# Patient Record
Sex: FEMALE | Race: BLACK OR AFRICAN AMERICAN | NOT HISPANIC OR LATINO | Employment: UNEMPLOYED
[De-identification: names, ages, dates, MRNs, and addresses within clinical notes are randomized per-mention and may not be internally consistent; named-entity substitution may affect disease eponyms.]

---

## 2017-01-03 ENCOUNTER — RECORDS - HEALTHEAST (OUTPATIENT)
Dept: ADMINISTRATIVE | Facility: OTHER | Age: 27
End: 2017-01-03

## 2017-01-12 ENCOUNTER — TRANSFERRED RECORDS (OUTPATIENT)
Dept: HEALTH INFORMATION MANAGEMENT | Facility: CLINIC | Age: 27
End: 2017-01-12

## 2017-01-12 ENCOUNTER — OFFICE VISIT - HEALTHEAST (OUTPATIENT)
Dept: PODIATRY | Facility: CLINIC | Age: 27
End: 2017-01-12

## 2017-01-12 DIAGNOSIS — M72.2 PLANTAR FASCIITIS: ICD-10-CM

## 2017-01-16 ENCOUNTER — RECORDS - HEALTHEAST (OUTPATIENT)
Dept: ADMINISTRATIVE | Facility: OTHER | Age: 27
End: 2017-01-16

## 2017-02-03 ENCOUNTER — TELEPHONE (OUTPATIENT)
Dept: FAMILY MEDICINE | Facility: CLINIC | Age: 27
End: 2017-02-03

## 2017-02-03 ENCOUNTER — AMBULATORY - HEALTHEAST (OUTPATIENT)
Dept: PODIATRY | Facility: CLINIC | Age: 27
End: 2017-02-03

## 2017-02-03 NOTE — TELEPHONE ENCOUNTER
Pt c/o white discharge, odor, and irritation. Been going on for 2 wks now. Pt would like metronidazole sent to her pharmacy. Pt states she has had BV before and is having the same symptoms. Advised pt that I will send this to DR. Ochoa but she may have to come in.     Routed to Dr. Ochoa. /HOMERO Rios

## 2017-02-03 NOTE — TELEPHONE ENCOUNTER
[facility] phone call message- patient requesting a refill:    Full Medication Name: METRONIDAZOLE (FLAGYL) 500 MG tablet     *    Pharmacy confirmed as   [facility] - [place], [place] - [address]  [address]  [place] [address]  Phone: [phone] Fax: [phone]  : Yes    Additional Comments: Pt got her BV back again.  She is wondering if the doctor will call the refill in.    OK to leave a message on voice mail? Yes    Primary language: English      needed? No    Call taken on February 3, 2017 at 11:13 AM by Karel Darnell

## 2017-02-05 NOTE — TELEPHONE ENCOUNTER
She should come in. I think she needs a referral to OB/Gyn for recurrent BV but it needs to be confirmed with a wet prep.     Thank you.

## 2017-02-06 ENCOUNTER — OFFICE VISIT (OUTPATIENT)
Dept: FAMILY MEDICINE | Facility: CLINIC | Age: 27
End: 2017-02-06

## 2017-02-06 VITALS
SYSTOLIC BLOOD PRESSURE: 120 MMHG | HEIGHT: 63 IN | BODY MASS INDEX: 11.16 KG/M2 | DIASTOLIC BLOOD PRESSURE: 89 MMHG | WEIGHT: 63 LBS | HEART RATE: 89 BPM

## 2017-02-06 DIAGNOSIS — M72.2 PLANTAR FASCIITIS: ICD-10-CM

## 2017-02-06 DIAGNOSIS — B96.89 BV (BACTERIAL VAGINOSIS): ICD-10-CM

## 2017-02-06 DIAGNOSIS — R87.612 PAPANICOLAOU SMEAR OF CERVIX WITH LOW GRADE SQUAMOUS INTRAEPITHELIAL LESION (LGSIL): ICD-10-CM

## 2017-02-06 DIAGNOSIS — N89.8 VAGINAL DISCHARGE: Primary | ICD-10-CM

## 2017-02-06 DIAGNOSIS — N76.0 BV (BACTERIAL VAGINOSIS): ICD-10-CM

## 2017-02-06 DIAGNOSIS — R87.610 ATYPICAL SQUAMOUS CELLS OF UNDETERMINED SIGNIFICANCE ON CYTOLOGIC SMEAR OF CERVIX (ASC-US): ICD-10-CM

## 2017-02-06 LAB
BACTERIA: NORMAL
CLUE CELLS: NORMAL
MOTILE TRICHOMONAS: NEGATIVE
ODOR: POSITIVE
PH WET PREP: NORMAL
WBC WET PREP: NORMAL
YEAST: NORMAL

## 2017-02-06 RX ORDER — CLINDAMYCIN HCL 300 MG
300 CAPSULE ORAL 2 TIMES DAILY
Qty: 14 CAPSULE | Refills: 0 | Status: SHIPPED | OUTPATIENT
Start: 2017-02-06 | End: 2017-02-13

## 2017-02-06 NOTE — PATIENT INSTRUCTIONS
Kee was seen today for vaginal problem, back pain and musculoskeletal problem.    Diagnoses and all orders for this visit:    Vaginal discharge  -     Wet Prep (P )  -     Chlamydia/Gono Amplified ([facility])    Plantar fasciitis  -     SPORTS MEDICINE REFERRAL  - Do the stretching like we discussed. Stretch for 30-60 seconds for 5-10 repetitions each session (do this for each leg). Do this 3-4 times per day.     Drink at least 64 ounces of water per day.   Increased your fruit and vegetable intake as this has nutrients that will help your body recover.     Please return to clinic in 2-3 months to recheck, sooner as needed.     Thank you for coming to [facility].  **If you had lab testing today and your results are reassuring or normal they will be be mailed to you within 7 days.   **If the lab tests need quick action we will call you with the results.  The phone number we will call with results is # [phone] (home) . If this is not the best number please call our clinic and change the number.  If you need any refills please call your pharmacy and they will contact us.  If you have any concerns about today's visit or wish to schedule another appointment please call our office during normal business hours [phone] (8-5:00 M-F)  If you have urgent medical concerns please call [phone] at any time of the day.  If you a medical emergency please call [phone]  Again thank you for choosing [facility] and please let us know how we can best partner with you to improve you and your family's health.        Treating Plantar Fasciitis  First, your healthcare provider tries to determine the cause of your problem in order to suggest ways to relieve pain. If your pain is due to poor foot mechanics, custom-made shoe inserts (orthoses) may help.    Reduce symptoms    To relieve mild symptoms, try aspirin, ibuprofen, or other medicines as directed. Rubbing ice on the affected area may also help.    To reduce  severe pain and swelling, your healthcare provider may prescribe pills or injections or a walking cast in some instances. Physical therapy, such as ultrasound or a daily stretching program, may also be recommended. Surgery is rarely required.    To reduce symptoms caused by poor foot mechanics, your foot may be taped. This supports the arch and temporarily controls movement. Night splints may also help by stretching the fascia.  Control movement  If taping helps, your healthcare provider may prescribe orthoses. Built from plaster casts of your feet, these inserts control the way your foot moves. As a result, your symptoms should go away.  Reduce overuse  Every time your foot strikes the ground, the plantar fascia is stretched. You can reduce the strain on the plantar fascia and the possibility of overuse by following these suggestions:    Lose any excess weight.    Avoid running on hard or uneven ground.    Use orthoses at all times in your shoes and house slippers.  If surgery is needed  Your healthcare provider may consider surgery if other types of treatment don't control your pain. During surgery, the plantar fascia is partially cut to release tension. As you heal, fibrous tissue fills the space between the heel bone and the plantar fascia.     8219-4905 [employer]. [address] [place] [address]. All rights reserved. This information is not intended as a substitute for professional medical care. Always follow your healthcare professional's instructions.      ORTHOPEDIC REFERRAL  Orders and demographics faxed to:  [facility]  Ph:  [phone]  Fax:  [phone]  Patient requested that they call her to schedule.  Macey Barrow  2/6/17      Your referral information is below:     [facility] [address], [address]  [place] [address]   [phone]  Please call to reschedule your appointment with OB/GYN.     If you  have any questions or need to reschedule please call the number listed above.  Any other concerns please call our referral coordinator at [phone]    Natalie  Care Coordinator     MAILED TO PATIENT     Message sent to Dr Peng and Gabriela about numerouse attempts to schedule pt with referrals. Natalie Segura 4:02 PM 2/10/2017

## 2017-02-06 NOTE — MR AVS SNAPSHOT
After Visit Summary   2/6/2017    Kee Phipps    MRN: [id number]           Patient Information     Date Of Birth          1990        Visit Information        Provider Department      2/6/2017 10:20 AM Jong Peng MD [facility]        Today's Diagnoses     Vaginal discharge    -  1     Plantar fasciitis         Atypical squamous cells of undetermined significance on cytologic smear of cervix (ASC-US)           Care Instructions    Kee was seen today for vaginal problem, back pain and musculoskeletal problem.    Diagnoses and all orders for this visit:    Vaginal discharge  -     Wet Prep ([facility] )  -     Chlamydia/Gono Amplified ([facility])    Plantar fasciitis  -     SPORTS MEDICINE REFERRAL  - Do the stretching like we discussed. Stretch for 30-60 seconds for 5-10 repetitions each session (do this for each leg). Do this 3-4 times per day.     Drink at least 64 ounces of water per day.   Increased your fruit and vegetable intake as this has nutrients that will help your body recover.     Please return to clinic in 2-3 months to recheck, sooner as needed.     Thank you for coming to [facility].  **If you had lab testing today and your results are reassuring or normal they will be be mailed to you within 7 days.   **If the lab tests need quick action we will call you with the results.  The phone number we will call with results is # [phone] (home) . If this is not the best number please call our clinic and change the number.  If you need any refills please call your pharmacy and they will contact us.  If you have any concerns about today's visit or wish to schedule another appointment please call our office during normal business hours [phone] (8-5:00 M-F)  If you have urgent medical concerns please call [phone] at any time of the day.  If you a medical emergency please call [phone]  Again thank you for choosing [facility] and please let us know how we can best  partner with you to improve you and your family's health.        Treating Plantar Fasciitis  First, your healthcare provider tries to determine the cause of your problem in order to suggest ways to relieve pain. If your pain is due to poor foot mechanics, custom-made shoe inserts (orthoses) may help.    Reduce symptoms    To relieve mild symptoms, try aspirin, ibuprofen, or other medicines as directed. Rubbing ice on the affected area may also help.    To reduce severe pain and swelling, your healthcare provider may prescribe pills or injections or a walking cast in some instances. Physical therapy, such as ultrasound or a daily stretching program, may also be recommended. Surgery is rarely required.    To reduce symptoms caused by poor foot mechanics, your foot may be taped. This supports the arch and temporarily controls movement. Night splints may also help by stretching the fascia.  Control movement  If taping helps, your healthcare provider may prescribe orthoses. Built from plaster casts of your feet, these inserts control the way your foot moves. As a result, your symptoms should go away.  Reduce overuse  Every time your foot strikes the ground, the plantar fascia is stretched. You can reduce the strain on the plantar fascia and the possibility of overuse by following these suggestions:    Lose any excess weight.    Avoid running on hard or uneven ground.    Use orthoses at all times in your shoes and house slippers.  If surgery is needed  Your healthcare provider may consider surgery if other types of treatment don't control your pain. During surgery, the plantar fascia is partially cut to release tension. As you heal, fibrous tissue fills the space between the heel bone and the plantar fascia.     5440-7295 [employer]. [address] [place] [address], [place] [address]. All rights reserved. This information is not intended as a substitute for professional medical care. Always follow your healthcare  professional's instructions.              Follow-ups after your visit        Additional Services     SPORTS MEDICINE REFERRAL       Your provider has referred you to:  [facility]---Appointments: [phone]    Please be aware that coverage of these services is subject to the terms and limitations of your health insurance plan.  Call member services at your health plan with any benefit or coverage questions.      Please bring the following to your appointment:    >>   Any x-rays, CTs or MRIs which have been performed.  Contact the facility where they were done to arrange for  prior to your scheduled appointment.  Any new CT, MRI or other procedures ordered by your specialist must be performed at a [facility] or coordinated by your clinic's referral office.    >>   List of current medications   >>   This referral request   >>   Any documents/labs given to you for this referral                  Who to contact     Please call your clinic at [phone] to:    Ask questions about your health    Make or cancel appointments    Discuss your medicines    Learn about your test results    Speak to your doctor   If you have compliments or concerns about an experience at your clinic, or if you wish to file a complaint, please contact [facility] Patient Relations at [phone] or email us at [email or web address]         Additional Information About Your Visit        [employer] Information     [employer] gives you secure access to your electronic health record. If you see a primary care provider, you can also send messages to your care team and make appointments. If you have questions, please call your primary care clinic.  If you do not have a primary care provider, please call [phone] and they will assist you.      [employer] is an electronic gateway that provides easy, online access to your medical records. With [employer], you can request a clinic appointment, read your  "test results, renew a prescription or communicate with your care team.     To access your existing account, please contact your [facility] or call [phone] for assistance.        Care EveryWhere ID     This is your Care EveryWhere ID. This could be used by other organizations to access your [facility] medical records  [id number]        Your Vitals Were     Pulse Height BMI (Body Mass Index)             89 5' 3\" (160 cm) 11.16 kg/m2          Blood Pressure from Last 3 Encounters:   02/06/17 120/89   12/28/16 129/82   12/21/16 118/80    Weight from Last 3 Encounters:   02/06/17 63 lb (28.577 kg)   12/28/16 168 lb 12.8 oz (76.567 kg)   11/23/16 170 lb 9.6 oz (77.384 kg)              We Performed the Following     Chlamydia/Gono Amplified ([facility])     GYN Cytology ([facility])     SPORTS MEDICINE REFERRAL     Wet Prep ([facility])        Primary Care Provider Office Phone # Fax #    Dayna Ochoa [phone]       [address] [address]        Thank you!     Thank you for choosing [facility]  for your care. Our goal is always to provide you with excellent care. Hearing back from our patients is one way we can continue to improve our services. Please take a few minutes to complete the written survey that you may receive in the mail after your visit with us. Thank you!             Your Updated Medication List - Protect others around you: Learn how to safely use, store and throw away your medicines at www.disposemymeds.org.          This list is accurate as of: 2/6/17 11:23 AM.  Always use your most recent med list.                   Brand Name Dispense Instructions for use    acetaminophen 500 MG tablet    TYLENOL    100 tablet    Take 2 tablets (1,000 mg) by mouth 3 times daily       dextromethorphan 15 MG/5ML syrup      Take 10 mLs by mouth 4 times daily as needed       diphenhydrAMINE 25 MG tablet    BENADRYL    60 tablet    Take 1-2 " tablets (25-50 mg) by mouth every 6 hours as needed for allergies or other       gabapentin 300 MG capsule    NEURONTIN    180 capsule    Take 2 capsules (600 mg) by mouth 2 times daily 2 in the morning and 1 tablet at night       metroNIDAZOLE 500 MG tablet    FLAGYL    14 tablet    Take 1 tablet (500 mg) by mouth 2 times daily       norgestim-eth estrad triphasic 0.18/0.215/0.25 MG-35 MCG per tablet    TRINESSA (28)    28 tablet    Take 1 tablet by mouth daily       PREDNISONE PO      Take 10 mg by mouth       PRENATAL VITAMINS PLUS 27-1 MG Tabs     90 tablet    Take 1 tablet by mouth daily       SUMAtriptan 50 MG tablet    IMITREX    18 tablet    Take 1 tablet (50 mg) by mouth at onset of headache for migraine May repeat in 2 hours. Max 8 tablets/24 hours.

## 2017-02-06 NOTE — PROGRESS NOTES
OFFICE VISIT    ASSESSMENT/PLAN:   Kee was seen today for vaginal problem, back pain and musculoskeletal problem.    Diagnoses and all orders for this visit:    Vaginal discharge: patient update regarding wet prep. Clindamycin prescription sent as patient has been taking metronidazole only thus far.   -     Wet Prep (P )  -     Chlamydia/Gono Amplified ([facility])    Plantar fasciitis: will schedule an appointment with sports medicine for further assistance in cares.   -     SPORTS MEDICINE REFERRAL  - Do the stretching like we discussed. Stretch for 30-60 seconds for 5-10 repetitions each session (do this for each leg). Do this 3-4 times per day.     Drink at least 64 ounces of water per day.   Increased your fruit and vegetable intake as this has nutrients that will help your body recover.     Please return to clinic in 2-3 months to recheck, sooner as needed.       SUBJECTIVE: 26 year old female with history of plantar fasciitis, fybromyalgia, celiac disease, Crohn's disease presenting with concerns for bacterial infection of vagina, back pain, and foot pain.     Vaginal concern: Thinks she may have a bacterial infection again. Discharge looks milky and turns yellow/brown. No itching. Has a cold right now for the last three days. Taking nyquil and dayquil. She feels weak and tired. Getting rest. Also has stuffy/runny nose.  She wonders if the BV could be caused by intercourse. She was avoid use of soap in the vaginal area for a while and this seemed to go okay until she started to use the soap again and that was when this episode happened. She usually takes metronidazole for the BV. She does not think it is a urine issue. Denies fevers.     Back pain: the whole back is hurting. Was hurting before she got sick. 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 It is the fibromyalgia acting up again. The follows with a rheumatologist for this and getting treatment  "from them. Denies stool/urine incontinence    Foot pain: present on both feet. Thinks it is plantar fasciitis. Got cortisone shot in the feet and has custom shoe inserts from her podiatrist. She has tried stretching multiple ways and these do not seem to help. 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 She was a MFIP form filled out to state that she should not work on her feet for long periods of time, which provider thinks is reasonable. Patient does want to work and is hoping she can find a desk job, such as , as she is good at talking with people. The medical opinion form was completed, original given to patient and a copy was faxed to . The form will last for three months at which time the patient should be re-evaluated.     The 10 point ROS is negative except as stated in the HPI.    OBJECTIVE:   /89 mmHg  Pulse 89  Ht 5' 3\" (160 cm)  Wt 63 lb (28.577 kg)  BMI 11.16 kg/m2  GENERAL: No acute distress. Cooperative. Well-appearing.  HEENT: Normocephalic atraumatic. No conjunctival injection or scleral icterus. Nares patent without rhinorrhea. Oral mucosa is moist  CARDIO: Regular rate and rhythm. S1 S2 present. No murmurs, gallops, or rubs.  RESP: Clear to auscultation bilaterally. No wheezing, rales or rhonchi. Good breath sounds throughout. No use of intercostal or other accessory muscles to breathe.  ABDO: Positive bowel sounds. Non-distended. Some tenderness to palpation of the pelvic region at midline and just right of midline. No guarding. No rebound tenderness.  EXTR: Moves all limbs independently. Bilateral upper extremity strength 5/5 in adduction, abduction, flexion, and extension. Bilateral lower extremity strength 5/5 in adduction, abduction, flexion, and extension. No lower extremity edema x 2.  : external vaginal area appears normal. " There is moderate amounts of thick, white discharge. Vaginal vault and cervix appears normal and without tenderness to exam. Uterus does not appear enlarged.     Discussed with Dr. Simmons who agrees with the above assessment and plan.    Jong Peng MD

## 2017-02-07 LAB
C TRACH RRNA CVX QL NAA+PROBE: NEGATIVE
N GONORRHOEA RRNA SPEC QL NAA+PROBE: NEGATIVE

## 2017-02-07 ASSESSMENT — PATIENT HEALTH QUESTIONNAIRE - PHQ9: SUM OF ALL RESPONSES TO PHQ QUESTIONS 1-9: 24

## 2017-02-07 NOTE — PROGRESS NOTES
Preceptor attestation:  Patient seen and discussed with the resident. Assessment and plan reviewed with resident and agreed upon.  Supervising physician: Vance Simmons  [facility]

## 2017-02-09 ENCOUNTER — RECORDS - HEALTHEAST (OUTPATIENT)
Dept: ADMINISTRATIVE | Facility: OTHER | Age: 27
End: 2017-02-09

## 2017-02-09 LAB
HIGH RISK?: NO
HPV REFLEX?: NO
LAB AP ABNORMAL BLEEDING: NO
LAB AP BIRTH CONTROL/HORMONES: ABNORMAL
LAB AP CASE REPORT: ABNORMAL
LAB AP CERVICAL APPEARANCE: NORMAL
LAB AP GYN INTERPRETATION: ABNORMAL
LAB AP MALIGNANT?: ABNORMAL
LAB AP PATIENT STATUS: ABNORMAL
LAB AP PREVIOUS ABNL: 2015
LAB AP PREVIOUS NORMAL: ABNORMAL
LAST MENS PERIOD: ABNORMAL
SPECIMEN ADEQUACY:: ABNORMAL

## 2017-02-10 NOTE — PROGRESS NOTES
Quick Note:    Spoke with patient on phone 2/10 - recommended going to OB/Gyn for potential colp among other  issues (recurrent BV and pelvic pain). Will send in new referral. She understands plan.  ______

## 2017-02-13 ENCOUNTER — TRANSFERRED RECORDS (OUTPATIENT)
Dept: HEALTH INFORMATION MANAGEMENT | Facility: CLINIC | Age: 27
End: 2017-02-13

## 2017-02-15 ENCOUNTER — TELEPHONE (OUTPATIENT)
Dept: FAMILY MEDICINE | Facility: CLINIC | Age: 27
End: 2017-02-15

## 2017-02-15 DIAGNOSIS — Z30.41 ENCOUNTER FOR SURVEILLANCE OF CONTRACEPTIVE PILLS: Primary | ICD-10-CM

## 2017-02-16 ENCOUNTER — TRANSFERRED RECORDS (OUTPATIENT)
Dept: HEALTH INFORMATION MANAGEMENT | Facility: CLINIC | Age: 27
End: 2017-02-16

## 2017-02-16 NOTE — TELEPHONE ENCOUNTER
Triaged pt yesterday. Pt states she is on birth control pills and doesn't want to have periods. Discussed taking 3 wks of the pack and on the fourth wk, start a new pack to avoid having a period. Advised pt that I will ask her MD to see if this is something she should or not should not do long term. Please advise. If okay to do this, please rewrite sig on birth control rx.     Routed to Dr. Ochoa.   /HOMERO Rios

## 2017-02-19 RX ORDER — DROSPIRENONE AND ETHINYL ESTRADIOL 0.02-3(28)
1 KIT ORAL DAILY
Qty: 84 TABLET | Refills: 1 | Status: SHIPPED
Start: 2017-02-19 | End: 2017-04-17

## 2017-02-20 NOTE — TELEPHONE ENCOUNTER
Sent in prescription for joyce - 3 month at a time.   Please let patient know and advise her that we can do IUD or nexplanon which can sometimes reduce periods and protects her from getting pregnant for 3-5 years.   Dr. Ochoa

## 2017-02-20 NOTE — TELEPHONE ENCOUNTER
Gave msg to pt. Pt states she prefers to continue to take the pill.     Routed to Dr. Ochoa/HOMERO Rios

## 2017-02-21 DIAGNOSIS — G89.29 OTHER CHRONIC PAIN: ICD-10-CM

## 2017-02-21 RX ORDER — GABAPENTIN 300 MG/1
600 CAPSULE ORAL 2 TIMES DAILY
Qty: 180 CAPSULE | Refills: 1 | Status: SHIPPED
Start: 2017-02-21 | End: 2017-04-17

## 2017-02-28 ENCOUNTER — TELEPHONE (OUTPATIENT)
Dept: FAMILY MEDICINE | Facility: CLINIC | Age: 27
End: 2017-02-28

## 2017-02-28 NOTE — TELEPHONE ENCOUNTER
[facility] phone call message- patient requesting results:    Test: Lab and PAP    Date of test:     Additional Comments: The pt is at the appointment that she needed the results for     OK to leave a message on voice mail? Yes    Primary language: English      needed? No    Call taken on February 28, 2017 at 11:05 AM by Nahum Lawrence

## 2017-03-03 ENCOUNTER — TELEPHONE (OUTPATIENT)
Dept: FAMILY MEDICINE | Facility: CLINIC | Age: 27
End: 2017-03-03

## 2017-03-03 NOTE — TELEPHONE ENCOUNTER
Refill request for Methocarbamol 500 mg tablets (Not on patients medication list) please advise.

## 2017-03-09 ENCOUNTER — OFFICE VISIT - HEALTHEAST (OUTPATIENT)
Dept: PODIATRY | Facility: CLINIC | Age: 27
End: 2017-03-09

## 2017-03-09 DIAGNOSIS — M72.2 PLANTAR FASCIITIS: ICD-10-CM

## 2017-03-10 ENCOUNTER — TELEPHONE (OUTPATIENT)
Dept: FAMILY MEDICINE | Facility: CLINIC | Age: 27
End: 2017-03-10

## 2017-03-10 DIAGNOSIS — M79.7 FIBROMYALGIA: Primary | ICD-10-CM

## 2017-03-14 ENCOUNTER — OFFICE VISIT (OUTPATIENT)
Dept: FAMILY MEDICINE | Facility: CLINIC | Age: 27
End: 2017-03-14

## 2017-03-14 VITALS
HEART RATE: 94 BPM | SYSTOLIC BLOOD PRESSURE: 113 MMHG | DIASTOLIC BLOOD PRESSURE: 80 MMHG | OXYGEN SATURATION: 99 % | TEMPERATURE: 97.8 F

## 2017-03-14 DIAGNOSIS — H53.8 BLURRED VISION: ICD-10-CM

## 2017-03-14 DIAGNOSIS — M79.7 FIBROMYALGIA: Primary | ICD-10-CM

## 2017-03-14 RX ORDER — METHOCARBAMOL 750 MG/1
750 TABLET, FILM COATED ORAL 3 TIMES DAILY PRN
Qty: 180 TABLET | Refills: 0 | Status: SHIPPED
Start: 2017-03-14 | End: 2018-09-06

## 2017-03-14 NOTE — MR AVS SNAPSHOT
After Visit Summary   3/14/2017    Kee Phipps    MRN: [id number]           Patient Information     Date Of Birth          1990        Visit Information        Provider Department      3/14/2017 9:00 AM Wesley Ferris DO [facility]        Today's Diagnoses     Fibromyalgia    -  1    Blurred vision          Care Instructions    Thank you for coming to clinic today.  Please do not hesitate to call or return if you have any questions.    1)  new medication, you can increased to 50mg daily in a week or so if you are not having any benefit  2) Schedule appointment with eye doctor  3) Return in 1 month for follow-up, or sooner if you have any new symptoms or concerns    Sincerely,  Dr. Ferris          Follow-ups after your visit        Additional Services     OPHTHALMOLOGY ADULT REFERRAL       Patient to stop at the [facility]    Reason for Referral: blurry vision, hx Crohn's dz     needed: No  Language: English    May leave message on voicemail: Yes    ([place] Only) Referral should be tracked (Yes/No)?                  Future tests that were ordered for you today     Open Future Orders        Priority Expected Expires Ordered    OPHTHALMOLOGY ADULT REFERRAL Routine  5/14/2017 3/14/2017            Who to contact     Please call your clinic at [phone] to:    Ask questions about your health    Make or cancel appointments    Discuss your medicines    Learn about your test results    Speak to your doctor   If you have compliments or concerns about an experience at your clinic, or if you wish to file a complaint, please contact [facility] Patient Relations at [phone] or email us at [email or web address]         Additional Information About Your Visit        MyChart Information     [employer] gives you secure access to your electronic health record. If you see a primary care provider, you can also send messages to your care team and  make appointments. If you have questions, please call your primary care clinic.  If you do not have a primary care provider, please call [phone] and they will assist you.      [employer] is an electronic gateway that provides easy, online access to your medical records. With [employer] you can request a clinic appointment, read your test results, renew a prescription or communicate with your care team.     To access your existing account, please contact your [facility] or call [phone] for assistance.        Care EveryWhere ID     This is your Care EveryWhere ID. This could be used by other organizations to access your [place] medical records  [id number]        Your Vitals Were     Pulse Temperature Last Period Pulse Oximetry          94 97.8  F (36.6  C) (Oral) 03/09/2017 99%         Blood Pressure from Last 3 Encounters:   03/14/17 113/80   02/06/17 120/89   12/28/16 129/82    Weight from Last 3 Encounters:   02/06/17 63 lb (28.6 kg)   12/28/16 168 lb 12.8 oz (76.6 kg)   11/23/16 170 lb 9.6 oz (77.4 kg)                 Today's Medication Changes          These changes are accurate as of: 3/14/17  9:42 AM.  If you have any questions, ask your nurse or doctor.               Start taking these medicines.        Dose/Directions    amitriptyline 25 MG tablet   Commonly known as:  ELAVIL   Used for:  Fibromyalgia   Started by:  Wesley Ferris DO        Dose:  25 mg   Take 1 tablet (25 mg) by mouth At Bedtime   Quantity:  30 tablet   Refills:  1            Where to get your medicines      These medications were sent to [facility] - [place], [place] - [address], [place] [address]     Phone:  [phone]     amitriptyline 25 MG tablet                Primary Care Provider Office Phone # Fax #    Dayna Boxneisha [phone]       [address] [address]        Thank you!     Thank you for choosing  [facility]  for your care. Our goal is always to provide you with excellent care. Hearing back from our patients is one way we can continue to improve our services. Please take a few minutes to complete the written survey that you may receive in the mail after your visit with us. Thank you!             Your Updated Medication List - Protect others around you: Learn how to safely use, store and throw away your medicines at www.disposemymeds.org.          This list is accurate as of: 3/14/17  9:42 AM.  Always use your most recent med list.                   Brand Name Dispense Instructions for use    acetaminophen 500 MG tablet    TYLENOL    100 tablet    Take 2 tablets (1,000 mg) by mouth 3 times daily       amitriptyline 25 MG tablet    ELAVIL    30 tablet    Take 1 tablet (25 mg) by mouth At Bedtime       dextromethorphan 15 MG/5ML syrup      Take 10 mLs by mouth 4 times daily as needed       diphenhydrAMINE 25 MG tablet    BENADRYL    60 tablet    Take 1-2 tablets (25-50 mg) by mouth every 6 hours as needed for allergies or other       drospirenone-ethinyl estradiol 3-0.02 MG per tablet    HELDER    84 tablet    Take 1 tablet by mouth daily       gabapentin 300 MG capsule    NEURONTIN    180 capsule    Take 2 capsules (600 mg) by mouth 2 times daily 2 in the morning and 1 tablet at night       norgestim-eth estrad triphasic 0.18/0.215/0.25 MG-35 MCG per tablet    TRINESSA (28)    28 tablet    Take 1 tablet by mouth daily       PREDNISONE PO      Take 10 mg by mouth       PRENATAL VITAMINS PLUS 27-1 MG Tabs     90 tablet    Take 1 tablet by mouth daily       SUMAtriptan 50 MG tablet    IMITREX    18 tablet    Take 1 tablet (50 mg) by mouth at onset of headache for migraine May repeat in 2 hours. Max 8 tablets/24 hours.
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
## 2017-03-14 NOTE — NURSING NOTE
Bilateral vision screening 10/10  Right vision screening 10/10  Left vision screening 10/10  [facility]

## 2017-03-14 NOTE — PATIENT INSTRUCTIONS
Thank you for coming to clinic today.  Please do not hesitate to call or return if you have any questions.    1)  new medication, you can increased to 50mg daily in a week or so if you are not having any benefit  2) Schedule appointment with eye doctor  3) Return in 1 month for follow-up, or sooner if you have any new symptoms or concerns    Sincerely,  Dr. Ferris    Opthalmology Referral  Unable to LVM- Not able to reach pt to schedule referral Natalie Segura 3:40 PM 3/16/2017      Your opthalmology referral information:  [facility]  [address] [place] [address]  [phone]    DATE: Tuesday, March 28th  TIME: 10:30am with Dr Michael    Arrive a few minutes early for new patient paperwork.  Bring your insurance card and photo ID.  The appointment will last anywhere from 90 minutes-2 hours.  Discussed appointment times with patient, unable to reach her after scheduling the appointment. Appointment information mailed to patient.    If you have any questions or need to help rescheduling this appointment please call us at [phone].    Genoveva

## 2017-03-14 NOTE — PROGRESS NOTES
Preceptor attestation:  Patient seen and discussed with the resident. Assessment and plan reviewed with resident and agreed upon.  Supervising physician: Reyes Fair  [facility]

## 2017-04-17 ENCOUNTER — OFFICE VISIT (OUTPATIENT)
Dept: FAMILY MEDICINE | Facility: CLINIC | Age: 27
End: 2017-04-17

## 2017-04-17 VITALS
OXYGEN SATURATION: 97 % | DIASTOLIC BLOOD PRESSURE: 81 MMHG | SYSTOLIC BLOOD PRESSURE: 118 MMHG | WEIGHT: 172.2 LBS | BODY MASS INDEX: 30.5 KG/M2 | TEMPERATURE: 98.2 F | HEART RATE: 88 BPM

## 2017-04-17 DIAGNOSIS — Z11.3 SCREEN FOR STD (SEXUALLY TRANSMITTED DISEASE): ICD-10-CM

## 2017-04-17 DIAGNOSIS — G89.29 OTHER CHRONIC PAIN: ICD-10-CM

## 2017-04-17 DIAGNOSIS — K90.0 CELIAC DISEASE: Primary | ICD-10-CM

## 2017-04-17 DIAGNOSIS — R07.0 THROAT PAIN: ICD-10-CM

## 2017-04-17 DIAGNOSIS — K50.819 CROHN'S DISEASE OF SMALL AND LARGE INTESTINES WITH COMPLICATION (H): ICD-10-CM

## 2017-04-17 LAB — HIV 1+2 AB+HIV1 P24 AG SERPL QL IA: NEGATIVE

## 2017-04-17 RX ORDER — DIPHENHYDRAMINE HYDROCHLORIDE AND LIDOCAINE HYDROCHLORIDE AND ALUMINUM HYDROXIDE AND MAGNESIUM HYDRO
5-10 KIT EVERY 6 HOURS PRN
Qty: 237 ML | Refills: 1 | Status: SHIPPED | OUTPATIENT
Start: 2017-04-17 | End: 2017-10-12

## 2017-04-17 RX ORDER — CHOLECALCIFEROL (VITAMIN D3) 50 MCG
2000 TABLET ORAL DAILY
Qty: 100 TABLET | Refills: 3 | Status: SHIPPED | OUTPATIENT
Start: 2017-04-17 | End: 2017-10-12

## 2017-04-17 RX ORDER — GABAPENTIN 600 MG/1
600 TABLET ORAL 2 TIMES DAILY
Qty: 120 TABLET | Refills: 3 | COMMUNITY
Start: 2017-04-17 | End: 2017-10-09

## 2017-04-17 RX ORDER — GABAPENTIN 300 MG/1
600 CAPSULE ORAL 2 TIMES DAILY
Qty: 180 CAPSULE | Refills: 1 | Status: SHIPPED | OUTPATIENT
Start: 2017-04-17 | End: 2017-05-24

## 2017-04-17 NOTE — LETTER
April 20, 2017      Kee Phipps  [address]  [place] [address]        Dear Kee,    Please see below for your test results.    Resulted Orders   Syphilis Screen [place] (RPR/VDRL) ([facility])   Result Value Ref Range    Syphilis Screen [place] Non-Reactive Non-Reactive    Narrative    Test performed by:  [facility] [address] [place] [address]   HIV Ag/Ab Screen [place] ([facility])   Result Value Ref Range    HIV Antigen/Antibody Negative Negative    Narrative    Test performed by:  [facility] [address] [place] [address]   Chlamydia/Gono Amplified ([facility])   Result Value Ref Range    Chlamydia trac,Amplified Prb Negative Negative    N gonorrhoeae,Amplified Prb Negative Negative    Narrative    Test performed by:  [facility] [address] [place] [address]     Normal.      If you have any questions, please call the clinic to make an appointment.    Sincerely,    Kyler García MD

## 2017-04-17 NOTE — PROGRESS NOTES
SUBJECTIVE:  Kee Phipps is a 26-year-old who came in today with a couple of concerns:   1.  She would like STI screening.  She was in a relationship (which was monogamous on her part) with a male partner, but she wanted to make sure that she hadn't been exposed to STIs.  She didn't have  unexpected vaginal discharge.  She had vaginal discharge that changed in the last year or so, but there wasn't an increased amount of change in color or odor.   2.  She was concerned about pain in her mouth and she wasn't sure if this represented a flare of Crohn's disease (which she has) or that this could represent a herpes simplex infection or other.  She had history of Crohn's disease and had been on an injectable immunomodulator;  she was off of that when she was pregnant.  She actually had an elective  and then never restarted the immunomodulator.  She was able to get that restarted again through her gastroenterologist.    I reviewed and updated as necessary in Epic, the allergies, medications, and problem list.   REVIEW OF SYSTEMS:  No diarrhea, blood in the stool, or mucus in the stool.  No abdominal pain.  No fevers or chills.  No unexpected weight loss.  No rash.      OBJECTIVE:     VITAL SIGNS:  Noted in Epic.   HEENT:  TMs were normal color and landmarks.  Nasopharynx was pink and moist.  Oropharynx showed pharyngitis and enlarged taste buds on the tongue, but no ulceration was noted at all on the tongue or the oropharynx.   NECK:  Supple, w/out lymphadenopathy.   LUNGS:  Clear to auscultation w/out wheezing or crackles.   HEART:  Regular rate and rhythm.   ABDOMEN:  Mildly obese, soft, and nontender.   GENITOURINARY:  External perineal exam was done with chaperone present.  There was no evidence of herpes simplex virus, genital warts, or other lesion.  Wet prep wasn't done as patient was asymptomatic and patient was up-to-date on Pap smear.   ASSESSMENT AND PLAN:   1.  Pain in the mouth and throat.  This is  most likely pharyngitis.  It could represent an early Crohn's flare, but in my judgment it was mild enough to not warrant treatment w/prednisone if that were the case.  We'll treat with Magic mouthwash.    2.  Screening for STIs.  This was done via visual inspection.  We'll also send for urine GC/chlamydia and serum for VDRL and HIV.  F/u as needed.

## 2017-04-17 NOTE — MR AVS SNAPSHOT
After Visit Summary   4/17/2017    Kee Phipps    MRN: [id number]           Patient Information     Date Of Birth          1990        Visit Information        Provider Department      4/17/2017 10:40 AM Kyler García MD [facility]        Today's Diagnoses     Celiac disease    -  1    Other chronic pain        Crohn's disease of small and large intestines with complication (H)        Screen for STD (sexually transmitted disease)        Throat pain           Follow-ups after your visit        Who to contact     Please call your clinic at [phone] to:    Ask questions about your health    Make or cancel appointments    Discuss your medicines    Learn about your test results    Speak to your doctor   If you have compliments or concerns about an experience at your clinic, or if you wish to file a complaint, please contact [facility] Patient Relations at [phone] or email us at [email or web address]         Additional Information About Your Visit        MyChart Information     [employer] gives you secure access to your electronic health record. If you see a primary care provider, you can also send messages to your care team and make appointments. If you have questions, please call your primary care clinic.  If you do not have a primary care provider, please call [phone] and they will assist you.      [employer] is an electronic gateway that provides easy, online access to your medical records. With [employer], you can request a clinic appointment, read your test results, renew a prescription or communicate with your care team.     To access your existing account, please contact your [facility] or call [phone] for assistance.        Care EveryWhere ID     This is your Care EveryWhere ID. This could be used by other organizations to access your [place] medical records  [id number]        Your Vitals Were     Pulse  Temperature Last Period Pulse Oximetry BMI (Body Mass Index)       88 98.2  F (36.8  C) (Oral) 04/03/2017 97% 30.5 kg/m2        Blood Pressure from Last 3 Encounters:   04/17/17 118/81   03/14/17 113/80   02/06/17 120/89    Weight from Last 3 Encounters:   04/17/17 172 lb 3.2 oz (78.1 kg)   02/06/17 63 lb (28.6 kg)   12/28/16 168 lb 12.8 oz (76.6 kg)              We Performed the Following     [facility]     HIV Ag/Ab Screen [place] ([facility])     Syphilis Screen [place] (RPR[facility]VDRL) [facility])          Today's Medication Changes          These changes are accurate as of: 4/17/17 11:59 PM.  If you have any questions, ask your nurse or doctor.               Start taking these medicines.        Dose/Directions    lidocaine visc 2% & diphenhydramine 12.5mg/5mL & maalox/mylanta w/simethicone (1:1:1 v/v/v) Susp compounding kit   Used for:  Throat pain   Started by:  Kyler García MD        Dose:  5-10 mL   Swish and swallow 5-10 mLs in mouth every 6 hours as needed for mouth sores   Quantity:  237 mL   Refills:  1       vitamin D 2000 UNITS tablet   Used for:  Crohn's disease of small and large intestines with complication (H)   Started by:  Kyler García MD        Dose:  2000 Units   Take 2,000 Units by mouth daily   Quantity:  100 tablet   Refills:  3         Stop taking these medicines if you haven't already. Please contact your care team if you have questions.     amitriptyline 25 MG tablet   Commonly known as:  ELAVIL   Stopped by:  Kyler García MD           dextromethorphan 15 MG/5ML syrup   Stopped by:  Kyler García MD           diphenhydrAMINE 25 MG tablet   Commonly known as:  BENADRYL   Stopped by:  Kyler García MD           drospirenone-ethinyl estradiol 3-0.02 MG per tablet   Commonly known as:  HELDER   Stopped by:  Kyler García MD           norgestim-eth estrad triphasic 0.18/0.215/0.25 MG-35 MCG per tablet   Commonly known as:  TRINESSA (28)   Stopped by:  Ricky,  Kyler STOKES MD           PREDNISONE PO   Stopped by:  Kyler García MD           PRENATAL VITAMINS PLUS 27-1 MG Tabs   Stopped by:  Kyler García MD                Where to get your medicines      These medications were sent to [facility] - [place], [place] - [address], [place] [address]     Phone:  [phone]     gabapentin 300 MG capsule    lidocaine visc 2% & diphenhydramine 12.5mg/5mL & maalox/mylanta w/simethicone (1:1:1 v/v/v) Susp compounding kit    vitamin D 2000 UNITS tablet                Primary Care Provider Office Phone # Fax #    Dayna Ochoa [phone]       [address] [address]        Thank you!     Thank you for choosing [facility]  for your care. Our goal is always to provide you with excellent care. Hearing back from our patients is one way we can continue to improve our services. Please take a few minutes to complete the written survey that you may receive in the mail after your visit with us. Thank you!             Your Updated Medication List - Protect others around you: Learn how to safely use, store and throw away your medicines at www.disposemymeds.org.          This list is accurate as of: 4/17/17 11:59 PM.  Always use your most recent med list.                   Brand Name Dispense Instructions for use    acetaminophen 500 MG tablet    TYLENOL    100 tablet    Take 2 tablets (1,000 mg) by mouth 3 times daily       * gabapentin 300 MG capsule    NEURONTIN    180 capsule    Take 2 capsules (600 mg) by mouth 2 times daily 2 in the morning and 1 tablet at night       * gabapentin 600 MG tablet    NEURONTIN    120 tablet    Take 1 tablet (600 mg) by mouth 2 times daily       lidocaine visc 2% & diphenhydramine 12.5mg/5mL & maalox/mylanta w/simethicone (1:1:1 v/v/v) Susp compounding kit     237 mL    Swish and swallow 5-10 mLs in mouth every 6 hours as needed for mouth sores       methocarbamol 750 MG  tablet    ROBAXIN    180 tablet    Take 1 tablet (750 mg) by mouth 3 times daily as needed for muscle spasms       SUMAtriptan 50 MG tablet    IMITREX    18 tablet    Take 1 tablet (50 mg) by mouth at onset of headache for migraine May repeat in 2 hours. Max 8 tablets/24 hours.       vitamin D 2000 UNITS tablet     100 tablet    Take 2,000 Units by mouth daily       * Notice:  This list has 2 medication(s) that are the same as other medications prescribed for you. Read the directions carefully, and ask your doctor or other care provider to review them with you.

## 2017-04-17 NOTE — LETTER
April 18, 2017      Kee Phipps  [address]  [place] [address]        Dear Kee,    Please see below for your test results.    Resulted Orders   HIV Ag/Ab Screen [place] ([facility])   Result Value Ref Range    HIV Antigen/Antibody Negative Negative    Narrative    Test performed by:  [facility]  [address] [place] [address]     Normal.      If you have any questions, please call the clinic to make an appointment.    Sincerely,    Kyler García MD

## 2017-04-18 LAB
C TRACH RRNA SPEC QL NAA+PROBE: NEGATIVE
N GONORRHOEA RRNA SPEC QL NAA+PROBE: NEGATIVE
RPR SER QL: NORMAL

## 2017-04-24 ENCOUNTER — TELEPHONE (OUTPATIENT)
Dept: FAMILY MEDICINE | Facility: CLINIC | Age: 27
End: 2017-04-24

## 2017-04-24 NOTE — TELEPHONE ENCOUNTER
[facility] phone call message- general phone call:    Reason for call: pt would like test results from 04/17/2017. Please call    Return call needed: Yes    OK to leave a message on voice mail? No     Primary language: English      needed? No    Call taken on April 24, 2017 at 3:24 PM by Criselda Joe

## 2017-04-27 ENCOUNTER — RECORDS - HEALTHEAST (OUTPATIENT)
Dept: ADMINISTRATIVE | Facility: OTHER | Age: 27
End: 2017-04-27

## 2017-05-08 ENCOUNTER — COMMUNICATION - HEALTHEAST (OUTPATIENT)
Dept: INFUSION THERAPY | Facility: HOSPITAL | Age: 27
End: 2017-05-08

## 2017-05-09 ENCOUNTER — RECORDS - HEALTHEAST (OUTPATIENT)
Dept: ADMINISTRATIVE | Facility: OTHER | Age: 27
End: 2017-05-09

## 2017-05-12 ENCOUNTER — INFUSION - HEALTHEAST (OUTPATIENT)
Dept: INFUSION THERAPY | Facility: HOSPITAL | Age: 27
End: 2017-05-12

## 2017-05-12 DIAGNOSIS — K50.119 CROHN'S COLITIS, UNSPECIFIED COMPLICATION (H): ICD-10-CM

## 2017-05-12 DIAGNOSIS — K50.119 CROHN'S DISEASE OF COLON, UNSPECIFIED COMPLICATION (H): ICD-10-CM

## 2017-05-24 DIAGNOSIS — G89.29 OTHER CHRONIC PAIN: ICD-10-CM

## 2017-05-25 RX ORDER — GABAPENTIN 300 MG/1
600 CAPSULE ORAL 2 TIMES DAILY
Qty: 180 CAPSULE | Refills: 1 | Status: SHIPPED | OUTPATIENT
Start: 2017-05-25 | End: 2017-10-09

## 2017-05-26 ENCOUNTER — INFUSION - HEALTHEAST (OUTPATIENT)
Dept: INFUSION THERAPY | Facility: HOSPITAL | Age: 27
End: 2017-05-26

## 2017-05-26 DIAGNOSIS — K50.119 CROHN'S COLITIS, UNSPECIFIED COMPLICATION (H): ICD-10-CM

## 2017-05-26 DIAGNOSIS — K50.119 CROHN'S DISEASE OF COLON, UNSPECIFIED COMPLICATION (H): ICD-10-CM

## 2017-06-23 ENCOUNTER — OFFICE VISIT (OUTPATIENT)
Dept: FAMILY MEDICINE | Facility: CLINIC | Age: 27
End: 2017-06-23

## 2017-06-23 VITALS
HEART RATE: 81 BPM | WEIGHT: 172.6 LBS | BODY MASS INDEX: 30.58 KG/M2 | TEMPERATURE: 98.4 F | DIASTOLIC BLOOD PRESSURE: 71 MMHG | SYSTOLIC BLOOD PRESSURE: 104 MMHG | HEIGHT: 63 IN

## 2017-06-23 DIAGNOSIS — G43.719 INTRACTABLE CHRONIC MIGRAINE WITHOUT AURA AND WITHOUT STATUS MIGRAINOSUS: ICD-10-CM

## 2017-06-23 DIAGNOSIS — M79.7 FIBROMYALGIA: Primary | ICD-10-CM

## 2017-06-23 RX ORDER — SUMATRIPTAN 50 MG/1
50 TABLET, FILM COATED ORAL
Qty: 18 TABLET | Refills: 3 | Status: SHIPPED | OUTPATIENT
Start: 2017-06-23 | End: 2017-10-09

## 2017-06-23 ASSESSMENT — ANXIETY QUESTIONNAIRES
1. FEELING NERVOUS, ANXIOUS, OR ON EDGE: MORE THAN HALF THE DAYS
2. NOT BEING ABLE TO STOP OR CONTROL WORRYING: NEARLY EVERY DAY
3. WORRYING TOO MUCH ABOUT DIFFERENT THINGS: NEARLY EVERY DAY
5. BEING SO RESTLESS THAT IT IS HARD TO SIT STILL: MORE THAN HALF THE DAYS
7. FEELING AFRAID AS IF SOMETHING AWFUL MIGHT HAPPEN: SEVERAL DAYS
GAD7 TOTAL SCORE: 17
6. BECOMING EASILY ANNOYED OR IRRITABLE: NEARLY EVERY DAY

## 2017-06-23 ASSESSMENT — PATIENT HEALTH QUESTIONNAIRE - PHQ9: 5. POOR APPETITE OR OVEREATING: NEARLY EVERY DAY

## 2017-06-23 NOTE — PROGRESS NOTES
"       SUBJECTIVE       Kee Phipps is a 26 year old  female with a PMH significant for:     Patient Active Problem List   Diagnosis     Health Care Home     Crohns disease (H)     Need for prophylactic vaccination and inoculation against varicella     Abnormal Pap smear of cervix     Chronic pain     Adjustment disorder with depressed mood     Celiac disease     Chronic pain syndrome     Immunosuppressed status (H)     Multiple joint pain     Back pain     Overweight (BMI 25.0-29.9)     Fibromyalgia     H/O  delivery, currently pregnant     Need for varicella vaccine     She presents with paperwork today.  She needs paperwork for [employer] to try to get public housing.     She notes that she has chronic diseases including: Crohn's, celiac, fibromyalgia and follows with GI and rheumatology.  She also follows with [facility] for plantar fasciitis.  She was given insoles and repors using these regularly.  Not wearing them today - in flip flops.  She notes they are interested in potential surgery for PF but hasn't followed up since March.  She wants a medical opinion form filled out for her disabilities.  I have not seen her in the past. She was sent to physical function testing in December for the purpose of filling this out, but never went. She feels that she cannot perform physicla labor.     Also here with forms for public housing.  I was not able to confirm she has a disability that would limit her ability to live independently     PMH, Medications and Allergies were reviewed and updated as needed.        REVIEW OF SYSTEMS     Pertinent review, per HPI.        OBJECTIVE     Vitals:    17 1015   BP: 104/71   Pulse: 81   Temp: 98.4  F (36.9  C)   TempSrc: Oral   Weight: 172 lb 9.6 oz (78.3 kg)   Height: 5' 3\" (160 cm)     Body mass index is 30.57 kg/(m^2).    Constitutional: Well appearing, no acute distress.  Feet: No swelling bilaterally.  Wearing flip flops, plastic.   Cardio: " "Regular rate and rhythm, no murmurs  Respiratory: No respiratory distress, lungs clear to posterior auscultation bilaterally.     No results found for this or any previous visit (from the past 24 hour(s)).        ASSESSMENT AND PLAN     1. Intractable chronic migraine without aura and without status migrainosus  Refill sumatriptan    Medical opinion forms: Filled this out today for fibromyalgia and plantar fasciitis with the plan to perform physical function testing.  Patient reports that she is following PF treatment plan but is not wearing her insoles today and has not seen [place] recently.  She has not done regular PT and not sure if she is actually doing this.  Patient will follow up in three months for more medical opinion form - noted that if she does not follow her plan and get physical function testing, see rheumatology for her fibromyalgia and follow up with [place] - will write \"not following treatment plan\" on next form.  - Encouraged to see one doctor.   - Genoveva helped discuss housing options with her today.       RTC 3mo or sooner if develops new or worsening symptoms.      Lara Trotter MD  PGY-3 [facility]      "

## 2017-06-23 NOTE — MR AVS SNAPSHOT
After Visit Summary   6/23/2017    Kee Phipps    MRN: [id number]           Patient Information     Date Of Birth          1990        Visit Information        Provider Department      6/23/2017 10:00 AM Lara Trotter MD [facility]        Today's Diagnoses     Fibromyalgia    -  1    Intractable chronic migraine without aura and without status migrainosus          Care Instructions    Referral sent to [facility]  Phone: [phone]  Fax: [phone]  Clinic will contact patient to schedule  es/June 23, 2017              Follow-ups after your visit        Additional Services     OCCUPATIONAL THERAPY REFERRAL       PT/OT REFERRAL  Kee Phipps  1990  Phone #: [phone] (home)    needed: No  Language: English    PT/OT  Facility:   Other: per patient's preference   treat  History: Needs physical function testing for medical opinion forms.     Precautions/Contraindications: None      Surgical Procedure/Test Results: None    Treatment Goals:   Other: Physcial function testing     Visits: Up to 1    Evaluate: Evaluate                  Who to contact     Please call your clinic at [phone] to:    Ask questions about your health    Make or cancel appointments    Discuss your medicines    Learn about your test results    Speak to your doctor   If you have compliments or concerns about an experience at your clinic, or if you wish to file a complaint, please contact [facility] Patient Relations at [phone] or email us at [email or web address]         Additional Information About Your Visit        MyChart Information     [employer] gives you secure access to your electronic health record. If you see a primary care provider, you can also send messages to your care team and make appointments. If you have questions, please call your primary care clinic.  If you do not have a primary care provider, please call [phone] and they  "will assist you.      [employer] is an electronic gateway that provides easy, online access to your medical records. With [employer], you can request a clinic appointment, read your test results, renew a prescription or communicate with your care team.     To access your existing account, please contact your [facility] or call [phone] for assistance.        Care EveryWhere ID     This is your Care EveryWhere ID. This could be used by other organizations to access your [place] medical records  [id number]        Your Vitals Were     Pulse Temperature Height Last Period BMI (Body Mass Index)       81 98.4  F (36.9  C) (Oral) 5' 3\" (160 cm) 06/17/2017 (Approximate) 30.57 kg/m2        Blood Pressure from Last 3 Encounters:   06/23/17 104/71   04/17/17 118/81   03/14/17 113/80    Weight from Last 3 Encounters:   06/23/17 172 lb 9.6 oz (78.3 kg)   04/17/17 172 lb 3.2 oz (78.1 kg)   02/06/17 63 lb (28.6 kg)                 Where to get your medicines      [facility] - [place], [place] - [address], [place] [address]     Phone:  [phone]     SUMAtriptan 50 MG tablet          Primary Care Provider Office Phone # Fax #    Dayna Ochoa [phone]       [address] [address]        Equal Access to Services     EDGAR IRAHETA AH: Hadii aad ku hadasho Socarsonali, waaxda luqadaha, qaybta kaalmada adeegyada, christal chavez. So [facility] [phone].    ATENCIÓN: Si habla español, tiene a jeong disposición servicios gratuitos de asistencia lingüística. Ann Marie al [phone].    We comply with applicable federal civil rights laws and [place] laws. We do not discriminate on the basis of race, color, national origin, age, disability sex, sexual orientation or gender identity.            Thank you!     Thank you for choosing [facility]  for your care. Our goal is " always to provide you with excellent care. Hearing back from our patients is one way we can continue to improve our services. Please take a few minutes to complete the written survey that you may receive in the mail after your visit with us. Thank you!             Your Updated Medication List - Protect others around you: Learn how to safely use, store and throw away your medicines at www.disposemymeds.org.          This list is accurate as of: 6/23/17 11:59 PM.  Always use your most recent med list.                   Brand Name Dispense Instructions for use Diagnosis    acetaminophen 500 MG tablet    TYLENOL    100 tablet    Take 2 tablets (1,000 mg) by mouth 3 times daily    Fibromyalgia       * gabapentin 600 MG tablet    NEURONTIN    120 tablet    Take 1 tablet (600 mg) by mouth 2 times daily        * gabapentin 300 MG capsule    NEURONTIN    180 capsule    Take 2 capsules (600 mg) by mouth 2 times daily 2 in the morning and 1 tablet at night    Other chronic pain       lidocaine visc 2% & diphenhydramine 12.5mg/5mL & maalox/mylanta w/simethicone (1:1:1 v/v/v) Susp compounding kit     237 mL    Swish and swallow 5-10 mLs in mouth every 6 hours as needed for mouth sores    Throat pain       methocarbamol 750 MG tablet    ROBAXIN    180 tablet    Take 1 tablet (750 mg) by mouth 3 times daily as needed for muscle spasms    Fibromyalgia       SUMAtriptan 50 MG tablet    IMITREX    18 tablet    Take 1 tablet (50 mg) by mouth at onset of headache for migraine May repeat in 2 hours. Max 8 tablets/24 hours.    Intractable chronic migraine without aura and without status migrainosus       vitamin D 2000 UNITS tablet     100 tablet    Take 2,000 Units by mouth daily    Crohn's disease of small and large intestines with complication (H)       * Notice:  This list has 2 medication(s) that are the same as other medications prescribed for you. Read the directions carefully, and ask your doctor or other care provider to review  them with you.

## 2017-06-23 NOTE — PROGRESS NOTES
Social Work Note:    Data and Intervention: Met with patient at Dr Trotter's request regarding housing. Patient was here to have a few housing forms filled out but she did not meet the criteria for either program. Discussed patients situation. She was living in a shelter a few years ago but left when they were not accommodating her celiac disease appropriately and she had to go to the ER. In 2016 patient was part of the [employer] program where 6 months of rent are paid with the expectation that patient would then cover her rent moving forward. Patient told me that she was working when she got this housing but lost her job and was unable to pay rent so had to move out in December 2016.    Since December patient has been staying with her brother, friend and mom on and off. Patient also stays in her car. Occasionally her 3 year old son has to sleep in the car with her but he often stays with a family member. Patient stated that all of the people that she has been staying with have Public Housing or Section 8 so she should not be staying with them.    Patient is also in the midst of getting her GED and trying to get an order for protection from her sons . There are a lot of stressors that she is working with but she knows that the housing is important. She told me that two days ago she met with people at [facility]) and chose to be placed on the permanent supportive housing wait list. Patient is aware that she may be on the waiting list for a while so we also discussed applying for Public Housing. Confirmed that she has to apply online.    Patient has MFIP so she gets food stamps and $510/month. Looked at [employer] for housing within [place] miles of [facility] that cost less that $500/month. The list of options was short and also had waiting lists but provided it to patient. We also looked at [facility] and nothing in [facility] is available or has an open wait list.    We  also discussed the value in establishing care with a regular physician. Patient spoke about the residents leaving. She has seen Dr García, Dr Mccabe and Dr Hartman and liked them all so I gave her their names and recommended that she see one of them regularly so that when paperwork is needed in the future, they know more about her.    Assessment and Plan: Assist patient in the future as needed.    Genoveva Patricia

## 2017-06-23 NOTE — PATIENT INSTRUCTIONS
Referral sent to [facility]  Phone: [phone]  Fax: [phone]  Clinic will contact patient to schedule  es/June 23, 2017

## 2017-06-24 ASSESSMENT — ANXIETY QUESTIONNAIRES: GAD7 TOTAL SCORE: 17

## 2017-06-26 ENCOUNTER — AMBULATORY - HEALTHEAST (OUTPATIENT)
Dept: ADMINISTRATIVE | Facility: REHABILITATION | Age: 27
End: 2017-06-26

## 2017-06-26 ENCOUNTER — AMBULATORY - HEALTHEAST (OUTPATIENT)
Dept: INFUSION THERAPY | Facility: HOSPITAL | Age: 27
End: 2017-06-26

## 2017-06-26 DIAGNOSIS — M79.7 FIBROMYALGIA: ICD-10-CM

## 2017-07-08 NOTE — PROGRESS NOTES
"Preceptor attestation:  Vital signs reviewed: /71  Pulse 81  Temp 98.4  F (36.9  C) (Oral)  Ht 5' 3\" (160 cm)  Wt 172 lb 9.6 oz (78.3 kg)  LMP 06/17/2017 (Approximate)  BMI 30.57 kg/m2    Patient seen and discussed with the resident. Assessment and plan reviewed with resident and agreed upon.    Supervising physician: Chichi Victoria MD  [facility]  "

## 2017-09-27 ENCOUNTER — TRANSFERRED RECORDS (OUTPATIENT)
Dept: HEALTH INFORMATION MANAGEMENT | Facility: CLINIC | Age: 27
End: 2017-09-27

## 2017-10-03 ENCOUNTER — OFFICE VISIT (OUTPATIENT)
Dept: FAMILY MEDICINE | Facility: CLINIC | Age: 27
End: 2017-10-03

## 2017-10-03 VITALS
HEART RATE: 94 BPM | WEIGHT: 162.8 LBS | TEMPERATURE: 98.3 F | SYSTOLIC BLOOD PRESSURE: 107 MMHG | DIASTOLIC BLOOD PRESSURE: 74 MMHG | BODY MASS INDEX: 28.84 KG/M2

## 2017-10-03 DIAGNOSIS — N93.9 VAGINAL BLEEDING: ICD-10-CM

## 2017-10-03 DIAGNOSIS — N92.6 MISSED PERIOD: Primary | ICD-10-CM

## 2017-10-03 LAB
B-HCG SERPL-ACNC: 481 MLU/ML (ref 0–4)
HCG UR QL: POSITIVE

## 2017-10-03 NOTE — PROGRESS NOTES
Subjective:    Kee Phipps is a 27 year old female who presents for evaluation of positive pregnancy test and vaginal bleeding. LMP was 17. This was a planned pregnancy, partner and her were trying for most of September. Took multiple home pregnancy tests  and they were all positive. Started having lower abdominal cramping , but no associated vaginal bleeding at that time. Went to [place] ED that night and they did beta-hcg quant (25) and transvaginal US which failed to show an intrauterine gestation. It did showed a small amount of free fluid, but was otherwise normal. Fallopian tubes are not mentioned one US report. Sent home with recommendation for repeat beta-hcg , but she did not have insurance at that time so didn't get this done. She was also diagnosed with BV at [facility] and then sent her home with metronidazole suppositories and she says she completed this medication and has no odorous vaginal discharge.     In addition, on  she noticed some pink discharge when wiping. This abated -10/2, but started again today. It is scant, only a small amount of brown bleeding on her underpants and a little pink when wiping. She is still having some cramping, primarily in bilateral lower quadrants R>L and low back. She relays a history of Crohns and celiac disease, but says this cramping this feels more like period cramps then GI cramps. She denies any nausea, vomiting or diarrhea. She does think she has been a little dehydrated lately, not drinking enough water. She denies any intercourse in the last few days, none immediately prior to the onset of her cramps/vaginal discharge.     In terms of prenatal history, patient is . She had on spontaneous miscarriage in  in the first trimester, with viable term pregnancy following this in . She also had an elective  oct/2016. She denies any history of recurrent miscarriages. She is taking a prenatal vitamin and not taking any  other medications.     While she does have some abdominal cramping, she denies any severe abdominal pain, lightheadedness, dizziness, nausea or vomiting. She is not having any dysuria.     ROS:   General: Denies fevers, chills, night sweats.  HEENT: Denies headache, visual disturbance.  CV: Denies chest pain, palpitations or shortness of breath.  GI: Denies N/V/D. +abdominal cramping.  : Denies dysuria.  Genital: +vaginal spotting.  Neurologic: Denies lightheadedness or dizziness.    Objective:    /74 (BP Location: Left arm, Patient Position: Sitting, Cuff Size: Adult Large)  Pulse 94  Temp 98.3  F (36.8  C) (Oral)  Wt 162 lb 12.8 oz (73.8 kg)  LMP 2017 (Within Weeks)  Breastfeeding? No  BMI 28.84 kg/m2    Physical Exam:  General: NAD. Alert and awake in no distress.   Skin: Good color, no pallor.   Heart: Regular rhythm, no murmurs.  Lungs: Clear to auscultation b/l, no wheezes, crackles, or rales.  Abdomen: Mild tenderness to palpation of bilateral lower quadrants. No abdominal distension, guarding or rebound.   Lymphatic: No swelling seen.    Assessment/Plan:  1. Vaginal bleeding, positive pregnancy test: HCG quantitative of 481 today, which is reassuring (doubling nicely from value of 25 on ). I suspect that her vaginal bleeding is either implantation bleeding or friable cervix. It could represent a threatened  as well and I discussed this with patient. I think ectopic pregnancy is unlikely and she is vitally stable with relatively benign abdominal exam at this time so I do not think emergent imaging is necessary. I suspect that her US at [place] was negative for intrauterine pregnancy because it was very early in her pregnancy. I see she has scheduled a first prenatal appointment 10/16. I think keeping this appointment is reasonable, but I would like to see patient back in clinic early next week to ensure her bleeding and abdominal pain have improved and her abdominal exam and  vitals are still stable. I will contact patient tomorrow to ask that she schedule a follow up in clinic next week (in addition to her first prenatal appointment). Discussed that if she develops significant lightheadedness, worsening abdominal pain or heavy vaginal bleeding (soaking >1 pad per hour) that she should go to the ED for evaluation immediately or at the least call our on-call provider for advise. I did also discuss drinking more water as dehydration can cause some cramping as well. When we see her in clinic next week, if her symptoms have resolved we can likely wait to get ultrasound until she is closer to 6 weeks. However, if continued adenexal pain we should consider repeat beta-hcg and if high enough to see a gestational sac (which presumably it will be), we should get a more emergent US with fallopian tube evaluation to r/o ectopic pregnancy.     2. History of Crohn's Disease: I have asked that patient schedule a follow up with her gastroenterologist to discuss management of this during pregnancy. She says she hasn't seen them in a while. She is not currently on any medications for this, but certainly the stress of pregnancy could cause a flare so I think it will be important for her to find out what is safe in pregnancy for management. She says she can call herself to schedule this.     Eva Francis, PGY 3  Family Medicine Resident  [facility]

## 2017-10-03 NOTE — PROGRESS NOTES
Preceptor attestation:  Vital signs reviewed: /74 (BP Location: Left arm, Patient Position: Sitting, Cuff Size: Adult Large)  Pulse 94  Temp 98.3  F (36.8  C) (Oral)  Wt 162 lb 12.8 oz (73.8 kg)  LMP 09/05/2017 (Within Weeks)  Breastfeeding? No  BMI 28.84 kg/m2    Patient seen and discussed with the resident. Assessment and plan reviewed with resident and agreed upon.    Supervising physician: Chichi Victoria MD  [facility]

## 2017-10-03 NOTE — PATIENT INSTRUCTIONS
Go to lab before you leave. We will call you with the results of this and order an ultrasound if needed.    If your vaginal bleeding becomes severe and you are soaking more then 1 pad/hour or if you start having worsening abdominal pain please let us know immediately.    Return for follow up in one week.    Schedule GI appointment to discuss medications for crohn's while pregnant.

## 2017-10-03 NOTE — MR AVS SNAPSHOT
After Visit Summary   10/3/2017    Kee Phipps    MRN: [id number]           Patient Information     Date Of Birth          1990        Visit Information        Provider Department      10/3/2017 10:20 AM Eva Francis DO [facility]        Today's Diagnoses     Missed period    -  1    Vaginal bleeding          Care Instructions    Go to lab before you leave. We will call you with the results of this and order an ultrasound if needed.    If your vaginal bleeding becomes severe and you are soaking more then 1 pad/hour or if you start having worsening abdominal pain please let us know immediately.    Return for a first prenatal appointment at your convenience.              Follow-ups after your visit        Who to contact     Please call your clinic at [phone] to:    Ask questions about your health    Make or cancel appointments    Discuss your medicines    Learn about your test results    Speak to your doctor   If you have compliments or concerns about an experience at your clinic, or if you wish to file a complaint, please contact [facility] Patient Relations at [phone] or email us at [email or web address]         Additional Information About Your Visit        MyChart Information     [employer] gives you secure access to your electronic health record. If you see a primary care provider, you can also send messages to your care team and make appointments. If you have questions, please call your primary care clinic.  If you do not have a primary care provider, please call [phone] and they will assist you.      [employer] is an electronic gateway that provides easy, online access to your medical records. With [employer], you can request a clinic appointment, read your test results, renew a prescription or communicate with your care team.     To access your existing account, please contact your [facility] or  call [phone] for assistance.        Care EveryWhere ID     This is your Care EveryWhere ID. This could be used by other organizations to access your [place] medical records  [id number]        Your Vitals Were     Pulse Temperature Last Period Breastfeeding? BMI (Body Mass Index)       94 98.3  F (36.8  C) (Oral) 09/05/2017 (Within Weeks) No 28.84 kg/m2        Blood Pressure from Last 3 Encounters:   10/03/17 107/74   06/23/17 104/71   04/17/17 118/81    Weight from Last 3 Encounters:   10/03/17 162 lb 12.8 oz (73.8 kg)   06/23/17 172 lb 9.6 oz (78.3 kg)   04/17/17 172 lb 3.2 oz (78.1 kg)              We Performed the Following     Beta-HCG Quantitative ([facility])     Beta-HCG Quantitative ([facility])     HCG Qualitative Urine (UPT)  ([facility])        Primary Care Provider Office Phone # Fax #    Deborah Shayy Miller [phone]       [facility]  [address] [place] [address]        Equal Access to Services     EDGAR IRAHETA : Hadii ye meyer Sofrank, waaxda luqadaha, qaybta kaalmada mary alice, christal chavez. So [facility] [phone].    ATENCIÓN: Si habla español, tiene a jeong disposición servicios gratuitos de asistencia lingüística. [facility] [phone].    We comply with applicable federal civil rights laws and [place] laws. We do not discriminate on the basis of race, color, national origin, age, disability, sex, sexual orientation, or gender identity.            Thank you!     Thank you for choosing [facility]  for your care. Our goal is always to provide you with excellent care. Hearing back from our patients is one way we can continue to improve our services. Please take a few minutes to complete the written survey that you may receive in the mail after your visit with us. Thank you!             Your Updated Medication List - Protect others around you: Learn how to safely use, store and throw away your medicines at  www.disposemymeds.org.          This list is accurate as of: 10/3/17 11:38 AM.  Always use your most recent med list.                   Brand Name Dispense Instructions for use Diagnosis    acetaminophen 500 MG tablet    TYLENOL    100 tablet    Take 2 tablets (1,000 mg) by mouth 3 times daily    Fibromyalgia       FIRST-MOUTHWASH BLM MT Susp compounding kit     237 mL    Swish and swallow 5-10 mLs in mouth every 6 hours as needed for mouth sores    Throat pain       * gabapentin 600 MG tablet    NEURONTIN    120 tablet    Take 1 tablet (600 mg) by mouth 2 times daily        * gabapentin 300 MG capsule    NEURONTIN    180 capsule    Take 2 capsules (600 mg) by mouth 2 times daily 2 in the morning and 1 tablet at night    Other chronic pain       methocarbamol 750 MG tablet    ROBAXIN    180 tablet    Take 1 tablet (750 mg) by mouth 3 times daily as needed for muscle spasms    Fibromyalgia       SUMAtriptan 50 MG tablet    IMITREX    18 tablet    Take 1 tablet (50 mg) by mouth at onset of headache for migraine May repeat in 2 hours. Max 8 tablets/24 hours.    Intractable chronic migraine without aura and without status migrainosus       vitamin D 2000 UNITS tablet     100 tablet    Take 2,000 Units by mouth daily    Crohn's disease of small and large intestines with complication (H)       * Notice:  This list has 2 medication(s) that are the same as other medications prescribed for you. Read the directions carefully, and ask your doctor or other care provider to review them with you.

## 2017-10-04 ENCOUNTER — TELEPHONE (OUTPATIENT)
Dept: FAMILY MEDICINE | Facility: CLINIC | Age: 27
End: 2017-10-04

## 2017-10-04 NOTE — TELEPHONE ENCOUNTER
[facility] phone call message- patient requesting results:    Test: Lab    Date of test: 10/3/2017    Additional Comments: the pt called to ask for her results and would like a call back       OK to leave a message on voice mail? Yes    Primary language: English      needed? No    Call taken on October 4, 2017 at 8:10 AM by Nahum Lawrence

## 2017-10-04 NOTE — PROGRESS NOTES
Spoke with patient over the phone regarding her b-hcg result. She is still having some cramping on and off and a little bit of pink on toilet paper when wiping. We discussed that this b-hcg level is reassuring, but it does not entirely rule out impending miscarriage. She will make an appointment to see me early next week. If abdominal pain is acutely worsening prior to that, she will call or go to the ED immediately. If bleeding is worsening this week and it is distressing to her, she may make an appointment to be seen earlier then next week for repeat b-hcg or speculum exam to get a better idea of whether this represents an inevitable miscarriage. We do have a blood type on patient (O POS), so no need for rhogam. I do not think there is a need to come in until next week unless bleeding is distressing to her or her abdominal pain is worsening (to r/o ectopic).

## 2017-10-07 ENCOUNTER — TRANSFERRED RECORDS (OUTPATIENT)
Dept: HEALTH INFORMATION MANAGEMENT | Facility: CLINIC | Age: 27
End: 2017-10-07

## 2017-10-09 ENCOUNTER — OFFICE VISIT (OUTPATIENT)
Dept: FAMILY MEDICINE | Facility: CLINIC | Age: 27
End: 2017-10-09

## 2017-10-09 VITALS
BODY MASS INDEX: 28.87 KG/M2 | SYSTOLIC BLOOD PRESSURE: 111 MMHG | TEMPERATURE: 98.4 F | OXYGEN SATURATION: 100 % | HEART RATE: 92 BPM | WEIGHT: 163 LBS | DIASTOLIC BLOOD PRESSURE: 75 MMHG

## 2017-10-09 DIAGNOSIS — R07.89 CHEST WALL PAIN: ICD-10-CM

## 2017-10-09 DIAGNOSIS — Z87.19 HISTORY OF CROHN'S DISEASE: ICD-10-CM

## 2017-10-09 DIAGNOSIS — Z72.0 TOBACCO ABUSE: ICD-10-CM

## 2017-10-09 DIAGNOSIS — Z3A.01 LESS THAN 8 WEEKS GESTATION OF PREGNANCY: Primary | ICD-10-CM

## 2017-10-09 DIAGNOSIS — G43.109 MIGRAINE WITH AURA AND WITHOUT STATUS MIGRAINOSUS, NOT INTRACTABLE: ICD-10-CM

## 2017-10-09 ASSESSMENT — ENCOUNTER SYMPTOMS
LIGHT-HEADEDNESS: 0
FEVER: 0
HEADACHES: 1
NAUSEA: 0
CHILLS: 0
DIZZINESS: 0
VOMITING: 0
SHORTNESS OF BREATH: 0

## 2017-10-09 NOTE — MR AVS SNAPSHOT
After Visit Summary   10/9/2017    Kee Phipps    MRN: [id number]           Patient Information     Date Of Birth          1990        Visit Information        Provider Department      10/9/2017 9:20 AM Eva Francis DO [facility]        Today's Diagnoses     Pregnancy test positive    -  1    Tobacco abuse          Care Instructions    Schedule ultrasound anytime after 10/17/17.    Try nicotine gum.    Schedule visit with your GI doctor.     Return on 10/16 for your next well visit.              Follow-ups after your visit        Your next 10 appointments already scheduled     Oct 16, 2017  8:40 AM CDT   Education with [facility] Ob Educator   [facility] ([facility])    [address] [place] [address] [id number] [phone]            Oct 16, 2017 10:00 AM CDT   NEW OB with Mikhail Hernandez MD   [facility] ([facility])    [address] [place] [id number]   [phone]              Who to contact     Please call your clinic at [phone] to:    Ask questions about your health    Make or cancel appointments    Discuss your medicines    Learn about your test results    Speak to your doctor   If you have compliments or concerns about an experience at your clinic, or if you wish to file a complaint, please contact [facility] Patient Relations at [phone] or email us at [email or web address]         Additional Information About Your Visit        MyChart Information     [employer] gives you secure access to your electronic health record. If you see a primary care provider, you can also send messages to your care team and make appointments. If you have questions, please call your primary care clinic.  If you do not have a primary care provider, please call [phone] and they will assist you.      [employer] is an electronic gateway that provides easy, online access to your medical records. With [employer], you can request a clinic  appointment, read your test results, renew a prescription or communicate with your care team.     To access your existing account, please contact your [facility] or call [phone] for assistance.        Care EveryWhere ID     This is your Care EveryWhere ID. This could be used by other organizations to access your [facility] medical records  [id number]        Your Vitals Were     Pulse Temperature Last Period Pulse Oximetry BMI (Body Mass Index)       92 98.4  F (36.9  C) 09/05/2017 (Within Weeks) 100% 28.87 kg/m2        Blood Pressure from Last 3 Encounters:   10/09/17 111/75   10/03/17 107/74   06/23/17 104/71    Weight from Last 3 Encounters:   10/09/17 163 lb (73.9 kg)   10/03/17 162 lb 12.8 oz (73.8 kg)   06/23/17 172 lb 9.6 oz (78.3 kg)              We Performed the Following     US OB <14 WKS SINGLE OR FIRST GESTATION          Today's Medication Changes          These changes are accurate as of: 10/9/17 10:01 AM.  If you have any questions, ask your nurse or doctor.               Start taking these medicines.        Dose/Directions    nicotine polacrilex 2 MG gum   Commonly known as:  [employer] NICOTINE POLACRILEX   Used for:  Tobacco abuse   Started by:  Eva Francis DO        Dose:  2 mg   Place 1 each (2 mg) inside cheek as needed for smoking cessation   Quantity:  30 tablet   Refills:  1            Where to get your medicines      These medications were sent to [facility] - [place], [place] - [address], [place] [address]     Phone:  [phone]     nicotine polacrilex 2 MG gum                Primary Care Provider Office Phone # Fax #    Deborah Miller [phone]       [facility]  [address] [place] [address]        Equal Access to Services     EDGAR IRAHETA AH: Anastasia Win, waaxda luqadaha, qaybta kaalmada mary alice, christal chavez. So [facility]  [phone].    ATENCIÓN: Si yang portillo, tiene a jeong disposición servicios gratuitos de asistencia lingüística. Ann Marie rowe [phone].    We comply with applicable federal civil rights laws and [place] laws. We do not discriminate on the basis of race, color, national origin, age, disability, sex, sexual orientation, or gender identity.            Thank you!     Thank you for choosing [facility]  for your care. Our goal is always to provide you with excellent care. Hearing back from our patients is one way we can continue to improve our services. Please take a few minutes to complete the written survey that you may receive in the mail after your visit with us. Thank you!             Your Updated Medication List - Protect others around you: Learn how to safely use, store and throw away your medicines at www.disposemymeds.org.          This list is accurate as of: 10/9/17 10:01 AM.  Always use your most recent med list.                   Brand Name Dispense Instructions for use Diagnosis    acetaminophen 500 MG tablet    TYLENOL    100 tablet    Take 2 tablets (1,000 mg) by mouth 3 times daily    Fibromyalgia       FIRST-MOUTHWASH BLM MT Susp compounding kit     237 mL    Swish and swallow 5-10 mLs in mouth every 6 hours as needed for mouth sores    Throat pain       * gabapentin 600 MG tablet    NEURONTIN    120 tablet    Take 1 tablet (600 mg) by mouth 2 times daily        * gabapentin 300 MG capsule    NEURONTIN    180 capsule    Take 2 capsules (600 mg) by mouth 2 times daily 2 in the morning and 1 tablet at night    Other chronic pain       methocarbamol 750 MG tablet    ROBAXIN    180 tablet    Take 1 tablet (750 mg) by mouth 3 times daily as needed for muscle spasms    Fibromyalgia       nicotine polacrilex 2 MG gum    [employer]S NICOTINE POLACRILEX    30 tablet    Place 1 each (2 mg) inside cheek as needed for smoking cessation    Tobacco abuse       SUMAtriptan 50 MG tablet    IMITREX    18 tablet    Take  1 tablet (50 mg) by mouth at onset of headache for migraine May repeat in 2 hours. Max 8 tablets/24 hours.    Intractable chronic migraine without aura and without status migrainosus       vitamin D 2000 UNITS tablet     100 tablet    Take 2,000 Units by mouth daily    Crohn's disease of small and large intestines with complication (H)       * Notice:  This list has 2 medication(s) that are the same as other medications prescribed for you. Read the directions carefully, and ask your doctor or other care provider to review them with you.

## 2017-10-09 NOTE — PROGRESS NOTES
"      HPI:       Kee Phipps is a 27 year old who presents for follow up on vaginal bleeding. No further bleeding since our last visit and her abdominal pain has improved. She was seen at [facility] ED 10/7 for unrelated concerns as described below and they ended up doing a repeat B-HCG that showed good increase at 2286 and US showed a small fluid collection in the upper endometrial canal that was thought to likely represent gestational sac. They recommended repeat US in 7-10 days to confirm viability. Patient has first OB appointment with us scheduled for 10/16.    Patients visit to [facility] ED 9/7 was for chest pain. She says it is reproducible on palpation and it happens quite frequently due to her fibromyalgia. At the ED they did an EKG that showed sinus arhythmia, but was otherwise normal. They also did a CBC, UA, BMP, and troponin, all of which were grossly normal. She says she is still getting some chest pain on and off and notes some SOB with exertion for about the last ten days. She has no SOB at rest. She usually takes gabapentin and tylenol for her pain. She wonders what else she can take for her fibromyalgia while pregnant.     She also tells me that she has had a couple of migraines over the last 5-7 days. She describes an aura (\"oil and water sensation in my vision\"), which is new for her and was a distressing symptom. She has been taking tylenol for her headaches with minimal improvement. She has had migraines in the past and used Imitrex for them, which works well.     She is also smoking 3 cigarettes/day and wonders if she can get patches to help with this.      She has not scheduled anything for GI follow up yet. She sees Dr. Zhao at [facility]. She says she can call and make an appointment for follow up.    Problem, Medication and Allergy Lists were reviewed and are current.  Patient is an established patient of this clinic.         Review of Systems:   Review of Systems   Constitutional: Negative for " chills and fever.   Respiratory: Negative for shortness of breath.    Cardiovascular: Positive for chest pain.   Gastrointestinal: Negative for nausea and vomiting.   Genitourinary: Negative for vaginal bleeding.   Neurological: Positive for headaches. Negative for dizziness and light-headedness.             Physical Exam:   Patient Vitals for the past 24 hrs:   BP Temp Pulse SpO2 Weight   10/09/17 0929 111/75 98.4  F (36.9  C) 92 100 % 163 lb (73.9 kg)     Body mass index is 28.87 kg/(m^2).  Vitals were reviewed and were normal     Physical Exam   Constitutional: She is oriented to person, place, and time. She appears well-developed and well-nourished. No distress.   HENT:   Head: Normocephalic and atraumatic.   Cardiovascular: Normal rate.  Exam reveals no gallop and no friction rub.    No murmur heard.  Pulmonary/Chest: She has no wheezes. She has no rales. She exhibits no tenderness.   Abdominal: She exhibits no distension. There is no tenderness.   Musculoskeletal: She exhibits no edema or tenderness.   Chest wall tender to palpation centrally.    Neurological: She is alert and oriented to person, place, and time.   Skin: Skin is warm. No rash noted.       Results:      Results from the last 24 hoursNo results found for this or any previous visit (from the past 24 hour(s)).  Assessment and Plan     1. Less than 8 weeks gestation of pregnancy: I am reassured by her B-HCG's as well as the resolution of her vaginal bleeding. I am much less concerned for ectopic pregnancy with both the improvement in her abdominal pain as well as US showing what appears to be an intrauterine gestational sac. I think it is reasonable to wait about 7-10 days and repeat this ultrasound to confirm viability and hopefully get dating on this pregnancy. If abdominal pain worsens or vaginal bleeding returns, patient will let us know. Return for first OB visit 10/16 or sooner if concerns arise.     2. Tobacco abuse: Smoking so little each  day (only 3 cigarettes/day) that I think even the 7 mg patch will be too much for her. Will prescribe gum as needed.     3. Migraine with aura and without status migrainosus, not intractable: Reassured that vision changes sound like Aura and is not concerning to me at the moment. She thought perhaps this could represent preeclampsia, so we discussed that we don't worry about the until >20 weeks gestation. Discussed just using tylenol as imitrex is category C in pregnancy.    4. Chest wall pain: This does not sound cardiac in nature. It is reproducible on palpation. I did consider PE, but patient is sating 100% on room air, is not tachycardic and complains of only very minimal periodic shortness of breath. I suspect that SOB is largely due to decreased inspiratory effort from chest wall pain. She will let us know if this is changing or worsening and at that time we can consider further work up for PE.     5. History of Crohn's: She will schedule appointment to follow up with her GI doctor ASAP. She knows to inform them of her pregnancy so that they can dictate how best to manage her symptoms during pregnancy mediation wise.     There are no discontinued medications.  Options for treatment and follow-up care were reviewed with the patient. Kee Phipps  engaged in the decision making process and verbalized understanding of the options discussed and agreed with the final plan.    Eva Francis, PGY 3  Family Medicine Resident  [facility]

## 2017-10-09 NOTE — PATIENT INSTRUCTIONS
Schedule ultrasound anytime after 10/17/17.    Try nicotine gum.    Schedule visit with your GI doctor.     Return on 10/16 for your next well visit.

## 2017-10-09 NOTE — PROGRESS NOTES
Preceptor attestation:  Patient seen and discussed with the resident. Assessment and plan reviewed with resident and agreed upon.  Supervising physician: Kyler García  [facility]

## 2017-10-11 ENCOUNTER — TELEPHONE (OUTPATIENT)
Dept: FAMILY MEDICINE | Facility: CLINIC | Age: 27
End: 2017-10-11

## 2017-10-11 NOTE — TELEPHONE ENCOUNTER
[facility] Family Medicine phone call message- general phone call:    Reason for call: She has been spotting for the last three days and she is 5 weeks pregnant.She would like to talk to a nurse.    Return call needed: Yes    OK to leave a message on voice mail? Yes    Primary language: English      needed? No    Call taken on October 11, 2017 at 10:30 AM by Kenzie Adhikari

## 2017-10-11 NOTE — TELEPHONE ENCOUNTER
Pt states that she is 5 weeks pregnant and 2 weeks ago she had light pink vaginal spotting that resolved.  Then on Monday she had bright pink vaginal spotting and yesterday and today it is brown vaginal spotting.  She states that she went to [facility] on 9/27/17 due to the spotting and was diagnosed with BV.  She states that she was prescribed Clindamycin vag suppository which she did.  Pt states that she had vaginal u/s at [facility] which only showed an empty gest sac at 5 wks gestation.  Pt would like to come in for evaluation as she feels like she has the BV back again since she had the vaginal u/s.  Appt made with Dr Mccabe at 11:20 am.    Reviewed care everywhere: 9/27/17 quant beta hcg 22 and repeat quant on 10/7/17 was 2286.  Her blood type is O+ with neg ayden screen.  10/7/17 [facility] OB u/s: single intrauterine gest sac measuring at 5 wks gestation, no yolk sac or fetal pole identified, and no abnl paraovarian adnexal masses identified.    Pt is scheduled for a NOB on Monday, 10/16/17.  Will need to reschedule appt until after viable pregnancy is confirmed.  Routed note to Dr Mccabe./YOLI

## 2017-10-12 ENCOUNTER — OFFICE VISIT (OUTPATIENT)
Dept: FAMILY MEDICINE | Facility: CLINIC | Age: 27
End: 2017-10-12

## 2017-10-12 VITALS
HEART RATE: 91 BPM | DIASTOLIC BLOOD PRESSURE: 72 MMHG | SYSTOLIC BLOOD PRESSURE: 113 MMHG | TEMPERATURE: 98 F | WEIGHT: 164.2 LBS | BODY MASS INDEX: 29.09 KG/M2

## 2017-10-12 DIAGNOSIS — R10.84 ABDOMINAL CRAMPING, GENERALIZED: ICD-10-CM

## 2017-10-12 DIAGNOSIS — N93.9 VAGINAL BLEEDING: Primary | ICD-10-CM

## 2017-10-12 DIAGNOSIS — R35.0 URINARY FREQUENCY: ICD-10-CM

## 2017-10-12 DIAGNOSIS — G43.109 MIGRAINE WITH AURA AND WITHOUT STATUS MIGRAINOSUS, NOT INTRACTABLE: ICD-10-CM

## 2017-10-12 DIAGNOSIS — M79.7 FIBROMYALGIA: ICD-10-CM

## 2017-10-12 LAB
B-HCG SERPL-ACNC: ABNORMAL MLU/ML (ref 0–4)
BACTERIA: NORMAL
BILIRUBIN UR: NEGATIVE
BLOOD UR: ABNORMAL
CLUE CELLS: NORMAL
GLUCOSE URINE: NEGATIVE
HEMOGLOBIN: 12.4 G/DL (ref 11.7–15.7)
KETONES UR QL: NEGATIVE
LEUKOCYTE ESTERASE UR: NEGATIVE
MOTILE TRICHOMONAS: NEGATIVE
NITRITE UR QL STRIP: NEGATIVE
ODOR: NORMAL
PH UR STRIP: 7 [PH] (ref 5–7)
PH WET PREP: NORMAL
PROTEIN UR: NEGATIVE
SP GR UR STRIP: 1.02
UROBILINOGEN UR STRIP-ACNC: ABNORMAL
WBC WET PREP: NORMAL
YEAST: NORMAL

## 2017-10-12 RX ORDER — SUMATRIPTAN 25 MG/1
25 TABLET, FILM COATED ORAL
Qty: 18 TABLET | Refills: 3 | Status: SHIPPED | OUTPATIENT
Start: 2017-10-12 | End: 2018-04-09

## 2017-10-12 RX ORDER — ACETAMINOPHEN 500 MG
1000 TABLET ORAL 3 TIMES DAILY
Qty: 100 TABLET | Refills: 11 | Status: SHIPPED | OUTPATIENT
Start: 2017-10-12 | End: 2019-03-27

## 2017-10-12 NOTE — PROGRESS NOTES
[facility] Visit    Subjective:  Kee Phipps is a 27 year old  at 5w2d by LMP who presents with vaginal spotting. She has a PMHx significant for Crohn's disease, Celiac disease, fibromyalgia, adjustment disorder with depressed mood, chronic pain, recurrent BV, and history of abnormal pap smears. Of note, there is concern about viability of this pregnancy given inconclusive U/S on 10/7/17 at [facility], but her quantitative beta hCG continues to trend as a viable pregnancy.     # Vaginal spotting: About 12 days ago, she noted some light-pink vaginal spotting for a couple days that resolved spontaneously. No other significant symptoms - no worsening abdominal cramping (has some at baseline due to Crohn's and Celiac diseases), no vaginal irritation or discharge (has recurrent BV), no recent sexual activity or concern for STI or trauma. No urinary symptoms of dysuria, frequency, burning or urgency. Then about 8 days ago, her light-pink vaginal spotting recurred in a similar presentation again without any other concerning signs or symptoms, and resolved on its own after two days. 3 days ago, she reports the same light-pink vaginal spotting again. Today, the spotting is more brown in color and she complains of some urinary frequency. Not passing clots, or having blood soaking through her clothes. No lightheadedness or orthostatic symptoms. Denies passing any tissue. No PMHx of ectopic or PID. Patient had a 1st trimester miscarriage in 2010 and an elective  in 2016. Patient is currently smoking and drinking alcohol during this pregnancy per chart review. She denies any symptoms of BV - no discharge, odor, vaginal irritation. Has not had any sexual intercourse or activity for at least the last month. She's having her baseline diffuse abdominal cramping from Crohn's and Celiac disease, and doesn't think it's worsening. No nausea or vomiting. No fevers or chills.    # Early pregnancy: About  "15 days ago on 9/27/17, she presented to the [facility] ED with vaginal spotting and lower abdominal cramping, and diagnosed with BV. She was adequately treated with Clindamycin suppository for 3 days. She had an early transvaginal U/S that did not show an intrauterine gestational sac. On 10/7/17, she presented again to the [facility] ED with vaginal spotting and lower abdominal cramping, and got another U/S which showed a likely gestational sac at 5 wks, but too soon to confirm viability. Recommended to trend quantitative beta hCG and get another U/S in 1-2 weeks.  - Quantitative beta hCG trend = 9/27/17: 22 --> 10/7/17: 2286 --> 10/12/17: 08020  - Recent labs: Blood type is O+ with negative antibody screen (10/7/17). Hgb 13.0 (10/7/17). Negative GC (9/27/17).  - Patient was scheduled for a new OB visit on 10/16/17 at [facility], but informed by HOMERO Rios that she'll need to reschedule it until after viable pregnancy is confirmed.     # Hx of LSIL pap smear (2/6/17): Patient was referred to OBGYN for colposcopy/consult for treatment due to multiple abnormal pap smears, and recalls OBGYN has cleared her of further testing and follow up.     # Mood: Patient describes her mood as \"overwhelmed\" with her recent ED visits, chronic medical issues that are challenging to manage, and with busy work-life balance. Her  and son are supportive as well as her other nearby family. She is excited about this pregnancy, and thinks that she might be having twins. No other concerns.     # Migraines: Patient struggles with chronic migraines - pounding pain, blurry vision changes, right-sided symptoms. At her last visit, she thought she was prescribed Imitrex but didn't get the prescription. Requesting it today.     ROS:   Gen: No fevers, chills, weight loss  HEENT: No headache, vision changes, hearing loss, swallowing problems   CV: No chest pain, palpitations, peripheral edema  Resp: No SOB, cough, wheezing, congestion, " "coryza  GI: No constipation, diarrhea, nausea, vomiting, heartburn, change in bowel habits. +abd cramping  : No dysuria, hematuria, discharge. +frequency, vaginal spotting  MSK: No arthralgias, myalgias  Skin: No rash, lesions    Objective:  Vitals:    10/12/17 1109   BP: 113/72   Pulse: 91   Temp: 98  F (36.7  C)   TempSrc: Oral   Weight: 164 lb 3.2 oz (74.5 kg)     Body mass index is 29.09 kg/(m^2).    GEN: NAD, healthy, alert  EYES: grossly normal to inspection, EOMI, normal conjunctivae/sclerae  RESP: CTAB, no w/r/r  CV: RRR, nl S1/S2, no S3/S4, no m/r/g, no peripheral edema, peripheral pulses strong  ABD: soft, mild diffuse tenderness in all quadrants, non-distended, no masses, no rebound, +BS throughout  : normal female external genitalia, no erythema/lesions/swelling of labia or vaginal walls, no discharge or blood clots, cervix anterior and appears friable with a miniscule amount of bright red blood, cervical os with small opening, no tissue or gestational sac observed  SKIN: no suspicious lesions or rashes  PSYCH: mentation appears normal, affect normal/bright, mood is \"overwhelmed, but feels supported\"     Results for orders placed or performed in visit on 10/12/17 (from the past 24 hour(s))   Hemoglobin (HGB) ([facility])   Result Value Ref Range    Hemoglobin 12.4 11.7 - 15.7 g/dL   Beta-HCG Quantitative ([facility])   Result Value Ref Range    Beta hCG, Quantitative 97333 (H) 0 - 4 mlU/mL    Narrative    Test performed by:  [facility]  [address] [place] [address]  Non-pregnant female . . . . . . . . . . . . . 0-4 (<5) mIU/mL  Equivocal result for early pregnancy . . . . 5-24 mIU/mL  Pregnant Female:  -------------------------------------------------------------  Weeks Post LMP Approximate HCG range(mIU/mL)  (Last Menstrual Period)range  -------------------------------------------------------------  3-4 Weeks . . . . . . . 9- 130 mIU/mL  4-5 Weeks . . . . . . . 75- 2,600 " mIU/mL  5-6 Weeks . . . . . . 850- 20,800 mIU/mL  6-7 Weeks . . . . . 4,000-100,200 mIU/mL  7-12 Weeks . . . . . 11,500-289,000 mIU/mL  12-16 Weeks . . . . . 18,300-137,000 mIU/mL  16-29 Weeks. . . . . . 1,400- 53,000 mIU/mL  (2nd Trimester)  29-41 Weeks. . . . . . . 940- 60,000 mIU/mL  (3rd Trimester)  INTERPRETIVE NOTE:  For diagnostic purposes, hCG results should be used  conjunction with other data.  If the hCG level is inconsistent with clinical evidence,  results should be confirmed by an alternate hCG method.  This assay is approved for use in the early detection  of pregnancy only. It is not approved for any other  uses such as tumor marker screening or monitoring.  Beta HCG ranges were updated 2/2007 to reflect  methodology referencing to the 4th World Health  Organization (WHO) International Standard.   Wet Prep ([facility])   Result Value Ref Range    Yeast Wet Prep None none    Motile Trichomonas Wet Prep Negative Negative    Clue Cells Wet Prep None NONE    WBC WET PREP 5-10 2 - 5    Bacteria Wet Prep Few None    pH Wet Prep Not performed 3.8 - 4.5    Odor Wet Prep None NONE   Urinalysis ([facility])   Result Value Ref Range    Specific Gravity Urine 1.020 1.005 - 1.030    pH Urine 7.0 4.5 - 8.0    Leukocyte Esterase UR Negative NEGATIVE    Nitrite Urine Negative NEGATIVE    Protein UR Negative NEGATIVE    Glucose Urine Negative NEGATIVE    Ketones Urine Negative NEGATIVE    Urobilinogen mg/dL 0.2 E.U./dL 0.2 E.U./dL    Bilirubin UR Negative NEGATIVE    Blood UR 2+ (A) NEGATIVE       Assessment/Plan:  Kee was seen today for several concerns.    Diagnoses and all orders for this visit:    Vaginal spotting. Unclear etiology, but not-to-miss diagnoses are unlikely. Spotting could be due to transvaginal U/S manipulation or other benign cause. Less concern for ectopic, ruptured ovarian cyst, STI or UTI given history and recent labs. No recent sexual activity. Exam was unimpressive except a somewhat friable cervix  with no active bleeding or clots, and not concerning for cervicitis. Hgb normal. Quant beta hCG consistent with 5-6 wk gestation which is reassuring. U/A and wet prep not infectious. Called patient with all of today's results already. Patient's new OB visit was scheduled for Monday 10/16/17, but was canceled until viable pregnancy is confirmed. I spoke with HOMERO Rios who will contact patient to schedule a follow up visit. Plan for U/S in next 1-2 weeks. Continue trending quant bHCG.   -     Hemoglobin (HGB) ([facility])  -     Beta-HCG Quantitative ([facility])  -     Wet Prep ([facility])    Abdominal cramping, generalized. Unclear etiology given PMHx of Crohn's, Celiac, fibromyalgia, chronic pain and pregnancy. Non-specific mild tenderness diffusely on exam without rebound or guarding. No nausea or vomiting. No clear indication for treatment or further work up. Patient has GI appointment tomorrow.     Urinary frequency. Denies other urinary symptoms. Returned positive for blood as expected. No UTI. No treatment indicated.   -     Urinalysis ([facility])    Migraine with aura and without status migrainosus, not intractable.   -     SUMAtriptan (IMITREX) 25 MG tablet; Take 1 tablet (25 mg) by mouth at onset of headache for migraine May repeat in 2 hours. Max 8 tablets/24 hours.    Fibromyalgia.  -     acetaminophen (TYLENOL) 500 MG tablet; Take 2 tablets (1,000 mg) by mouth 3 times daily    Hx of LSIL pap smear (2/6/17): Patient was referred to OBGYN for colposcopy/consult for treatment due to multiple abnormal pap smears, and recalls OBGYN has cleared her of further testing and follow up.         -    Follow up with patient at next visit. Review/attain medical records.     Mood: Appears stable and patient confident in support network.         -    Continue to monitor closely. Consider PHQ-9 at next visit.  patient on tobacco and ETOH use during pregnancy.     Options for treatment and follow-up care were reviewed  with the patient who was engaged and actively involved in the decision making process, verbalized understanding of the options discussed, and satisfied with the final plan.    Patient was staffed with supervising physician, Dr. Vance Simmons.     Raj Mock MD, PGY-1  [facility]

## 2017-10-12 NOTE — TELEPHONE ENCOUNTER
TCC for pt.  She no showed her appt yesterday.  Need to let her know that we should cancel her NOB appt on Monday, 10/16/17 and can reschedule after viable pregnancy is confirmed./NG

## 2017-10-12 NOTE — MR AVS SNAPSHOT
After Visit Summary   10/12/2017    Kee Phipps    MRN: [id number]           Patient Information     Date Of Birth          1990        Visit Information        Provider Department      10/12/2017 11:20 AM Raj Mock MD [facility]        Today's Diagnoses     Vaginal bleeding    -  1    Fibromyalgia        Migraine with aura and without status migrainosus, not intractable        Urinary frequency          Care Instructions    - Will call with results of HCG, urine and wet prep. If treatment is indicated, will discuss over the phone and prescribe appropriately.   - Prescribe Imitrex for migraines. Continue with Tylenol as needed, safe during pregnancy.  - Schedule OB ultrasound in the next 1-2 weeks. Can discuss at new OB visit on Monday if we decide to schedule it earlier.   - Hemoglobin was normal today, reassuring that no significant bleeding is occurring.           Follow-ups after your visit        Who to contact     Please call your clinic at [phone] to:    Ask questions about your health    Make or cancel appointments    Discuss your medicines    Learn about your test results    Speak to your doctor   If you have compliments or concerns about an experience at your clinic, or if you wish to file a complaint, please contact [facility] Patient Relations at [phone] or email us at [email or web address]         Additional Information About Your Visit        MyChart Information     [employer] gives you secure access to your electronic health record. If you see a primary care provider, you can also send messages to your care team and make appointments. If you have questions, please call your primary care clinic.  If you do not have a primary care provider, please call [phone] and they will assist you.      [employer] is an electronic gateway that provides easy, online access to your medical records. With [employer], you can request a clinic  appointment, read your test results, renew a prescription or communicate with your care team.     To access your existing account, please contact your [facility] or call [phone] for assistance.        Care EveryWhere ID     This is your Care EveryWhere ID. This could be used by other organizations to access your [facility] medical records  [id number]        Your Vitals Were     Pulse Temperature Last Period BMI (Body Mass Index)          91 98  F (36.7  C) (Oral) 09/05/2017 (Approximate) 29.09 kg/m2         Blood Pressure from Last 3 Encounters:   10/12/17 113/72   10/09/17 111/75   10/03/17 107/74    Weight from Last 3 Encounters:   10/12/17 164 lb 3.2 oz (74.5 kg)   10/09/17 163 lb (73.9 kg)   10/03/17 162 lb 12.8 oz (73.8 kg)              We Performed the Following     Beta-HCG Quantitative ([facility])     Hemoglobin (HGB) ([facility])     Urinalysis ([facility])     Wet Prep ([facility])          Today's Medication Changes          These changes are accurate as of: 10/12/17 12:28 PM.  If you have any questions, ask your nurse or doctor.               Start taking these medicines.        Dose/Directions    SUMAtriptan 25 MG tablet   Commonly known as:  IMITREX   Used for:  Migraine with aura and without status migrainosus, not intractable   Started by:  Raj Mock MD        Dose:  25 mg   Take 1 tablet (25 mg) by mouth at onset of headache for migraine May repeat in 2 hours. Max 8 tablets/24 hours.   Quantity:  18 tablet   Refills:  3            Where to get your medicines      These medications were sent to [facility] - [place], [place] - [address], [place] [address]     Phone:  [phone]     acetaminophen 500 MG tablet    SUMAtriptan 25 MG tablet                Primary Care Provider Office Phone # Fax #    Deborah Miller [phone]       [facility]  [address] [place] [address]        Equal Access to Services      EDGAR [facility]: Hadii aad ku mitch Sofrank, waaxda luqadaha, qaybta kaalmada adematt, christal spenserin hayaatalya perez piaelias valdez . So [facility] [phone].    ATENCIÓN: Si habla lindsey, tiene a jeong disposición servicios gratuitos de asistencia lingüística. Llame al [phone].    We comply with applicable federal civil rights laws and [place] laws. We do not discriminate on the basis of race, color, national origin, age, disability, sex, sexual orientation, or gender identity.            Thank you!     Thank you for choosing [facility]  for your care. Our goal is always to provide you with excellent care. Hearing back from our patients is one way we can continue to improve our services. Please take a few minutes to complete the written survey that you may receive in the mail after your visit with us. Thank you!             Your Updated Medication List - Protect others around you: Learn how to safely use, store and throw away your medicines at www.disposemymeds.org.          This list is accurate as of: 10/12/17 12:28 PM.  Always use your most recent med list.                   Brand Name Dispense Instructions for use Diagnosis    acetaminophen 500 MG tablet    TYLENOL    100 tablet    Take 2 tablets (1,000 mg) by mouth 3 times daily    Fibromyalgia       FIRST-MOUTHWASH BLM MT Susp compounding kit     237 mL    Swish and swallow 5-10 mLs in mouth every 6 hours as needed for mouth sores    Throat pain       methocarbamol 750 MG tablet    ROBAXIN    180 tablet    Take 1 tablet (750 mg) by mouth 3 times daily as needed for muscle spasms    Fibromyalgia       nicotine polacrilex 2 MG gum    [employer] NICOTINE POLACRILEX    30 tablet    Place 1 each (2 mg) inside cheek as needed for smoking cessation    Tobacco abuse       SUMAtriptan 25 MG tablet    IMITREX    18 tablet    Take 1 tablet (25 mg) by mouth at onset of headache for migraine May repeat in 2 hours. Max 8 tablets/24 hours.    Migraine with aura and  without status migrainosus, not intractable       vitamin D 2000 UNITS tablet     100 tablet    Take 2,000 Units by mouth daily    Crohn's disease of small and large intestines with complication (H)

## 2017-10-12 NOTE — TELEPHONE ENCOUNTER
Pt states that she fell asleep yesterday and that is why she missed the appt.  Pt states that she can come in this morning and appt made with Dr Mock at 11:20 am.  Told pt that we should cancel the appt for Monday for NOB until we are sure everything is okay with the baby and pregnancy.  Pt verbalized understanding./NG

## 2017-10-12 NOTE — PATIENT INSTRUCTIONS
- Will call with results of HCG, urine and wet prep. If treatment is indicated, will discuss over the phone and prescribe appropriately.   - Prescribe Imitrex for migraines. Continue with Tylenol as needed, safe during pregnancy.  - Schedule OB ultrasound in the next 1-2 weeks. Can discuss at new OB visit on Monday if we decide to schedule it earlier.   - Hemoglobin was normal today, reassuring that no significant bleeding is occurring.

## 2017-10-14 ENCOUNTER — TRANSFERRED RECORDS (OUTPATIENT)
Dept: HEALTH INFORMATION MANAGEMENT | Facility: CLINIC | Age: 27
End: 2017-10-14

## 2017-10-15 NOTE — PROGRESS NOTES
Preceptor attestation:  Patient seen and discussed with the resident. Assessment and plan reviewed with resident and agreed upon.  Supervising physician: Vance Simmons  [facility]

## 2017-10-16 ENCOUNTER — OFFICE VISIT (OUTPATIENT)
Dept: FAMILY MEDICINE | Facility: CLINIC | Age: 27
End: 2017-10-16

## 2017-10-16 VITALS
BODY MASS INDEX: 29.23 KG/M2 | SYSTOLIC BLOOD PRESSURE: 101 MMHG | TEMPERATURE: 98.4 F | WEIGHT: 165 LBS | HEART RATE: 72 BPM | DIASTOLIC BLOOD PRESSURE: 69 MMHG

## 2017-10-16 DIAGNOSIS — Z32.01 PREGNANCY EXAMINATION OR TEST, POSITIVE RESULT: Primary | ICD-10-CM

## 2017-10-16 DIAGNOSIS — Z32.01 PREGNANCY TEST POSITIVE: Primary | ICD-10-CM

## 2017-10-16 LAB — B-HCG SERPL-ACNC: ABNORMAL MLU/ML (ref 0–4)

## 2017-10-16 NOTE — PATIENT INSTRUCTIONS
We will call you when your results return and determine a plan then    Call if you have questions or concerns such as increase in bleeding/spotting

## 2017-10-16 NOTE — MR AVS SNAPSHOT
After Visit Summary   10/16/2017    Kee Phipps    MRN: [id number]           Patient Information     Date Of Birth          1990        Visit Information        Provider Department      10/16/2017 2:10 PM Marek Cintron MD [facility]        Today's Diagnoses     Pregnancy test positive    -  1      Care Instructions    We will call you when your results return and determine a plan then    Call if you have questions or concerns such as increase in bleeding/spotting          Follow-ups after your visit        Future tests that were ordered for you today     Open Future Orders        Priority Expected Expires Ordered    US OB <14 WKS SINGLE OR FIRST GESTATION Routine  10/16/2018 10/16/2017            Who to contact     Please call your clinic at [phone] to:    Ask questions about your health    Make or cancel appointments    Discuss your medicines    Learn about your test results    Speak to your doctor   If you have compliments or concerns about an experience at your clinic, or if you wish to file a complaint, please contact [facility] Patient Relations at [phone] or email us at [email or web address]         Additional Information About Your Visit        MyChart Information     [employer] gives you secure access to your electronic health record. If you see a primary care provider, you can also send messages to your care team and make appointments. If you have questions, please call your primary care clinic.  If you do not have a primary care provider, please call [phone] and they will assist you.      [employer] is an electronic gateway that provides easy, online access to your medical records. With [employer], you can request a clinic appointment, read your test results, renew a prescription or communicate with your care team.     To access your existing account, please contact your [facility] or call  [phone] for assistance.        Care EveryWhere ID     This is your Care EveryWhere ID. This could be used by other organizations to access your [facility] medical records  [id number]        Your Vitals Were     Pulse Temperature Last Period BMI (Body Mass Index)          72 98.4  F (36.9  C) (Oral) 09/05/2017 (Approximate) 29.23 kg/m2         Blood Pressure from Last 3 Encounters:   10/16/17 101/69   10/12/17 113/72   10/09/17 111/75    Weight from Last 3 Encounters:   10/16/17 165 lb (74.8 kg)   10/12/17 164 lb 3.2 oz (74.5 kg)   10/09/17 163 lb (73.9 kg)              We Performed the Following     Beta-HCG Quantitative ([facility])        Primary Care Provider Office Phone # Fax #    Deborah Miller [phone]       [facility]  [address] [place] [address]        Equal Access to Services     EDGAR IRAHETA : Hadii aad ku hadasho Soomaali, waaxda luqadaha, qaybta kaalmada adeegyada, waxay spenserin haynestorn ana valdez . So [facility] [phone].    ATENCIÓN: Si habla español, tiene a jeong disposición servicios gratuitos de asistencia lingüística. Llame al [phone].    We comply with applicable federal civil rights laws and [place] laws. We do not discriminate on the basis of race, color, national origin, age, disability, sex, sexual orientation, or gender identity.            Thank you!     Thank you for choosing [facility]  for your care. Our goal is always to provide you with excellent care. Hearing back from our patients is one way we can continue to improve our services. Please take a few minutes to complete the written survey that you may receive in the mail after your visit with us. Thank you!             Your Updated Medication List - Protect others around you: Learn how to safely use, store and throw away your medicines at www.disposemymeds.org.          This list is accurate as of: 10/16/17  2:52 PM.  Always use your most recent med list.                    Brand Name Dispense Instructions for use Diagnosis    acetaminophen 500 MG tablet    TYLENOL    100 tablet    Take 2 tablets (1,000 mg) by mouth 3 times daily    Fibromyalgia       methocarbamol 750 MG tablet    ROBAXIN    180 tablet    Take 1 tablet (750 mg) by mouth 3 times daily as needed for muscle spasms    Fibromyalgia       nicotine polacrilex 2 MG gum    [employer] NICOTINE POLACRILEX    30 tablet    Place 1 each (2 mg) inside cheek as needed for smoking cessation    Tobacco abuse       SUMAtriptan 25 MG tablet    IMITREX    18 tablet    Take 1 tablet (25 mg) by mouth at onset of headache for migraine May repeat in 2 hours. Max 8 tablets/24 hours.    Migraine with aura and without status migrainosus, not intractable

## 2017-10-16 NOTE — PROGRESS NOTES
SUBJECTIVE       Kee Phipps is a 27 year old  female with a PMH significant for:     Patient Active Problem List   Diagnosis     Health Care Home     Crohns disease (H)     Need for prophylactic vaccination and inoculation against varicella     Abnormal Pap smear of cervix     Chronic pain     Adjustment disorder with depressed mood     Celiac disease     Chronic pain syndrome     Immunosuppressed status (H)     Multiple joint pain     Back pain     Overweight (BMI 25.0-29.9)     Fibromyalgia     H/O  delivery, currently pregnant     Need for varicella vaccine     Patient presents with:  Follow Up For: Pt thought she was here for her first OB which was cancelled this morning without the patient's knowledge.  She would like an ultrasound to see the baby.   27 year old  at 5w6d based on LMP 17  Spotting has significantly decreased, sees this twice a day that is more brown/pink which is lighter than previous. No abdominal cramping. Taking her multivitamin. Would like a repeat ultrasound today. No fevers. Feeling well overall without nausea. Anxious to have next ultrasound done as previous one done 9 days ago on 10/7/17 at [facility] was done too soon to confirm viability    PMH, Medications and Allergies were reviewed and updated as needed.    ROS: As above per HPI        OBJECTIVE     Vitals:    10/16/17 1433   BP: 101/69   BP Location: Left arm   Patient Position: Sitting   Cuff Size: Adult Large   Pulse: 72   Temp: 98.4  F (36.9  C)   TempSrc: Oral   Weight: 165 lb (74.8 kg)     Body mass index is 29.23 kg/(m^2).    GEN: NAD, AAox3, appears stated age  CV: HDS  HEENT: EOMI, head normocephalic, sclera anicteric, trachea midline  PULM: non-labored  ABD: soft, non-tender,  NEURO: GCS 15  GAIT: normal  EXTREM: no edema  PSYCH: euthymic, linear thoughts, no delusions/hallucinations, comprehensible    LABS/IMAGING/EKG  No results found for this or any previous visit (from the past 24  hour(s)).    10/7/17 (previous U/S done at [facility])  CONCLUSION:   1.  Small focal fluid collection in the upper endometrial canal now visualized may represent a gestational sac at 5 weeks 0 days. This is too soon to confirm viability. Correlation with serial quantitative beta hCG measurements and follow-up ultrasound in 10-14 days recommended to confirm viability.  2.  Small 2 cm presumed physiologic cyst left ovary. No abnormal paraovarian adnexal masses.    ASSESSMENT AND PLAN     Pregnancy test positive  Per request of patient, we'll do an ultrasound today as a visible gestational sac may be more visible at this time although not ideal as would prefer at 8-12 weeks Patient ok with doing TVUS. Last one done 10 days ago   Thus she should be 5w6D today based on LMP. Quantitative beta hCG trend = 9/27/17: 22 --> 10/7/17: 2286 --> 10/12/17: 66439  -serial hcg and repeat fetal U/S in house today as there is an availability  -will call patient with results and determine plan when we know more.   - continue prenatal  - continue to monitor spotting   - schedule first OB appt if viable pregnancy confirmed    Marek Cintron MD PGY3  [facility]    Discussed with Dr. Emanuel who agrees with the above assessment and plan.

## 2017-10-17 NOTE — PROGRESS NOTES
Preceptor attestation:  Patient seen and discussed with the resident. Assessment and plan reviewed with resident and agreed upon.  Supervising physician: Melchor Emanuel  [facility]

## 2017-10-19 ENCOUNTER — TELEPHONE (OUTPATIENT)
Dept: FAMILY MEDICINE | Facility: CLINIC | Age: 27
End: 2017-10-19

## 2017-10-19 NOTE — TELEPHONE ENCOUNTER
----- Message from Pretty Rios RN sent at 10/19/2017  9:22 AM CDT -----  Regarding: Sched NOB  Please schedule pt for a NOB with Dr Hernandez.    Thanks,    Pretty

## 2017-10-25 ENCOUNTER — TELEPHONE (OUTPATIENT)
Dept: FAMILY MEDICINE | Facility: CLINIC | Age: 27
End: 2017-10-25

## 2017-10-25 NOTE — TELEPHONE ENCOUNTER
Called pt to see how she was doing and she states that she is doing okay.  Asked her if she wanted to schedule her NOB with Dr Francis?  Pt states that she has decided to go to another clinic closer to her home for her prenatal care.  Wished her good luck with her pregnancy and told her to call with any questions concerns or if she needed any of her records.  Pt verbalized understanding./NG

## 2017-10-30 ENCOUNTER — TRANSFERRED RECORDS (OUTPATIENT)
Dept: HEALTH INFORMATION MANAGEMENT | Facility: CLINIC | Age: 27
End: 2017-10-30

## 2017-11-10 ENCOUNTER — OFFICE VISIT (OUTPATIENT)
Dept: FAMILY MEDICINE | Facility: CLINIC | Age: 27
End: 2017-11-10

## 2017-11-10 VITALS
WEIGHT: 167 LBS | TEMPERATURE: 98.3 F | BODY MASS INDEX: 29.58 KG/M2 | HEART RATE: 92 BPM | OXYGEN SATURATION: 98 % | DIASTOLIC BLOOD PRESSURE: 74 MMHG | SYSTOLIC BLOOD PRESSURE: 118 MMHG
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
## 2017-11-10 DIAGNOSIS — T14.8XXA BRUISING: ICD-10-CM

## 2017-11-10 RX ORDER — BENZOCAINE/MENTHOL 6 MG-10 MG
LOZENGE MUCOUS MEMBRANE
Qty: 30 G | Refills: 0 | Status: SHIPPED | OUTPATIENT
Start: 2017-11-10 | End: 2018-09-06

## 2017-11-10 ASSESSMENT — PATIENT HEALTH QUESTIONNAIRE - PHQ9: SUM OF ALL RESPONSES TO PHQ QUESTIONS 1-9: 12

## 2017-11-10 NOTE — PROGRESS NOTES
"       SUBJECTIVE       Kee Phipps is a 27 year old  female with a PMH significant for:     Patient Active Problem List   Diagnosis     Health Care Home     Crohns disease (H)     Need for prophylactic vaccination and inoculation against varicella     Abnormal Pap smear of cervix     Chronic pain     Adjustment disorder with depressed mood     Celiac disease     Chronic pain syndrome     Immunosuppressed status (H)     Multiple joint pain     Back pain     Overweight (BMI 25.0-29.9)     Fibromyalgia     H/O  delivery, currently pregnant     Need for varicella vaccine     She presents with bruising on her L arm. States that she had similar spots on the arm last summer, was treated for lyme at this time. No concern for recent tick bites or exposure, e.g. Camping. No personal or family hx of bleeding disorder.     In addition has some insect bite marks on the L breast, feet and back of the leg. The bites are itchy. Went to the ED about 1 week ago and received \"cream\" that hasn't been helpful. Son also has some of these bites. Brothers, who live at her mother's home, have had similar bites. Pt and her son did spend a night at her mom's place this week. Thinks that other apartments in her mother's building have recently been treated for fleas.     Currently pregnant, 10 weeks. Seeing OB[facility]GYN at [facility].         REVIEW OF SYSTEMS     See HPI        OBJECTIVE     Vitals:    11/10/17 1538   BP: 118/74   Pulse: 92   Temp: 98.3  F (36.8  C)   TempSrc: Oral   SpO2: 98%   Weight: 167 lb (75.8 kg)     Body mass index is 29.58 kg/(m^2).    Gen: Well-appearing adult female. Alert, oriented and appropriate. NAD  Skin: L anterior lower arm with 2, < 1 cm areas of ecchymosis. L breast, L posterior knee and R dorsal foot with few, scattered 2-3 mm erythematous papules. No drainage.      No results found for this or any previous visit (from the past 24 hour(s)).        ASSESSMENT AND PLAN     1. Bug bite, initial encounter  DDx " includes flea vs bed bug vs other insect bites. Pt provided with gentle steroid for itchy. Also provided with information regarding identification of bed bugs in the home, eradication and legal responsibilities of the landlord.   - hydrocortisone (CORTAID) 1 % cream; Apply sparingly to affected area three times daily for 14 days.  Dispense: 30 g; Refill: 0    2. Bruising  Suspect normal response to minor trauma. No personal or family hx of bleeding d/o.    Deborah Miller

## 2017-11-10 NOTE — PROGRESS NOTES
Preceptor attestation:  Patient seen and discussed with the resident. Assessment and plan reviewed with resident and agreed upon.  Supervising physician: Luis E Cote  [facility]

## 2017-11-10 NOTE — MR AVS SNAPSHOT
After Visit Summary   11/10/2017    Kee Phipps    MRN: [id number]           Patient Information     Date Of Birth          1990        Visit Information        Provider Department      11/10/2017 3:30 PM Deborah Miller MD [facility]        Today's Diagnoses     Bug bite, initial encounter    -  1    Bruising           Follow-ups after your visit        Who to contact     Please call your clinic at [phone] to:    Ask questions about your health    Make or cancel appointments    Discuss your medicines    Learn about your test results    Speak to your doctor   If you have compliments or concerns about an experience at your clinic, or if you wish to file a complaint, please contact [facility] Patient Relations at [phone] or email us at [email or web address]         Additional Information About Your Visit        MyChart Information     [employer] gives you secure access to your electronic health record. If you see a primary care provider, you can also send messages to your care team and make appointments. If you have questions, please call your primary care clinic.  If you do not have a primary care provider, please call [phone] and they will assist you.      Datumate is an electronic gateway that provides easy, online access to your medical records. With [employer], you can request a clinic appointment, read your test results, renew a prescription or communicate with your care team.     To access your existing account, please contact your [facility] or call [phone] for assistance.        Care EveryWhere ID     This is your Care EveryWhere ID. This could be used by other organizations to access your [facility] medical records  [id number]        Your Vitals Were     Pulse Temperature Last Period Pulse Oximetry BMI (Body Mass Index)       92 98.3  F (36.8  C) (Oral) 09/05/2017 (Approximate) 98% 29.58 kg/m2         Blood Pressure from Last 3 Encounters:   11/10/17 118/74   10/16/17 101/69   10/12/17 113/72    Weight from Last 3 Encounters:   11/10/17 167 lb (75.8 kg)   10/16/17 165 lb (74.8 kg)   10/12/17 164 lb 3.2 oz (74.5 kg)              Today, you had the following     No orders found for display         Today's Medication Changes          These changes are accurate as of: 11/10/17  4:11 PM.  If you have any questions, ask your nurse or doctor.               Start taking these medicines.        Dose/Directions    hydrocortisone 1 % cream   Commonly known as:  CORTAID   Used for:  Bug bite, initial encounter   Started by:  Deborah Miller MD        Apply sparingly to affected area three times daily for 14 days.   Quantity:  30 g   Refills:  0            Where to get your medicines      These medications were sent to [facility] - [place], [place] - [address], [place] [address]     Phone:  [phone]     hydrocortisone 1 % cream                Primary Care Provider Office Phone # Fax #    Deborah Miller [phone]       [facility]  [address] [place] [address]        Equal Access to Services     EDGAR IRAHETA AH: Anastasia cateso Socarsonali, waaxda luqadaha, qaybta kaalmada adeegyada, christal chavez. So [facility] [phone].    ATENCIÓN: Si habla español, tiene a jeong disposición servicios gratuitos de asistencia lingüística. Llame al [phone].    We comply with applicable federal civil rights laws and [place] laws. We do not discriminate on the basis of race, color, national origin, age, disability, sex, sexual orientation, or gender identity.            Thank you!     Thank you for choosing [facility]  for your care. Our goal is always to provide you with excellent care. Hearing back from our patients is one way we can continue to improve our services. Please take a few minutes to complete the written survey that  you may receive in the mail after your visit with us. Thank you!             Your Updated Medication List - Protect others around you: Learn how to safely use, store and throw away your medicines at www.disposemymeds.org.          This list is accurate as of: 11/10/17  4:11 PM.  Always use your most recent med list.                   Brand Name Dispense Instructions for use Diagnosis    acetaminophen 500 MG tablet    TYLENOL    100 tablet    Take 2 tablets (1,000 mg) by mouth 3 times daily    Fibromyalgia       hydrocortisone 1 % cream    CORTAID    30 g    Apply sparingly to affected area three times daily for 14 days.    Bug bite, initial encounter       methocarbamol 750 MG tablet    ROBAXIN    180 tablet    Take 1 tablet (750 mg) by mouth 3 times daily as needed for muscle spasms    Fibromyalgia       nicotine polacrilex 2 MG gum    CVS NICOTINE POLACRILEX    30 tablet    Place 1 each (2 mg) inside cheek as needed for smoking cessation    Tobacco abuse       SUMAtriptan 25 MG tablet    IMITREX    18 tablet    Take 1 tablet (25 mg) by mouth at onset of headache for migraine May repeat in 2 hours. Max 8 tablets/24 hours.    Migraine with aura and without status migrainosus, not intractable

## 2017-12-14 ENCOUNTER — TELEPHONE (OUTPATIENT)
Dept: FAMILY MEDICINE | Facility: CLINIC | Age: 27
End: 2017-12-14

## 2017-12-14 NOTE — TELEPHONE ENCOUNTER
Pt states that she had an EAB at 10-11 wks gestation on 11/22/17 because she was not able to take her medications during pregnancy for her chronic conditions and could not deal with her symptoms.  Pt states that she started taking her BCP on 12/3/17 and then had unprotected sex on 12/4/17.  She states that she went to Urgent Care yesterday because she was having symptoms of pregnancy including migraines, blurry vision, and breast tenderness which she had with her other pregnancies.  She states that they checked her beta hcg quant which was 20 and was told that it was in the 3-4 wk gestation level.  Pt is wondering if she could be pregnant again or if the level is still coming down from recent EAB?  Told her that she will need to have repeat quant beta hcg after 48-72 hours to see if it is increasing or decreasing.  Told her that if it is increasing that it could be sign of new early pregnancy and if it is decreasing then it is most likely coming down from recent EAB.  Told pt that she should make an appt to see the doctor tomorrow to discuss and have labs drawn.  Pt verbalized understanding and transferred her to the appt desk to schedule.    Pt scheduled an appt to see Dr Miller on Friday, 12/15/17 at 8:20 am.  Routed note to Dr Miller./YOLI

## 2017-12-14 NOTE — TELEPHONE ENCOUNTER
[facility] phone call message- general phone call:    Reason for call: She would like to talk to nick.    Return call needed: Yes    OK to leave a message on voice mail? Yes    Primary language: English      needed? No    Call taken on December 14, 2017 at 10:12 AM by Kenzie Adhikari

## 2017-12-15 ENCOUNTER — TELEPHONE (OUTPATIENT)
Dept: FAMILY MEDICINE | Facility: CLINIC | Age: 27
End: 2017-12-15

## 2017-12-15 ENCOUNTER — OFFICE VISIT (OUTPATIENT)
Dept: FAMILY MEDICINE | Facility: CLINIC | Age: 27
End: 2017-12-15
Payer: COMMERCIAL

## 2017-12-15 VITALS — HEART RATE: 91 BPM | DIASTOLIC BLOOD PRESSURE: 81 MMHG | SYSTOLIC BLOOD PRESSURE: 118 MMHG

## 2017-12-15 DIAGNOSIS — Z32.01 PREGNANCY TEST POSITIVE: Primary | ICD-10-CM

## 2017-12-15 LAB — B-HCG SERPL-ACNC: 14 MLU/ML (ref 0–4)

## 2017-12-15 RX ORDER — ACETAMINOPHEN AND CODEINE PHOSPHATE 120; 12 MG/5ML; MG/5ML
1 SOLUTION ORAL DAILY
COMMUNITY
End: 2018-01-11

## 2017-12-15 ASSESSMENT — PATIENT HEALTH QUESTIONNAIRE - PHQ9: SUM OF ALL RESPONSES TO PHQ QUESTIONS 1-9: 24

## 2017-12-15 NOTE — PROGRESS NOTES
"       SUBJECTIVE       Kee Phipps is a 27 year old  female with a PMH significant for:     Patient Active Problem List   Diagnosis     Health Care Home     Crohns disease (H)     Need for prophylactic vaccination and inoculation against varicella     Abnormal Pap smear of cervix     Chronic pain     Adjustment disorder with depressed mood     Celiac disease     Chronic pain syndrome     Immunosuppressed status (H)     Multiple joint pain     Back pain     Overweight (BMI 25.0-29.9)     Fibromyalgia     H/O  delivery, currently pregnant     Need for varicella vaccine     She presents in follow-up. Had elective  on 17. Decided to do this b/c of \"autoimmune disorders.\" Was having a lot of pain and felt that she couldn't take medications that were needed. Hx Crohn's, celiac and fibromyalgia. Got birth control pills after the . Started taking birth control pills on 12/3. Had unprotected intercourse on . Soon after that, breasts became tender again. Had migraine headache, which she states only occurs when she is pregnant.  Was seen in urgent care yesterday , had positive UPT.  Beta-hCG at that time was 20.  Patient states that she does desire pregnancy, however feels that it may be too uncomfortable or complicated with her chronic medical conditions.  Currently following with a rheumatologist at [facility].        REVIEW OF SYSTEMS     See HPI        OBJECTIVE     Vitals:    12/15/17 0831   BP: 118/81   Pulse: 91     There is no height or weight on file to calculate BMI.    General appearance: Well-appearing adult female.  Alert, oriented and appropriate.  No acute distress  HEENT: Mucous membranes moist  Cardiovascular: Regular rate and rhythm, no rubs, murmurs or extra heart sounds   Pulmonary: Clear to auscultation bilaterally.  No wheezes, rales or rhonchi    No results found for this or any previous visit (from the past 24 hour(s)).        ASSESSMENT AND PLAN     1. Pregnancy test " positive  Repeat beta-hCG today.  Patient states that if pregnant, she most likely would not seek another elective .  Given her desire for pregnancy, feel that it would be prudent, whether pregnant today or not, that she meet with perinatology to discuss healthy pregnancy with her chronic diseases.  In addition, encouraged that she continue to work with rheumatology.  If not pregnant, will discuss birth control options.  - Beta-HCG Quantitative ([facility])        RTC pending lab results or as needed    Deborah Miller

## 2017-12-15 NOTE — PROGRESS NOTES
Preceptor attestation:  Patient seen and discussed with the resident. Assessment and plan reviewed with resident and agreed upon.  Supervising physician: Billie Gibbons  [facility]

## 2017-12-15 NOTE — MR AVS SNAPSHOT
After Visit Summary   12/15/2017    Kee Phipps    MRN: [id number]           Patient Information     Date Of Birth          1990        Visit Information        Provider Department      12/15/2017 8:20 AM Deborah Miller MD [facility]        Today's Diagnoses     Pregnancy test positive    -  1       Follow-ups after your visit        Who to contact     Please call your clinic at [phone] to:    Ask questions about your health    Make or cancel appointments    Discuss your medicines    Learn about your test results    Speak to your doctor   If you have compliments or concerns about an experience at your clinic, or if you wish to file a complaint, please contact [facility] Patient Relations at [phone] or email us at [email or web address]         Additional Information About Your Visit        MyChart Information     [employer] gives you secure access to your electronic health record. If you see a primary care provider, you can also send messages to your care team and make appointments. If you have questions, please call your primary care clinic.  If you do not have a primary care provider, please call [phone] and they will assist you.      [employer] is an electronic gateway that provides easy, online access to your medical records. With [employer], you can request a clinic appointment, read your test results, renew a prescription or communicate with your care team.     To access your existing account, please contact your [facility] or call [phone] for assistance.        Care EveryWhere ID     This is your Care EveryWhere ID. This could be used by other organizations to access your [facility] medical records  [id number]        Your Vitals Were     Pulse Last Period                91 09/05/2017 (Approximate)           Blood Pressure from Last 3 Encounters:   12/15/17 118/81   11/10/17 118/74   10/16/17 101/69     Weight from Last 3 Encounters:   11/10/17 167 lb (75.8 kg)   10/16/17 165 lb (74.8 kg)   10/12/17 164 lb 3.2 oz (74.5 kg)              We Performed the Following     Beta-HCG Quantitative ([facility])        Primary Care Provider Office Phone # Fax #    Deborah Shayy Miller [phone]       [facility]  [address] [place] [address]        Equal Access to Services     EDGAR IRAHETA : Hadii aad ku hadasho Soomaali, waaxda luqadaha, qaybta kaalmada adeegyada, waxay idiin hayaan adeeg nataraelias la'olga . So [facility] [phone].    ATENCIÓN: Si habla español, tiene a jeong disposición servicios gratuitos de asistencia lingüística. [facility] [phone].    We comply with applicable federal civil rights laws and [place] laws. We do not discriminate on the basis of race, color, national origin, age, disability, sex, sexual orientation, or gender identity.            Thank you!     Thank you for choosing [facility]  for your care. Our goal is always to provide you with excellent care. Hearing back from our patients is one way we can continue to improve our services. Please take a few minutes to complete the written survey that you may receive in the mail after your visit with us. Thank you!             Your Updated Medication List - Protect others around you: Learn how to safely use, store and throw away your medicines at www.disposemymeds.org.          This list is accurate as of: 12/15/17 11:59 PM.  Always use your most recent med list.                   Brand Name Dispense Instructions for use Diagnosis    acetaminophen 500 MG tablet    TYLENOL    100 tablet    Take 2 tablets (1,000 mg) by mouth 3 times daily    Fibromyalgia       hydrocortisone 1 % cream    CORTAID    30 g    Apply sparingly to affected area three times daily for 14 days.    Bug bite, initial encounter       MELOXICAM PO      Take 15 mg by mouth        methocarbamol 750 MG tablet    ROBAXIN    180 tablet    Take 1 tablet  (750 mg) by mouth 3 times daily as needed for muscle spasms    Fibromyalgia       nicotine polacrilex 2 MG gum    [employer]S NICOTINE POLACRILEX    30 tablet    Place 1 each (2 mg) inside cheek as needed for smoking cessation    Tobacco abuse       norethindrone 0.35 MG per tablet    MICRONOR     Take 1 tablet by mouth daily        SUMAtriptan 25 MG tablet    IMITREX    18 tablet    Take 1 tablet (25 mg) by mouth at onset of headache for migraine May repeat in 2 hours. Max 8 tablets/24 hours.    Migraine with aura and without status migrainosus, not intractable

## 2017-12-20 NOTE — PROGRESS NOTES
Falling beta HCG (had been in the 30s at another clinic) indicates that this is persistently elevated from recent pregnancy termination rather than new pregnancy. Discussed with patient via phone.

## 2018-01-11 DIAGNOSIS — Z30.011 ENCOUNTER FOR INITIAL PRESCRIPTION OF CONTRACEPTIVE PILLS: Primary | ICD-10-CM

## 2018-01-11 RX ORDER — ACETAMINOPHEN AND CODEINE PHOSPHATE 120; 12 MG/5ML; MG/5ML
1 SOLUTION ORAL DAILY
Qty: 90 TABLET | Refills: 3 | Status: SHIPPED | OUTPATIENT
Start: 2018-01-11 | End: 2018-09-06

## 2018-01-11 NOTE — TELEPHONE ENCOUNTER
"Called patient to discuss prescription. She does not recall who prescribed this to her initially or why it was progesterone vs combined OCP. Thinks maybe she \"reacted\" to combined pill. Pt is also a current smoker. No hx of VTE. Discussed the necessity to take the pill daily at the same time. If deviates by >3H from usual time, back-up contraception should be used. Pt expressed understanding. Prescription sent.    Deborah Miller MD, PGY-2  "

## 2018-02-22 ENCOUNTER — TRANSFERRED RECORDS (OUTPATIENT)
Dept: HEALTH INFORMATION MANAGEMENT | Facility: CLINIC | Age: 28
End: 2018-02-22

## 2018-03-28 ENCOUNTER — OFFICE VISIT (OUTPATIENT)
Dept: FAMILY MEDICINE | Facility: CLINIC | Age: 28
End: 2018-03-28
Payer: COMMERCIAL

## 2018-03-28 DIAGNOSIS — Z72.51 UNPROTECTED SEXUAL INTERCOURSE: ICD-10-CM

## 2018-03-28 DIAGNOSIS — R30.0 DYSURIA: ICD-10-CM

## 2018-03-28 DIAGNOSIS — N89.8 VAGINAL DISCHARGE: Primary | ICD-10-CM

## 2018-03-28 LAB
BACTERIA: NORMAL
BACTERIA: NORMAL
BILIRUBIN UR: NEGATIVE
BLOOD UR: ABNORMAL
CASTS: NORMAL /LPF
CLUE CELLS: NORMAL
CRYSTAL URINE: NORMAL /LPF
EPITHELIAL CELLS UR: NORMAL /LPF (ref 0–2)
GLUCOSE URINE: NEGATIVE
HCG UR QL: NEGATIVE
HIV 1+2 AB+HIV1 P24 AG SERPL QL IA: NEGATIVE
KETONES UR QL: NEGATIVE
LEUKOCYTE ESTERASE UR: ABNORMAL
MOTILE TRICHOMONAS: NEGATIVE
MUCOUS URINE: NORMAL LPF
NITRITE UR QL STRIP: NEGATIVE
ODOR: NORMAL
PH UR STRIP: 7 [PH] (ref 5–7)
PH WET PREP: NORMAL
PROTEIN UR: NEGATIVE
RBC URINE: <2 /HPF
SP GR UR STRIP: 1.02
UROBILINOGEN UR STRIP-ACNC: ABNORMAL
WBC URINE: NORMAL /HPF
WBC WET PREP: NORMAL
YEAST: NORMAL

## 2018-03-28 NOTE — MR AVS SNAPSHOT
After Visit Summary   3/28/2018    Kee Phipps    MRN: [id number]           Patient Information     Date Of Birth          1990        Visit Information        Provider Department      3/28/2018 10:20 AM Darci Schilling,  [facility]        Today's Diagnoses     Vaginal discharge    -  1    Dysuria        Unprotected sexual intercourse          Care Instructions    - start boric acid as recommended by OB/GYN clinic  - will call with results          Follow-ups after your visit        Who to contact     Please call your clinic at [phone] to:    Ask questions about your health    Make or cancel appointments    Discuss your medicines    Learn about your test results    Speak to your doctor            Additional Information About Your Visit        Tr[employer]an[employer] Information     [employer] gives you secure access to your electronic health record. If you see a primary care provider, you can also send messages to your care team and make appointments. If you have questions, please call your primary care clinic.  If you do not have a primary care provider, please call [phone] and they will assist you.      [employer] is an electronic gateway that provides easy, online access to your medical records. With [employer], you can request a clinic appointment, read your test results, renew a prescription or communicate with your care team.     To access your existing account, please contact your [facility] or call [phone] for assistance.        Care EveryWhere ID     This is your Care EveryWhere ID. This could be used by other organizations to access your [facility] medical records  [id number]        Your Vitals Were     Last Period                   09/05/2017 (Approximate)            Blood Pressure from Last 3 Encounters:   12/15/17 118/81   11/10/17 118/74   10/16/17 101/69    Weight from Last 3 Encounters:   11/10/17 167 lb (75.8 kg)   10/16/17 165 lb (74.8 kg)    10/12/17 164 lb 3.2 oz (74.5 kg)              We Performed the Following     Chlamydia/Gono Amplified ([facility])     HCG Qualitative Urine (UPT)  ([facility])     HIV Ag/Ab Screen [place] ([facility])     Syphilis Screen [place] (RPR/VDRL) ([facility])     Urinalysis, Micro If ([facility])     Urine Culture ([facility])     Urine Microscopic ([facility])     Wet Prep ([facility])        Primary Care Provider Office Phone # Fax Cele    Deborah Shayy Miller [phone]       [facility]  [address] [place] [address]        Equal Access to Services     SALLY IRAHETA : Hadii ye cabrera hadhector Sofrank, waaxda ludevikaadaha, qaybta kaalmada adematt, christal valdez . So [facility] [phone].    ATENCIÓN: Si habla español, tiene a jeong disposición servicios gratuitos de asistencia lingüística. Llame al [phone].    We comply with applicable federal civil rights laws and [place] laws. We do not discriminate on the basis of race, color, national origin, age, disability, sex, sexual orientation, or gender identity.            Thank you!     Thank you for choosing [facility]  for your care. Our goal is always to provide you with excellent care. Hearing back from our patients is one way we can continue to improve our services. Please take a few minutes to complete the written survey that you may receive in the mail after your visit with us. Thank you!             Your Updated Medication List - Protect others around you: Learn how to safely use, store and throw away your medicines at www.disposemymeds.org.          This list is accurate as of 3/28/18 12:11 PM.  Always use your most recent med list.                   Brand Name Dispense Instructions for use Diagnosis    acetaminophen 500 MG tablet    TYLENOL    100 tablet    Take 2 tablets (1,000 mg) by mouth 3 times daily    Fibromyalgia       boric acid 600 mg vaginal suppository - PHARMACY TO MIX COMPOUND      Place 600 mg vaginally At  Bedtime        hydrocortisone 1 % cream    CORTAID    30 g    Apply sparingly to affected area three times daily for 14 days.    Bug bite, initial encounter       MELOXICAM PO      Take 15 mg by mouth        methocarbamol 750 MG tablet    ROBAXIN    180 tablet    Take 1 tablet (750 mg) by mouth 3 times daily as needed for muscle spasms    Fibromyalgia       nicotine polacrilex 2 MG gum    [employer] NICOTINE POLACRILEX    30 tablet    Place 1 each (2 mg) inside cheek as needed for smoking cessation    Tobacco abuse       norethindrone 0.35 MG per tablet    MICRONOR    90 tablet    Take 1 tablet (0.35 mg) by mouth daily    Encounter for initial prescription of contraceptive pills       SUMAtriptan 25 MG tablet    IMITREX    18 tablet    Take 1 tablet (25 mg) by mouth at onset of headache for migraine May repeat in 2 hours. Max 8 tablets/24 hours.    Migraine with aura and without status migrainosus, not intractable

## 2018-03-28 NOTE — PROGRESS NOTES
Preceptor Attestation:   Patient seen, evaluated and discussed with the resident. I have verified the content of the note, which accurately reflects my assessment of the patient and the plan of care.   Supervising Physician:  Tony Butler MD

## 2018-03-29 ENCOUNTER — TELEPHONE (OUTPATIENT)
Dept: FAMILY MEDICINE | Facility: CLINIC | Age: 28
End: 2018-03-29

## 2018-03-29 DIAGNOSIS — A54.9 GONORRHEA: Primary | ICD-10-CM

## 2018-03-29 DIAGNOSIS — Z20.2 EXPOSURE TO GONORRHEA: ICD-10-CM

## 2018-03-29 LAB
CULTURE: NORMAL
TREPONEMA ANTIBODY (SYPHILIS): NEGATIVE

## 2018-03-29 NOTE — PROGRESS NOTES
Bismark Angulo is a 27 year old female with past medical history of recurrent bacterial vaginosis who presents today for STI screening.  Patient reports one episode of unprotected intercourse with her ex-boyfriend on 3/25.  Since that time she has noted urinary frequency some increase in pressure over the bladder and also some white vaginal discharge.  She states this feels like past BV infections.  She does have a history of Crohn's disease and celiac disease.  She is not currently taking any medications for her Crohn's disease.  She denies fevers chills night sweats.    SOCHX: current smoker    Objective:  LMP 09/05/2017 (Approximate) Afebrile HR 80 RR 14  Abdomen: Mild tenderness noted in the lower abdomen bilaterally.  Bowel sounds active.  No rebound guarding or rigidity.  : No lesions or sores on external genitalia.  There was a fair amount of white thick discharge noted in the vagina.  Cervix was normal in appearance.  Bimanual exam did not show any significant adnexal tenderness and there was no cervical motion tenderness.      A/P    Screening for STIs after unprotected intercourse: Spoke with patient extensively about workup.  UPT was done and that was negative.  Wet prep was done and showed a possible bacterial vaginosis infection.  UA had positive leukocyte esterase but only 2-5 white cells with 25-50 squamous epithelial cells.  Gonorrhea chlamydia, HIV, syphilis testing were pending spoke with patient extensively about possible treatment for BV. She stated she would like to avoid oral antibiotics unless absolutely necessary as she has been instructed to do so by her gastroenterologist due  her Crohn's disease. She says the Flagyl vaginal gel has not worked well for her in the past but she has received boric acid vaginal suppositories from her OB/GYN at [facility] that help with BV.  I recommended that she continue that therapy as recommended by her OB/GYN's office.      Discussed with   Carrie.    Guille Schilling DO  PGY 3

## 2018-03-29 NOTE — TELEPHONE ENCOUNTER
Patient notified of negative STI results. Informed that gonorrhea/ chlamydia and syphilis was not back yet. Patient will call back for results later this afternoon. /HOMERO Stevenson  Routed to Dr. Schilling

## 2018-03-29 NOTE — TELEPHONE ENCOUNTER
[facility] phone call message- patient requesting results:    Test: Lab    Date of test: [id number]    Additional Comments: none    OK to leave a message on voice mail? Yes    Primary language: English      needed? No    Call taken on March 29, 2018 at 8:20 AM by Karel Darnell

## 2018-03-29 NOTE — TELEPHONE ENCOUNTER
Negative syphilis results given to patient. Nurse will call patient once gc and chlamydia is back. /HOMERO Stevenson

## 2018-03-30 ENCOUNTER — ALLIED HEALTH/NURSE VISIT (OUTPATIENT)
Dept: FAMILY MEDICINE | Facility: CLINIC | Age: 28
End: 2018-03-30
Payer: COMMERCIAL

## 2018-03-30 VITALS — TEMPERATURE: 98.2 F

## 2018-03-30 DIAGNOSIS — Z11.3 SCREEN FOR STD (SEXUALLY TRANSMITTED DISEASE): Primary | ICD-10-CM

## 2018-03-30 PROBLEM — Z20.2 EXPOSURE TO GONORRHEA: Status: ACTIVE | Noted: 2018-03-30

## 2018-03-30 LAB
C TRACH RRNA CVX QL NAA+PROBE: NEGATIVE
N GONORRHOEA RRNA SPEC QL NAA+PROBE: POSITIVE

## 2018-03-30 RX ORDER — CEFTRIAXONE SODIUM 250 MG/1
250 INJECTION, POWDER, FOR SOLUTION INTRAMUSCULAR; INTRAVENOUS ONCE
Qty: 1.25 ML | Refills: 0 | OUTPATIENT
Start: 2018-03-30 | End: 2018-03-30

## 2018-03-30 RX ORDER — AZITHROMYCIN 500 MG/1
1000 TABLET, FILM COATED ORAL ONCE
Qty: 2 TABLET | Refills: 0 | Status: SHIPPED | OUTPATIENT
Start: 2018-03-30 | End: 2018-03-30

## 2018-03-30 NOTE — MR AVS SNAPSHOT
After Visit Summary   3/30/2018    Kee Pihpps    MRN: [id number]           Patient Information     Date Of Birth          1990        Visit Information        Provider Department      3/30/2018 2:30 PM Nurse, [facility]        Today's Diagnoses     Screen for STD (sexually transmitted disease)    -  1       Follow-ups after your visit        Who to contact     Please call your clinic at [phone] to:    Ask questions about your health    Make or cancel appointments    Discuss your medicines    Learn about your test results    Speak to your doctor            Additional Information About Your Visit        MyChart Information     [employer] gives you secure access to your electronic health record. If you see a primary care provider, you can also send messages to your care team and make appointments. If you have questions, please call your primary care clinic.  If you do not have a primary care provider, please call [phone] and they will assist you.      [employer] is an electronic gateway that provides easy, online access to your medical records. With [employer], you can request a clinic appointment, read your test results, renew a prescription or communicate with your care team.     To access your existing account, please contact your [facility] or call [phone] for assistance.        Care EveryWhere ID     This is your Care EveryWhere ID. This could be used by other organizations to access your [place] medical records  [id number]        Your Vitals Were     Temperature Last Period                98.2  F (36.8  C) 09/05/2017 (Approximate)           Blood Pressure from Last 3 Encounters:   12/15/17 118/81   11/10/17 118/74   10/16/17 101/69    Weight from Last 3 Encounters:   11/10/17 167 lb (75.8 kg)   10/16/17 165 lb (74.8 kg)   10/12/17 164 lb 3.2 oz (74.5 kg)              We Performed the Following     cefTRIAXone (ROCEPHIN) 250 mg vial, IM  (Charge)     INJECTION INTRAMUSCULAR OR SUB-Q        Primary Care Provider Office Phone # Fax #    Deborah Miller [phone]       [facility]  [address] [place] [address]        Equal Access to Services     EDGAR IRAHETA : Hadii aad ku hadkianao Socarsonali, waaxda luqadaha, qaybta kaalmada adematt, waxbrittany maya alextalya johnsgoldenelias chavez. So [facility] [phone].    ATENCIÓN: Si habla español, tiene a jeong disposición servicios gratuitos de asistencia lingüística. Llame al [phone].    We comply with applicable federal civil rights laws and [place] laws. We do not discriminate on the basis of race, color, national origin, age, disability, sex, sexual orientation, or gender identity.            Thank you!     Thank you for choosing [facility]  for your care. Our goal is always to provide you with excellent care. Hearing back from our patients is one way we can continue to improve our services. Please take a few minutes to complete the written survey that you may receive in the mail after your visit with us. Thank you!             Your Updated Medication List - Protect others around you: Learn how to safely use, store and throw away your medicines at www.disposemymeds.org.          This list is accurate as of 3/30/18  3:46 PM.  Always use your most recent med list.                   Brand Name Dispense Instructions for use Diagnosis    acetaminophen 500 MG tablet    TYLENOL    100 tablet    Take 2 tablets (1,000 mg) by mouth 3 times daily    Fibromyalgia       azithromycin 500 MG tablet    ZITHROMAX    2 tablet    Take 2 tablets (1,000 mg) by mouth once for 1 dose    Gonorrhea       boric acid 600 mg vaginal suppository - PHARMACY TO MIX COMPOUND      Place 600 mg vaginally At Bedtime        cefTRIAXone 250 MG injection    ROCEPHIN    1.25 mL    Inject 250 mg into the muscle once for 1 dose    Gonorrhea       hydrocortisone 1 % cream    CORTAID    30 g    Apply sparingly to  affected area three times daily for 14 days.    Bug bite, initial encounter       MELOXICAM PO      Take 15 mg by mouth        methocarbamol 750 MG tablet    ROBAXIN    180 tablet    Take 1 tablet (750 mg) by mouth 3 times daily as needed for muscle spasms    Fibromyalgia       nicotine polacrilex 2 MG gum    [employer] NICOTINE POLACRILEX    30 tablet    Place 1 each (2 mg) inside cheek as needed for smoking cessation    Tobacco abuse       norethindrone 0.35 MG per tablet    MICRONOR    90 tablet    Take 1 tablet (0.35 mg) by mouth daily    Encounter for initial prescription of contraceptive pills       SUMAtriptan 25 MG tablet    IMITREX    18 tablet    Take 1 tablet (25 mg) by mouth at onset of headache for migraine May repeat in 2 hours. Max 8 tablets/24 hours.    Migraine with aura and without status migrainosus, not intractable

## 2018-03-30 NOTE — TELEPHONE ENCOUNTER
Patient notified of positive gonorrhea results and verbalizes understanding. Treatment sent to [facility] pharmacy per patient's request and transferred to appt to make nurse only appt. /HOMERO Stevenson    Gonorrhea treatment guideline:    Applies to uncomplicated gonococcal infections of the cervix, urethra, and rectum    Patients infected with N. gonorrhoeae frequently are coinfected with C. trachomatis.  This finding has led to the recommendation that patients treated for gonococcal infection also be treated routinely with a regimen that is effective against uncomplicated genital C. trachomatis infection.  Recommended regimen    Ceftriaxone 250 mg in a single intramuscular dose  PLUS  Azithromycin 1 g orally in a single dose (preferred)  Or doxycycline 100 mg orally twice daily for 7 days (not in pregnancy)  Alternative regimens    If ceftriaxone is not available:  Cefixime 400 mg in a single oral dose  PLUS  Azithromycin 1 g orally in a single dose (preferred)  Or doxycycline 100 mg orally twice daily for 7 days (not in pregnancy)      If the patient has severe cephalosporin allergy:  Azithromycin 2 g in a single oral dose  Follow up:    Retest all patient in 3 months.  Should be tested for HIV and syphilis as well.   For pharyngeal cases, recheck in 14 days.    Counseling:    No sexual contact for 7 days after treatment with azithromycin is initiated or until after completion of antibiotics with other regimens.    All sexual partners within the past 60 days are recommended to be evaluated and treated by their provider or [facility] (which is free testing and treatment)..  If no partner within 60 days, then the most recent sexual partner should be notified.     Notify patient that we are required by the [facility] to report all chlamydia infections, for the purpose of assistance with partner treatment.  We can list on this report the appropriate sexual partners that need to be treated,  and the Dept of Health will contact them confidentially to facilitate evaluation and treatment.    Penicillin allergy:  Reactions to first generation cephalosporins occur in approximately 5%-10% of persons with a history of penicillin allergy and occur less frequently with third-generation cephalosporins. In those persons with a history of penicillin allergy, the use of cephalosporins should be contraindicated only in those with a history of a severe reaction to penicillin (e.g., anaphylaxis, Torres Heath syndrome, and toxic epidermal necrolysis).    Persistent infection:  Clinicians who diagnose gonorrhea in a patient with persistent infection after treatment (treatment failure) with the recommended combination therapy regimen should culture relevant clinical specimens and perform antimicrobial susceptibility testing of N. gonorrhae isolates. The treating clinician should consult an infectious disease specialist, an STD/HIV Prevention Training [facility] (http://www.nnptc.org), or [facility] (telephone: [phone]) for treatment advice, and report the case to CDC through the local or state health department within 24 hours of diagnosis. A test-of-cure should be conducted 1 week after re-treatment, and clinicians should ensure that the patient's sex partners from the preceding 60 days are evaluated promptly with culture and treated as indicated.   Source:  2010 CDC STD treatment guidelines. Updated with 2015 guidelines.

## 2018-03-30 NOTE — NURSING NOTE
The following medication was given:     MEDICATION: Rocephin 250 mg and Lidocaine .9cc  ROUTE: IM  SITE: Deltoid - Left  DOSE: 1  LOT #: [id number]  :  Rafael  EXPIRATION DATE:  08/01/20  NDC#: 8178-3496-50   Medication administered by: BRANDON Rosario Dr., MD was available on site at the time of this service.

## 2018-04-05 ENCOUNTER — RECORDS - HEALTHEAST (OUTPATIENT)
Dept: GENERAL RADIOLOGY | Facility: CLINIC | Age: 28
End: 2018-04-05

## 2018-04-05 ENCOUNTER — OFFICE VISIT - HEALTHEAST (OUTPATIENT)
Dept: PODIATRY | Facility: CLINIC | Age: 28
End: 2018-04-05

## 2018-04-05 ENCOUNTER — TRANSFERRED RECORDS (OUTPATIENT)
Dept: HEALTH INFORMATION MANAGEMENT | Facility: CLINIC | Age: 28
End: 2018-04-05

## 2018-04-05 DIAGNOSIS — M72.2 PLANTAR FASCIAL FIBROMATOSIS: ICD-10-CM

## 2018-04-05 DIAGNOSIS — M72.2 PLANTAR FASCIITIS: ICD-10-CM

## 2018-04-09 ENCOUNTER — OFFICE VISIT (OUTPATIENT)
Dept: FAMILY MEDICINE | Facility: CLINIC | Age: 28
End: 2018-04-09
Payer: COMMERCIAL

## 2018-04-09 ENCOUNTER — COMMUNICATION - HEALTHEAST (OUTPATIENT)
Dept: ADMINISTRATIVE | Facility: CLINIC | Age: 28
End: 2018-04-09

## 2018-04-09 ENCOUNTER — RECORDS - HEALTHEAST (OUTPATIENT)
Dept: ADMINISTRATIVE | Facility: OTHER | Age: 28
End: 2018-04-09

## 2018-04-09 ENCOUNTER — ANESTHESIA - HEALTHEAST (OUTPATIENT)
Dept: SURGERY | Facility: CLINIC | Age: 28
End: 2018-04-09

## 2018-04-09 VITALS
OXYGEN SATURATION: 98 % | SYSTOLIC BLOOD PRESSURE: 123 MMHG | BODY MASS INDEX: 29.06 KG/M2 | TEMPERATURE: 98.3 F | WEIGHT: 164 LBS | HEART RATE: 95 BPM | RESPIRATION RATE: 16 BRPM | HEIGHT: 63 IN | DIASTOLIC BLOOD PRESSURE: 85 MMHG

## 2018-04-09 DIAGNOSIS — Z01.818 PREOP GENERAL PHYSICAL EXAM: Primary | ICD-10-CM

## 2018-04-09 DIAGNOSIS — G43.109 MIGRAINE WITH AURA AND WITHOUT STATUS MIGRAINOSUS, NOT INTRACTABLE: ICD-10-CM

## 2018-04-09 RX ORDER — SUMATRIPTAN 25 MG/1
25 TABLET, FILM COATED ORAL
Qty: 18 TABLET | Refills: 0 | Status: SHIPPED | OUTPATIENT
Start: 2018-04-09 | End: 2018-09-06

## 2018-04-09 NOTE — PROGRESS NOTES
[address] [address]  Phone: [phone]  Fax: [phone]    4/9/2018    Adult PRE-OP Evaluation:    Kee Phipps, 1990 presents for pre-operative evaluation and assessment as requested by Dr. Arreola (DP), prior to undergoing surgery/procedure for treatment of  Plantar fasciitis .    Proposed procedure: Plantar Fasciotomy    Date of Surgery/ Procedure: 04/10/18  Hospital/Surgical Facility: [facility], Fax: [phone]     Primary Physician: Deborah Miller (Pre-op done today by Dr. Marek Cintron)  Type of Anesthesia Anticipated: General  History of anesthesia complications: NONE  History of  abnormal bleeding: NONE   History of blood transfusions: NO  Patient has a Health Care Directive or Living Will:  NO    Preoperative Questions   1. NO - Do you have a history of heart attack, stroke, stent, bypass or surgery on an artery in the head, neck, heart or legs?  2. NO - Do you ever have any pain or discomfort in your chest?  3. NO - Have you ever had a severe pain across the front of your chest lasting for half an hour or more?  4. NO - Do you have a history of Congestive Heart Failure?  5. NO - Are you troubled by shortness of breath when: walking on the level/ up a slight hill/ at night?  6. NO - Does your chest ever sound wheezy or whistling?  7. NO - Do you currently have a cold, bronchitis or other respiratory infection?  8. NO - Have you had a cold, bronchitis or other respiratory infection within the last 2 weeks?  9. NO - Do you usually have a cough?  10. NO - Do you sometimes get pains in the calves of your legs when you walk?  11. NO - Do you or anyone in your family have previous history of blood clots?  12. NO - Do you or does anyone in your family have a serious bleeding problem such as prolonged bleeding following surgeries or cuts?  13. NO - Have you ever had problems with anemia or been told to take iron pills?  14. NO - Have you had any abnormal  blood loss such as black, tarry or bloody stools, or abnormal vaginal bleeding?  15. NO - Have you ever had a blood transfusion?  16. NO - Have you or any of your relatives ever had problems with anesthesia?  17. NO - Do you have sleep apnea, excessive snoring or daytime drowsiness?  18. NO - Do you have any prosthetic heart valves?  19. NO - Do you have prosthetic joints?  20. NO - Is there any chance that you may be pregnant?    Patient Active Problem List   Diagnosis     Health Care Home     Crohns disease (H)     Need for prophylactic vaccination and inoculation against varicella     Abnormal Pap smear of cervix     Chronic pain     Adjustment disorder with depressed mood     Celiac disease     Chronic pain syndrome     Immunosuppressed status (H)     Multiple joint pain     Back pain     Overweight (BMI 25.0-29.9)     Fibromyalgia     H/O  delivery, currently pregnant     Need for varicella vaccine     Exposure to gonorrhea         Current Outpatient Prescriptions on File Prior to Visit:  boric acid 600 mg vaginal suppository - PHARMACY TO MIX COMPOUND Place 600 mg vaginally At Bedtime   norethindrone (MICRONOR) 0.35 MG per tablet Take 1 tablet (0.35 mg) by mouth daily   MELOXICAM PO Take 15 mg by mouth   hydrocortisone (CORTAID) 1 % cream Apply sparingly to affected area three times daily for 14 days.   acetaminophen (TYLENOL) 500 MG tablet Take 2 tablets (1,000 mg) by mouth 3 times daily   SUMAtriptan (IMITREX) 25 MG tablet Take 1 tablet (25 mg) by mouth at onset of headache for migraine May repeat in 2 hours. Max 8 tablets/24 hours.   nicotine polacrilex (CVS NICOTINE POLACRILEX) 2 MG gum Place 1 each (2 mg) inside cheek as needed for smoking cessation   methocarbamol (ROBAXIN) 750 MG tablet Take 1 tablet (750 mg) by mouth 3 times daily as needed for muscle spasms     No current facility-administered medications on file prior to visit.     OTC products: None, except as noted above    Allergies  "  Allergen Reactions     Gluten Meal      Humira [Adalimumab]      Caused huge lumps at injection site.      Latex Itching     Remicade [Infliximab] Rash     Latex Allergy: NO    Social History     Social History     Marital status: Single     Spouse name: N/A     Number of children: N/A     Years of education: N/A     Occupational History     student      Social History Main Topics     Smoking status: Current Every Day Smoker     Packs/day: 0.20     Types: Cigarettes     Smokeless tobacco: Never Used     Alcohol use 0.0 oz/week     0 Standard drinks or equivalent per week      Comment: occ     Drug use: No     Sexual activity: Yes     Partners: Male     Other Topics Concern     None     Social History Narrative       REVIEW OF SYSTEMS:   Constitutional, HEENT, cardiovascular, pulmonary, gi and gu systems are negative, except as otherwise noted.    EXAM:     Patient Vitals for the past 24 hrs:   BP Temp Pulse Resp SpO2 Height Weight   04/09/18 1349 123/85 98.3  F (36.8  C) 95 16 98 % 5' 3\" (160 cm) 164 lb (74.4 kg)     Body mass index is 29.05 kg/(m^2).  GENERAL: healthy, alert and no distress, good hygiene  EYES: Eyes grossly normal to inspection, extraocular movements - intact, and PERRL  HENT:  Nose- normal; Mouth- no ulcers, no lesions, mallampati II/IV  NECK: no tenderness, no adenopathy, no asymmetry, no masses,  RESP: lungs clear to auscultation - no rales, no rhonchi, no wheezes  CV: RRR no m/r/g  ABDOMEN: soft, no tenderness  MS: extremities- no gross deformities noted, no edema. No muscle wasting  SKIN: no suspicious lesions, no rashes  NEURO: CN II-XII intact  BACK: no CVA tenderness, no paralumbar tenderness  PSYCH: Alert and oriented times 3; speech- coherent , normal rate and volume; able to articulate logical thoughts  LYMPHATICS: ant. cervical- normal, post. cervical- normal    DIAGNOSTICS:      No labs or EKG required for low risk surgery (cataract, skin procedure, breast biopsy, etc)    RISK " ASSESSMENT:     Cardiovascular Risk:  -Patient is able to participate in strenuous activities without chest pain.  -The patient does not have chest pain with exertion.  -Patient does not have a history of congestive heart failure.    -The patient does not have a history of stroke and does not have a history of valvular disease.    Pulmonary Risk:  -In terms of risk factors for pulmonary complication, the patient has no risk factors and other than a 4 cig/day history    Perioperative Complications:  -The patient does not have a history of bleeding or clotting problems in the past.    -The patient has not had complications from surgeries.    -The patient does not have a family history of any anesthesia or surgical complications.      IMPRESSION:   Reason for surgery/procedure: Fasciotomy of L foot    The proposed surgical procedure is considered LOW risk.    For above listed surgery and anesthesia:   Patient is at low risk for surgery/procedure and perioperative/procedure complications.    RECOMMENDATIONS:     Labs:  None    Fasting:  NPO for 12 hours prior to surgery    Preop Plan:  --Approval given to proceed with proposed procedure, without further diagnostic evaluation    Medications:  Patient should hold their regular medications the morning of surgery unless otherwise instructed.    Hold aspirin 7 days prior to surgery.  Hold ibuprofen for 5 days prior.      Marek Cintron MD PGY3  [facility]  [phone] (P)    Patient was precepted with Dr. Luis E Cote who agrees with the above assessment and plan.     This note may have been created with help of Dragon dictation system. Grammatical /typing errors are not intentional.    Please contact our office if there are any further questions or information required about this patient.

## 2018-04-09 NOTE — PROGRESS NOTES
Preceptor Attestation:   Patient seen, evaluated and discussed with the resident. I have verified the content of the note, which accurately reflects my assessment of the patient and the plan of care.   Supervising Physician:  Luis E Cote MD

## 2018-04-09 NOTE — MR AVS SNAPSHOT
After Visit Summary   4/9/2018    Kee Phipps    MRN: [id number]           Patient Information     Date Of Birth          1990        Visit Information        Provider Department      4/9/2018 1:50 PM Marek Cintron MD [facility]        Today's Diagnoses     Preop general physical exam    -  1    Migraine with aura and without status migrainosus, not intractable          Care Instructions      Presurgery Checklist  You are scheduled to have surgery. The healthcare staff will try to make your stay comfortable. Use the guidelines below to remind yourself what to do before surgery. Be sure to follow any specific pre-op instructions from your surgeon or nurse.   Preparing for Surgery  Ask your surgeon if you ll need a blood transfusion during surgery and if so, how to prepare for it. In some cases, you can donate blood before surgery. If needed, this blood can be given back (transfused) to you during or after surgery.  If you are having abdominal surgery, ask what you need to do to clear your bowel.  Tell your surgeon if you have allergies to any medications or foods.  Arrange for an adult family member or friend to drive you home after surgery. If possible, have someone ready to help you at home as you recover.  Call the surgeon if you get a cold, fever, sore throat, diarrhea, or other health problem just before surgery. Your surgeon can decide whether or not to postpone the surgery.  Medications  Tell your surgeon about all medications you take, including prescription and over-the-counter products such as herbal remedies and vitamins. Ask if you should continue taking them.  If you take ibuprofen, naproxen, or  blood thinners  such as aspirin, clopidogrel (Plavix), or warfarin (Coumadin), ask your surgeon whether you should stop taking them and how long before surgery you should stop.  You may be told to take antibiotics just before surgery to prevent infection. If so, follow  instructions carefully on how to take them.  If you are told to take medications called anticoagulants to prevent blood clots after surgery, be sure to follow the instructions on how to take them.  Stop Smoking  If you smoke, healing may take longer. So at least 2 week(s) before surgery, stop smoking.  Bathing or Showering Before Surgery  If instructed, wash with antibacterial soap. Afterward, do not use lotions or powders.  If you are having surgery on the head, you may be asked to shampoo with antibacterial soap. Follow instructions for doing so.  Do Not Remove Hair from the Surgery Site  Do not shave hair from the incision site, unless you are given specific instructions to do so. Usually, if hair needs to be removed, it will be done at the hospital right before surgery.  Don t Eat or Drink  Your doctor will tell you when to stop eating and drinking. If you do not follow your doctor's instructions, your procedure may be postponed or rescheduled for another day.  If your surgeon tells you to continue any medications, take them with small sips of water.  You can brush your teeth and rinse your mouth, but don t swallow any water.  Day of Surgery  Do not wear makeup. Do not use perfume, deodorant, or hairspray. Remove nail polish and artificial nails.  Leave jewelry (including rings), watches, and other valuables at home.  Be sure to bring health insurance cards or forms and a photo ID.  Bring a list of your medications (include the name, dose, how often you take them, and the time last dose was taken).  Arrive on time at the hospital or surgery facility.          Follow-ups after your visit        Who to contact     Please call your clinic at [phone] to:    Ask questions about your health    Make or cancel appointments    Discuss your medicines    Learn about your test results    Speak to your doctor            Additional Information About Your Visit        [facility] Information     [employer] gives you secure  "access to your electronic health record. If you see a primary care provider, you can also send messages to your care team and make appointments. If you have questions, please call your primary care clinic.  If you do not have a primary care provider, please call [phone] and they will assist you.      [employer] is an electronic gateway that provides easy, online access to your medical records. With [employer], you can request a clinic appointment, read your test results, renew a prescription or communicate with your care team.     To access your existing account, please contact your [facility] or call [phone] for assistance.        Care EveryWhere ID     This is your Care EveryWhere ID. This could be used by other organizations to access your [place] medical records  [id number]        Your Vitals Were     Pulse Temperature Respirations Height Last Period Pulse Oximetry    95 98.3  F (36.8  C) 16 5' 3\" (160 cm) 04/06/2018 (Exact Date) 98%    Breastfeeding? BMI (Body Mass Index)                No 29.05 kg/m2           Blood Pressure from Last 3 Encounters:   04/09/18 123/85   12/15/17 118/81   11/10/17 118/74    Weight from Last 3 Encounters:   04/09/18 164 lb (74.4 kg)   11/10/17 167 lb (75.8 kg)   10/16/17 165 lb (74.8 kg)              Today, you had the following     No orders found for display         Where to get your medicines      These medications were sent to [facility] - [place], [place] - [address], [place] [address]     Phone:  [phone]     SUMAtriptan 25 MG tablet          Primary Care Provider Office Phone # Fax #    Deborah Miller [phone]       [facility]  [address] [place] [address]        Equal Access to Services     EDGAR IRAHETA : Anastasia Win, chaitanya raines, qaybta kachristal kelly. So [facility] [phone].    ATENCIÓN: " Si yang portillo, tiene a jeong disposición servicios gratuitos de asistencia lingüística. Ann Marie rowe [phone].    We comply with applicable federal civil rights laws and [place] laws. We do not discriminate on the basis of race, color, national origin, age, disability, sex, sexual orientation, or gender identity.            Thank you!     Thank you for choosing [facility]  for your care. Our goal is always to provide you with excellent care. Hearing back from our patients is one way we can continue to improve our services. Please take a few minutes to complete the written survey that you may receive in the mail after your visit with us. Thank you!             Your Updated Medication List - Protect others around you: Learn how to safely use, store and throw away your medicines at www.disposemymeds.org.          This list is accurate as of 4/9/18  2:16 PM.  Always use your most recent med list.                   Brand Name Dispense Instructions for use Diagnosis    acetaminophen 500 MG tablet    TYLENOL    100 tablet    Take 2 tablets (1,000 mg) by mouth 3 times daily    Fibromyalgia       boric acid 600 mg vaginal suppository - PHARMACY TO MIX COMPOUND      Place 600 mg vaginally At Bedtime        hydrocortisone 1 % cream    CORTAID    30 g    Apply sparingly to affected area three times daily for 14 days.    Bug bite, initial encounter       MELOXICAM PO      Take 15 mg by mouth        methocarbamol 750 MG tablet    ROBAXIN    180 tablet    Take 1 tablet (750 mg) by mouth 3 times daily as needed for muscle spasms    Fibromyalgia       nicotine polacrilex 2 MG gum    CVS NICOTINE POLACRILEX    30 tablet    Place 1 each (2 mg) inside cheek as needed for smoking cessation    Tobacco abuse       norethindrone 0.35 MG per tablet    MICRONOR    90 tablet    Take 1 tablet (0.35 mg) by mouth daily    Encounter for initial prescription of contraceptive pills       SUMAtriptan 25 MG tablet    IMITREX    18 tablet     Take 1 tablet (25 mg) by mouth at onset of headache for migraine May repeat in 2 hours. Max 8 tablets/24 hours.    Migraine with aura and without status migrainosus, not intractable

## 2018-04-10 ENCOUNTER — SURGERY - HEALTHEAST (OUTPATIENT)
Dept: SURGERY | Facility: CLINIC | Age: 28
End: 2018-04-10

## 2018-04-10 ENCOUNTER — COMMUNICATION - HEALTHEAST (OUTPATIENT)
Dept: SCHEDULING | Facility: CLINIC | Age: 28
End: 2018-04-10

## 2018-04-10 ENCOUNTER — TRANSFERRED RECORDS (OUTPATIENT)
Dept: HEALTH INFORMATION MANAGEMENT | Facility: CLINIC | Age: 28
End: 2018-04-10

## 2018-04-10 ENCOUNTER — COMMUNICATION - HEALTHEAST (OUTPATIENT)
Dept: ADMINISTRATIVE | Facility: CLINIC | Age: 28
End: 2018-04-10

## 2018-04-10 ASSESSMENT — MIFFLIN-ST. JEOR: SCORE: 1420.35

## 2018-04-17 ENCOUNTER — OFFICE VISIT - HEALTHEAST (OUTPATIENT)
Dept: PODIATRY | Facility: CLINIC | Age: 28
End: 2018-04-17

## 2018-04-17 DIAGNOSIS — M72.2 PLANTAR FASCIITIS: ICD-10-CM

## 2018-04-24 ENCOUNTER — OFFICE VISIT - HEALTHEAST (OUTPATIENT)
Dept: PODIATRY | Facility: CLINIC | Age: 28
End: 2018-04-24

## 2018-04-24 DIAGNOSIS — M72.2 PLANTAR FASCIITIS: ICD-10-CM

## 2018-09-06 ENCOUNTER — OFFICE VISIT (OUTPATIENT)
Dept: FAMILY MEDICINE | Facility: CLINIC | Age: 28
End: 2018-09-06
Payer: COMMERCIAL

## 2018-09-06 VITALS
BODY MASS INDEX: 29.27 KG/M2 | HEIGHT: 63 IN | HEART RATE: 88 BPM | RESPIRATION RATE: 20 BRPM | OXYGEN SATURATION: 100 % | WEIGHT: 165.2 LBS | SYSTOLIC BLOOD PRESSURE: 125 MMHG | TEMPERATURE: 98.7 F | DIASTOLIC BLOOD PRESSURE: 74 MMHG

## 2018-09-06 DIAGNOSIS — Z11.3 SCREEN FOR STD (SEXUALLY TRANSMITTED DISEASE): ICD-10-CM

## 2018-09-06 DIAGNOSIS — M79.7 FIBROMYALGIA: ICD-10-CM

## 2018-09-06 DIAGNOSIS — G43.109 MIGRAINE WITH AURA AND WITHOUT STATUS MIGRAINOSUS, NOT INTRACTABLE: ICD-10-CM

## 2018-09-06 DIAGNOSIS — R35.0 URINARY FREQUENCY: Primary | ICD-10-CM

## 2018-09-06 LAB
BACTERIA: NORMAL
BILIRUBIN UR: NEGATIVE
BLOOD UR: NEGATIVE
CASTS: NORMAL /LPF
CRYSTAL URINE: NORMAL /LPF
EPITHELIAL CELLS UR: NORMAL /LPF (ref 0–2)
GLUCOSE URINE: NEGATIVE
HCG UR QL: NEGATIVE
HIV 1+2 AB+HIV1 P24 AG SERPL QL IA: NEGATIVE
KETONES UR QL: NEGATIVE
LEUKOCYTE ESTERASE UR: ABNORMAL
MUCOUS URINE: NORMAL LPF
NITRITE UR QL STRIP: NEGATIVE
PH UR STRIP: 7 [PH] (ref 5–7)
PROTEIN UR: NEGATIVE
RBC URINE: NORMAL /HPF
SP GR UR STRIP: 1.02
UROBILINOGEN UR STRIP-ACNC: ABNORMAL
WBC URINE: NORMAL /HPF

## 2018-09-06 RX ORDER — NITROFURANTOIN 25; 75 MG/1; MG/1
100 CAPSULE ORAL 2 TIMES DAILY
Qty: 14 CAPSULE | Refills: 0 | Status: SHIPPED | OUTPATIENT
Start: 2018-09-06 | End: 2019-02-27

## 2018-09-06 RX ORDER — METHOCARBAMOL 750 MG/1
750 TABLET, FILM COATED ORAL 3 TIMES DAILY PRN
Qty: 180 TABLET | Refills: 0 | Status: SHIPPED | OUTPATIENT
Start: 2018-09-06 | End: 2019-03-27

## 2018-09-06 ASSESSMENT — ANXIETY QUESTIONNAIRES
7. FEELING AFRAID AS IF SOMETHING AWFUL MIGHT HAPPEN: NOT AT ALL
5. BEING SO RESTLESS THAT IT IS HARD TO SIT STILL: NOT AT ALL
2. NOT BEING ABLE TO STOP OR CONTROL WORRYING: NOT AT ALL
IF YOU CHECKED OFF ANY PROBLEMS ON THIS QUESTIONNAIRE, HOW DIFFICULT HAVE THESE PROBLEMS MADE IT FOR YOU TO DO YOUR WORK, TAKE CARE OF THINGS AT HOME, OR GET ALONG WITH OTHER PEOPLE: NOT DIFFICULT AT ALL
GAD7 TOTAL SCORE: 0
6. BECOMING EASILY ANNOYED OR IRRITABLE: NOT AT ALL
3. WORRYING TOO MUCH ABOUT DIFFERENT THINGS: NOT AT ALL
1. FEELING NERVOUS, ANXIOUS, OR ON EDGE: NOT AT ALL

## 2018-09-06 ASSESSMENT — PAIN SCALES - GENERAL: PAINLEVEL: NO PAIN (0)

## 2018-09-06 ASSESSMENT — PATIENT HEALTH QUESTIONNAIRE - PHQ9: 5. POOR APPETITE OR OVEREATING: NOT AT ALL

## 2018-09-06 NOTE — MR AVS SNAPSHOT
After Visit Summary   9/6/2018    Kee Phipps    MRN: [id number]           Patient Information     Date Of Birth          1990        Visit Information        Provider Department      9/6/2018 12:30 PM Reyes Fair MD [facility]        Today's Diagnoses     Urinary frequency    -  1    Fibromyalgia        Screen for STD (sexually transmitted disease)          Care Instructions    One week of irritation. Passing urine frequently. Up at night.    History of recurrent BV; uses Boric acid.    No visible rash. Feels different when she had GC this past spring.    Interested in full STI check.    Urine test: Possible bladder infection    1) Check for STI in urine and blood  2) Start macrobid for presumed urinary infection.  We will check overnight culture to confirm.  We will contact you if any other treatment is needed.    Recheck in one week if not clearing          Follow-ups after your visit        Who to contact     Please call your clinic at [phone] to:    Ask questions about your health    Make or cancel appointments    Discuss your medicines    Learn about your test results    Speak to your doctor            Additional Information About Your Visit        [employer] Information     [employer] gives you secure access to your electronic health record. If you see a primary care provider, you can also send messages to your care team and make appointments. If you have questions, please call your primary care clinic.  If you do not have a primary care provider, please call [phone] and they will assist you.      [employer] is an electronic gateway that provides easy, online access to your medical records. With [employer], you can request a clinic appointment, read your test results, renew a prescription or communicate with your care team.     To access your existing account, please contact your [facility] or call [phone] for assistance.        Care EveryWhere ID   "   This is your Care EveryWhere ID. This could be used by other organizations to access your [place] medical records  [id number]        Your Vitals Were     Pulse Temperature Respirations Height Last Period Pulse Oximetry    88 98.7  F (37.1  C) (Oral) 20 5' 3\" (160 cm) 08/20/2017 (Approximate) 100%    Breastfeeding? BMI (Body Mass Index)                No 29.26 kg/m2           Blood Pressure from Last 3 Encounters:   09/06/18 125/74   04/09/18 123/85   12/15/17 118/81    Weight from Last 3 Encounters:   09/06/18 165 lb 3.2 oz (74.9 kg)   04/09/18 164 lb (74.4 kg)   11/10/17 167 lb (75.8 kg)              We Performed the Following     Chlamydia/Gono Amplified ([facility])     HCG Qualitative Urine (UPT)  ([facility])     HIV Ag/Ab Screen [place] ([facility])     Syphilis Screen [place] (RPR/VDRL) ([facility])     Urinalysis, Micro If ([facility])     Urine Culture ([facility])     Urine Microscopic ([facility])     Wet Prep ([facility])          Today's Medication Changes          These changes are accurate as of 9/6/18  1:05 PM.  If you have any questions, ask your nurse or doctor.               Start taking these medicines.        Dose/Directions    nitroFURantoin (macrocrystal-monohydrate) 100 MG capsule   Commonly known as:  MACROBID   Used for:  Urinary frequency   Started by:  Reyes Fair MD        Dose:  100 mg   Take 1 capsule (100 mg) by mouth 2 times daily   Quantity:  14 capsule   Refills:  0            Where to get your medicines      [facility] [place] [place] - [address], [place] [address]     Phone:  [phone]     methocarbamol 750 MG tablet    nitroFURantoin (macrocrystal-monohydrate) 100 MG capsule                Primary Care Provider Office Phone # Fax #    Deborah Miller [phone]       [facility]  [address] [place] [address]        Equal Access to Services     EDGAR IRAHETA AH: Anastasia meyer " Quirino, wayaredda luqadaha, qaybta kaaldeb wheat, christal spenserin hayaan shayydee natoctavio laCaseloga kathy. So [facility] [phone].    ATENCIÓN: Si yang portillo, tiene a jeong disposición servicios gratuitos de asistencia lingüística. Ann Marie al [phone].    We comply with applicable federal civil rights laws and [place] laws. We do not discriminate on the basis of race, color, national origin, age, disability, sex, sexual orientation, or gender identity.            Thank you!     Thank you for choosing [facility]  for your care. Our goal is always to provide you with excellent care. Hearing back from our patients is one way we can continue to improve our services. Please take a few minutes to complete the written survey that you may receive in the mail after your visit with us. Thank you!             Your Updated Medication List - Protect others around you: Learn how to safely use, store and throw away your medicines at www.disposemymeds.org.          This list is accurate as of 9/6/18  1:05 PM.  Always use your most recent med list.                   Brand Name Dispense Instructions for use Diagnosis    acetaminophen 500 MG tablet    TYLENOL    100 tablet    Take 2 tablets (1,000 mg) by mouth 3 times daily    Fibromyalgia       boric acid 600 mg vaginal suppository - PHARMACY TO MIX COMPOUND      Place 600 mg vaginally nightly as needed        hydrocortisone 1 % cream    CORTAID    30 g    Apply sparingly to affected area three times daily for 14 days.    Bug bite, initial encounter       MELOXICAM PO      Take 15 mg by mouth as needed        methocarbamol 750 MG tablet    ROBAXIN    180 tablet    Take 1 tablet (750 mg) by mouth 3 times daily as needed for muscle spasms    Fibromyalgia       nicotine polacrilex 2 MG gum    [employer]S NICOTINE POLACRILEX    30 tablet    Place 1 each (2 mg) inside cheek as needed for smoking cessation    Tobacco abuse       nitroFURantoin (macrocrystal-monohydrate) 100 MG capsule    MACROBID     14 capsule    Take 1 capsule (100 mg) by mouth 2 times daily    Urinary frequency       norethindrone 0.35 MG per tablet    MICRONOR    90 tablet    Take 1 tablet (0.35 mg) by mouth daily    Encounter for initial prescription of contraceptive pills       SUMAtriptan 25 MG tablet    IMITREX    18 tablet    Take 1 tablet (25 mg) by mouth at onset of headache for migraine May repeat in 2 hours. Max 8 tablets/24 hours.    Migraine with aura and without status migrainosus, not intractable

## 2018-09-06 NOTE — PATIENT INSTRUCTIONS
One week of irritation. Passing urine frequently. Up at night.    History of recurrent BV; uses Boric acid.    No visible rash. Feels different when she had GC this past spring.    Interested in full STI check.    Urine test: Possible bladder infection    1) Check for STI in urine and blood  2) Start macrobid for presumed urinary infection.  We will check overnight culture to confirm.  We will contact you if any other treatment is needed.    Recheck in one week if not clearing

## 2018-09-07 LAB
C TRACH RRNA SPEC QL NAA+PROBE: NEGATIVE
CULTURE: NORMAL
N GONORRHOEA RRNA SPEC QL NAA+PROBE: NEGATIVE
TREPONEMA ANTIBODY (SYPHILIS): NEGATIVE

## 2018-09-07 ASSESSMENT — ANXIETY QUESTIONNAIRES: GAD7 TOTAL SCORE: 0

## 2018-09-09 RX ORDER — SUMATRIPTAN 50 MG/1
50 TABLET, FILM COATED ORAL
Qty: 9 TABLET | Refills: 3 | Status: SHIPPED | OUTPATIENT
Start: 2018-09-09 | End: 2019-03-27

## 2018-09-09 NOTE — PROGRESS NOTES
"       SUBJECTIVE       Kee Phipps is a 28 year old  female with a PMH significant for:     Patient Active Problem List   Diagnosis     Health Care Home     Crohns disease (H)     Need for prophylactic vaccination and inoculation against varicella     Abnormal Pap smear of cervix     Chronic pain     Adjustment disorder with depressed mood     Celiac disease     Chronic pain syndrome     Immunosuppressed status (H)     Multiple joint pain     Back pain     Overweight (BMI 25.0-29.9)     Fibromyalgia     H/O  delivery, currently pregnant     Need for varicella vaccine     Exposure to gonorrhea     She presents with One week of irritation. Passing urine frequently. Up at night.    History of recurrent BV; uses Boric acid.    No visible rash. Feels different when she had GC this past spring.    Interested in full STI check.    PMH, Medications and Allergies were reviewed and updated as needed.        REVIEW OF SYSTEMS     General: No fevers, chills, sweats, unexplained weight loss  Head: No headache  Neck: No swallowing problems   CV: No chest pain or palpitations  Resp: No shortness of breath.  No cough. No hemoptysis.  GI: No constipation, diarrhea, or blood in stool.  no nausea or vomiting  : No pain passing urine or urinary frequency            OBJECTIVE     Vitals:    18 1236   BP: 125/74   Pulse: 88   Resp: 20   Temp: 98.7  F (37.1  C)   TempSrc: Oral   SpO2: 100%   Weight: 165 lb 3.2 oz (74.9 kg)   Height: 5' 3\" (160 cm)     Body mass index is 29.26 kg/(m^2).    Gen:  Well nourished and in NAD  HEENT: PERRLA; TMs normal color and landmarks; nasopharynx pink and moist; oropharynx pink and moist  Neck: supple without lymphadenopathy  CV:  RRR  - no murmurs, rubs, or gallups,   Pulm:  CTAB, no wheezes/rales/rhonchi, good air entry   ABD: soft, nontender, no masses, no rebound, BS intact throughout  Extrem: no cyanosis, edema or clubbing  Psych: Euthymic     No results found for this or any previous " visit (from the past 24 hour(s)).        ASSESSMENT AND PLAN     Kee was seen today for uti.    Diagnoses and all orders for this visit:    Urinary frequency  -     Urinalysis, Micro If ([facility])  -     Urine Microscopic ([facility])  -     nitroFURantoin, macrocrystal-monohydrate, (MACROBID) 100 MG capsule; Take 1 capsule (100 mg) by mouth 2 times daily  -     Urine Culture ([facility])    Fibromyalgia  -     methocarbamol (ROBAXIN) 750 MG tablet; Take 1 tablet (750 mg) by mouth 3 times daily as needed for muscle spasms    Screen for STD (sexually transmitted disease)  -     HCG Qualitative Urine (UPT)  ([facility])  -     Chlamydia/Gono Amplified ([facility])  -     HIV Ag/Ab Screen [place] ([facility])  -     Syphilis Screen [place] (RPR/VDRL) ([facility])  -     Cancel: Wet Prep (P )    Migraine with aura and without status migrainosus, not intractable  -     SUMAtriptan (IMITREX) 50 MG tablet; Take 1 tablet (50 mg) by mouth at onset of headache for migraine May repeat in 2 hours. Max 8 tablets/24 hours.        Patient Instructions   One week of irritation. Passing urine frequently. Up at night.    History of recurrent BV; uses Boric acid.    No visible rash. Feels different when she had GC this past spring.    Interested in full STI check.    Urine test: Possible bladder infection    1) Check for STI in urine and blood  2) Start macrobid for presumed urinary infection.  We will check overnight culture to confirm.  We will contact you if any other treatment is needed.    Recheck in one week if not clearing      Total of 30 minutes was spent in face to face contact with patient with > 50% in counseling and coordination of care.  Options for treatment and/or follow-up care were reviewed with the patient. Kee Phipps was engaged and actively involved in the decision making process. She verbalized understanding of the options discussed and was satisfied with the final plan.    Reyes Fair MD

## 2018-09-12 ENCOUNTER — AMBULATORY - HEALTHEAST (OUTPATIENT)
Dept: PODIATRY | Facility: CLINIC | Age: 28
End: 2018-09-12

## 2018-09-12 ENCOUNTER — COMMUNICATION - HEALTHEAST (OUTPATIENT)
Dept: ADMINISTRATIVE | Facility: CLINIC | Age: 28
End: 2018-09-12

## 2018-09-25 ENCOUNTER — COMMUNICATION - HEALTHEAST (OUTPATIENT)
Dept: ADMINISTRATIVE | Facility: CLINIC | Age: 28
End: 2018-09-25

## 2018-11-30 ENCOUNTER — OFFICE VISIT (OUTPATIENT)
Dept: FAMILY MEDICINE | Facility: CLINIC | Age: 28
End: 2018-11-30
Payer: COMMERCIAL

## 2018-11-30 VITALS
SYSTOLIC BLOOD PRESSURE: 128 MMHG | WEIGHT: 172 LBS | DIASTOLIC BLOOD PRESSURE: 81 MMHG | OXYGEN SATURATION: 100 % | BODY MASS INDEX: 30.47 KG/M2 | TEMPERATURE: 98.2 F | HEART RATE: 72 BPM | RESPIRATION RATE: 18 BRPM

## 2018-11-30 DIAGNOSIS — N94.6 DYSMENORRHEA: ICD-10-CM

## 2018-11-30 DIAGNOSIS — N64.52 NIPPLE DISCHARGE: ICD-10-CM

## 2018-11-30 DIAGNOSIS — N64.4 BREAST PAIN: Primary | ICD-10-CM

## 2018-11-30 LAB
HCG UR QL: NEGATIVE
PROLACTIN SERPL-MCNC: 5.8 NG/ML (ref 0–20)

## 2018-11-30 RX ORDER — ACETAMINOPHEN 325 MG/1
650 TABLET ORAL EVERY 4 HOURS PRN
Qty: 100 TABLET | Refills: 0 | Status: SHIPPED | OUTPATIENT
Start: 2018-11-30 | End: 2019-03-27

## 2018-11-30 NOTE — MR AVS SNAPSHOT
After Visit Summary   11/30/2018    Kee Phipps    MRN: [id number]           Patient Information     Date Of Birth          1990        Visit Information        Provider Department      11/30/2018 10:00 AM Aaron Mccabe MD [facility]        Today's Diagnoses     Breast pain    -  1    Nipple discharge        Dysmenorrhea          Care Instructions    -Follow-up in 1 month  -Make sure to wear a support bra as often as possible  -We will continue to talk about weight loss and possibility of breast reduction surgery.            Follow-ups after your visit        Who to contact     Please call your clinic at [phone] to:    Ask questions about your health    Make or cancel appointments    Discuss your medicines    Learn about your test results    Speak to your doctor            Additional Information About Your Visit        [employer] Information     [employer] gives you secure access to your electronic health record. If you see a primary care provider, you can also send messages to your care team and make appointments. If you have questions, please call your primary care clinic.  If you do not have a primary care provider, please call [phone] and they will assist you.      [employer] is an electronic gateway that provides easy, online access to your medical records. With [employer], you can request a clinic appointment, read your test results, renew a prescription or communicate with your care team.     To access your existing account, please contact your [facility] or call [phone] for assistance.        Care EveryWhere ID     This is your Care EveryWhere ID. This could be used by other organizations to access your [facility] medical records  [id number]        Your Vitals Were     Pulse Temperature Respirations Pulse Oximetry BMI (Body Mass Index)       72 98.2  F (36.8  C) (Oral) 18 100% 30.47 kg/m2        Blood Pressure from Last 3 Encounters:    11/30/18 128/81   09/06/18 125/74   04/09/18 123/85    Weight from Last 3 Encounters:   11/30/18 172 lb (78 kg)   09/06/18 165 lb 3.2 oz (74.9 kg)   04/09/18 164 lb (74.4 kg)              We Performed the Following     HCG Qualitative Urine (UPT)  ([facility])     Prolactin ([facility])          Today's Medication Changes          These changes are accurate as of 11/30/18 11:00 AM.  If you have any questions, ask your nurse or doctor.               These medicines have changed or have updated prescriptions.        Dose/Directions    * acetaminophen 500 MG tablet   Commonly known as:  TYLENOL   This may have changed:  Another medication with the same name was added. Make sure you understand how and when to take each.   Used for:  Fibromyalgia   Changed by:  Aaron Mccabe MD        Dose:  1000 mg   Take 2 tablets (1,000 mg) by mouth 3 times daily   Quantity:  100 tablet   Refills:  11       * acetaminophen 325 MG tablet   Commonly known as:  TYLENOL   This may have changed:  You were already taking a medication with the same name, and this prescription was added. Make sure you understand how and when to take each.   Used for:  Breast pain   Changed by:  Aaron Mccabe MD        Dose:  650 mg   Take 2 tablets (650 mg) by mouth every 4 hours as needed for mild pain   Quantity:  100 tablet   Refills:  0       * Notice:  This list has 2 medication(s) that are the same as other medications prescribed for you. Read the directions carefully, and ask your doctor or other care provider to review them with you.         Where to get your medicines      These medications were sent to [address]) [place] [address]     Phone:  [phone]     acetaminophen 325 MG tablet                Primary Care Provider Office Phone # Fax #    Deborah Miller [phone]       [address] [place]  [address]        Equal Access to Services     [facility]: Hadii aad ku hadkianacristine Rolandali, wayaredda luqduniaha, qaybta divinekedarchristal regan. [facility] [phone].    ATENCIÓN: Si habla español, tiene a jeong disposición servicios gratuitos de asistencia lingüística. Rangelame al [phone].    We comply with applicable federal civil rights laws and [place] laws. We do not discriminate on the basis of race, color, national origin, age, disability, sex, sexual orientation, or gender identity.            Thank you!     Thank you for choosing [facility]  for your care. Our goal is always to provide you with excellent care. Hearing back from our patients is one way we can continue to improve our services. Please take a few minutes to complete the written survey that you may receive in the mail after your visit with us. Thank you!             Your Updated Medication List - Protect others around you: Learn how to safely use, store and throw away your medicines at www.disposemymeds.org.          This list is accurate as of 11/30/18 11:00 AM.  Always use your most recent med list.                   Brand Name Dispense Instructions for use Diagnosis    * acetaminophen 500 MG tablet    TYLENOL    100 tablet    Take 2 tablets (1,000 mg) by mouth 3 times daily    Fibromyalgia       * acetaminophen 325 MG tablet    TYLENOL    100 tablet    Take 2 tablets (650 mg) by mouth every 4 hours as needed for mild pain    Breast pain       boric acid 600 mg vaginal suppository - PHARMACY TO MIX COMPOUND      Place 600 mg vaginally nightly as needed        MELOXICAM PO      Take 15 mg by mouth as needed        methocarbamol 750 MG tablet    ROBAXIN    180 tablet    Take 1 tablet (750 mg) by mouth 3 times daily as needed for muscle spasms    Fibromyalgia       nicotine 2 MG gum    [employer] NICOTINE POLACRILEX    30 tablet    Place 1 each (2 mg) inside cheek as needed for smoking cessation    Tobacco abuse        nitroFURantoin macrocrystal-monohydrate 100 MG capsule    MACROBID    14 capsule    Take 1 capsule (100 mg) by mouth 2 times daily    Urinary frequency       SUMAtriptan 50 MG tablet    IMITREX    9 tablet    Take 1 tablet (50 mg) by mouth at onset of headache for migraine May repeat in 2 hours. Max 8 tablets/24 hours.    Migraine with aura and without status migrainosus, not intractable       * Notice:  This list has 2 medication(s) that are the same as other medications prescribed for you. Read the directions carefully, and ask your doctor or other care provider to review them with you.

## 2018-11-30 NOTE — PATIENT INSTRUCTIONS
-Follow-up in 1 month  -Make sure to wear a support bra as often as possible  -We will continue to talk about weight loss and possibility of breast reduction surgery.

## 2018-11-30 NOTE — PROGRESS NOTES
ASSESSMENT AND PLAN     Breast pain  Most likely due to muscle strain due to the weight of her breasts. Discussed with the patient regarding the benefit of weight loss and exercise prior to discussing a breast reduction surgery. Discussed continued use of supportive bras. Will r/u breast tenderness due to pregnancy.   -     HCG Qualitative Urine (UPT)  ([facility])  -     Prolactin ([facility])  -     acetaminophen (TYLENOL) 325 MG tablet; Take 2 tablets (650 mg) by mouth every 4 hours as needed for mild pain    Nipple discharge  She states that she has had nipple discharge for many years. With dysmenorrhea, will check prolactin to r/o hyperprolactinemia   -     HCG Qualitative Urine (UPT)  (P )  -     Prolactin ([facility])    Dysmenorrhea  -     HCG Qualitative Urine (UPT)  ([facility])  -     Prolactin ([facility])      RTC in 1 month for follow up of breast pain or sooner if develops new or worsening symptoms.    Aaron Mccabe MD PGY3  [facility]                SUBJECTIVE       Kee Phipps is a 28 year old  female with a PMH significant for:     Patient Active Problem List   Diagnosis     Health Care Home     Crohns disease (H)     Need for prophylactic vaccination and inoculation against varicella     Abnormal Pap smear of cervix     Chronic pain     Adjustment disorder with depressed mood     Celiac disease     Chronic pain syndrome     Immunosuppressed status (H)     Multiple joint pain     Back pain     Overweight (BMI 25.0-29.9)     Fibromyalgia     H/O  delivery, currently pregnant     Need for varicella vaccine     Exposure to gonorrhea     She presents with chronic breast pain and shoulder/upper back pain. She states that the pain has been ongoing since the birth of her son 6 years ago. The breast pain is localized on the outer rim of both breasts. She endorses clear breast discharge with expression, which she has had for many years. Denies vision changes or headache. The breast pain is  "more noticeable during her menstrual periods. No family history of breast cancer. She also complains about shoulder and upper back pain which she thinks is due to the weight of her breasts. She does not wear supportive bras consistently. She is interested in breast reduction surgery which improved the pain of her cousin who also had large breasts.     In addition, she mentions that her periods have been very irregular this year with variable duration and frequency. She was on combined birth control pills last year but stopped taking them because \"there was too much hormone in my body\". Had 2 abortions in the last 2 years as she couldn't handle pregnancy as she was dealing with Crohn's. She is using condoms for contraception but is ok if she gets pregnant to have a baby.     PMH, Medications and Allergies were reviewed and updated as needed.        REVIEW OF SYSTEMS     CONSTITUTIONAL: NEGATIVE for fever, chills, change in weight  RESP: NEGATIVE for significant cough or SOB  BREAST: POSITIVE for tenderness and discharge  CV: NEGATIVE for chest pain, palpitations or peripheral edema  MUSCULOSKELETAL: POSITIVE for shoulder and upper back pain; NEGATIVE for significant arthralgias           OBJECTIVE     Vitals:    11/30/18 1000   BP: 128/81   Pulse: 72   Resp: 18   Temp: 98.2  F (36.8  C)   TempSrc: Oral   SpO2: 100%   Weight: 172 lb (78 kg)     Body mass index is 30.47 kg/(m^2).    Constitutional: Awake, alert, cooperative, no apparent distress, and appears stated age.  Eyes: Lids and lashes normal, extra ocular muscles intact, sclera clear, conjunctiva normal.  ENT: Normocephalic, without obvious abnormality, atramatic, external ears without lesions, gums normal and good dentition.  Lungs: No increased work of breathing, good air exchange, clear to auscultation bilaterally, no crackles or wheezing.  Cardiovascular: Regular rate and rhythm, normal S1 and S2, no S3 or S4, and no murmur noted.  Chest / Breast: Breasts " symmetrical, skin without lesion(s), no nipple retraction or dimpling, no nipple discharge, no masses palpated, no axillary or supraclavicular adenopathy. Tenderness was noted on the distal border of bilateral breasts.  Neuropsychiatric: Normal affect, mood, orientation, memory and insight.  Skin: No rashes, erythema, pallor, petechia or purpura.    No results found for this or any previous visit (from the past 24 hour(s)).

## 2018-12-03 ENCOUNTER — TELEPHONE (OUTPATIENT)
Dept: FAMILY MEDICINE | Facility: CLINIC | Age: 28
End: 2018-12-03

## 2018-12-03 NOTE — TELEPHONE ENCOUNTER
[facility] phone call message- patient requesting results:    Test: Lab    Date of test: 11/30/2018    Additional Comments: UPT    OK to leave a message on voice mail? Yes    Primary language: English      needed? No    Call taken on December 3, 2018 at 11:19 AM by Karel Darnell

## 2018-12-03 NOTE — TELEPHONE ENCOUNTER
Gave pt results of the following:  Component      Latest Ref Rng & Units 11/30/2018   HCG Qual Urine      Negative NEGATIVE   Prolactin      0.0 - 20.0 ng/mL 5.8     Routed to Dr. Mccabe who saw pt on 11/30.   /HOMERO Rios

## 2019-02-19 ENCOUNTER — COMMUNICATION - HEALTHEAST (OUTPATIENT)
Dept: ADMINISTRATIVE | Facility: CLINIC | Age: 29
End: 2019-02-19

## 2019-02-19 ENCOUNTER — AMBULATORY - HEALTHEAST (OUTPATIENT)
Dept: VASCULAR SURGERY | Facility: CLINIC | Age: 29
End: 2019-02-19

## 2019-02-25 ENCOUNTER — RECORDS - HEALTHEAST (OUTPATIENT)
Dept: ADMINISTRATIVE | Facility: OTHER | Age: 29
End: 2019-02-25

## 2019-02-27 ENCOUNTER — ANESTHESIA - HEALTHEAST (OUTPATIENT)
Dept: SURGERY | Facility: AMBULATORY SURGERY CENTER | Age: 29
End: 2019-02-27

## 2019-02-27 ENCOUNTER — OFFICE VISIT (OUTPATIENT)
Dept: FAMILY MEDICINE | Facility: CLINIC | Age: 29
End: 2019-02-27
Payer: COMMERCIAL

## 2019-02-27 ENCOUNTER — RECORDS - HEALTHEAST (OUTPATIENT)
Dept: ADMINISTRATIVE | Facility: OTHER | Age: 29
End: 2019-02-27

## 2019-02-27 VITALS
HEIGHT: 62 IN | OXYGEN SATURATION: 97 % | RESPIRATION RATE: 12 BRPM | WEIGHT: 172.6 LBS | SYSTOLIC BLOOD PRESSURE: 132 MMHG | TEMPERATURE: 98.3 F | DIASTOLIC BLOOD PRESSURE: 88 MMHG | BODY MASS INDEX: 31.76 KG/M2 | HEART RATE: 87 BPM

## 2019-02-27 DIAGNOSIS — M72.2 PLANTAR FASCIITIS: ICD-10-CM

## 2019-02-27 DIAGNOSIS — Z01.818 PREOP GENERAL PHYSICAL EXAM: ICD-10-CM

## 2019-02-27 DIAGNOSIS — Z01.818 PRE-OPERATIVE EXAMINATION: Primary | ICD-10-CM

## 2019-02-27 ASSESSMENT — MIFFLIN-ST. JEOR: SCORE: 1476.16

## 2019-02-27 NOTE — PROGRESS NOTES
Adult Preoperative History and Physical Examination  Date of Examination: 2019   Proposed Surgery: Right plantar fasciotomy   Surgeon: Dr. Arreola  Date of Surgery: 3/1/19  Location of Surgery: [facility] [place]   Anesthesia: Local anesthesia with sedation  Fax Number for H&P: [phone]  Dr. Arreola has asked to have Kee Phipps see us for a pre-operative evaluation for the above-mentioned procedure. She is otherwise in her usual state of health.   Indication for Surgery: plantar fascitis that has failed medical treatment  Past Medical History:   Past Medical History:   Diagnosis Date     Abnormal Pap smear of cervix      Bilateral plantar fasciitis      Celiac disease 2014     Crohn's disease (H) 2012     Depressive disorder      Fibromyalgia      H/O miscarriage, currently pregnant     miscarriage .      delivery 2013    34 weeks. Early rupture of membranes      Patient Active Problem List   Diagnosis     Health Care Home     Crohns disease (H)     Need for prophylactic vaccination and inoculation against varicella     Abnormal Pap smear of cervix     Chronic pain     Adjustment disorder with depressed mood     Celiac disease     Chronic pain syndrome     Immunosuppressed status (H)     Multiple joint pain     Back pain     Overweight (BMI 25.0-29.9)     Fibromyalgia     H/O  delivery, currently pregnant     Need for varicella vaccine     Exposure to gonorrhea      Current Outpatient Medications   Medication     acetaminophen (TYLENOL) 325 MG tablet     acetaminophen (TYLENOL) 500 MG tablet     boric acid 600 mg vaginal suppository - PHARMACY TO MIX COMPOUND     methocarbamol (ROBAXIN) 750 MG tablet     SUMAtriptan (IMITREX) 50 MG tablet     No current facility-administered medications for this visit.       Allergies   Allergen Reactions     Gluten Meal      Humira [Humira]      Caused huge lumps at injection site.      Latex Itching     Remicade [Infliximab] Rash      PAST  SURGICAL HISTORY:   Past Surgical History:   Procedure Laterality Date     DILATION AND CURETTAGE SUCTION, TREAT MISSED , 1ST TRIMESTER      miscarriage at 8 wks gest     FASCIOTOMY LOWER EXTREMITY Left 2018    Left foot     SMALL BOWEL RESECTION       TONSILLECTOMY          SURGICAL EVALUATION QUESTIONS:  Any more short of breath on exertion than other people of your age? No   Do you have any chest pain on exertion (anginal type)? No   What is the date of your last menstrual period? 2/15/19   Personal/Family History of Anesthetic Reaction? No   Personal/Family History of easy bruising/bleeding disorder? No   Personal History of Snoring/Sleep Apnea? No   Personal/Family History of VTE/PE? No   Current Aspirin Use? No   Recent Steroid Use? No   Other Significant Family History:   Family History   Problem Relation Age of Onset     Cancer Maternal Grandmother         bone cancer     Hypertension Mother      Asthma Brother      Asthma Brother      Asthma Brother      Asthma Brother      Asthma Son      Crohn's Disease Paternal Uncle      Diabetes No family hx of      Coronary Artery Disease No family hx of      Hyperlipidemia No family hx of      Cerebrovascular Disease No family hx of      Breast Cancer No family hx of      Colon Cancer No family hx of      Prostate Cancer No family hx of      Other Cancer No family hx of      Depression No family hx of      Anxiety Disorder No family hx of      Mental Illness No family hx of      Substance Abuse No family hx of      Anesthesia Reaction No family hx of      Osteoporosis No family hx of      Genetic Disorder No family hx of      Thyroid Disease No family hx of      Obesity No family hx of      Unknown/Adopted No family hx of       Social History     Socioeconomic History     Marital status: Single     Spouse name: Not on file     Number of children: Not on file     Years of education: Not on file     Highest education level: Not on file   Occupational  History     Occupation: student   Social Needs     Financial resource strain: Not on file     Food insecurity:     Worry: Not on file     Inability: Not on file     Transportation needs:     Medical: Not on file     Non-medical: Not on file   Tobacco Use     Smoking status: Current Every Day Smoker     Packs/day: 0.20     Types: Cigarettes     Smokeless tobacco: Never Used   Substance and Sexual Activity     Alcohol use: Yes     Alcohol/week: 0.0 oz     Comment: occ     Drug use: No     Sexual activity: Yes     Partners: Male   Lifestyle     Physical activity:     Days per week: Not on file     Minutes per session: Not on file     Stress: Not on file   Relationships     Social connections:     Talks on phone: Not on file     Gets together: Not on file     Attends Hinduism service: Not on file     Active member of club or organization: Not on file     Attends meetings of clubs or organizations: Not on file     Relationship status: Not on file     Intimate partner violence:     Fear of current or ex partner: Not on file     Emotionally abused: Not on file     Physically abused: Not on file     Forced sexual activity: Not on file   Other Topics Concern     Not on file   Social History Narrative     Not on file       Review of Systems:   General: No fevers, chills, weight loss  Head: No changes to chronic headaches or recent trauma  Eyes: No acute change in vision or other eye disease  Ears: No acute change in hearing  Oropharynx: No sore throat or URI symptoms   CV: No chest pain or palpitations.  Resp: No shortness of breath or cough. No hemoptysis.  GI: No nausea, vomiting, constipation, diarrhea, melena or abdominal pain  : No urinary pain, change in color, or frequency   MS: No arthralgias or changes to chronic myalgias  Psych: No significant change in mood, anxiety level   Skin: No new rashes or lesions  Neuro: No alterations in thinking, paresthesias, or weakness     OBJECTIVE:  /88 (BP Location: Left  "arm, Patient Position: Sitting, Cuff Size: Adult Regular)   Pulse 87   Temp 98.3  F (36.8  C) (Oral)   Resp 12   Ht 1.575 m (5' 2\")   Wt 78.3 kg (172 lb 9.6 oz)   SpO2 97%   BMI 31.57 kg/m      General - healthy, alert and no distress    Head - Normocephalic, atraumatic.    Eyes - Pupils are equal, round and reactive to light bilaterally.  Extraocular movements are intact bilaterally. Sclera and conjunctiva clear. Lids without lesions    Ears - Tympanic membranes clear bilaterally. External canals without lesion.    Mouth - Oropharynx is clear without exudates.    Neck - No cervical adenopathy, thyromegally, JVD or carotid bruits noted.    Lungs - Clear to auscultation bilaterally, no wheezes, rales or rhonchi.    CV - Regular rate and rhythm, no murmurs, rubs or gallops.    Abdomen - Non-tender, non-distended, positive bowel sounds, no masses, no hepatosplenomegaly. No rebound or guarding.     Upper Extremities - No edema or deformities. Radial pulses strong bilaterally.    Lower Extremities - No edema. Dorsalis pedis pulses strong bilaterally. Cap refill is brisk bilaterally.    Skin - Warm, dry, intact. No rashes or erythema.    Neurologic - Cranial nerves 2-12 intact, Pateller, achilles, biceps and brachioradialis reflexes intact. Muscle tone, bulk and strength within normal limits throughout.    Psych - Judgment and mental status are clear, patient has reasonable insight. Mood is stable.    No results found for this or any previous visit (from the past 24 hour(s)).    EKG:  not indicated    CXR: not indicated    RECOMMENDATIONS:  1) No food, water, beverages after midnight on the day of surgery except for sips of water for any daily medications.  2) Hold any NSAIDs 3 days prior to surgery    Kee Phipps is a 28 year old female here for preoperative evaluation proir to tight foot fasciotomy.  Procedure is low risk and with low bleeding risk. Patient previously tolerated this procedure less than one year " ago.    Approval given to proceed with proposed procedure, without further diagnostic evaluation. The patient is an ASA risk level 1.    Dr. Arreola, please see the above history and physical on our mutual patient, Kee Phipps. Thank you for allowing me to participate in the pre-operative consultation of this patient.    Patient discussed with attending physician Dr. Luis E Cote who agrees with the plan.      Regards,   Miguelito Sutton MD PGY1  [address] [address]  [phone]  Dept: [phone]

## 2019-02-27 NOTE — PATIENT INSTRUCTIONS
You area cleared for your foot surgery on 3/1/19.    Remember to not eat or drink after midnight on the day of your surgery.    I have sent a copy of your note with you and we also will fax a copy to Dr. Arreola' office.    Please let us know if you have any questions  Before Your Surgery      Call your surgeon if there is any change in your health. This includes signs of a cold or flu (such as a sore throat, runny nose, cough, rash or fever).    Do not smoke, drink alcohol or take over the counter medicine (unless your surgeon or primary care doctor tells you to) for the 24 hours before and after surgery.    If you take prescribed drugs: Follow your doctor s orders about which medicines to take and which to stop until after surgery.    Eating and drinking prior to surgery: follow the instructions from your surgeon    Take a shower or bath the night before surgery. Use the soap your surgeon gave you to gently clean your skin. If you do not have soap from your surgeon, use your regular soap. Do not shave or scrub the surgery site.  Wear clean pajamas and have clean sheets on your bed.

## 2019-02-28 ENCOUNTER — TRANSFERRED RECORDS (OUTPATIENT)
Dept: HEALTH INFORMATION MANAGEMENT | Facility: CLINIC | Age: 29
End: 2019-02-28

## 2019-02-28 ENCOUNTER — AMBULATORY - HEALTHEAST (OUTPATIENT)
Dept: PODIATRY | Facility: CLINIC | Age: 29
End: 2019-02-28

## 2019-02-28 ENCOUNTER — OFFICE VISIT - HEALTHEAST (OUTPATIENT)
Dept: PODIATRY | Facility: CLINIC | Age: 29
End: 2019-02-28

## 2019-02-28 DIAGNOSIS — M72.2 PLANTAR FASCIITIS: ICD-10-CM

## 2019-02-28 ASSESSMENT — MIFFLIN-ST. JEOR: SCORE: 1463.44

## 2019-03-27 ENCOUNTER — OFFICE VISIT (OUTPATIENT)
Dept: FAMILY MEDICINE | Facility: CLINIC | Age: 29
End: 2019-03-27
Payer: COMMERCIAL

## 2019-03-27 VITALS
OXYGEN SATURATION: 94 % | WEIGHT: 178 LBS | HEART RATE: 64 BPM | HEIGHT: 63 IN | DIASTOLIC BLOOD PRESSURE: 85 MMHG | BODY MASS INDEX: 31.54 KG/M2 | TEMPERATURE: 98.5 F | SYSTOLIC BLOOD PRESSURE: 129 MMHG | RESPIRATION RATE: 16 BRPM

## 2019-03-27 DIAGNOSIS — Z32.01 PREGNANCY TEST POSITIVE: Primary | ICD-10-CM

## 2019-03-27 LAB — HCG UR QL: POSITIVE

## 2019-03-27 RX ORDER — PRENATAL VIT/IRON FUM/FOLIC AC 27MG-0.8MG
1 TABLET ORAL DAILY
Qty: 90 TABLET | Refills: 3 | Status: ON HOLD | OUTPATIENT
Start: 2019-03-27 | End: 2019-08-17

## 2019-03-27 RX ORDER — ACETAMINOPHEN 325 MG/1
650 TABLET ORAL EVERY 4 HOURS PRN
Qty: 100 TABLET | Refills: 0 | Status: SHIPPED | OUTPATIENT
Start: 2019-03-27 | End: 2022-01-06

## 2019-03-27 ASSESSMENT — MIFFLIN-ST. JEOR: SCORE: 1520.49

## 2019-03-27 NOTE — PROGRESS NOTES
Preceptor Attestation:   Patient seen, evaluated and discussed with the resident. I have verified the content of the note, which accurately reflects my assessment of the patient and the plan of care.   Supervising Physician:  Maximino Sagastume MD

## 2019-03-27 NOTE — PATIENT INSTRUCTIONS
CONGRATULATIONS!!!!!!!!!    -Start taking prenatal vitamin daily.  -Schedule NEW OB visit  -Schedule Dating Ultrasound  -Take tylenol as needed (do not take NSAIDs).  -Work on quitting smoking.  -Schedule a smoking cessation visit as soon as able.    Sincerely,    Dr. Barrera    Patient Education     Adapting to Pregnancy: First Trimester    As your body adjusts, you may have to change or limit your daily activities. You ll need more rest. You may also need to use the energy you have more wisely.  Your changing body  Almost every part of your body is affected as you adapt to pregnancy. The uterus and cervix will begin to soften right away. You may not look very pregnant during the first 3 months. But you are likely to have some common signs of early pregnancy:    Nausea    Fatigue    Frequent urination    Mood swings    Bloating of the belly    Constipation    Heartburn    Missed or light periods (first trimester bleeding)    Nipple or breast tenderness and breast swelling  It s not too late to start good habits  What matters most is protecting your baby from this moment on. If you smoke, drink alcohol, or use drugs, now is the time to stop. If you need help, talk with your healthcare provider:    Smoking increases the risk of stillbirth or having a low-birth-weight baby. If you smoke, quit now.    Alcohol and drugs have been linked with miscarriage, birth defects, intellectual disability, and low birth weight. Do not drink alcohol or take drugs.  Tips to relieve nausea  Although nausea can happen at any time of the day, it may be worse in the morning. To help prevent nausea:    Eat small, light meals at frequent intervals.    Drink fluids often.    Get up slowly. Eat a few unsalted crackers before you get out of bed.    Avoid smells that bother you.    Avoid spicy and fatty foods.    Eat an ice pop in your favorite flavor.    Get plenty of rest.    Ask your healthcare provider about taking ginger or vitamin B6  for nausea and vomiting.    Talk with your healthcare provider if you take vitamins that upset your stomach.  Work concerns  The end of the first trimester is a good time to discuss working during pregnancy with your employer. Follow your healthcare provider s advice if your job needs you to stand for a long time, work with hazardous tools, or even sit at a desk all day. Your workspace, workload, or scheduled hours may need to be adjusted. Perhaps you can change body postures more often or take an extra break.  Advice for travel  Talk to your healthcare provider first, but the second trimester may be the best time for any travel. You may be advised to avoid certain trips while you re pregnant. Food and water can be concerns in developing countries. Travel by car is a good choice, as you can stop, get out, and stretch. Bring snacks and water along. Fasten the lap belt below your belly, low over your hips. Also be sure to wear the shoulder harness.  Intimacy  Unless your healthcare provider tells you to, there is no reason to stop having sex while you re pregnant. You or your partner may notice changes in desire. Desire may be less in the first trimester, due to nausea and fatigue. In the second trimester, sex may be very enjoyable. The third trimester can be a challenge comfort-wise. Try different positions and see what s best for you both.  Date Last Reviewed: 10/1/2017    1791-2293 [employer]. [address] [facility], [place] [address]. All rights reserved. This information is not intended as a substitute for professional medical care. Always follow your healthcare professional's instructions.

## 2019-03-27 NOTE — PROGRESS NOTES
SUBJECTIVE  HPI: Kee Phipps is a 28 year old female who presents for pregnancy test.  Patient reports that she took a home pregnancy test today and it was positive.  She reports symptoms of pregnancy including mild nausea.  No vomiting.  She has had increased breast tenderness for the past week.  She reports of previous pregnancies she had migraines with aura.  Yesterday she felt like she had an aura.      Reports her last menstrual period was about 2/3/19.  It lasted for 3 days.  She is unsure of the dates.  This was not a planned pregnancy.    Denies any abdominal pain or cramping.  Reports that she did have some light brown spotting yesterday and today it is now resolved.    She is a .  Reports her 5-year-old son was born .  She has had 2 therapeutic abortions and one spontaneous . States that she previously terminated pregnancies because of her fibromyalgia, celiac and crohn's diseases.     Reports that she smokes about half pack a day.  Has been smoking since she was a teenager.    Not currently taking any medications.     ROS: per HPI.     PMH:   Patient Active Problem List    Diagnosis Date Noted     Exposure to gonorrhea 2018     Priority: Medium     2018 STD retest in 3 months. Should be tested for be tested for HIV and syphilis as well. KAYLA Robbins/[facility]         H/O  delivery, currently pregnant 2016     Priority: Medium     13  delivery at 34 wks gestation at [facility] after prolonged PPROM at 31 wks.       Need for varicella vaccine 2016     Priority: Medium     16 nonimmune to varicella by titer.       Fibromyalgia 03/10/2016     Priority: Medium     Overweight (BMI 25.0-29.9) 2016     Priority: Medium     Back pain 2016     Priority: Medium     Multiple joint pain 2016     Priority: Medium     Referral to rheumatology: consistent with fibromyalgia   -XR SI joints   -Vit D levels   -recommended gabapentin and  exercise       Immunosuppressed status (H) 07/08/2015     Priority: Medium     Chronic pain syndrome 05/14/2015     Priority: Medium     Per GI: no reason for narcotics for GI symptoms! 12/18  Seeing Rheum and pain clinic - consistent with fibromyalgia (having more joint pain)  Weaning percocet 1/20: #30 given. No further refills.   Gabapentin 1800mg daily    CPM #1 completed? Yes, date: 2/9/2015  CPM #2 completed? Yes, date: 3/9/2015  Pain contract signed? Yes, date: 3/9/2015  Behavioral health consult completed? Yes, date: 5/12/2015  Personal care plan completed? Yes, date: 5/12/2015 See patient instructions              Celiac disease 02/09/2015     Priority: Medium     Chronic pain 09/30/2014     Priority: Medium     No indication for narcotics per GI  Due to Crohn's not responsive to other therapies. CPM visit 1&2 complete     CPM #1 completed? Yes, date: 2/9/2015  CPM #2 completed? Yes, date: 3/9/2015  Pain contract signed? Yes, date: 3/9/2015  Behavioral health consult completed? Yes, date: 5/12/2015  Personal care plan completed? Yes, date: 5/12/2015 See patient instructions               Adjustment disorder with depressed mood 09/30/2014     Priority: Medium     Abnormal Pap smear of cervix      Priority: Medium     2/6/17 LSIL Pap, pt referred to OBGYN for colp/consult  9/2/15 Colposcopy done at [facility].  7/8/15 ASCUS Pap- superior elkin os ulceration.  Pt referred to OBGYN.  6/17/14 COLP APPT: Repeat Pap- LSIL, ECC and cervical os bx- neg dysplasia, Ectocervix bx- Moderate squamous dysplasia.  Plan: refer to GYN.  DG  4/30/14 LGSIL Pap with + high risk HPV.   Cervical appearance: pink with darker pink lacy areas. Pt needs colposcopy ASAP!  2/8/13 LGSIL Pap with negative HPV.  Pt needs repeat Pap in 1yr.    6/18/09 nl Pap       Need for prophylactic vaccination and inoculation against varicella 02/11/2013     Priority: Medium     Varicella nonimmune- needs varicella vaccine PP       Crohns disease (H)  "02/01/2013     Priority: Medium     Follows with [facility]. [facility] 2/20/15 with Crohn's flair. Prednisone [id number] then pred 30mg x 7 D.        Health Care Home 11/27/2012     Priority: Medium     Tier Level: 0    DX V65.8 REPLACED WITH 48788 HEALTH CARE HOME (04/08/2013)         Social Hx:  Social History     Social History Narrative     Not on file     History   Smoking Status     Current Every Day Smoker     Packs/day: 0.20     Types: Cigarettes   Smokeless Tobacco     Never Used       OBJECTIVE:  Vitals: BP (!) 135/92   Pulse 116   Temp 98.5  F (36.9  C) (Oral)   Resp 16   Ht 1.607 m (5' 3.25\")   Wt 80.7 kg (178 lb)   SpO2 94%   BMI 31.28 kg/m    General: Pleasant. Young woman. Obese. No distress.  Heart: Regular rate and rhythm. No murmurs, rubs, or gallops.  Extremities: Extremities warm and well-perfused. No peripheral edema.  Lungs: Clear to auscultation bilaterally. No wheezes or crackles. Good air movement.  Abdomen: soft, non tender.     Results for orders placed or performed in visit on 03/27/19   HCG Qualitative Urine (UPT)  ([facility])   Result Value Ref Range    HCG Qual Urine POSITIVE Negative         ASSESSMENT & PLAN:      Pregnancy examination or test, pregnancy unconfirmed: UPT positive.  By LMP patient approximately 7 weeks gestation.  Not a planned pregnancy but patient overall excited about a new pregnancy.  Will start prenatal vitamin today.  Counseled patient smoking cessation and advised patient to schedule a smoking cessation visit as she may benefit from nicotine replacement.  Counseled patient on avoiding alcohol.  Will order dating ultrasound for after next week.  Advised patient to schedule new OB visit.  -     HCG Qualitative Urine (UPT)  ([facility] )  -     Prenatal Vit-Fe Fumarate-FA (PRENATAL MULTIVITAMIN W/IRON) 27-0.8 MG tablet; Take 1 tablet by mouth daily  -     US OB <14 WKS SINGLE OR FIRST GESTATION; Future        Orders Placed This Encounter   Procedures     US OB <14 WKS " SINGLE OR FIRST GESTATION     HCG Qualitative Urine (UPT)  ([facility])       Return to clinic in 1 week for dating ultrasound, new OB visit.      The patient was actively involved in the decision making process, and all the questions were answered to their satisfaction prior to leaving.       Patient precepted with attending physician, Dr. Sagastume.    Eva Barrera, DO   PGY-3 [facility]  Pager: [phone]

## 2019-04-01 ENCOUNTER — OFFICE VISIT (OUTPATIENT)
Dept: FAMILY MEDICINE | Facility: CLINIC | Age: 29
End: 2019-04-01
Payer: COMMERCIAL

## 2019-04-01 VITALS
BODY MASS INDEX: 31.81 KG/M2 | HEART RATE: 66 BPM | DIASTOLIC BLOOD PRESSURE: 73 MMHG | WEIGHT: 181 LBS | RESPIRATION RATE: 20 BRPM | OXYGEN SATURATION: 97 % | TEMPERATURE: 98.5 F | SYSTOLIC BLOOD PRESSURE: 109 MMHG

## 2019-04-01 DIAGNOSIS — O20.9 BLEEDING IN EARLY PREGNANCY: Primary | ICD-10-CM

## 2019-04-01 DIAGNOSIS — K59.00 CONSTIPATION, UNSPECIFIED CONSTIPATION TYPE: ICD-10-CM

## 2019-04-01 DIAGNOSIS — A59.01 TRICHOMONAS VAGINALIS (TV) INFECTION: ICD-10-CM

## 2019-04-01 DIAGNOSIS — N83.291 COMPLEX CYST OF RIGHT OVARY: ICD-10-CM

## 2019-04-01 LAB — B-HCG SERPL-ACNC: 5739 MLU/ML (ref 0–4)

## 2019-04-01 RX ORDER — POLYETHYLENE GLYCOL 3350 17 G/17G
1 POWDER, FOR SOLUTION ORAL 2 TIMES DAILY
Qty: 30 PACKET | Refills: 0 | Status: SHIPPED | OUTPATIENT
Start: 2019-04-01 | End: 2019-04-05

## 2019-04-01 NOTE — PATIENT INSTRUCTIONS
Thank you for coming to [facility] for your care. It was a pleasure to take care of you!    - Repeat HCG level today, I will call you with results.  - Referral to OBGYN placed, be sure to make appointment ASAP.   - If having more bleeding, severe cramping, fevers, chills, or any other issues, call clinic or go to the Emergency department.   - I have sent Miralax for constipation - please be sure to follow up with [facility] for follow up your Crohns and Celiac disease.     Eduarda Wasserman MD

## 2019-04-01 NOTE — PROGRESS NOTES
Preceptor Attestation:   Patient seen, evaluated and discussed with the resident. I have verified the content of the note, which accurately reflects my assessment of the patient and the plan of care.   Supervising Physician:  Vance Simmons MD

## 2019-04-01 NOTE — PROGRESS NOTES
SUBJECTIVE       Kee Phipps is a 28 year old female with a PMH significant for   Patient Active Problem List   Diagnosis     Health Care Home     Crohns disease (H)     Need for prophylactic vaccination and inoculation against varicella     Abnormal Pap smear of cervix     Chronic pain     Adjustment disorder with depressed mood     Celiac disease     Chronic pain syndrome     Immunosuppressed status (H)     Multiple joint pain     Back pain     Overweight (BMI 25.0-29.9)     Fibromyalgia     H/O  delivery, currently pregnant     Need for varicella vaccine     Exposure to gonorrhea    who presents for evaluation of vaginal bleeding in early pregnancy.     Vaginal bleeding  Patient is a 28-year-old female with history of adjustment disorder with depressed mood, chronic pain, fibromyalgia, history of  delivery who comes in for follow-up from [facility] emergency department for vaginal bleeding.  Patient was seen on yesterday 3/31/19 for vaginal bleeding, was confirmed to be pregnant to my UPT: 3/27/19.  At that time patient had been having some spotting.  Patient reports no rash have a history of spotting however prior to presenting to the ED had noticed that she had a large amount of blood when she wiped.  She also reports cramping for the past 3 days with sharp pains in her lower pelvic region.  She was found to have trichomonas infection as well as cellulitis given treatment for this.  Ultrasound completed in the emergency department estimated to be about 5 weeks gestation, no fetal pole was identified and no heartbeat was seen at that time.  She had lab work done to get beta-hCG levels which was at 4027.  She was recommended to follow-up for this with her primary care and OB/GYN.  Patient was also noted to have a complex right-sided ovarian cyst and was recommended for follow-up as well.  Of note, patient reports from history of miscarriage when she was about 18 years old.  Patient reports  that this was an unexpected pregnancy and she reports being conflicted about if she should keep her lots due to stressors with her partner.  She has been able to father of baby for about 5 months.  Otherwise denies any fevers or chills. She has 1 son who is 5 years old.    Medications and problem list have been reviewed and updated.         REVIEW OF SYSTEMS   General: No fevers, chills  Head: No headache, dizziness  Neck: No swallowing problems   Resp: No cough. No congestion, coryza  GI: No constipation, diarrhea, no nausea or vomiting  Skin: No rash        OBJECTIVE     Vitals:    04/01/19 1009   BP: 109/73   BP Location: Left arm   Patient Position: Sitting   Cuff Size: Adult Regular   Pulse: 66   Resp: 20   Temp: 98.5  F (36.9  C)   TempSrc: Oral   SpO2: 97%   Weight: 82.1 kg (181 lb)     Body mass index is 31.81 kg/m .  Gen: -American female, sitting up in chair, in no apparent distress.  HEENT: No Scleral icterus or conjunctival injection  Neck: supple without lymphadenopathy  CV:  RRR  - no murmurs, age appropriate rate  Pulm:  CTAB, no wheezes/rales/rhonchi, good air entry   Abdomen: soft, nontender, no masses, no rebound, BS intact throughout  Skin: No rashes or lesions noted.    ASSESSMENT AND PLAN     Kee was seen today for recheck and medication reconciliation.    Diagnoses and all orders for this visit:    Bleeding in early pregnancy  Complex pregnancy  Patient coming in today for follow-up for malignancy.  HCG level 4027 in ED.  Ultrasound did not show a fetal pole or cardiac activity. Concern is that patient may have had a miscarriage.  Plan is to repeat another hCG level today and see how this has been trending.  Patient is noted to have a right complex cyst.  Plan to refer her to OB/GYN to see if there needs to be any further workup for this.  No fevers, chills, no concerns.  She is pregnancy at this time she had recent ultrasound.  Return precautions discussed.  - Beta-HCG Quantitative  ([facility])  - OB/GYN REFERRAL; Future    Constipation, unspecified constipation type  She is also had a complex history with underlying celiac disease as well as far as disease had been on suppressive immunotherapy at in the past.  Currently not on any medications for this.  She will need follow-up of this.  Patient endorses some constipation and so will prescribe MiraLAX.  - Polyethylene glycol (MIRALAX/GLYCOLAX) packet; Take 17 g by mouth 2 times daily    Complex cyst of right ovary  Patient admits to prior knowledge of having a right-sided ovarian cyst.  This was noted to be complex and measuring at  1.7 x 1.5 x 1.9 cm  - OB/GYN REFERRAL; Future    Trichomonal infection  Patient will need wet prep.  To have trichomonas infection, was treated for this in the ED with metronidazole.  She was also asked to have partner be seen at the clinic with her to get treatment for this.  - Will need test of cure in 3-4 weeks.    Options for treatment and/or follow-up care were reviewed with the patient who was engaged and actively involved in the decision making process and verbalized understanding of the options discussed and was satisfied with the final plan. Risks and benefits of plan were carefully reviewed.     I, Eduarda Wasserman, have discussed patient findings with attending physician Vance Simmons MD who was agreeable with plan.     Eduarda Wasserman, PGY2

## 2019-04-02 ENCOUNTER — TELEPHONE (OUTPATIENT)
Dept: FAMILY MEDICINE | Facility: CLINIC | Age: 29
End: 2019-04-02

## 2019-04-02 NOTE — TELEPHONE ENCOUNTER
[facility] phone call message- general phone call:    Reason for call: She would like a call back with her test results.    Return call needed: Yes    OK to leave a message on voice mail? Yes    Primary language: English      needed? No    Call taken on April 2, 2019 at 9:04 AM by Kenzie Adhikari

## 2019-04-02 NOTE — RESULT ENCOUNTER NOTE
Grayson Lockett,    Could you please call patient with the following results.     Grayson Ms. Phipps,    I hope you are doing well. I wanted to let you know about the results we completed. It shows that you beta HCG (that is an indication of pregnancy that we talked about) has increased and is now at 5700 from 4027 in the ED. This is a good indication that the pregnancy may be progressing well. I would like you to have another visit on Wednesday if you are available to make sure that the number has at least doubled as would be expected in a normal pregnancy. If you have been able to make an appointment with OBGYN this week then that would work as well so that they can follow up on this and also follow up about your cyst.     Best,    BS

## 2019-04-05 ENCOUNTER — TELEPHONE (OUTPATIENT)
Dept: FAMILY MEDICINE | Facility: CLINIC | Age: 29
End: 2019-04-05

## 2019-04-05 DIAGNOSIS — K59.00 CONSTIPATION, UNSPECIFIED CONSTIPATION TYPE: Primary | ICD-10-CM

## 2019-04-05 RX ORDER — POLYETHYLENE GLYCOL 3350 17 G/17G
1 POWDER, FOR SOLUTION ORAL DAILY
Qty: 1 BOTTLE | Refills: 11 | Status: SHIPPED | OUTPATIENT
Start: 2019-04-05 | End: 2021-05-05

## 2019-05-03 ENCOUNTER — HEALTH MAINTENANCE LETTER (OUTPATIENT)
Age: 29
End: 2019-05-03

## 2019-08-16 ENCOUNTER — TRANSFERRED RECORDS (OUTPATIENT)
Dept: HEALTH INFORMATION MANAGEMENT | Facility: CLINIC | Age: 29
End: 2019-08-16

## 2019-08-16 ENCOUNTER — ANESTHESIA EVENT (OUTPATIENT)
Dept: SURGERY | Facility: CLINIC | Age: 29
End: 2019-08-16
Payer: COMMERCIAL

## 2019-08-16 ENCOUNTER — HOSPITAL ENCOUNTER (OUTPATIENT)
Facility: CLINIC | Age: 29
Discharge: HOME | End: 2019-08-17
Attending: INTERNAL MEDICINE | Admitting: OBSTETRICS & GYNECOLOGY
Payer: COMMERCIAL

## 2019-08-16 ENCOUNTER — ANESTHESIA (OUTPATIENT)
Dept: SURGERY | Facility: CLINIC | Age: 29
End: 2019-08-16
Payer: COMMERCIAL

## 2019-08-16 DIAGNOSIS — Z30.011 ENCOUNTER FOR INITIAL PRESCRIPTION OF CONTRACEPTIVE PILLS: ICD-10-CM

## 2019-08-16 DIAGNOSIS — N94.9 UTERUS PROBLEM: ICD-10-CM

## 2019-08-16 DIAGNOSIS — Z98.890 S/P LAPAROSCOPY: Primary | ICD-10-CM

## 2019-08-16 LAB
ABO + RH BLD: NORMAL
ABO + RH BLD: NORMAL
ALBUMIN SERPL-MCNC: 3.5 G/DL (ref 3.4–5)
ALP SERPL-CCNC: 62 U/L (ref 40–150)
ALT SERPL W P-5'-P-CCNC: 15 U/L (ref 0–50)
ANION GAP SERPL CALCULATED.3IONS-SCNC: 6 MMOL/L (ref 3–14)
AST SERPL W P-5'-P-CCNC: 8 U/L (ref 0–45)
BASOPHILS # BLD AUTO: 0 10E9/L (ref 0–0.2)
BASOPHILS NFR BLD AUTO: 0 %
BILIRUB SERPL-MCNC: 0.4 MG/DL (ref 0.2–1.3)
BLD GP AB SCN SERPL QL: NORMAL
BLOOD BANK CMNT PATIENT-IMP: NORMAL
BUN SERPL-MCNC: 12 MG/DL (ref 7–30)
CALCIUM SERPL-MCNC: 8.2 MG/DL (ref 8.5–10.1)
CHLORIDE SERPL-SCNC: 107 MMOL/L (ref 94–109)
CO2 SERPL-SCNC: 25 MMOL/L (ref 20–32)
CREAT SERPL-MCNC: 0.67 MG/DL (ref 0.52–1.04)
DIFFERENTIAL METHOD BLD: NORMAL
EOSINOPHIL # BLD AUTO: 0 10E9/L (ref 0–0.7)
EOSINOPHIL NFR BLD AUTO: 0.5 %
ERYTHROCYTE [DISTWIDTH] IN BLOOD BY AUTOMATED COUNT: 12.8 % (ref 10–15)
GFR SERPL CREATININE-BSD FRML MDRD: >90 ML/MIN/{1.73_M2}
GLUCOSE SERPL-MCNC: 85 MG/DL (ref 70–99)
HCT VFR BLD AUTO: 37.6 % (ref 35–47)
HGB BLD-MCNC: 13 G/DL (ref 11.7–15.7)
IMM GRANULOCYTES # BLD: 0 10E9/L (ref 0–0.4)
IMM GRANULOCYTES NFR BLD: 0.3 %
INR PPP: 1.07 (ref 0.86–1.14)
LYMPHOCYTES # BLD AUTO: 2.2 10E9/L (ref 0.8–5.3)
LYMPHOCYTES NFR BLD AUTO: 29.6 %
MCH RBC QN AUTO: 31.2 PG (ref 26.5–33)
MCHC RBC AUTO-ENTMCNC: 34.6 G/DL (ref 31.5–36.5)
MCV RBC AUTO: 90 FL (ref 78–100)
MONOCYTES # BLD AUTO: 0.9 10E9/L (ref 0–1.3)
MONOCYTES NFR BLD AUTO: 11.9 %
NEUTROPHILS # BLD AUTO: 4.2 10E9/L (ref 1.6–8.3)
NEUTROPHILS NFR BLD AUTO: 57.7 %
NRBC # BLD AUTO: 0 10*3/UL
NRBC BLD AUTO-RTO: 0 /100
PLATELET # BLD AUTO: 180 10E9/L (ref 150–450)
POTASSIUM SERPL-SCNC: 3.8 MMOL/L (ref 3.4–5.3)
PROT SERPL-MCNC: 6.9 G/DL (ref 6.8–8.8)
RBC # BLD AUTO: 4.17 10E12/L (ref 3.8–5.2)
SODIUM SERPL-SCNC: 138 MMOL/L (ref 133–144)
SPECIMEN EXP DATE BLD: NORMAL
WBC # BLD AUTO: 7.3 10E9/L (ref 4–11)

## 2019-08-16 PROCEDURE — 71000014 ZZH RECOVERY PHASE 1 LEVEL 2 FIRST HR: Performed by: OBSTETRICS & GYNECOLOGY

## 2019-08-16 PROCEDURE — 25000128 H RX IP 250 OP 636: Performed by: ANESTHESIOLOGY

## 2019-08-16 PROCEDURE — 25000132 ZZH RX MED GY IP 250 OP 250 PS 637: Performed by: STUDENT IN AN ORGANIZED HEALTH CARE EDUCATION/TRAINING PROGRAM

## 2019-08-16 PROCEDURE — 25000128 H RX IP 250 OP 636: Performed by: INTERNAL MEDICINE

## 2019-08-16 PROCEDURE — 27210794 ZZH OR GENERAL SUPPLY STERILE: Performed by: OBSTETRICS & GYNECOLOGY

## 2019-08-16 PROCEDURE — 25000128 H RX IP 250 OP 636: Performed by: OBSTETRICS & GYNECOLOGY

## 2019-08-16 PROCEDURE — 25000125 ZZHC RX 250: Performed by: REGISTERED NURSE

## 2019-08-16 PROCEDURE — 86850 RBC ANTIBODY SCREEN: CPT | Performed by: INTERNAL MEDICINE

## 2019-08-16 PROCEDURE — 80053 COMPREHEN METABOLIC PANEL: CPT | Performed by: INTERNAL MEDICINE

## 2019-08-16 PROCEDURE — 25000125 ZZHC RX 250: Performed by: OBSTETRICS & GYNECOLOGY

## 2019-08-16 PROCEDURE — 25800030 ZZH RX IP 258 OP 636: Performed by: INTERNAL MEDICINE

## 2019-08-16 PROCEDURE — 37000008 ZZH ANESTHESIA TECHNICAL FEE, 1ST 30 MIN: Performed by: OBSTETRICS & GYNECOLOGY

## 2019-08-16 PROCEDURE — 25800030 ZZH RX IP 258 OP 636: Performed by: REGISTERED NURSE

## 2019-08-16 PROCEDURE — 25000128 H RX IP 250 OP 636: Performed by: REGISTERED NURSE

## 2019-08-16 PROCEDURE — 86900 BLOOD TYPING SEROLOGIC ABO: CPT | Performed by: INTERNAL MEDICINE

## 2019-08-16 PROCEDURE — 36000057 ZZH SURGERY LEVEL 3 1ST 30 MIN - UMMC: Performed by: OBSTETRICS & GYNECOLOGY

## 2019-08-16 PROCEDURE — 85025 COMPLETE CBC W/AUTO DIFF WBC: CPT | Performed by: INTERNAL MEDICINE

## 2019-08-16 PROCEDURE — 85610 PROTHROMBIN TIME: CPT | Performed by: INTERNAL MEDICINE

## 2019-08-16 PROCEDURE — 71000015 ZZH RECOVERY PHASE 1 LEVEL 2 EA ADDTL HR: Performed by: OBSTETRICS & GYNECOLOGY

## 2019-08-16 PROCEDURE — 37000009 ZZH ANESTHESIA TECHNICAL FEE, EACH ADDTL 15 MIN: Performed by: OBSTETRICS & GYNECOLOGY

## 2019-08-16 PROCEDURE — 99285 EMERGENCY DEPT VISIT HI MDM: CPT | Mod: 25 | Performed by: INTERNAL MEDICINE

## 2019-08-16 PROCEDURE — 36000059 ZZH SURGERY LEVEL 3 EA 15 ADDTL MIN UMMC: Performed by: OBSTETRICS & GYNECOLOGY

## 2019-08-16 PROCEDURE — 99285 EMERGENCY DEPT VISIT HI MDM: CPT | Mod: Z6 | Performed by: INTERNAL MEDICINE

## 2019-08-16 PROCEDURE — 25000566 ZZH SEVOFLURANE, EA 15 MIN: Performed by: OBSTETRICS & GYNECOLOGY

## 2019-08-16 PROCEDURE — 86901 BLOOD TYPING SEROLOGIC RH(D): CPT | Performed by: INTERNAL MEDICINE

## 2019-08-16 PROCEDURE — 96374 THER/PROPH/DIAG INJ IV PUSH: CPT | Mod: 59 | Performed by: INTERNAL MEDICINE

## 2019-08-16 PROCEDURE — 40000170 ZZH STATISTIC PRE-PROCEDURE ASSESSMENT II: Performed by: OBSTETRICS & GYNECOLOGY

## 2019-08-16 PROCEDURE — 96361 HYDRATE IV INFUSION ADD-ON: CPT | Performed by: INTERNAL MEDICINE

## 2019-08-16 RX ORDER — HYDROMORPHONE HYDROCHLORIDE 1 MG/ML
.3-.5 INJECTION, SOLUTION INTRAMUSCULAR; INTRAVENOUS; SUBCUTANEOUS
Status: DISCONTINUED | OUTPATIENT
Start: 2019-08-16 | End: 2019-08-16 | Stop reason: HOSPADM

## 2019-08-16 RX ORDER — HYDROMORPHONE HYDROCHLORIDE 1 MG/ML
0.5 INJECTION, SOLUTION INTRAMUSCULAR; INTRAVENOUS; SUBCUTANEOUS ONCE
Status: COMPLETED | OUTPATIENT
Start: 2019-08-16 | End: 2019-08-16

## 2019-08-16 RX ORDER — FENTANYL CITRATE 50 UG/ML
INJECTION, SOLUTION INTRAMUSCULAR; INTRAVENOUS PRN
Status: DISCONTINUED | OUTPATIENT
Start: 2019-08-16 | End: 2019-08-16

## 2019-08-16 RX ORDER — ONDANSETRON 4 MG/1
4 TABLET, ORALLY DISINTEGRATING ORAL EVERY 6 HOURS PRN
Status: DISCONTINUED | OUTPATIENT
Start: 2019-08-16 | End: 2019-08-17 | Stop reason: HOSPADM

## 2019-08-16 RX ORDER — OXYCODONE HYDROCHLORIDE 5 MG/1
5-10 TABLET ORAL EVERY 4 HOURS PRN
Qty: 5 TABLET | Refills: 0 | Status: SHIPPED | OUTPATIENT
Start: 2019-08-16 | End: 2021-05-05

## 2019-08-16 RX ORDER — FENTANYL CITRATE 50 UG/ML
25-50 INJECTION, SOLUTION INTRAMUSCULAR; INTRAVENOUS
Status: DISCONTINUED | OUTPATIENT
Start: 2019-08-16 | End: 2019-08-16

## 2019-08-16 RX ORDER — ACETAMINOPHEN 325 MG/1
650 TABLET ORAL EVERY 6 HOURS PRN
Status: DISCONTINUED | OUTPATIENT
Start: 2019-08-16 | End: 2019-08-17 | Stop reason: HOSPADM

## 2019-08-16 RX ORDER — NORGESTIMATE AND ETHINYL ESTRADIOL 0.25-0.035
1 KIT ORAL DAILY
Qty: 90 TABLET | Refills: 3 | Status: SHIPPED | OUTPATIENT
Start: 2019-08-16 | End: 2021-05-05

## 2019-08-16 RX ORDER — NALOXONE HYDROCHLORIDE 0.4 MG/ML
.1-.4 INJECTION, SOLUTION INTRAMUSCULAR; INTRAVENOUS; SUBCUTANEOUS
Status: DISCONTINUED | OUTPATIENT
Start: 2019-08-16 | End: 2019-08-17 | Stop reason: HOSPADM

## 2019-08-16 RX ORDER — MAGNESIUM HYDROXIDE 1200 MG/15ML
LIQUID ORAL PRN
Status: DISCONTINUED | OUTPATIENT
Start: 2019-08-16 | End: 2019-08-16 | Stop reason: HOSPADM

## 2019-08-16 RX ORDER — BUPIVACAINE HYDROCHLORIDE 2.5 MG/ML
INJECTION, SOLUTION INFILTRATION; PERINEURAL PRN
Status: DISCONTINUED | OUTPATIENT
Start: 2019-08-16 | End: 2019-08-16 | Stop reason: HOSPADM

## 2019-08-16 RX ORDER — OXYCODONE HYDROCHLORIDE 5 MG/1
5 TABLET ORAL EVERY 6 HOURS PRN
Status: DISCONTINUED | OUTPATIENT
Start: 2019-08-16 | End: 2019-08-17 | Stop reason: HOSPADM

## 2019-08-16 RX ORDER — LIDOCAINE 40 MG/G
CREAM TOPICAL
Status: DISCONTINUED | OUTPATIENT
Start: 2019-08-16 | End: 2019-08-17 | Stop reason: HOSPADM

## 2019-08-16 RX ORDER — ONDANSETRON 2 MG/ML
INJECTION INTRAMUSCULAR; INTRAVENOUS PRN
Status: DISCONTINUED | OUTPATIENT
Start: 2019-08-16 | End: 2019-08-16

## 2019-08-16 RX ORDER — PROPOFOL 10 MG/ML
INJECTION, EMULSION INTRAVENOUS PRN
Status: DISCONTINUED | OUTPATIENT
Start: 2019-08-16 | End: 2019-08-16

## 2019-08-16 RX ORDER — ONDANSETRON 4 MG/1
4 TABLET, ORALLY DISINTEGRATING ORAL EVERY 30 MIN PRN
Status: DISCONTINUED | OUTPATIENT
Start: 2019-08-16 | End: 2019-08-16

## 2019-08-16 RX ORDER — PROPOFOL 10 MG/ML
INJECTION, EMULSION INTRAVENOUS CONTINUOUS PRN
Status: DISCONTINUED | OUTPATIENT
Start: 2019-08-16 | End: 2019-08-16

## 2019-08-16 RX ORDER — ONDANSETRON 2 MG/ML
4 INJECTION INTRAMUSCULAR; INTRAVENOUS EVERY 30 MIN PRN
Status: DISCONTINUED | OUTPATIENT
Start: 2019-08-16 | End: 2019-08-16

## 2019-08-16 RX ORDER — NALOXONE HYDROCHLORIDE 0.4 MG/ML
.1-.4 INJECTION, SOLUTION INTRAMUSCULAR; INTRAVENOUS; SUBCUTANEOUS
Status: DISCONTINUED | OUTPATIENT
Start: 2019-08-16 | End: 2019-08-16

## 2019-08-16 RX ORDER — SODIUM CHLORIDE 9 MG/ML
INJECTION, SOLUTION INTRAVENOUS CONTINUOUS
Status: DISCONTINUED | OUTPATIENT
Start: 2019-08-16 | End: 2019-08-16

## 2019-08-16 RX ORDER — ONDANSETRON 2 MG/ML
4 INJECTION INTRAMUSCULAR; INTRAVENOUS EVERY 6 HOURS PRN
Status: DISCONTINUED | OUTPATIENT
Start: 2019-08-16 | End: 2019-08-17 | Stop reason: HOSPADM

## 2019-08-16 RX ORDER — HYDROXYZINE HYDROCHLORIDE 25 MG/1
25 TABLET, FILM COATED ORAL EVERY 6 HOURS PRN
Status: DISCONTINUED | OUTPATIENT
Start: 2019-08-16 | End: 2019-08-17 | Stop reason: HOSPADM

## 2019-08-16 RX ORDER — DEXAMETHASONE SODIUM PHOSPHATE 4 MG/ML
INJECTION, SOLUTION INTRA-ARTICULAR; INTRALESIONAL; INTRAMUSCULAR; INTRAVENOUS; SOFT TISSUE PRN
Status: DISCONTINUED | OUTPATIENT
Start: 2019-08-16 | End: 2019-08-16

## 2019-08-16 RX ORDER — LIDOCAINE HYDROCHLORIDE 20 MG/ML
INJECTION, SOLUTION INFILTRATION; PERINEURAL PRN
Status: DISCONTINUED | OUTPATIENT
Start: 2019-08-16 | End: 2019-08-16

## 2019-08-16 RX ORDER — SODIUM CHLORIDE, SODIUM LACTATE, POTASSIUM CHLORIDE, CALCIUM CHLORIDE 600; 310; 30; 20 MG/100ML; MG/100ML; MG/100ML; MG/100ML
INJECTION, SOLUTION INTRAVENOUS CONTINUOUS
Status: DISCONTINUED | OUTPATIENT
Start: 2019-08-16 | End: 2019-08-16

## 2019-08-16 RX ORDER — MEPERIDINE HYDROCHLORIDE 25 MG/ML
12.5 INJECTION INTRAMUSCULAR; INTRAVENOUS; SUBCUTANEOUS
Status: DISCONTINUED | OUTPATIENT
Start: 2019-08-16 | End: 2019-08-16

## 2019-08-16 RX ORDER — SODIUM CHLORIDE, SODIUM LACTATE, POTASSIUM CHLORIDE, CALCIUM CHLORIDE 600; 310; 30; 20 MG/100ML; MG/100ML; MG/100ML; MG/100ML
INJECTION, SOLUTION INTRAVENOUS CONTINUOUS PRN
Status: DISCONTINUED | OUTPATIENT
Start: 2019-08-16 | End: 2019-08-16

## 2019-08-16 RX ADMIN — FENTANYL CITRATE 50 MCG: 50 INJECTION, SOLUTION INTRAMUSCULAR; INTRAVENOUS at 18:31

## 2019-08-16 RX ADMIN — MIDAZOLAM 2 MG: 1 INJECTION INTRAMUSCULAR; INTRAVENOUS at 17:17

## 2019-08-16 RX ADMIN — ROCURONIUM BROMIDE 10 MG: 10 INJECTION INTRAVENOUS at 17:51

## 2019-08-16 RX ADMIN — ROCURONIUM BROMIDE 40 MG: 10 INJECTION INTRAVENOUS at 17:31

## 2019-08-16 RX ADMIN — ONDANSETRON 4 MG: 2 INJECTION INTRAMUSCULAR; INTRAVENOUS at 18:17

## 2019-08-16 RX ADMIN — SODIUM CHLORIDE, POTASSIUM CHLORIDE, SODIUM LACTATE AND CALCIUM CHLORIDE: 600; 310; 30; 20 INJECTION, SOLUTION INTRAVENOUS at 17:20

## 2019-08-16 RX ADMIN — HYDROMORPHONE HYDROCHLORIDE 0.5 MG: 1 INJECTION, SOLUTION INTRAMUSCULAR; INTRAVENOUS; SUBCUTANEOUS at 14:50

## 2019-08-16 RX ADMIN — PROPOFOL 50 MCG/KG/MIN: 10 INJECTION, EMULSION INTRAVENOUS at 17:52

## 2019-08-16 RX ADMIN — SODIUM CHLORIDE: 9 INJECTION, SOLUTION INTRAVENOUS at 14:50

## 2019-08-16 RX ADMIN — SUGAMMADEX 180 MG: 100 INJECTION, SOLUTION INTRAVENOUS at 18:18

## 2019-08-16 RX ADMIN — LIDOCAINE HYDROCHLORIDE 60 MG: 20 INJECTION, SOLUTION INFILTRATION; PERINEURAL at 17:30

## 2019-08-16 RX ADMIN — FENTANYL CITRATE 50 MCG: 50 INJECTION, SOLUTION INTRAMUSCULAR; INTRAVENOUS at 17:57

## 2019-08-16 RX ADMIN — OXYCODONE HYDROCHLORIDE 5 MG: 5 TABLET ORAL at 22:56

## 2019-08-16 RX ADMIN — ACETAMINOPHEN 650 MG: 325 TABLET, FILM COATED ORAL at 21:27

## 2019-08-16 RX ADMIN — FENTANYL CITRATE 50 MCG: 50 INJECTION INTRAMUSCULAR; INTRAVENOUS at 19:20

## 2019-08-16 RX ADMIN — PROPOFOL 120 MG: 10 INJECTION, EMULSION INTRAVENOUS at 17:30

## 2019-08-16 RX ADMIN — DEXAMETHASONE SODIUM PHOSPHATE 10 MG: 4 INJECTION, SOLUTION INTRAMUSCULAR; INTRAVENOUS at 17:49

## 2019-08-16 RX ADMIN — FENTANYL CITRATE 50 MCG: 50 INJECTION INTRAMUSCULAR; INTRAVENOUS at 19:04

## 2019-08-16 ASSESSMENT — ENCOUNTER SYMPTOMS
ARTHRALGIAS: 0
CONFUSION: 0
EYE REDNESS: 0
HEADACHES: 0
SHORTNESS OF BREATH: 0
NECK STIFFNESS: 0
ABDOMINAL PAIN: 1
COLOR CHANGE: 0
FEVER: 0
DIFFICULTY URINATING: 0

## 2019-08-16 ASSESSMENT — LIFESTYLE VARIABLES: TOBACCO_USE: 1

## 2019-08-16 NOTE — ED PROVIDER NOTES
[facility] EMERGENCY DEPARTMENT ([facility])    19        History     Chief Complaint   Patient presents with     Abdominal Pain     [facility]     The history is provided by the patient and medical records.     Kee Phipps is a 28 year old  female with a past medical history significant for Crohn's disease, and fibromyalgia who presents here to the Emergency [facility] to [facility] pain and vaginal bleeding. Patient was having a D&C performed at [facility] and began complaining of abdominal pain. Patients LMP was on 2019. She had an US and visualization was concerning for a possible perforation. Patient had been given Fentanyl at [facility] before being transferred but denies it helping. States that she ate at 9 am.    I have reviewed the Medications, Allergies, Past Medical and Surgical History, and Social History in the Neurodyn system.    Past Medical History:   Diagnosis Date     Abnormal Pap smear of cervix      Bilateral plantar fasciitis      Celiac disease 2014     Crohn's disease (H) 2012     Depressive disorder      Fibromyalgia      H/O miscarriage, currently pregnant     miscarriage .      delivery 2013    34 weeks. Early rupture of membranes       Past Surgical History:   Procedure Laterality Date     DILATION AND CURETTAGE SUCTION, TREAT MISSED , 1ST TRIMESTER      miscarriage at 8 wks gest     FASCIOTOMY LOWER EXTREMITY Left 2018    Left foot     SMALL BOWEL RESECTION       TONSILLECTOMY         Family History   Problem Relation Age of Onset     Cancer Maternal Grandmother         bone cancer     Hypertension Mother      Asthma Brother      Asthma Brother      Asthma Brother      Asthma Brother      Asthma Son      Crohn's Disease Paternal Uncle      Diabetes No family hx of      Coronary Artery Disease No family hx of      Hyperlipidemia  No family hx of      Cerebrovascular Disease No family hx of      Breast Cancer No family hx of      Colon Cancer No family hx of      Prostate Cancer No family hx of      Other Cancer No family hx of      Depression No family hx of      Anxiety Disorder No family hx of      Mental Illness No family hx of      Substance Abuse No family hx of      Anesthesia Reaction No family hx of      Osteoporosis No family hx of      Genetic Disorder No family hx of      Thyroid Disease No family hx of      Obesity No family hx of      Unknown/Adopted No family hx of      Heart Disease No family hx of        Social History     Tobacco Use     Smoking status: Current Every Day Smoker     Packs/day: 0.20     Types: Cigarettes     Smokeless tobacco: Never Used   Substance Use Topics     Alcohol use: Yes     Alcohol/week: 0.0 oz     Comment: occ       Current Facility-Administered Medications   Medication     sodium chloride 0.9% infusion     Current Outpatient Medications   Medication     boric acid 600 mg vaginal suppository - PHARMACY TO MIX COMPOUND     Prenatal Vit-Fe Fumarate-FA (PRENATAL MULTIVITAMIN W/IRON) 27-0.8 MG tablet     acetaminophen (TYLENOL) 325 MG tablet     polyethylene glycol (MIRALAX/GLYCOLAX) powder        Allergies   Allergen Reactions     Gluten Meal      Humira [Humira]      Caused huge lumps at injection site.      Ibuprofen      Patient has Crohns disease and is unable to take this medication.     Latex Itching     Remicade [Infliximab] Rash         Review of Systems   Constitutional: Negative for fever.   HENT: Negative for congestion.    Eyes: Negative for redness.   Respiratory: Negative for shortness of breath.    Cardiovascular: Negative for chest pain.   Gastrointestinal: Positive for abdominal pain.   Genitourinary: Positive for vaginal bleeding. Negative for difficulty urinating.   Musculoskeletal: Negative for arthralgias and neck stiffness.   Skin: Negative for color change.   Neurological:  Negative for headaches.   Psychiatric/Behavioral: Negative for confusion.       Physical Exam   BP: 106/85  Pulse: 76  Temp: 98.6  F (37  C)  Resp: 18  Weight: 79.8 kg (176 lb)  SpO2: 99 %      Physical Exam   Constitutional: No distress.   HENT:   Head: Atraumatic.   Mouth/Throat: Oropharynx is clear and moist. No oropharyngeal exudate.   Eyes: Pupils are equal, round, and reactive to light. No scleral icterus.   Neck: Neck supple. No JVD present.   Cardiovascular: Normal rate, regular rhythm, normal heart sounds and intact distal pulses. Exam reveals no gallop and no friction rub.   No murmur heard.  Pulmonary/Chest: Effort normal and breath sounds normal. No stridor. No respiratory distress. She has no wheezes. She has no rales. She exhibits no tenderness.   Abdominal: Soft. Bowel sounds are normal. There is no hepatosplenomegaly. There is tenderness in the suprapubic area. There is no rigidity, no rebound, no guarding, no CVA tenderness, no tenderness at McBurney's point and negative Leslie's sign. No hernia.       Musculoskeletal: She exhibits no edema or tenderness.   Skin: Skin is warm. No rash noted. She is not diaphoretic.       ED Course   2:37 PM  The patient was seen and examined by Marita Robins MD in Room ED05.        Procedures        Results for orders placed or performed during the hospital encounter of 08/16/19 (from the past 24 hour(s))   ABO/Rh type and screen     Status: None (In process)    Collection Time: 08/16/19  2:50 PM   Result Value Ref Range    ABO PENDING     Antibody Screen PENDING     Test Valid Only At          [facility]    Specimen Expires 08/19/2019    INR     Status: None    Collection Time: 08/16/19  2:50 PM   Result Value Ref Range    INR 1.07 0.86 - 1.14   Comprehensive metabolic panel     Status: None (In process)    Collection Time: 08/16/19  2:50 PM   Result Value Ref Range    Sodium 138 133 - 144 mmol/L    Potassium 3.8 3.4 -  5.3 mmol/L    Chloride 107 94 - 109 mmol/L    Carbon Dioxide PENDING 20 - 32 mmol/L    Anion Gap PENDING 3 - 14 mmol/L    Glucose PENDING 70 - 99 mg/dL    Urea Nitrogen PENDING 7 - 30 mg/dL    Creatinine PENDING 0.52 - 1.04 mg/dL    GFR Estimate PENDING >60 mL/min/[1.73_m2]    GFR Estimate If Black PENDING >60 mL/min/[1.73_m2]    Calcium PENDING 8.5 - 10.1 mg/dL    Bilirubin Total PENDING 0.2 - 1.3 mg/dL    Albumin PENDING 3.4 - 5.0 g/dL    Protein Total PENDING 6.8 - 8.8 g/dL    Alkaline Phosphatase PENDING 40 - 150 U/L    ALT PENDING 0 - 50 U/L    AST PENDING 0 - 45 U/L           Labs Ordered and Resulted from Time of ED Arrival Up to the Time of Departure from the ED   CBC WITH PLATELETS DIFFERENTIAL   INR   COMPREHENSIVE METABOLIC PANEL   ABO/RH TYPE AND SCREEN            Assessments & Plan (with Medical Decision Making)  Suspected uterine perforation after suction currettage at [facility], OB aware, pre-ope labs and pain meds given, keep NPO, OB consult in progress for possible lap. Held off abx per OB. Will sign off to incoming EP.       I have reviewed the nursing notes.    I have reviewed the findings, diagnosis, plan and need for follow up with the patient.    New Prescriptions    No medications on file       Final diagnoses:   Uterus problem, suspected perforation     IOnofre, am serving as a trained medical scribe to document services personally performed by Marita Robins MD, based on the provider's statements to me.   IMarita MD, was physically present and have reviewed and verified the accuracy of this note documented by Onofre Baker.    8/16/2019   [facility], [place], EMERGENCY DEPARTMENT     Marita Robins MD  08/19/19 2923

## 2019-08-16 NOTE — ANESTHESIA PREPROCEDURE EVALUATION
Anesthesia Pre-Procedure Evaluation    Patient: Kee Phipps   MRN:     [id number] Gender:   female   Age:    28 year old :      1990        Preoperative Diagnosis: Abdominal Pain   Procedure(s):  LAPAROSCOPY, DIAGNOSTIC     Past Medical History:   Diagnosis Date     Abnormal Pap smear of cervix      Bilateral plantar fasciitis      Celiac disease 2014     Crohn's disease (H) 2012     Depressive disorder      Fibromyalgia      H/O miscarriage, currently pregnant     miscarriage .      delivery 2013    34 weeks. Early rupture of membranes      Past Surgical History:   Procedure Laterality Date     DILATION AND CURETTAGE SUCTION, TREAT MISSED , 1ST TRIMESTER      miscarriage at 8 wks gest     FASCIOTOMY LOWER EXTREMITY Left 2018    Left foot     SMALL BOWEL RESECTION       TONSILLECTOMY            Anesthesia Evaluation     . Pt has had prior anesthetic.            ROS/MED HX    ENT/Pulmonary:     (+)tobacco use, , . .   COPD: 1.   Neurologic: Comment: RIGHT ARM NUMBNESS.  LEFT FOOT NUMBNESS.  FIBROMYALGIA.      Cardiovascular: Comment: IRREGULAR HEART BEAT.        METS/Exercise Tolerance:     Hematologic:  - neg hematologic  ROS       Musculoskeletal:  - neg musculoskeletal ROS       GI/Hepatic:  - neg GI/hepatic ROS       Renal/Genitourinary: Comment: S/P  IN OFFICE.  ABDOMINAL PAIN.        Endo:  - neg endo ROS       Psychiatric:         Infectious Disease:  - neg infectious disease ROS       Malignancy:      - no malignancy   Other:    - neg other ROS                     PHYSICAL EXAM:   Mental Status/Neuro: A/A/O   Airway: Facies: Feasible  Mallampati: I  Mouth/Opening: Full  TM distance: > 6 cm  Neck ROM: Full   Respiratory: Auscultation: CTAB     Resp. Rate: Normal     Resp. Effort: Normal      CV: Rhythm: Regular  Heart: Normal Sounds  Edema: None   Comments:                      LABS:  CBC:   Lab Results   Component Value Date    WBC 7.3 2019    WBC 9.8  "03/18/2015    HGB 13.0 08/16/2019    HGB 12.4 10/12/2017    HCT 37.6 08/16/2019    HCT 43.3 11/14/2016     08/16/2019     03/18/2015     BMP:   Lab Results   Component Value Date     08/16/2019     10/13/2016    POTASSIUM 3.8 08/16/2019    POTASSIUM 3.9 10/13/2016    CHLORIDE 107 08/16/2019    CHLORIDE 105 10/13/2016    CO2 25 08/16/2019    CO2 24.3 06/07/2016    BUN 12 08/16/2019    BUN 14 10/13/2016    CR 0.67 08/16/2019    CR 0.79 10/13/2016    GLC 85 08/16/2019    GLC 67 (L) 10/13/2016     COAGS:   Lab Results   Component Value Date    PTT 28 11/14/2016    INR 1.07 08/16/2019     POC:   Lab Results   Component Value Date    HCG POSITIVE 03/27/2019     OTHER:   Lab Results   Component Value Date    A1C 5.2 10/13/2016    BRENDA 8.2 (L) 08/16/2019    MAG 1.7 (L) 09/25/2014    ALBUMIN 3.5 08/16/2019    PROTTOTAL 6.9 08/16/2019    ALT 15 08/16/2019    AST 8 08/16/2019    ALKPHOS 62 08/16/2019    BILITOTAL 0.4 08/16/2019    CRP 16.0 (H) 03/18/2015        Preop Vitals    BP Readings from Last 3 Encounters:   08/16/19 107/79   04/01/19 109/73   03/27/19 129/85    Pulse Readings from Last 3 Encounters:   08/16/19 76   04/01/19 66   03/27/19 64      Resp Readings from Last 3 Encounters:   08/16/19 18   04/01/19 20   03/27/19 16    SpO2 Readings from Last 3 Encounters:   08/16/19 100%   04/01/19 97%   03/27/19 94%      Temp Readings from Last 1 Encounters:   08/16/19 37  C (98.6  F) (Oral)    Ht Readings from Last 1 Encounters:   03/27/19 1.607 m (5' 3.25\")      Wt Readings from Last 1 Encounters:   08/16/19 79.8 kg (176 lb)    Estimated body mass index is 30.93 kg/m  as calculated from the following:    Height as of 3/27/19: 1.607 m (5' 3.25\").    Weight as of this encounter: 79.8 kg (176 lb).     LDA:  Peripheral IV 08/16/19 (Active)   Number of days: 0        Assessment:   ASA SCORE: 2 emergent   H&P: History and physical reviewed and following examination; no interval change.   Smoking Status:  " Active Smoker   NPO Status: NPO Appropriate     Plan:   Anes. Type:  General   Pre-Medication: Midazolam   Induction:  IV (Standard)   Airway: ETT; Oral   Access/Monitoring: PIV   Maintenance: Balanced     Postop Plan:   Postop Pain: Opioids  Postop Sedation/Airway: Not planned     PONV Management:   Adult Risk Factors: Female, Postop Opioids     CONSENT: Direct conversation   Plan and risks discussed with: Patient                      Morales Carlton DO

## 2019-08-16 NOTE — OP NOTE
Operative Note    Kee Phipps  [id number]  2019    Preoperative Diagnosis:   Possible uterine perforation, possible abdominal injury     Postoperative Diagnosis:   Uterine perforation     Procedure:   1. Diagnostic laparscopy, possible repair of bowel injury, possible open procedure    Surgeon: Shikha Brothers MD     Assist: Arabella Chou MD PGY3   Thuy Ferreira MD PGY1    Anesthesia: general endotracheal     Complications: None     EBL: <5 mL   IVF: 800 mL crystalloid   UOP: 250 mL clear urine     Findings: Exam under anesthesia revealed anteverted uterus and minimal vaginal bleeding.  A survey of the upper abdomen showed normal anatomy without injury. A survey of pelvis revealed approximately 25cc hematoperiotoneum in the anterior and posterior cul-de-sacs. Several bilateral omental-fascial adhesions were noted in lower abdomen at site of previous laparotomy. A 5mm uterine perforation was noted over the posterior aspect of the left uterine fundus which was hemostatic. No other perforations of the uterus were noted. The bowel adjacent to the perforation was run and noted to be intact. No stool contents were visualized in the pelvis or abdomen. Hematoperiotneum was suction-irrigated with eventual return of clear fluid.     Indications: Ms. Phipps is a 28 year old  [facility] 4w4d. She desired surgical termination. During the suction D&C, there was a possibility of uterine perforation over the left fundus with flexible suction cannula. She was transferred to this hospital for diagnostic laparoscopy, possible repair of bowel, possible open procedure. All risks, benefits and alternatives were discussed and written informed consent was obtained.     Procedure: The patient was taken to the operating room where she was placed in the dorsal lithotomy position with feet in yellow fin stirrups. General endotracheal anesthesia was administered. An exam  under anesthesia was performed. The patient was then prepped and draped in the usual sterile fashion. A speculum was inserted into the vagina, a single toothed tenaculum placed on the cervix at 12 o'clock, and a uterine manipulator inserted into the cervical os. The speculum was removed.     Attention was then turned to the abdomen where 0.25% bupivicaine was used to infiltrate the inferior aspect of the umbilicus. An 11-blade scalpel was used to make a 5 mm horizontal incision inferior to the umbilicus and a Nara was used to expand the incision. A 5mm Visi-port was placed with visualization from 5mm scope. After visual entry into the abdomen, CO2 gas was attached to the port. Pneumoperitoneum was achieved with good tympany of the abdomen. The 5 mm scope was placed in the port and visualized the abdomen which was free of any injury from entry.     The scope was then used to visualize the pelvis which revealed approximately 25cc hematoperitoneum in the anterior and posterior cul-de-sac. Adhesions were visualized as noted above. The posterior uterus was unable to be visualized. Therefore, attention was turned to the LLQ of the abdomen where a site 4 cm medial to the anterior superior iliac crest was noted to be free of major blood vessels, 0.25% bupivicaine injected, and a 5 mm horizontal skin incision made. The incision was stretched with a Nara. A 5 mm port was placed under visualization within the abdomen. An atraumatic grasper was inserted through the LLQ port for further inspection of the pelvis. Above findings were noted. The suction  was then inserted and used to irrigate areas of hemoperiotoneum. Final washout revealed clear fluid. All fluid was then suctioned. Good hemostasis was noted. A final visual sweep of the pelvis showed no further pathology. The port was removed under direct visualization, and the pneumoperitoneum was expelled. The incisions were closed using an interrupted 4-0 vicryl stich  and covered with steri-strips and bandaids. The speculum was reinserted into the vagina, the uterine manipulator removed, and the tenaculum removed from the cervix. Good hemostasis of the cervix was noted.    Instrument, sponge, and needle counts were correct times 2. Dr. Brothers was present and scrubbed for the entire procedure. The patient was extubated in the operating room and transferred to the PACU in stable condition.    Thuy Ferreira MD PGY1  Obstetrics & Gynecology  08/16/19   OB/GYN Staff -- . Agree with note as above.  MD Beti

## 2019-08-16 NOTE — CONSULTS
OB/GYN Consult  Kee Phipps   1990  MRN [id number]    CC: Abdominal pain    Consultation requested by Dr. Robins     HPI: Kee Phipps is a 28 year old now  who presents for [facility] due to concern for uterine perforation during D&C. Patient was undergoing surgical  at 4w4d and there was concern for possible uterine perforation at the time of the procedure with the suction cannula. Since the procedure the patient has noticed sharp intermittent LLQ pain since the procedure, but this improved with pain medication here in the ED. She denies any nausea or vomiting, fevers, urinary sx, or other concerns. Has had moderate vaginal bleeding since the procedure, but has not had a to change a pad yet.     Gyn Hx:  Contraception: None  OB Hx:  x1 at 34 weeks after PPROM, TAB x3    Past Medical History:   Diagnosis Date     Abnormal Pap smear of cervix      Bilateral plantar fasciitis      Celiac disease 2014     Crohn's disease (H) 2012     Depressive disorder      Fibromyalgia      H/O miscarriage, currently pregnant     miscarriage .      delivery 2013    34 weeks. Early rupture of membranes        Past Surgical History:   Procedure Laterality Date     DILATION AND CURETTAGE SUCTION, TREAT MISSED , 1ST TRIMESTER      miscarriage at 8 wks gest     FASCIOTOMY LOWER EXTREMITY Left 2018    Left foot     SMALL BOWEL RESECTION       TONSILLECTOMY          No current facility-administered medications on file prior to encounter.   Current Outpatient Medications on File Prior to Encounter:  boric acid 600 mg vaginal suppository - PHARMACY TO MIX COMPOUND PLACE ONE CAPSULE IN THE VAGINA AT BEDTIME FOR 14 DAYS   Prenatal Vit-Fe Fumarate-FA (PRENATAL MULTIVITAMIN W/IRON) 27-0.8 MG tablet Take 1 tablet by mouth daily   acetaminophen (TYLENOL) 325 MG tablet Take 2 tablets (650 mg) by mouth every 4 hours as needed for mild pain   polyethylene glycol (MIRALAX/GLYCOLAX)  powder Take 17 g (1 capful) by mouth daily        Allergies   Allergen Reactions     Gluten Meal      Humira [Humira]      Caused huge lumps at injection site.      Ibuprofen      Patient has Crohns disease and is unable to take this medication.     Latex Itching     Remicade [Infliximab] Rash        Social History     Socioeconomic History     Marital status: Single     Spouse name: Not on file     Number of children: Not on file     Years of education: Not on file     Highest education level: Not on file   Occupational History     Occupation: student   Social Needs     Financial resource strain: Not on file     Food insecurity:     Worry: Not on file     Inability: Not on file     Transportation needs:     Medical: Not on file     Non-medical: Not on file   Tobacco Use     Smoking status: Current Every Day Smoker     Packs/day: 0.20     Types: Cigarettes     Smokeless tobacco: Never Used   Substance and Sexual Activity     Alcohol use: Yes     Alcohol/week: 0.0 oz     Comment: occ     Drug use: No     Sexual activity: Yes     Partners: Male   Lifestyle     Physical activity:     Days per week: Not on file     Minutes per session: Not on file     Stress: Not on file   Relationships     Social connections:     Talks on phone: Not on file     Gets together: Not on file     Attends Synagogue service: Not on file     Active member of club or organization: Not on file     Attends meetings of clubs or organizations: Not on file     Relationship status: Not on file     Intimate partner violence:     Fear of current or ex partner: Not on file     Emotionally abused: Not on file     Physically abused: Not on file     Forced sexual activity: Not on file   Other Topics Concern     Not on file   Social History Narrative     Not on file        Objective:  Vitals:    08/16/19 1434 08/16/19 1445 08/16/19 1500 08/16/19 1515   BP: 106/85 111/84 120/73 108/77   Pulse: 76      Resp: 18      Temp: 98.6  F (37  C)      TempSrc: Oral       SpO2: 99% 100% 95% 100%   Weight: 79.8 kg (176 lb)        Gen: appears well, NAD, cooperative  Heart: RRR with no murmurs noted   Lungs: CTAB, no wheezes, rubs or rhonchi noted. Good air movement throughout and no labored breathing  Abd: Soft, non-distended, moderate tenderness to palpation in RLQ. No guarding or rebound.     Labs/Imaging:  Results for orders placed or performed during the hospital encounter of 08/16/19 (from the past 24 hour(s))   CBC with platelets differential   Result Value Ref Range    WBC 7.3 4.0 - 11.0 10e9/L    RBC Count 4.17 3.8 - 5.2 10e12/L    Hemoglobin 13.0 11.7 - 15.7 g/dL    Hematocrit 37.6 35.0 - 47.0 %    MCV 90 78 - 100 fl    MCH 31.2 26.5 - 33.0 pg    MCHC 34.6 31.5 - 36.5 g/dL    RDW 12.8 10.0 - 15.0 %    Platelet Count 180 150 - 450 10e9/L    Diff Method Automated Method     % Neutrophils 57.7 %    % Lymphocytes 29.6 %    % Monocytes 11.9 %    % Eosinophils 0.5 %    % Basophils 0.0 %    % Immature Granulocytes 0.3 %    Nucleated RBCs 0 0 /100    Absolute Neutrophil 4.2 1.6 - 8.3 10e9/L    Absolute Lymphocytes 2.2 0.8 - 5.3 10e9/L    Absolute Monocytes 0.9 0.0 - 1.3 10e9/L    Absolute Eosinophils 0.0 0.0 - 0.7 10e9/L    Absolute Basophils 0.0 0.0 - 0.2 10e9/L    Abs Immature Granulocytes 0.0 0 - 0.4 10e9/L    Absolute Nucleated RBC 0.0    ABO/Rh type and screen   Result Value Ref Range    ABO PENDING     Antibody Screen PENDING     Test Valid Only At          [facility]    Specimen Expires 08/19/2019    INR   Result Value Ref Range    INR 1.07 0.86 - 1.14   Comprehensive metabolic panel   Result Value Ref Range    Sodium 138 133 - 144 mmol/L    Potassium 3.8 3.4 - 5.3 mmol/L    Chloride 107 94 - 109 mmol/L    Carbon Dioxide 25 20 - 32 mmol/L    Anion Gap 6 3 - 14 mmol/L    Glucose 85 70 - 99 mg/dL    Urea Nitrogen 12 7 - 30 mg/dL    Creatinine 0.67 0.52 - 1.04 mg/dL    GFR Estimate >90 >60 mL/min/[1.73_m2]    GFR Estimate If Black  >90 >60 mL/min/[1.73_m2]    Calcium 8.2 (L) 8.5 - 10.1 mg/dL    Bilirubin Total 0.4 0.2 - 1.3 mg/dL    Albumin 3.5 3.4 - 5.0 g/dL    Protein Total 6.9 6.8 - 8.8 g/dL    Alkaline Phosphatase 62 40 - 150 U/L    ALT 15 0 - 50 U/L    AST 8 0 - 45 U/L       Assessment/Plan:   Kee Phipps is a 28 year old now  who [facility] due to concern for uterine perforation during D&C with suction curette. Patient well appearing with stable vital signs and hemoglobin. Discussed recommendation for diagnostic laparoscopy to evaluate for uterine perforation and rule out bowel injury. Patient upset regarding today's events, but understanding regarding need for surgery. Discussed risks including bleeding, infection, damage to bowel, bladder blood vessels, and very rare risk of hysterectomy if she has life threatening bleeding that we are unable to control. Written consent signed. Will proceed with surgery when able.     Patient seen and care plan discussed under supervision of  Dr. Cole.    Arabella Chou MD  Ob/Gyn PGY-3  19 3:59 PM      I personally examined and evaluated Kee Phipps on 2019.  I discussed the patient with Dr. Chou and agree with the presentation, exam and plan of care documented in this note with edits by me.   Anni Cole MD MPH

## 2019-08-16 NOTE — ANESTHESIA CARE TRANSFER NOTE
Patient: Kee Phipps    Procedure(s):  LAPAROSCOPY, DIAGNOSTIC    Diagnosis: Abdominal Pain  Diagnosis Additional Information: No value filed.    Anesthesia Type:   General     Note:  Airway :Face Mask  Patient transferred to:PACU  Handoff Report: Identifed the Patient, Identified the Reponsible Provider, Reviewed the pertinent medical history, Discussed the surgical course, Reviewed Intra-OP anesthesia mangement and issues during anesthesia, Set expectations for post-procedure period and Allowed opportunity for questions and acknowledgement of understanding      Vitals: (Last set prior to Anesthesia Care Transfer)    CRNA VITALS  8/16/2019 1800 - 8/16/2019 1832      8/16/2019             Resp Rate (observed):  27                Electronically Signed By: PANKAJ Chao CRNA  August 16, 2019  6:32 PM

## 2019-08-17 VITALS
TEMPERATURE: 96.2 F | BODY MASS INDEX: 30.93 KG/M2 | DIASTOLIC BLOOD PRESSURE: 64 MMHG | OXYGEN SATURATION: 100 % | WEIGHT: 176 LBS | HEART RATE: 83 BPM | SYSTOLIC BLOOD PRESSURE: 110 MMHG | RESPIRATION RATE: 20 BRPM

## 2019-08-17 PROCEDURE — 25000132 ZZH RX MED GY IP 250 OP 250 PS 637: Performed by: STUDENT IN AN ORGANIZED HEALTH CARE EDUCATION/TRAINING PROGRAM

## 2019-08-17 PROCEDURE — 25000132 ZZH RX MED GY IP 250 OP 250 PS 637: Performed by: OBSTETRICS & GYNECOLOGY

## 2019-08-17 RX ORDER — AMOXICILLIN 250 MG
1-2 CAPSULE ORAL 2 TIMES DAILY PRN
Status: DISCONTINUED | OUTPATIENT
Start: 2019-08-17 | End: 2019-08-17 | Stop reason: HOSPADM

## 2019-08-17 RX ORDER — SIMETHICONE 80 MG
80 TABLET,CHEWABLE ORAL EVERY 6 HOURS PRN
Qty: 30 TABLET | Refills: 0 | Status: SHIPPED | OUTPATIENT
Start: 2019-08-17 | End: 2021-05-05

## 2019-08-17 RX ORDER — AMOXICILLIN 250 MG
1-2 CAPSULE ORAL 2 TIMES DAILY PRN
Qty: 100 TABLET | Refills: 0 | Status: SHIPPED | OUTPATIENT
Start: 2019-08-17 | End: 2021-05-05

## 2019-08-17 RX ORDER — SIMETHICONE 80 MG
80 TABLET,CHEWABLE ORAL EVERY 6 HOURS PRN
Status: DISCONTINUED | OUTPATIENT
Start: 2019-08-17 | End: 2019-08-17 | Stop reason: HOSPADM

## 2019-08-17 RX ADMIN — SIMETHICONE CHEW TAB 80 MG 80 MG: 80 TABLET ORAL at 11:24

## 2019-08-17 RX ADMIN — ACETAMINOPHEN 650 MG: 325 TABLET, FILM COATED ORAL at 03:32

## 2019-08-17 RX ADMIN — OXYCODONE HYDROCHLORIDE 5 MG: 5 TABLET ORAL at 10:57

## 2019-08-17 RX ADMIN — OXYCODONE HYDROCHLORIDE 5 MG: 5 TABLET ORAL at 05:19

## 2019-08-17 RX ADMIN — HYDROXYZINE HYDROCHLORIDE 25 MG: 25 TABLET ORAL at 00:27

## 2019-08-17 RX ADMIN — SENNOSIDES AND DOCUSATE SODIUM 2 TABLET: 8.6; 5 TABLET ORAL at 11:24

## 2019-08-17 ASSESSMENT — ACTIVITIES OF DAILY LIVING (ADL)
COGNITION: 0 - NO COGNITION ISSUES REPORTED
RETIRED_EATING: 0-->INDEPENDENT
AMBULATION: 0-->INDEPENDENT
RETIRED_COMMUNICATION: 0-->UNDERSTANDS/COMMUNICATES WITHOUT DIFFICULTY
DRESS: 0-->INDEPENDENT
SWALLOWING: 0-->SWALLOWS FOODS/LIQUIDS WITHOUT DIFFICULTY
FALL_HISTORY_WITHIN_LAST_SIX_MONTHS: NO
BATHING: 0-->INDEPENDENT
TRANSFERRING: 0-->INDEPENDENT
TOILETING: 0-->INDEPENDENT

## 2019-08-17 NOTE — PLAN OF CARE
VS:   /75   Pulse 83   Temp 97.7  F (36.5  C) (Oral)   Resp 16   Wt 79.8 kg (176 lb)   SpO2 100%   BMI 30.93 kg/m       Output:   Voiding well and independently. No BM yet.   Lungs Lung sounds clear   Activity:   Up independently   Skin: WDL ex incision   Pain:   Controled with PRN tylenol and oxy   Neuro/CMS:   CMS intact, denies N/T, A7O x4   Dressing(s):   CDI   Diet:   Regular, tolerating well   LDA:   PIV saline locked   Equipment:   Walker and call light in use   Plan:   Likely discharge tomorrow. Monitor overnight.   Additional Info:   [facility]. Was going to discontinue home but lives alone and did not want her home alone     GOALS 900  Patient able to ambulate as they were prior to admission or with assist devices provided by therapies during their stay.  - met    1100   Patient able to ambulate as they were prior to admission or with assist devices provided by therapies during their stay. - met

## 2019-08-17 NOTE — ANESTHESIA POSTPROCEDURE EVALUATION
Anesthesia POST Procedure Evaluation    Patient: Kee Phipps   MRN:     [id number] Gender:   female   Age:    28 year old :      1990        Preoperative Diagnosis: Abdominal Pain   Procedure(s):  LAPAROSCOPY, DIAGNOSTIC   Postop Comments: No value filed.       Anesthesia Type:  Not documented  General    Reportable Event: NO     PAIN: Uncomplicated   Sign Out status: Comfortable, Well controlled pain     PONV: No PONV   Sign Out status:  No Nausea or Vomiting     Neuro/Psych: Uneventful perioperative course   Sign Out Status: Preoperative baseline; Age appropriate mentation     Airway/Resp.: Uneventful perioperative course   Sign Out Status: Non labored breathing, age appropriate RR; Resp. Status within EXPECTED Parameters     CV: Uneventful perioperative course   Sign Out status: Appropriate BP and perfusion indices; Appropriate HR/Rhythm     Disposition:   Sign Out in:  PACU  Disposition:  Floor  Recovery Course: Uneventful  Follow-Up: Not required     Comments/Narrative:  HOME vs FLOOR, DEPENDING ON IF RIDE TO HOME IS AVAILABLE.           Last Anesthesia Record Vitals:  CRNA VITALS  2019 1800 - 2019 1900      2019             Resp Rate (observed):  27          Last PACU Vitals:  Vitals Value Taken Time   /47 2019  7:30 PM   Temp 36.4  C (97.5  F) 2019  7:00 PM   Pulse 87 2019  7:30 PM   Resp 17 2019  7:39 PM   SpO2 100 % 2019  7:39 PM   Temp src     NIBP     Pulse     SpO2     Resp     Temp     Ht Rate     Temp 2     Vitals shown include unvalidated device data.      Electronically Signed By: Morales Carlton DO, 2019, 7:40 PM

## 2019-08-17 NOTE — OR NURSING
Pt doing well. Pain is tolerable at this time. Awaiting admission orders to the floor due to pt not having a responsible adult to take her home. Will continue to monitor closely.

## 2019-08-17 NOTE — OR NURSING
PACU to Inpatient Nursing Handoff    Patient Kee Phipps is a 28 year old female who speaks English.   Procedure Procedure(s):  LAPAROSCOPY, DIAGNOSTIC   Surgeon(s) Primary: Anni Velasquez MD  Resident - Assisting: Arabella Chou MD; Thuy Ferreira MD     Allergies   Allergen Reactions     Gluten Meal      Humira [Humira]      Caused huge lumps at injection site.      Ibuprofen      Patient has Crohns disease and is unable to take this medication.     Latex Itching     Remicade [Infliximab] Rash       Isolation  No active isolations     Past Medical History   has a past medical history of Abnormal Pap smear of cervix, Bilateral plantar fasciitis, Celiac disease (2014), Crohn's disease (H) (2012), Depressive disorder, Fibromyalgia, H/O miscarriage, currently pregnant, and  delivery (2013). She also has no past medical history of Arthritis, Congestive heart failure, unspecified, COPD (chronic obstructive pulmonary disease) (H), Coronary artery disease, CVA (cerebral infarction), Diabetes (H), Heart disease, History of blood transfusion, Hypertension, Thyroid disease, or Uncomplicated asthma.    Anesthesia General   Dermatome Level     Preop Meds Not applicable   Nerve block Not applicable   Intraop Meds dexamethasone (Decadron)  fentanyl (Sublimaze): 100 mcg total  hydromorphone (Dilaudid): 0.5 mg total  ondansetron (Zofran): last given at    Local Meds Yes   Antibiotics Not applicable     Pain Patient Currently in Pain: denies  Comfort: comfortably manageable  Pain Control: inadequate pain control   PACU meds  fentanyl (Sublimaze): 100 mcg (total dose) last given at 1920    PCA / epidural No   Capnography     Telemetry ECG Rhythm: Sinus rhythm   Inpatient Telemetry Monitor Ordered? No        Labs Glucose Lab Results   Component Value Date    GLC 85 2019       Hgb Lab Results   Component Value Date    HGB 13.0 2019       INR Lab Results   Component Value Date     INR 1.07 08/16/2019      PACU Imaging Not applicable     Wound/Incision Incision/Surgical Site 08/16/19 Abdomen (Active)   Incision Assessment WDL 8/16/2019  7:37 PM   Closure Adhesive strip(s) 8/16/2019  7:37 PM   Incision Drainage Amount None 8/16/2019  7:37 PM   Dressing Intervention Clean, dry, intact 8/16/2019  7:37 PM   Number of days: 0      CMS        Equipment Not applicable   Other LDA       IV Access Peripheral IV 08/16/19 (Active)   Site Assessment [facility] 8/16/2019  7:00 PM   Line Status Infusing 8/16/2019  6:34 PM   Phlebitis Scale 0-->no symptoms 8/16/2019  7:00 PM   Infiltration Scale 0 8/16/2019  6:34 PM   Number of days: 0      Blood Products Not applicable EBL 25 mL   Intake/Output Date 08/16/19 0700 - 08/17/19 0659   Shift 0926-1410 9021-7902 8778-9305 24 Hour Total   INTAKE   P.O.  120  120   I.V.  600  600   Shift Total(mL/kg)  720(9.02)  720(9.02)   OUTPUT   Blood  25  25   Shift Total(mL/kg)  25(0.31)  25(0.31)   Weight (kg) 79.83 79.83 79.83 79.83      Drains / [place]     Time of void PreOp      PostOp      Diapered? No   Bladder Scan      mL (08/16/19 1947)  tolerating sips     Vitals    B/P: 115/58  T: 97.5  F (36.4  C)    Temp src: Axillary  P:  Pulse: 63 (08/16/19 1915)    Heart Rate: 81 (08/16/19 1945)     R: 18  O2:  SpO2: 97 %    O2 Device: None (Room air) (08/16/19 1915)    Oxygen Delivery: 5 LPM (08/16/19 1834)         Family/support present none   Patient belongings     Patient transported on cart   DC meds/scripts (obs/outpt) Yes, scripts   Inpatient Pain Meds Released? Yes       Special needs/considerations None   Tasks needing completion None       Belle Coleman RN  ASCOM [id number]

## 2019-08-17 NOTE — PROGRESS NOTES
Gynecology Progress Note     S: Pt feels well. Pain under okay control this morning.  No SOB, CP.  No nausea/vomiting. Voiding. Tolerating regular diet.     O:  Vitals:    08/16/19 2000 08/16/19 2015 08/16/19 2030 08/16/19 2100   BP: 108/66 110/65 121/71 119/75   Pulse: 59 61 83    Resp: 12 19 10 16   Temp:    97.7  F (36.5  C)   TempSrc:    Oral   SpO2: 99% 99% 100%    Weight:            Gen: comfortable, no acute distress  CV: regular rate  Lungs: appropriate work of breathing  Abd: soft, non tender. Lsc incisions with bandaids in place   Ext: warm and well perfused, SCDs in place    Hemoglobin   Date Value Ref Range Status   08/16/2019 13.0 11.7 - 15.7 g/dL Final   10/12/2017 12.4 11.7 - 15.7 g/dL Final       A/P:Kee Phipps is a 28 year old female POD #1 s/p D&C c/b uterine perforation at [facility] and POD#1 s/p dx laparoscopy. Stayed overnight as outpatient due to no ride.     FEN: regular diet  Pain: doing well on tylenol, ibuprofen. PRN oxycodone   : voiding   Heme: Hgb 13 > EBL 10. No recheck ordered.     Contraception: plans OCPs.     Dispo: Anticipate discharge to home today. Will call medical cab.     Sultana Mayer MD  OB/GYN, PGY3  8/17/2019    Staff MD Note    I appreciate the note by Dr. Mayer.  Any necessary changes have been made by me.  I evaluated the patient with the resident and agree with the assessment and plan.  Patient with likely gas pain, given simethicone, stool softener and will add this at discharge.  Patient desires discharge home, feels ready to go home.    Eleni Lam MD

## 2019-08-17 NOTE — PLAN OF CARE
Discharge Goals:  -Patient able to ambulate as they were prior to admission or with assist devices provided by therapies during their stay.   -Nurse to Notify Provider when Outpatient in a Bed discharge goals have been met and patient is ready for discharge.    1200-Goal met  200-no change  400-no change  600-no change    Pt up independently with no problems. Went outside to smoke. Voiding without difficulty. Per pt barely any bleeding and only seen with wiping. Lungs clear. VSS. Pt pain controlled with PRN meds. Pt stated ready to go home.

## 2019-08-17 NOTE — PROGRESS NOTES
Patient has been educated on potential risks of choosing to leave the unit and that the responsibility for patient well-being will belong to the patient. Pt has been informed that admission to hospital is due to need for medical treatment. Education given to the patient on some of the potential risks included but are not limited to:      - lack of access to nursing intervention      - possible missed appointments with MD, therapies, tests      - possible missed medications, antibiotics, management of IV's    Patient Response:verbalized understanding    Patient notified staff prior to leaving unit: yes  Coban wrap placed over IV prior to pt leaving unit: yes

## 2019-08-17 NOTE — PROGRESS NOTES
Pt. admitted from PACU at 2100 . Pt. arrived with personal belongings.Pt. is A&Ox4.  VSS. 02 sats= 98% on room air. Lung sounds= clear and equal bilaterally with both anterior and posterior. Bowel sounds are audible and active in all 4 quadrants. CMS and Neuro's are intact. Denies numbness and tingling in all extremities. Has pain in the abdomen and given tylenol and 5mg oxy, ICE applied, and is well controlled. Pt. denied nausea, CP, SOB, lightheadedness, and dizziness. Is on a Clear Liquid diet and appetite was good. laproscopic incisional dressing is C/D/I.  PIV is patent and saline locked.

## 2019-08-17 NOTE — PLAN OF CARE
VS:   /64 (BP Location: Right arm)   Pulse 83   Temp 96.2  F (35.7  C) (Oral)   Resp 20   Wt 79.8 kg (176 lb)   SpO2 100%   BMI 30.93 kg/m     Stable on RA    Output:   Spontaneously voiding. Last BM 8/17/19   Lungs LS clear equal bilaterally   Activity:   Independent    Skin: Intact ex: lap incision below bellybutton    Pain:   Abdominal pain managed with oral Oxycodone 5mg   Tylenol available    Neuro/CMS:   A&Ox4  Intact   Dressing(s):   CDI    Diet:   Regular, adequate appetite   LDA:   PIV SL    Equipment:   none   Plan:   Discharge home today    Additional Info:   Pt. Able to make needs known.    Discharge Goals:  -Patient able to ambulate as they were prior to admission or with assist devices provided by therapies during their stay.   -Nurse to Notify Provider when Outpatient in a Bed discharge goals have been met and patient is ready for discharge.     0800-Goal met  1000-no change, MD stopped by ok'd for discharge, waiting for discharge medications and ride arrival for pick-up.   1200-no change  1400-no change. Medical ride coming soon        Patient has been educated on potential risks of choosing to leave the unit and that the responsibility for patient well-being will belong to the patient. Pt has been informed that admission to hospital is due to need for medical treatment. Education given to the patient on some of the potential risks included but are not limited to:   -lack of access to nursing intervention   -possible missed appointments with MD, therapies, tests   -possible missed medications, antibiotics, management of IV's  Patient Response: verbalized understanding  Patient notified staff prior to leaving unit: yes  Coban wrap placed over IV prior to pt leaving unit yes

## 2019-09-26 ENCOUNTER — DOCUMENTATION ONLY (OUTPATIENT)
Dept: PSYCHOLOGY | Facility: CLINIC | Age: 29
End: 2019-09-26

## 2019-09-26 ENCOUNTER — OFFICE VISIT (OUTPATIENT)
Dept: FAMILY MEDICINE | Facility: CLINIC | Age: 29
End: 2019-09-26
Payer: COMMERCIAL

## 2019-09-26 VITALS
WEIGHT: 175.8 LBS | DIASTOLIC BLOOD PRESSURE: 89 MMHG | RESPIRATION RATE: 12 BRPM | SYSTOLIC BLOOD PRESSURE: 126 MMHG | BODY MASS INDEX: 31.15 KG/M2 | OXYGEN SATURATION: 96 % | HEART RATE: 79 BPM | TEMPERATURE: 98.2 F | HEIGHT: 63 IN

## 2019-09-26 DIAGNOSIS — G43.109 MIGRAINE WITH AURA AND WITHOUT STATUS MIGRAINOSUS, NOT INTRACTABLE: Primary | ICD-10-CM

## 2019-09-26 DIAGNOSIS — F41.9 ANXIETY: ICD-10-CM

## 2019-09-26 DIAGNOSIS — G44.209 MUSCLE CONTRACTION HEADACHE: ICD-10-CM

## 2019-09-26 DIAGNOSIS — K50.819 CROHN'S DISEASE OF SMALL AND LARGE INTESTINES WITH COMPLICATION (H): ICD-10-CM

## 2019-09-26 DIAGNOSIS — F33.1 MODERATE EPISODE OF RECURRENT MAJOR DEPRESSIVE DISORDER (H): ICD-10-CM

## 2019-09-26 RX ORDER — PAROXETINE 10 MG/1
10 TABLET, FILM COATED ORAL DAILY
Qty: 30 TABLET | Refills: 3 | Status: SHIPPED | OUTPATIENT
Start: 2019-09-26 | End: 2021-05-05

## 2019-09-26 RX ORDER — BACLOFEN 10 MG/1
10 TABLET ORAL 2 TIMES DAILY PRN
Qty: 40 TABLET | Refills: 3 | Status: SHIPPED | OUTPATIENT
Start: 2019-09-26 | End: 2020-04-13

## 2019-09-26 RX ORDER — ZOLMITRIPTAN 5 MG/1
5 TABLET, ORALLY DISINTEGRATING ORAL
Qty: 16 TABLET | Refills: 3 | Status: SHIPPED | OUTPATIENT
Start: 2019-09-26 | End: 2019-10-10

## 2019-09-26 ASSESSMENT — PATIENT HEALTH QUESTIONNAIRE - PHQ9
SUM OF ALL RESPONSES TO PHQ QUESTIONS 1-9: 20
5. POOR APPETITE OR OVEREATING: MORE THAN HALF THE DAYS

## 2019-09-26 ASSESSMENT — ANXIETY QUESTIONNAIRES
5. BEING SO RESTLESS THAT IT IS HARD TO SIT STILL: NEARLY EVERY DAY
2. NOT BEING ABLE TO STOP OR CONTROL WORRYING: NEARLY EVERY DAY
7. FEELING AFRAID AS IF SOMETHING AWFUL MIGHT HAPPEN: NEARLY EVERY DAY
3. WORRYING TOO MUCH ABOUT DIFFERENT THINGS: NEARLY EVERY DAY
IF YOU CHECKED OFF ANY PROBLEMS ON THIS QUESTIONNAIRE, HOW DIFFICULT HAVE THESE PROBLEMS MADE IT FOR YOU TO DO YOUR WORK, TAKE CARE OF THINGS AT HOME, OR GET ALONG WITH OTHER PEOPLE: EXTREMELY DIFFICULT
1. FEELING NERVOUS, ANXIOUS, OR ON EDGE: NEARLY EVERY DAY

## 2019-09-26 ASSESSMENT — MIFFLIN-ST. JEOR: SCORE: 1501.55

## 2019-09-26 NOTE — PROGRESS NOTES
Behavioral Health Team,    Patient is being referred for mental health services by their provider, Dr. Boo.  Please advise if we are able to see patient for in house treatment or if a community option would be best.    Thank you,    Akilah Solis  9/26/2019

## 2019-09-26 NOTE — PROGRESS NOTES
"       SUBJECTIVE       Kee Phipps is a 29 year old  female with a PMH significant for:     Patient Active Problem List   Diagnosis     Health Care Home     Crohns disease     Need for prophylactic vaccination and inoculation against varicella     Abnormal Pap smear of cervix     Chronic pain     Adjustment disorder with depressed mood     Celiac disease     Chronic pain syndrome     Immunosuppressed status (H)     Multiple joint pain     Back pain     Overweight (BMI 25.0-29.9)     Fibromyalgia     H/O  delivery, currently pregnant     Need for varicella vaccine     Exposure to gonorrhea     Trichomonas vaginalis (TV) infection     S/P laparoscopy       She presents with headaches and mood concerns.    1. Headaches  Kee has a history of migraines, preceded by a visual aura. She usually has migraines once per month, but over the past month she has had migraines daily. The headaches occur on the right side of her head, and lately she has also felt tightness across her forehead. She also has light sensitivity, sound sensitivity, and nausea. She has not noticed a pattern to her headaches and is unsure whether they are worsened by her menses. Sumatriptan and tylenol have not helped; she cannot take NSAIDs due to her Crohn's disease.    Kee also feels like the right side of her face is numb when she wakes up in the AM. Her mother says that the side of her face looks puffy, but her smile is symmetric. She also feels like her hands twitch.    2. Mood concerns  Kee has also been having \"panic attacks.\" She was trying to conceive, and became pregnant in April and in August. Both times when she became pregnant, she had to stop taking her fibromyalgia medication and the pain became unbearable, so she had to end both pregnancies with abortions. She started taking an oral contraceptive after the second pregnancy, which contained estrogen. She started having \"panic attacks\" where she felt like her heart was " racing, she felt short of breath, and she felt like something bad was going to happen. She stopped taking the OCP, and she also stopped drinking coffee. The panic attacks stopped.     Kee has been struggling with her mood lately. She feels very anxious and sad all the time, which she attributes to her recent health concerns (including the pregnancies) and moving. She has a history of depression and saw a counselor through [facility] in the past, which she found helpful. She also took duloxetine in the past, but it made her feel funny/more anxious so she stopped. She has good family support, although she says usually her family members come to her for help- she feels bad asking them for help instead. She has a 6 year old boy, so she says she would never harm herself. She would go straight to an ED if she developed thoughts of suicidal or homicidal ideation.     3. Contraception  Kee is currently using condoms as contraception since she had panic attacks while taking the combined OCP (it is unclear whether her panic attacks were related, as she was also drinking coffee and feeling more anxious overall). She is interested in another form of birth control. She would prefer to take a pill, and would be able to take a pill at the same time every day. She had a depot shot in the past, but she gained weight. She does not want an IUD. She is currently sexually active with one male partner.     4. Smoking  Kee is currently smoking four cigarettes per day. She has significantly decreased her smoking., and she is trying to quit smoking completely. She does not have cravings when she's busy; she smokes when she is bored.     PMH, Medications and Allergies were reviewed and updated as needed.        REVIEW OF SYSTEMS     EYES: POSITIIVE for vision changes (blurry vision with headaches)  CV: NEGATIVE for chest pain, palpitations or peripheral edema  GI: POSITIVE for nausea (with headaches), NEGATIVE for abdominal  "pain  MUSCULOSKELETAL: POSITIVE for significant arthralgias or myalgia  NEURO:  POSITIVE for paresthesias, NEGATIVE for weakness  PSYCHIATRIC: POSITIVE for changes in mood or affect        OBJECTIVE     Vitals:    09/26/19 0844   BP: 126/89   BP Location: Left arm   Patient Position: Sitting   Cuff Size: Adult Regular   Pulse: 79   Resp: 12   Temp: 98.2  F (36.8  C)   TempSrc: Oral   SpO2: 96%   Weight: 79.7 kg (175 lb 12.8 oz)   Height: 1.6 m (5' 3\")     Body mass index is 31.14 kg/m .    Constitutional: Awake, alert, cooperative, and appears stated age.  Eyes: Lids and lashes normal, pupils equal, round and reactive to light, extra ocular muscles intact, sclera clear  ENT: Normocephalic, without obvious abnormality, atramatic, oral pharynx with moist mucus membranes, tonsils without erythema or exudates, gums normal and good dentition.  Neck: Supple, symmetrical, trachea midline  Lungs: No increased work of breathing, good air exchange, clear to auscultation bilaterally, no crackles or wheezing.  Cardiovascular: Regular rate and rhythm, normal S1 and S2, no S3 or S4, and no murmur noted.  Musculoskeletal:  Full range of motion noted.   Neurologic: Awake, alert. Cranial nerves II-XII are grossly intact.  Motor strength is 5 out of 5 bilaterally.  Cerebellar finger to nose intact.  Sensory is intact.   Neuropsychiatric: Mildly anxious.     NIKKI-7: 17, extremely difficult  PHQ-9: 20, extremely difficult    ASSESSMENT AND PLAN   Kee Phipps is a 28 yo female with a past medical history of adjustment disorder with depressed mood, chronic pain syndrome, and fibromyalgia, presenting to discuss headaches and mood concerns.     1. Migraine with aura and without status migrainosus, not intractable:  Chronic, but more frequent recently. Characteristic pain on right half of head, with preceding visual auras, light/sound sensitivity, and nausea does sound like MIGRAINE. Sumatriptan and tylenol have not helped. Neuro exam " revealed no concerns. Will try a different triptan medication.  Sounds like stress is the biggest trigger - does not notice worsening with periods, coffee or alcohol.  - ZOLMitriptan (ZOMIG-ZMT) 5 MG ODT; Take 1 tablet (5 mg) by mouth at onset of headache for migraine  Dispense: 16 tablet; Refill: 3    2. Muscle contraction headache: She also describes a band-like pain around her head that occurs with stress. This sounds   more like muscle contraction headache.  I discussed that not all her headaches are migraines which may explain why she thinks sumatriptan is not working.   Encouraged to try and distinguish between different headaches - given patient information.   - baclofen (LIORESAL) 10 MG tablet; Take 1 tablet (10 mg) by mouth 2 times daily as needed for muscle spasms (muscle contraction headaches)  Dispense: 40 tablet; Refill: 3    3. Numbness of face and tingling in hands: Kee says she feels numb on the right side of her face and has tingling in her hands when she wakes up in the AM. Possibly due to stress or headaches. No deficits on neuro exam.  -continue to monitor     4. Moderate episode of recurrent major depressive disorder (H): High scores on PHQ-9. Kee has a history of depression, likely currently exacerbated by recent health concerns and the stress of moving. Denies suicidal or homicidal ideation. Has good family support. Counseling helped in the past. Duloxetine made her feel too anxious.  - PARoxetine (PAXIL) 10 MG tablet; Take 1 tablet (10 mg) by mouth daily  Dispense: 30 tablet; Refill: 3  - MENTAL HEALTH REFERRAL  -    5. Anxiety: High scores on NIKKI-7. Likely due to recent health concerns and the stress of moving. Denies suicidal or homicidal ideation. Has good family support. Counseling helped in the past.  May have been having panic attacks / hyperventilation.    - MENTAL HEALTH REFERRAL    6. Contraception: Currently using condoms and not desiring to become pregnant. Sexually active  with 1 male partner. Combined OCPs with estrogen are contraindicated for Kee, given her migraines with aura. Depot shots made her gain weight, and she is not interested in an IUD.  Has had 2 abortions in less than 6 months - good birth control is a priority. Discussed options, including progestin-only OCPs. Kee would like to think about the options  -discuss at next visit    7. Smoking cessation: Kee decreased her smoking to 4 cigarettes daily. She is working on quitting completely. She declines patches, lozenges, or gum at this time.  -Encouraged to try distracting herself with a lollipop or gum when she wants to smoke while bored  -continue to monitor    8. Patient believes she has rheumatoid arthritis but not documented in chart - question this diagnosis - if necessary, re-refer to rheumatology for re-evaluation.    9. She requests re-referral to GI to restart meds for Chrohn's disease.    RTC in 4 weeks for follow up of headaches and mood, and to discuss contraception; or sooner if develops new or worsening symptoms.    Written By:  Olivia Sawyer  Medical Student, Year 4    Preceptor Attestation:  I was present with the medical student who participated in the service and in the documentation of this note. I have verified the history and personally performed the physical exam and medical decision making. I have verified the content of the note, which accurately reflects my assessment of the patient and the plan of care.   Supervising Physician:  Nahum Boo MD.    Total visit time was 25 mins, all of which was face to face MD time, and over 50% of this time was spent in counseling and coordination of care.    Addendum - formulary switch to [employer]

## 2019-09-26 NOTE — PATIENT INSTRUCTIONS
Patient Education     What Are Migraine and Tension Headaches?    Although there are several types of headaches, migraine and tension headaches affect the most people. When you have a headache, it isn't your brain that's hurting. Your head aches because nerves in the bones, blood vessels, meninges, and muscles of your head are irritated. These irritated nerves send pain signals to the brain, which identifies where you hurt and how bad the pain is.  Talk with your healthcare provider about a treatment plan that may help relieve pain and prevent future headaches.   What causes your headache?  The actual headache process is not yet understood. Only rarely are headaches a sign of a serious medical problem such as a tumor. Headache pain may be caused by abnormal interaction between the brain and the nerves and blood vessels in the head. A previous head injury or concussion, neck pain, environmental stresses, muscle tension, anxiety, depression, fatigue, skipping meals, or certain foods and drinks may trigger headache pain.  Brain scans are rarely needed and only for certain danger sign symptoms. CT scans are associated with potential radiation effects and potential inaccurate false findings.  What is referred pain?  Headache pain can be referred pain, which is pain that has its source in one place but is felt in another. For example, pain behind the eyes may actually be caused by tense muscles in the neck and shoulders. This means that the place that hurts may not be the part of the body that needs treatment.  Is it a migraine?  Migraine is a vascular headache that causes throbbing pain felt on one (most common) or both sides (less common) of the head. You may feel nauseated or vomit. This headache may also be preceded or associated with changes in sight (like seeing spots or flashes of light), ability to speak, or sensation (aura). There are a wide variety of environmental and food-related triggers for migraines. The  "pain may last for 4 to 72 hours. Afterward, you may feel shaky for a day or so. If this is the first time you experience these symptoms, you should immediately seek medical attention because you could be having a stroke.  Is it a tension headache?  This type of headache is usually a dull ache or a sensation of pressure on both sides of the head. It may be associated with pain or tension in the neck and shoulders. Depression, anxiety, and stress can cause a tension headache. The pain may not have a definite beginning or end. It may come and go, or seem never to go away.  When to call the healthcare provider  Call your healthcare provider for headaches that happen along with any of these symptoms:    Sudden, severe headache that is different from your usual headache pain    Headache associated with fever    Sudden headache associated with stiff neck    Slurred speech    Recurring headache in children     Ongoing numbness or muscle weakness    Loss of vision    Pain following a head injury    Convulsions, or a change in mental awareness    A headache you would call \"the worst headache you've ever had\"    New headaches in a pregnant woman   Date Last Reviewed: 1/1/2018 2000-2018 [employer]. [address] [place] [address]. All rights reserved. This information is not intended as a substitute for professional medical care. Always follow your healthcare professional's instructions.           MENTAL HEALTH REFERRAL    September 26, 2019  Mental Health referral routed to behavioral health team for recommendations. See Documentation Only encounter for more information.    Akilah Solis            GASTROENTEROLOGY ADULT REF PROCEDURE ONLY  September 27, 2019 Online referral placed with [facility] who will contact patient to schedule.     [facility]  Phone [phone]  Fax: [phone]      Faxed referral and demographic to [phone].    Mary Estrada    "

## 2019-09-29 NOTE — PROGRESS NOTES
Review of Dr. Boo's order and note indicates that this is a referral for psychotherapy to address depressed mood and anxiety.  Dr. Liu still appears to have openings for new therapy patients.  Will ask the  to reach out to patient to offer visit with Dr. Liu.  This should be a 60 minute visit in her mental health clinic (Monday afternoon, Tuesday morning or Thursday morning).  She has openings as early as October 7.     - please addend this encounter with your outreach efforts so we can keep track of this.  Thanks!    Let me know if you have questions or would like additional follow up from me.  Thanks!      Faith Barreto, Ph.D.,     Disclaimer  The above treatment recommendations are based on consultation with the patient's primary care provider and a review of relevant information in EPIC.? I have not personally examined the patient.? All recommendations should be implemented with considerations of the patient's relevant prior history and current clinical status.  Please contact me with any questions about the care of this patient.

## 2019-10-07 ENCOUNTER — TELEPHONE (OUTPATIENT)
Dept: PSYCHOLOGY | Facility: CLINIC | Age: 29
End: 2019-10-07

## 2019-10-07 NOTE — TELEPHONE ENCOUNTER
This provider placed an outreach call to the patient as she did not show for her scheduled appointment today 10/7/2019. Left a message requesting the patient call the clinic and re-schedule if she is interested. One additional outreach call will be placed by this provider. If that attempt is unsuccessful, a letter will be sent. Following that letter, no additional outreach attempts will be made unless contacted by another referring provider.     Maria C Liu, PhD

## 2019-10-10 RX ORDER — RIZATRIPTAN BENZOATE 5 MG/1
5 TABLET, ORALLY DISINTEGRATING ORAL
Qty: 12 TABLET | Refills: 3 | Status: SHIPPED | OUTPATIENT
Start: 2019-10-10 | End: 2021-05-05

## 2019-10-31 ENCOUNTER — TELEPHONE (OUTPATIENT)
Dept: PSYCHOLOGY | Facility: CLINIC | Age: 29
End: 2019-10-31

## 2019-10-31 NOTE — TELEPHONE ENCOUNTER
This provider placed a second outreach call to the patient after a no show at the beginning of the month of October and no response to previous voicemail. Left a message requesting the patient call the clinic and re-schedule if she is interested. Since this attempt has been unsuccessful, a letter will be sent with community mental health resources. Following that letter, no additional outreach attempts will be made unless contacted by another referring provider.     Maria C Liu, PhD

## 2019-11-04 ENCOUNTER — HEALTH MAINTENANCE LETTER (OUTPATIENT)
Age: 29
End: 2019-11-04

## 2020-04-02 ENCOUNTER — TELEPHONE (OUTPATIENT)
Dept: FAMILY MEDICINE | Facility: CLINIC | Age: 30
End: 2020-04-02

## 2020-04-02 NOTE — TELEPHONE ENCOUNTER
Reached out to patient during COVID19 Clinic outreach. Reassured patient that [facility] is still open and has started implementing phone and video appointments to help patient remain safe at home.     Patient reports the following concerns: None. Declined follow up visit from ED.     Per patient request, patient is scheduled for a visit to address their concerns on the following date: None    Offered MyChart. Patient already has MyChart.      Lara Mendoza MD, PGY2

## 2020-04-13 ENCOUNTER — VIRTUAL VISIT (OUTPATIENT)
Dept: FAMILY MEDICINE | Facility: CLINIC | Age: 30
End: 2020-04-13
Payer: COMMERCIAL

## 2020-04-13 ENCOUNTER — TELEPHONE (OUTPATIENT)
Dept: FAMILY MEDICINE | Facility: CLINIC | Age: 30
End: 2020-04-13

## 2020-04-13 VITALS — WEIGHT: 176 LBS | HEIGHT: 63 IN | BODY MASS INDEX: 31.18 KG/M2

## 2020-04-13 DIAGNOSIS — M79.7 FIBROMYALGIA: ICD-10-CM

## 2020-04-13 RX ORDER — METHOCARBAMOL 750 MG/1
TABLET, FILM COATED ORAL
Qty: 90 TABLET | Refills: 0 | Status: SHIPPED | OUTPATIENT
Start: 2020-04-13 | End: 2021-05-05

## 2020-04-13 ASSESSMENT — MIFFLIN-ST. JEOR: SCORE: 1502.46

## 2020-04-13 NOTE — PROGRESS NOTES
"Family Medicine Telephone Visit Note         Telephone Visit Consent   Patient was verbally read the following and verbal consent was obtained.    \"This telephone visit will be conducted via a call between you and your physician/provider. We have found that certain health care needs can be provided without the need for a physical exam.  This service lets us provide the care you need with a short phone conversation.  If a prescription is necessary we can send it directly to your pharmacy.  If lab work is needed we can place an order for that and you can then stop by our lab to have the test done at a later time.    Telephone visits are billed at different rates depending on your insurance coverage. During this emergency period, for some insurers they may be billed the same as an in-person visit.  Please reach out to your insurance provider with any questions.    If during the course of the call the physician/provider feels a telephone visit is not appropriate, you will not be charged for this service.\"    Name person giving consent:  Patient   Date verbal consent given:  2020  Time verbal consent given:  2:48 PM    Chief Complaint   Patient presents with     Pain     Upper back, chest, shoulder and neck hurting with movement and reaching              HPI   Patients name: [facility]  Appointment start time:  3:17 PM   has known FM.  Not moving enough and muscles feel tense.  'never been this bad before'  Trying heat.    Methocarbamol  Sleep is terrible.  Wakes up 3 times per night.    Feels stressed.  Feels safe.  Mattress is old, which doesn't help.  Taking tylenol now--doesn't help.  Can't remember if had side effects of med.  Has crohns and cant take nsaids.  Is able to take walks.  Sleeps on side  No new injury.  Really just wants to try refills of methocarbamol, which helped in past.  Current rx has       Current Outpatient Medications   Medication Sig Dispense Refill     acetaminophen (TYLENOL) 325 MG " "tablet Take 2 tablets (650 mg) by mouth every 4 hours as needed for mild pain 100 tablet 0     baclofen (LIORESAL) 10 MG tablet Take 1 tablet (10 mg) by mouth 2 times daily as needed for muscle spasms (muscle contraction headaches) 40 tablet 3     norgestimate-ethinyl estradiol (ORTHO-CYCLEN/SPRINTEC) 0.25-35 MG-MCG tablet Take 1 tablet by mouth daily 90 tablet 3     oxyCODONE (ROXICODONE) 5 MG tablet Take 1-2 tablets (5-10 mg) by mouth every 4 hours as needed for moderate to severe pain 5 tablet 0     PARoxetine (PAXIL) 10 MG tablet Take 1 tablet (10 mg) by mouth daily 30 tablet 3     polyethylene glycol (MIRALAX/GLYCOLAX) powder Take 17 g (1 capful) by mouth daily 1 Bottle 11     rizatriptan (MAXALT-MLT) 5 MG ODT Take 1 tablet (5 mg) by mouth at onset of headache for migraine May repeat in 2 hours. Max 6 tablets/24 hours. 12 tablet 3     senna-docusate (SENOKOT-S/PERICOLACE) 8.6-50 MG tablet Take 1-2 tablets by mouth 2 times daily as needed for constipation 100 tablet 0     simethicone (MYLICON) 80 MG chewable tablet Take 1 tablet (80 mg) by mouth every 6 hours as needed for flatulence or cramping 30 tablet 0     Allergies   Allergen Reactions     Gluten Meal      Humira [Humira]      Caused huge lumps at injection site.      Ibuprofen      Patient has Crohns disease and is unable to take this medication.     Latex Itching     Remicade [Infliximab] Rash              Review of Systems:     ROS COMP: + poor sleep.  No neuro sx/weakness/swelling.  Pain identical to prior FM pain         Physical Exam:     There were no vitals taken for this visit.  Estimated body mass index is 31.14 kg/m  as calculated from the following:    Height as of 9/26/19: 1.6 m (5' 3\").    Weight as of 9/26/19: 79.7 kg (175 lb 12.8 oz).    Exam:  Constitutional: alert  Psychiatric: mentation appears normal    none         Assessment and Plan   1. Fibromyalgia  Flare likely from situational stressors (covid)  - methocarbamol (ROBAXIN) 750 MG " tablet; 1 tab po at bedtime,  Then 1/2 to 1 tab q 8 hours during the day.  Dispense: 90 tablet; Refill: 0    Refilled medications that would be required in the next 3 months.     After Visit Information:  Patient declined AVS     No follow-ups on file.    Appointment end time: 3:29 PM  This is a telephone visit that took 12 minutes.      Clinician location:      Jorge Luis Ch MD

## 2020-04-24 ENCOUNTER — TRANSFERRED RECORDS (OUTPATIENT)
Dept: HEALTH INFORMATION MANAGEMENT | Facility: CLINIC | Age: 30
End: 2020-04-24

## 2020-05-01 ENCOUNTER — TRANSFERRED RECORDS (OUTPATIENT)
Dept: HEALTH INFORMATION MANAGEMENT | Facility: CLINIC | Age: 30
End: 2020-05-01

## 2020-05-04 ENCOUNTER — TELEPHONE (OUTPATIENT)
Dept: FAMILY MEDICINE | Facility: CLINIC | Age: 30
End: 2020-05-04

## 2020-05-04 NOTE — TELEPHONE ENCOUNTER
[facility] phone call message- patient requesting a refill:    Full Medication Name: ANTIBIOTIC (for yeast)    Dose: ?    Pharmacy confirmed as   [id number] - [place], [place] - [address] [place] [address]  Phone: [phone] Fax: [phone]  : Yes    Additional Comments: she needs a antibiotic for a poss yeast infection.     OK to leave a message on voice mail? Yes    Primary language: English      needed? No    Call taken on May 4, 2020 at 9:17 AM by Kenzie Adhikari

## 2020-05-05 NOTE — TELEPHONE ENCOUNTER
Patient needs a phone visit to discuss further.    Routed to [facility] to assist with scheduling.      Lara Mendoza MD, PGY2

## 2020-05-07 ENCOUNTER — TELEPHONE (OUTPATIENT)
Dept: FAMILY MEDICINE | Facility: CLINIC | Age: 30
End: 2020-05-07

## 2020-11-22 ENCOUNTER — HEALTH MAINTENANCE LETTER (OUTPATIENT)
Age: 30
End: 2020-11-22

## 2021-05-05 ENCOUNTER — OFFICE VISIT (OUTPATIENT)
Dept: FAMILY MEDICINE | Facility: CLINIC | Age: 31
End: 2021-05-05
Payer: COMMERCIAL

## 2021-05-05 VITALS
OXYGEN SATURATION: 97 % | SYSTOLIC BLOOD PRESSURE: 117 MMHG | HEART RATE: 93 BPM | DIASTOLIC BLOOD PRESSURE: 81 MMHG | RESPIRATION RATE: 16 BRPM | TEMPERATURE: 98.4 F

## 2021-05-05 DIAGNOSIS — N92.6 MISSED MENSES: Primary | ICD-10-CM

## 2021-05-05 LAB — HCG UR QL: NEGATIVE

## 2021-05-05 PROCEDURE — 99213 OFFICE O/P EST LOW 20 MIN: CPT | Mod: GC | Performed by: STUDENT IN AN ORGANIZED HEALTH CARE EDUCATION/TRAINING PROGRAM

## 2021-05-05 PROCEDURE — 81025 URINE PREGNANCY TEST: CPT | Performed by: STUDENT IN AN ORGANIZED HEALTH CARE EDUCATION/TRAINING PROGRAM

## 2021-05-05 RX ORDER — PRENATAL VIT/IRON FUM/FOLIC AC 27MG-0.8MG
1 TABLET ORAL DAILY
Qty: 90 TABLET | Refills: 3 | Status: SHIPPED | OUTPATIENT
Start: 2021-05-05 | End: 2021-08-24

## 2021-05-05 NOTE — PROGRESS NOTES
Assessment & Plan     Kee was seen today for pregnancy test and medication reconciliation.    Diagnoses and all orders for this visit:    Missed menses  -     HCG Qualitative Urine (UPT)  ([facility])  -     Prenatal Vit-Fe Fumarate-FA (PRENATAL MULTIVITAMIN W/IRON) 27-0.8 MG tablet; Take 1 tablet by mouth daily  -     HCG Qualitative Urine (UPT)  ([facility]); Future    Discussed negative pregnancy test with patient.  It is possible that it is negative due to being early in pregnancy.  Recommended repeating the test in approximately 1 to 2 weeks.  Did prescribe her prenatal vitamins as she is trying to conceive.  Of note, she is Rh+.    Review of the result(s) of each unique test - UPT, blood type    Return in about 1 week (around 5/12/2021) for repeat UPT.    Kaleigh Montano MD PGY3  [facility]    Bismark Sweet is a 30 year old who presents for the following health issues:    HPI   She reports having 2 positive urine pregnancy test today at home.  Her LMP was 4/6/2021.  2 days ago, she did note some mild pink on the toilet paper when she wiped.  However, she has not gotten her menses.  She does endorse some breast tenderness, nausea, and fatigue.  She also notes some menstrual type cramping but no significant pain.  She does note that her periods have been irregular since getting her Nexplanon removed in November 2020.  She states that she has been trying to conceive.  She was seen by her GI doctor yesterday who recommended waiting 6 months before conceiving to allow establishment of a good regimen to prevent flares of her Crohn's disease.  She has not had a Crohn's flare for a couple of years.    Review of Systems   Constitutional, HEENT, cardiovascular, pulmonary, gi and gu systems are negative, except as otherwise noted.      Objective    /81 (BP Location: Left arm, Patient Position: Sitting, Cuff Size: Adult Large)   Pulse 93   Temp 98.4  F (36.9  C) (Oral)   Resp 16   LMP  04/06/2021 (Exact Date)   SpO2 97%   Breastfeeding No   There is no height or weight on file to calculate BMI.  Physical Exam   GENERAL: healthy, alert and no distress  RESP: lungs clear to auscultation - no rales, rhonchi or wheezes  CV: regular rate and rhythm, normal S1 S2, no S3 or S4, no murmur, click or rub, no peripheral edema and peripheral pulses strong  ABDOMEN: soft, nontender, no hepatosplenomegaly, no masses and bowel sounds normal  MS: no gross musculoskeletal defects noted, no edema    Results for orders placed or performed in visit on 05/05/21 (from the past 24 hour(s))   HCG Qualitative Urine (UPT)  ([facility])   Result Value Ref Range    HCG Qual Urine NEGATIVE Negative

## 2021-05-10 DIAGNOSIS — N92.6 MISSED MENSES: ICD-10-CM

## 2021-05-10 LAB — HCG UR QL: POSITIVE

## 2021-05-10 PROCEDURE — 81025 URINE PREGNANCY TEST: CPT

## 2021-05-11 ENCOUNTER — TELEPHONE (OUTPATIENT)
Dept: FAMILY MEDICINE | Facility: CLINIC | Age: 31
End: 2021-05-11

## 2021-05-11 NOTE — TELEPHONE ENCOUNTER
Pt is wondering if she can use Boric acid vaginal suppositories to prevent BV?  She states that she has been using them for years and it seems to be the only thing that works.  She states that she cannot take Metronidazole as it messes with her stomach due to having Crohn's.  She has used Metro gel in the past but it does not seem to help.  She is wondering if there is another option if Boric Acid vaginal suppositories can't be used to prevent BV?  She is about 5 wks pregnant.  Told her that she should NOT use Boric Acid for now until she hears back on whether it is safe to use.  Routed note to Pharm D pool./YOLI

## 2021-05-12 NOTE — TELEPHONE ENCOUNTER
Message  Received: Today  Message Contents   Bonilla English, [facility]  Pretty Rios RN; Lara Mendoza MD   Caller: Unspecified (Yesterday, 12:25 PM)             Hi Pretty,     She should definitely NOT use boric acid during pregnancy. There are other agents that can be used if she doesn't tolerate metronidazole orally, such as topical creams (metronidazole and clindamycin are two) that are used locally. I am not sure of her history, so her PCP would have to make this decision with her.     One potential option I am including for consideration is the metronidazole 0.75% gel for 7 to 10 days, followed by twice weekly dosing of gel for four to six months.     Hope that helps! Let me know if I need to discuss anything with the patient. I included Dr. Mendoza on this reply so that she is aware.     Thanks!     Bonilla

## 2021-05-13 NOTE — TELEPHONE ENCOUNTER
Pt returned call and info given. She states that for the last week she has had BV symptoms: vaginal discharge, odor, and pelvic cramping.  She denies vaginal bleeding.  Scheduled pt to see Dr Townsend this morning at 10:20 AM./YOLI    Gave info to Dr Townsend in clinic.  Routed note to Dr Townsend./YOLI

## 2021-05-13 NOTE — TELEPHONE ENCOUNTER
I have verified the content of the note, which accurately reflects my assessment of the patient and the plan of care.   Luna Pompa, [facility], PharmD

## 2021-05-14 ENCOUNTER — OFFICE VISIT (OUTPATIENT)
Dept: FAMILY MEDICINE | Facility: CLINIC | Age: 31
End: 2021-05-14
Payer: COMMERCIAL

## 2021-05-14 VITALS
TEMPERATURE: 97.6 F | OXYGEN SATURATION: 98 % | DIASTOLIC BLOOD PRESSURE: 80 MMHG | BODY MASS INDEX: 36.85 KG/M2 | HEART RATE: 88 BPM | WEIGHT: 208 LBS | SYSTOLIC BLOOD PRESSURE: 120 MMHG | RESPIRATION RATE: 18 BRPM

## 2021-05-14 DIAGNOSIS — K59.09 OTHER CONSTIPATION: ICD-10-CM

## 2021-05-14 DIAGNOSIS — O09.899 H/O PRETERM DELIVERY, CURRENTLY PREGNANT: ICD-10-CM

## 2021-05-14 DIAGNOSIS — N89.8 VAGINAL DISCHARGE: Primary | ICD-10-CM

## 2021-05-14 LAB
BACTERIA: NORMAL
CLUE CELLS: NORMAL
MOTILE TRICHOMONAS: NEGATIVE
ODOR: NORMAL
PH WET PREP: NORMAL (ref 3.8–4.5)
WBC WET PREP: <2 (ref 2–5)
YEAST: NORMAL

## 2021-05-14 PROCEDURE — 99213 OFFICE O/P EST LOW 20 MIN: CPT | Mod: GC | Performed by: STUDENT IN AN ORGANIZED HEALTH CARE EDUCATION/TRAINING PROGRAM

## 2021-05-14 PROCEDURE — 87210 SMEAR WET MOUNT SALINE/INK: CPT | Performed by: STUDENT IN AN ORGANIZED HEALTH CARE EDUCATION/TRAINING PROGRAM

## 2021-05-14 RX ORDER — CLINDAMYCIN PHOSPHATE 20 MG/G
1 CREAM VAGINAL AT BEDTIME
Status: CANCELLED | OUTPATIENT
Start: 2021-05-14

## 2021-05-14 NOTE — PROGRESS NOTES
Preceptor Attestation:   Patient seen, evaluated and discussed with the resident. I have verified the content of the note, which accurately reflects my assessment of the patient and the plan of care.   Supervising Physician:  Billie Gibbons MD

## 2021-05-14 NOTE — PROGRESS NOTES
Assessment & Plan     Vaginal discharge  Feels similar to previous episodes of BV, wet prep negative this visit, low concern for STD/STIs. Unable to provide urine sample today, will provide at next visit that will hopefully be on  with her OB US that will be done early due to concern for ectopic pregnancy with R sided abdominal pain and cramping. 5w3d per LMP on 21. Hx of  delivery with multiple abortions related to her Crohn's disease, has new OB visit scheduled 2021 with Dr. Joe. Vaginal discharge likely related to physiologic changes during pregnancy, but can be UTI or less likely to be STD/STIs.   - Wet Prep ([facility])  - Urinalysis([facility]), future     Other constipation  Last bowel movement 5/10/21 after starting prenatal vitamins. Notes some abdominal pain related to eating that is likely related to constipation and/or Crohn's, but will rule out ectopic with early OB US. Hx of constipation related to Crohn's disease, good water intake, will trial metamucil prn and suppository prn if no benefit from metamucil after few days.   - psyllium (METAMUCIL/KONSYL) 58.6 % powder  Dispense: 173 g; Refill: 0  - glycerin (LAXATIVE) 1.2 g suppository  Dispense: 10 suppository; Refill: 0    Nausea  Intermittently nausea for 1-2 weeks, denies vomiting, able to keep foods down. Recommended increasing sincere intake.     H/O  delivery, currently pregnant (5w3d per LMP on 21)   R sided pain on exam with reported R sided abdominal pain and cramping, denies bloody vaginal discharge. Pain likely related to constipation and/or Crohn's disease, but will obtain 1st trimester US earlier than 8wks due to concern for ectopic pregnancy. Red flag symptoms for ectopic pregnancy reviewed with patient, if any were to be present then she is to return to ER for evaluation.   - US OB 1st Trimester With Doppler    Review of the result(s) of each unique test - Wet Prep, UPT  Ordering of each unique test     Tobacco  Cessation:   reports that she has been smoking cigarettes. She has been smoking about 0.20 packs per day. She has never used smokeless tobacco.  Tobacco Cessation Action Plan: Information offered: Patient not interested at this time    MEDICATIONS:        - Trial of Metamucil prn for few days and Glycerin suppository prn if no benefit after metamucil for 3-4 days        - Continue other medications without change  FUTURE LABS:       - UA       - OB  1st trimester  FUTURE APPOINTMENTS:       - Follow-up visit in 1 month for new OB visit     Return in about 1 month (around 2021).    Saulo Segura DO  [facility] PAULETTE Sweet is a 30 year old  who presents for the following health issues   Abdominal cramping and constipation:   R sided sharp abdominal pain and cramps x1 week, feels similar to her Crohn's disease but that is usually on L side, mildly worsened with eating. Associated with nausea and constipation x5 days, onset after starting prenatal vitamins with iron. No vomiting. Has had constipation in the past that was related to her Crohn's, has not tried suppository or any OTC medications, reports good water and fiber intake.     Possible BV:   White-yellowish vaginal discharge, vaginal irritation, smelly discharge and sticky in nature x1wk, no itching. Partner does not report similar issues. Has history of recurrent BV that previously took Boric acid suppository but has been discontinued due to pregnancy. Does not recall date of last episode of BV, has tried metronidazole gel in the past without much relief. Does not recall taking clindamycin cream. Sees OB/GYN for recurrent BV. Denies hematuria, dysuria, urinary frequency, urgency, bloody vaginal discharge.     HPI     Review of Systems   Constitutional, HEENT, cardiovascular, pulmonary, GI, , musculoskeletal, neuro, skin, endocrine and psych systems are negative, except as otherwise noted.      Objective    BP  120/80 (BP Location: Left arm, Patient Position: Sitting, Cuff Size: Adult Large)   Pulse 88   Temp 97.6  F (36.4  C) (Tympanic)   Resp 18   Wt 94.3 kg (208 lb)   SpO2 98%   BMI 36.85 kg/m    Body mass index is 36.85 kg/m .  Physical Exam   GENERAL: healthy, alert and no distress  EYES: Eyes grossly normal to inspection, PERRL and conjunctivae and sclerae normal  HENT: ear canals and TM's normal, nose and mouth without ulcers or lesions  NECK: no adenopathy, no asymmetry, masses, or scars and thyroid normal to palpation  RESP: lungs clear to auscultation - no rales, rhonchi or wheezes  BREAST: normal without masses, tenderness or nipple discharge and no palpable axillary masses or adenopathy  CV: regular rate and rhythm, normal S1 S2, no S3 or S4, no murmur, click or rub, no peripheral edema and peripheral pulses strong   ABDOMEN: soft, nontender, no hepatosplenomegaly, no masses and bowel sounds normal  MS: no gross musculoskeletal defects noted, no edema  SKIN: no suspicious lesions or rashes  NEURO: Normal strength and tone, mentation intact and speech normal  PSYCH: mentation appears normal, affect normal/bright    Wet Prep results reviewed    ----- Service Performed and Documented by Resident or Fellow ------

## 2021-05-14 NOTE — PATIENT INSTRUCTIONS
Thank you for coming into the clinic today!  Here is what we discussed:    Come in 5/16 for initial OB US to rule out ectopic pregnancy, you will provide a urine sample for a UA at that time as well    Take Metamucil as needed for constipation, increase water and fiber intake; see if that     Try rectal suppository if constipation does not resolve after few days on metamucil     Please follow up as scheduled in 1 month with Dr. Joe for your new OB visit. Return sooner if vaginal irritation or constipation does not improve in 1-2 weeks    If you have any questions, please call the clinic at [phone].     Patient Education    Adapting to Pregnancy: First Trimester  As your body adjusts during your first trimester of pregnancy, you may have to change or limit your daily activities. You ll need more rest. You may also need to use the energy you have more wisely.   Your changing body  Almost every part of your body is affected as you adapt to pregnancy. The uterus and cervix will start to soften right away. You may not look very pregnant during the first 3 months. But you are likely to have some common signs of early pregnancy:     Nausea    Fatigue    Frequent urination    Mood swings    Bloating of the belly    Constipation    Heartburn    Missed or light periods (first trimester bleeding)    Nipple or breast tenderness and breast swelling  It s not too late to start good habits   What matters most is protecting your baby from this moment on. If you smoke, drink alcohol, or use drugs, now is the time to stop. If you need help, talk with your healthcare provider:     Smoking increases the risk of stillbirth or having a low-birth-weight baby. If you smoke, quit now.    Alcohol and drugs have been linked with miscarriage, birth defects, intellectual disability, and low birth weight. Don't drink alcohol or take drugs.  Tips to relieve nausea  During pregnancy, nausea can happen at any time of the day, but it may be  worse in the morning. To help prevent nausea:     Eat small, light meals at frequent intervals.    Drink fluids often.    Get up slowly. Eat a few unsalted crackers before you get out of bed.    Avoid smells that bother you.    Avoid spicy and fatty foods.    Eat an ice pop in your favorite flavor.    Get plenty of rest.    Ask your healthcare provider about taking sincere or vitamin B6 for nausea and vomiting.    Talk with your healthcare provider if you take vitamins that upset your stomach.   Work concerns  The end of the first trimester is a good time to discuss working during pregnancy with your employer. Follow your healthcare provider s advice if your job needs you to stand for a long time, work with hazardous tools, or even sit at a desk all day. Your workspace, workload, or scheduled hours may need to be adjusted. Perhaps you can change body postures more often or take an extra break.   Advice for travel  Talk to your healthcare provider first, but the second trimester may be the best time for any travel. You may be advised to avoid certain trips while you re pregnant. Food and water can be concerns in developing countries. Travel by car is a good choice, as you can stop, get out, and stretch. Bring snacks and water along. Fasten the lap belt below your belly, low over your hips. Also be sure to wear the shoulder harness.   Intimacy  Unless your healthcare provider tells you to, there's no reason to stop having sex while you re pregnant. You or your partner may notice changes in desire. Desire may be less in the first trimester, due to nausea and fatigue. In the second trimester, sex may be very enjoyable. The third trimester can be a challenge comfort-wise. Try different positions and see what s best for you both.   [employer] Application last reviewed this educational content on 4/1/2020 2000-2021 [employer]. All rights reserved. This information is not intended as a substitute for professional medical  "care. Always follow your healthcare professional's instructions.           Pregnancy: Your First Trimester Changes  The first trimester is a time of rapid development for your baby. Because your baby is growing so quickly, it is important that you start a healthy lifestyle right away. By the end of the first trimester, your baby has formed all of its major body organs and weighs just over an ounce.      Actual size of baby is 1/4\"    Month 1 (weeks 1 to 4)  The placenta (the organ that nourishes your baby) begins to form. The brain, spinal cord, heart, gastrointestinal tract, and lungs begin to develop. Your baby is about   inch long by the end of the first month.      Actual size of baby is 1\"    Month 2 (weeks 5 to 8)  All of your baby s major body organs form. The face, fingers, toes, ears, and eyes appear. By the end of the month, your baby is about 1 inch long.      Actual size of baby is 3\"      Month 3 (weeks 9 to 12)  Your baby can open and close its fists and mouth. The sexual organs begin to form. As the first trimester ends, your baby is about 3 inches long.   [employer] last reviewed this educational content on 8/1/2020 2000-2021 [employer]. All rights reserved. This information is not intended as a substitute for professional medical care. Always follow your healthcare professional's instructions.             Patient Education     Ectopic Pregnancy  When a woman becomes pregnant, the fertilized egg (ovum) attaches to the inside of the uterus (womb) where it grows. But in some cases, the egg can attach outside the uterus, most often in the fallopian tube. This is called an ectopic pregnancy. You might also hear this called a tubal pregnancy. But the egg can attach in other places, too. These areas may include the ovary, cervix, or belly (abdomen). An ectopic pregnancy in these areas is rare.  During pregnancy, the fertilized egg grows in the uterus. But with an ectopic pregnancy, the " growing egg in the fallopian tube can cause the tube to burst (rupture). This can lead to severe bleeding if it is not found early and treated.    What causes ectopic pregnancy?  Most often, there is a problem with the fallopian tube that stops the egg from moving through it and into the uterus. This could be due to injury to the tube, such as from an infection or surgery. A woman s chances for an ectopic pregnancy can increase if she has had:    An ectopic pregnancy in the past    Pelvic infections that keep coming back    Endometriosis    Problems getting pregnant (infertility)    Surgery on her tubes    A pregnancy while using certain birth control pills or an intrauterine device (IUD)  A woman may also be at higher risk if she:    Smokes    Is over age 35    Douches on a regular basis  What are the symptoms of ectopic pregnancy?  The symptoms of an ectopic pregnancy most often occur during the first 12 weeks of pregnancy (first trimester). A woman may have symptoms of early pregnancy, such as a missed menstrual period. Her breasts may be tender. She may urinate often and feel sick to her stomach (nausea). But in some cases, a woman may have no symptoms.  If a woman has symptoms, bleeding from the vagina early in the pregnancy is common. The woman may also have pain in the pelvis or lower back. The pain can come on quickly or slowly. It can be mild or severe. It can be dull or sharp and can come and go or last for a while.  As an ectopic pregnancy grows, symptoms may increase. If the tube bursts, a woman may have:    Severe, sudden pain in the abdomen or pelvis that does not go away    Pain that travels to the shoulder    Weakness and fainting  If an ectopic pregnancy bursts, it can lead to severe internal bleeding that can threaten the woman s life. Immediate treatment is needed.  What is the treatment for ectopic pregnancy?  An ectopic pregnancy can be treated in several ways. Treatment may  include:    Medicine. Injection of one or more doses of medicine may be used to stop the fertilized egg cells from growing. Then the body absorbs these cells. Hormone levels are also tested at specific times. This testing is used to make sure that hormone levels have gone back to normal.    Surgery. Surgery is done to remove the part of the tube where the ectopic pregnancy is found or remove the whole tube    Watchful waiting. This treatment is used for only a small group of women who would be at a very low risk for rupture. It includes close checking of symptoms hormone level testing, and ultrasound scans to check the woman s status.  If a woman is showing signs that the ectopic pregnancy might burst or has signs that the tube has burst, the woman needs immediate surgery.  [employer] last reviewed this educational content on 7/1/2019 2000-2021 [employer]. All rights reserved. This information is not intended as a substitute for professional medical care. Always follow your healthcare professional's instructions.

## 2021-05-15 ENCOUNTER — SURGERY - HEALTHEAST (OUTPATIENT)
Dept: SURGERY | Facility: HOSPITAL | Age: 31
End: 2021-05-15
Payer: COMMERCIAL

## 2021-05-15 ENCOUNTER — RECORDS - HEALTHEAST (OUTPATIENT)
Dept: ADMINISTRATIVE | Facility: OTHER | Age: 31
End: 2021-05-15

## 2021-05-15 ENCOUNTER — ANESTHESIA - HEALTHEAST (OUTPATIENT)
Dept: SURGERY | Facility: HOSPITAL | Age: 31
End: 2021-05-15

## 2021-05-15 ASSESSMENT — MIFFLIN-ST. JEOR
SCORE: 1661.65
SCORE: 1661.65

## 2021-05-17 NOTE — TELEPHONE ENCOUNTER
"Pt was seen by Dr Segura on 5/14/21.  She ended up having a rt sided ruptured ectopic and had rt salpingectomy.    Called pt to see how she was doing and she states that she \"doesn't know because it happened so fast\".  Pt asked how long the tube was ruptured and told her that it most likely wasn't very long as there was only a small amount of bleeding and nothing was seen on ultrasound the day before.  She states that pain is tolerable and has pain medication if needed.  She will call [facility] to schedule follow-up.  Pt asked about her other fallopian tube and told her that it appeared nl on surgical report.  Discussed that many women are able to get pregnant after ectopic and only having one fallopian tube.  Pt denied other questions or concerns.  Told her to call if she has any questions or concerns.  Told her that NOB appt for June has been cancelled.  Pt verbalized understanding.  Routed note to Dr Segura./YOLI  "

## 2021-05-21 ENCOUNTER — TELEPHONE (OUTPATIENT)
Dept: FAMILY MEDICINE | Facility: CLINIC | Age: 31
End: 2021-05-21

## 2021-05-21 NOTE — TELEPHONE ENCOUNTER
Pt states that she just received a call from a doctor at [facility] who told her that there was no fetal tissue on the tissue that was removed with her rt fallopian tube.  She states that she has an appt at [facility] to have quantitative beta hcg done today.  She states that if she was still pregnant that she thinks that she had a miscarriage 2 days ago as she had severe cramping and was having vaginal bleeding and past a y shaped stringy blood clot.  She states that today she stopped bleeding.  Pt is wondering if the quant would have gone back down to zero by now or how fast it decreases?  Discussed that it should be at or close to zero at this time since she had the surgery on 5/15/21 and quant at that time was just over 1,000.  Discussed the blood testing will determine if another ultrasound needs to be done or what needs to be done next.  Pt states that she is scared.  Told her to call if there is anything else that she needs.  Pt verbalized understanding.      See Care Everywhere for surgical pathology preliminary results./NG

## 2021-05-24 ENCOUNTER — TELEPHONE (OUTPATIENT)
Dept: FAMILY MEDICINE | Facility: CLINIC | Age: 31
End: 2021-05-24

## 2021-05-24 NOTE — TELEPHONE ENCOUNTER
[facility] phone call message- general phone call:    Reason for call:     Call back    Action desired:    Pt is asking for a return call from HOMERO Olsen.     Return call needed: Yes    OK to leave a message on voice mail? Yes    Advised patient to response may take up to 2 business days: Yes    Primary language: English      needed? No    Call taken on May 24, 2021 at 10:43 AM by Daylin Rodrigez CMA

## 2021-05-24 NOTE — TELEPHONE ENCOUNTER
Pt states that her quant that was done at [facility] on Friday, 5/21/21 was 30.  It was 1,174 on 5/15.  She is waiting for Dr Núñez to call her back so that she can further discuss the results.  She states that she is still concerned that she had a miscarriage on 5/19 as it felt like when she had miscarriages in the past.  Discussed that the surgical pathology showed implantation site changes and MRI showed ectopic as well as when Dr Núñez did the surgery.  Discussed that there was nothing seen in the uterus.  Told her to let Dr Núñez know about the symptoms that she had and the concerns that she had miscarriage.  Pt also worried that her other fallopian tube is scarred and is wondering if insurance covers a surrogate or freezing of her eggs?  Discussed that insurance typically does not cover any of this.  Told her to discuss her concerns with Dr Núñez and he can advise her on next steps.  Told her to call with further questions or concerns./NG

## 2021-05-27 VITALS
WEIGHT: 209.8 LBS | BODY MASS INDEX: 35.7 KG/M2 | BODY MASS INDEX: 36.01 KG/M2 | HEIGHT: 64 IN | BODY MASS INDEX: 35.7 KG/M2 | BODY MASS INDEX: 36.01 KG/M2 | HEIGHT: 64 IN | WEIGHT: 209.8 LBS

## 2021-05-30 VITALS — WEIGHT: 170 LBS | BODY MASS INDEX: 30.11 KG/M2

## 2021-05-30 VITALS — WEIGHT: 171 LBS | BODY MASS INDEX: 30.29 KG/M2

## 2021-06-01 VITALS — BODY MASS INDEX: 28.17 KG/M2 | HEIGHT: 63 IN | WEIGHT: 159 LBS

## 2021-06-02 VITALS — BODY MASS INDEX: 31.65 KG/M2 | HEIGHT: 62 IN | WEIGHT: 172 LBS

## 2021-06-02 VITALS
BODY MASS INDEX: 28.17 KG/M2 | BODY MASS INDEX: 30.47 KG/M2 | HEIGHT: 63 IN | WEIGHT: 159 LBS | BODY MASS INDEX: 30.47 KG/M2 | WEIGHT: 159 LBS | BODY MASS INDEX: 28.17 KG/M2 | HEIGHT: 63 IN

## 2021-06-08 NOTE — PROGRESS NOTES
Admission History & Physical  Kee Phipps, 1990, [id number]    [facility]  Dayna Ochoa MD, [phone]    Extended Emergency Contact Information  Primary Emergency Contact: Sherrie Julien   [facility]  Home Phone: [phone]  Relation: Mother     Assessment and Plan:   Assessment: Plantar fasciitis bilateral feet  Plan: The patient was given a cortisone injection in both heels.  I have recommended orthotics.  Active Problems:    * No active hospital problems. *      Chief Complaint:  bilateral heel pain times several months      HPI:    Kee Phipps is a 26 y.o. old female who presented to the clinic today complaining of severe pain on the bottom of both heels.  The patient stated that the pain is aggravated with weightbearing and ambulation.  She has had this pain for 5 months.  The pain is only relieved with nonweightbearing.  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  She denies any trauma to her feet.  She has not had any associated redness or swelling with her pain.   History is provided by patient    Medical History  Active Ambulatory (Non-Hospital) Problems    Diagnosis     Tachycardia     Accelerated idioventricular rhythm     Celiac disease     Chronic, continuous use of opioids     Opioid contract exists     Crohn's disease of colon     Crohn's colitis     SBO (small bowel obstruction)     Exacerbation of Crohn's disease of small intestine     Past Medical History   Diagnosis Date     Celiac disease      Crohn disease      Fibromyalgia      HPV (human papilloma virus) infection      Patient Active Problem List    Diagnosis Date Noted     Tachycardia 04/23/2015     Accelerated idioventricular rhythm 04/23/2015     Celiac disease 04/22/2015     Chronic, continuous use of opioids 04/21/2015     Opioid contract exists 04/21/2015     Crohn's disease of colon 04/20/2015     Crohn's colitis 04/20/2015     SBO (small bowel  obstruction) 04/20/2015     Exacerbation of Crohn's disease of small intestine 04/20/2015     Surgical History  She  has a past surgical history that includes Bowel resection; Appendectomy; and Cervix lesion destruction.   Past Surgical History   Procedure Laterality Date     Bowel resection       illium      Appendectomy       Cervix lesion destruction      Social History  Reviewed, and she  reports that she has been smoking Cigarettes.  She has been smoking about 0.50 packs per day. She does not have any smokeless tobacco history on file. She reports that she drinks about 1.2 oz of alcohol per week  She reports that she does not use illicit drugs.  Social History   Substance Use Topics     Smoking status: Current Every Day Smoker     Packs/day: 0.50     Types: Cigarettes     Smokeless tobacco: Not on file     Alcohol use 1.2 oz/week     1 Glasses of wine, 1 Cans of beer per week      Comment: occasional use      Allergies  Allergies   Allergen Reactions     Barley Other (See Comments)     CELIAC     Gluten Other (See Comments)     Humira [Adalimumab] Other (See Comments)     lg bumps on injection sites     Latex Itching     Latex Itching     Rye Grass Other (See Comments)     CELIAC     Wheat Containing Products      Remicade [Infliximab] Rash    Family History  Reviewed, and family history includes Hypertension in her mother.   Psychosocial Needs  Social History     Social History Narrative     Additional psychosocial needs reviewed per nursing assessment.       Prior to Admission Medications     (Not in a hospital admission)        Review of Systems - Negative      Visit Vitals     Pulse 96     Resp 16     SpO2 99%     Objective findings: Gen.: The patient is alert and in no acute distress    Integument: Nails bilateral feet normal growth and color.  Skin bilateral feet warm supple and intact.    Vascular: DP and PT pulses +2 over 4 bilateral feet.  Capillary refill less than 2 seconds bilateral  feet.    Neurologic: Negative clonus, negative Babinski bilateral.    Musculoskeletal: Range of motion within normal limits bilateral feet.  Muscle power +5 over 5 bilaterally with all compartments.  There is severe pain on palpation of the plantar medial aspect of both heels.  There is no edema or erythema surrounding both heels.  No palpable masses noted bilateral feet.    Assessment: Plantar fasciitis bilateral feet    Plan: The patient was given a cortisone injection today in both heels each consisting of 2 mL of dexamethasone sodium phosphate and 1 mL of 2% lidocaine plain.  I have also recommended orthotics.  The patient has several Crohn's disease and is unable to tolerate nonsteroidal anti-inflammatory medications.

## 2021-06-09 NOTE — PROGRESS NOTES
Subjective findings: The patient return to the clinic today for orthotic fitting and training.  She is being treated for plantar fasciitis.  She was given orthotics today along with instructions.

## 2021-06-10 NOTE — PROGRESS NOTES
1015 Kee arrived amb with her cousin, pt has been feeling well, gi symptoms good, just some aches in her joints  PIV w ease to R AC, site per pt request.  Pt states she did not take Claritin last night or this morning as directed. States she will take it when she gets home.  Entyvio infused as ordered, no sxs adverse Rxn.  IV flushed NSx 30min; pt monitored. VSS. Iv dc'd and site covered clean dressing  Ate lunch during infusion, and rested quietly  Kee dc'd A&Ox4 ambulatory and stable   Ate lunch during infusion, and rested quietly

## 2021-06-10 NOTE — PROGRESS NOTES
Pt arrived amb with her son, Pt has been having more problems with her crohns, ,diarrhea, IV started in rt wrist, pre med of tylenol given, Pt took her claritin at home, this morning, Pt has alittle stuff nose, itchy eyes, Waited 1/2hr and pt infused with entyvio. sree well, watched the pt for 40min after, vss, IV was flushed with ns then dcdafter, Pt given her AVS and left amb with her sleeping son at 1300 rtc 2 weeks  Chinyere Schmid

## 2021-06-11 NOTE — PROGRESS NOTES
Pt did not show for her appointment, called the pt states she is in the ER with her son, rescheduled for Friday  Chinyere Schmid

## 2021-06-17 NOTE — ANESTHESIA POSTPROCEDURE EVALUATION
Patient: Kee Phipps   PLANTAR FASCIOTOMY LEFT FOOT   Anesthesia type: MAC    Patient location: Phase II Recovery  Last vitals:   Vitals:    04/10/18 0923   BP:    Pulse: 63   Resp: 16   Temp:    SpO2: 100%     Post vital signs: stable  Level of consciousness: awake, alert and oriented  Post-anesthesia pain: pain controlled  Post-anesthesia nausea and vomiting: no  Pulmonary: unassisted, return to baseline  Cardiovascular: stable and blood pressure at baseline  Hydration: adequate  Anesthetic events: no    QCDR Measures:  ASA# 11 - Jazmín-op Cardiac Arrest: ASA11B - Patient did NOT experience unanticipated cardiac arrest  ASA# 12 - Jazmín-op Mortality Rate: ASA12B - Patient did NOT die  ASA# 13 - PACU Re-Intubation Rate: NA - No ETT / LMA used for case  ASA# 10 - Composite Anes Safety: ASA10A - No serious adverse event    Additional Notes:   normal...

## 2021-06-17 NOTE — ANESTHESIA PREPROCEDURE EVALUATION
Anesthesia Evaluation      Patient summary reviewed   No history of anesthetic complications     Airway   Mallampati: I  Neck ROM: full   Pulmonary - normal exam   (+) a smoker                         Cardiovascular - negative ROS and normal exam  Exercise tolerance: > or = 4 METS   Neuro/Psych    (+) neuromuscular disease (Fibromyalgia),  depression, anxiety/panic attacks, chronic pain (back pain)    Endo/Other    (+) arthritis, obesity (BMI 36), pregnant     Comments: Right tubal pregnancy, ectopic    GI/Hepatic/Renal    (+) GERD poorly controlled,       Comments: Crohn's disease  Celiac disease          Dental - normal exam                        Anesthesia Plan  Planned anesthetic: general endotracheal and total IV anesthesia  COVID PCR pending, negative screen.  Airborne precautions, hepa filter on breathing circuit.    TIVA     RSI with rocuronium    Ketamine   Magnesium  Decadron 10 mg  Zofran    Reverse with Sugammadex.  Pt has received the Sugammadex birth control information handout.  Risks, benefits and options were discussed and pt agrees with plan and wishes to proceed.  ASA 2 - emergent   Induction: intravenous   Anesthetic plan and risks discussed with: patient  Anesthesia plan special considerations: rapid sequence induction, antiemetics,   Post-op plan: routine recovery

## 2021-06-17 NOTE — ANESTHESIA CARE TRANSFER NOTE
Last vitals:   Vitals:    05/15/21 2239   BP: 145/88   Pulse: 98   Resp: 16   Temp: 36.5  C (97.7  F)   SpO2: 99%     Patient's level of consciousness is drowsy  Spontaneous respirations: yes  Maintains airway independently: yes  Dentition unchanged: yes  Oropharynx: oropharynx clear of all foreign objects    QCDR Measures:  ASA# 20 - Surgical Safety Checklist: WHO surgical safety checklist completed prior to induction    PQRS# 430 - Adult PONV Prevention: 4558F - Pt received => 2 anti-emetic agents (different classes) preop & intraop  ASA# 8 - Peds PONV Prevention: NA - Not pediatric patient, not GA or 2 or more risk factors NOT present  PQRS# 424 - Jazmín-op Temp Management: 4559F - At least one body temp DOCUMENTED => 35.5C or 95.9F within required timeframe  PQRS# 426 - PACU Transfer Protocol: - Transfer of care checklist used  ASA# 14 - Acute Post-op Pain: ASA14B - Patient did NOT experience pain >= 7 out of 10

## 2021-06-17 NOTE — ANESTHESIA PREPROCEDURE EVALUATION
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

## 2021-06-17 NOTE — PROGRESS NOTES
Subjective findings: The patient return to the clinic today for postop visit #2, 2 weeks status post plantar fasciotomy of the left foot. The patient is in good spirits and she had no complaints.      Objective findings: The dressings were removed and the wound margins are well healed.  There is minimal edema noted.  There is no erythema or cellulitis noted.  Neurovascular status is intact and vital signs are stable.      Assessment: Plantar fasciitis left foot       Plan: Removed all sutures today.  I have instructed the patient to begin wearing normal shoe gear.  She may now bathe her foot in a normal manner.  She is to return to the clinic as needed.

## 2021-06-17 NOTE — ANESTHESIA POSTPROCEDURE EVALUATION
Patient: Kee Phipps  Procedure(s):  RIGHT SALPINGECTOMY, LAPAROSCOPIC (Right)  Anesthesia type: general    Patient location: [facility] Surgical Floor  Last vitals:   Vitals Value Taken Time   /63 05/16/21 0730   Temp 36.5  C (97.7  F) 05/16/21 0730   Pulse 78 05/16/21 0730   Resp 18 05/16/21 0730   SpO2 97 % 05/16/21 0730     Post vital signs: stable  Level of consciousness: awake and responds to simple questions  Post-anesthesia pain: pain controlled  Post-anesthesia nausea and vomiting: no  Pulmonary: unassisted, return to baseline  Cardiovascular: stable and blood pressure at baseline  Hydration: adequate  Anesthetic events: no    QCDR Measures:  ASA# 11 - Jazmín-op Cardiac Arrest: ASA11B - Patient did NOT experience unanticipated cardiac arrest  ASA# 12 - Jazmín-op Mortality Rate: ASA12B - Patient did NOT die  ASA# 13 - PACU Re-Intubation Rate: ASA13B - Patient did NOT require a new airway mgmt  ASA# 10 - Composite Anes Safety: ASA10A - No serious adverse event    Additional Notes:

## 2021-06-17 NOTE — ANESTHESIA CARE TRANSFER NOTE
Last vitals:   Vitals:    04/10/18 0805   BP: (P) 118/81   Pulse: (P) 64   Resp: (P) 16   Temp: (P) 36.6  C (97.8  F)   SpO2: (P) 99%     Patient's level of consciousness is drowsy  Spontaneous respirations: yes  Maintains airway independently: yes  Dentition unchanged: yes  Oropharynx: oropharynx clear of all foreign objects    QCDR Measures:  ASA# 20 - Surgical Safety Checklist: WHO surgical safety checklist completed prior to induction  PQRS# 430 - Adult PONV Prevention: 4558F - Pt received => 2 anti-emetic agents (different classes) preop & intraop  ASA# 8 - Peds PONV Prevention: 4558F - Pt received => 2 anti-emetic agents (different classes) preop & intraop  PQRS# 424 - Jazmín-op Temp Management: NA - MAC anesthesia or case < 60 minutes  PQRS# 426 - PACU Transfer Protocol: - Transfer of care checklist used  ASA# 14 - Acute Post-op Pain: ASA14B - Patient did NOT experience pain >= 7 out of 10

## 2021-06-17 NOTE — PROGRESS NOTES
Subjective findings: The patient return to the clinic today for postop visit #1, 1 week status post plantar fasciotomy of the left foot. The patient is in good spirits and she had no complaints.     Objective findings: The dressings were removed and the wound margins are well coaptated and maintained.  There is minimal edema noted.  There is no erythema or cellulitis noted.  Neurovascular status is intact and vital signs are stable.     Assessment: Plantar fasciitis left foot      Plan: Applied a sterile dressing to the left foot today.  I have instructed the patient to return to the clinic in 1 week for postop visit #2 at which time sutures will be removed.

## 2021-06-17 NOTE — PROGRESS NOTES
Subjective findings: The patient return to the clinic today with continued complaints of heel pain in the bottom of both heels.  She has been previously treated with cortisone injections and orthotics.  Her pain has not improved.  She continues to have some post static dyskinesia.    Objective findings: Nails bilateral feet normal growth and color.  Skin bilateral feet warm supple and intact.  DP and PT pulses +2 over 4 bilateral feet.  Capillary refill less than 2 seconds bilateral feet.  Negative clonus, negative Babinski bilateral.  Range of motion within normal limits bilateral feet.  Muscle power +5 over 5 bilaterally with all compartments.  There is severe pain on palpation of the plantar medial aspect of both heels.  There is no edema or erythema surrounding both heels.  No palpable masses noted bilateral feet.     Assessment: Plantar fasciitis bilateral feet     Plan: X-rays of both feet were taken today.  I informed the patient that the x-rays were negative for any osseous abnormalities.  Since the patient has not responded to conservative measures I am going to recommend a plantar fasciotomy of both feet.  The patient was informed that the procedures will be done on an outpatient basis under local anesthesia with IV sedation.  She was told that the procedures will take approximately 10 minutes to perform.  She would then be discharged and able to weight-bear in a postop shoe for 2 weeks.  One foot would be done at a time.  She was asked to go n.p.o. at midnight prior to the procedure and she was asked to see her primary care physician for preoperative consultation.

## 2021-06-24 NOTE — PATIENT INSTRUCTIONS - HE
Please call one of the [place] locations below to schedule an appointment. If you received a prescription please bring it with you to your appointment. Some locations are limited to what they carry.    Office Locations    [facility]  [address] [place] [address]  Home Medical Equipment, [address]   Phone: [phone]   Orthotics and Prosthetics, [address]   Phone: [phone]    [facility]  Home Medical Equipment  [address], [address], [place] [address]   Phone: [phone]    Orthotics and Prosthetics ([facility])    [address], [address], [place] [address]  Phone: [phone]    [facility]  [address]  [address]   [place] [address]   Phone: [phone]    [facility] Professional Bldg[facility]  [address]  [place] [address]  Phone: [phone]    [facility] Bld[facility]   [address]  [place] [address]  Phone: [phone]    [facility]  [address]  [place] [address]  Phone: [phone]    [facility]  [address]  [address]  [place] [address]  Phone: [phone]    [place]   [address] [place] [address]   Phone: [phone]

## 2021-06-24 NOTE — PROGRESS NOTES
FOOT AND ANKLE SURGERY/PODIATRY Progress Note        ASSESSMENT:   Plantar fasciitis right foot    HPI: Kee Phipps was seen again today complaining of mild pain in the bottom of her right heel.  The patient stated that the pain is aggravated with prolonged ambulation.  She does not have post static dyskinesia.  She does not have any redness or swelling surrounding the right heel.  She denies any trauma to the right foot.  She stated that she has similar pain on the left foot.  The patient is 8 months status post plantar fasciotomy of the left foot.  Again she does not have a post static dyskinesia of the left foot.      Past Medical History:   Diagnosis Date     Anemia      Anxiety      Arthritis      Celiac disease      Crohn disease (H)      Depression      Fibromyalgia      HPV (human papilloma virus) infection      Migraine      Plantar fasciitis     bilateral       Past Surgical History:   Procedure Laterality Date     APPENDECTOMY       BOWEL RESECTION      illium      CERVIX LESION DESTRUCTION       FOOT SURGERY Left      PLANTAR FASCIA RELEASE Left 4/10/2018    Procedure:  PLANTAR FASCIOTOMY LEFT FOOT ;  Surgeon: Yuri Arreola DPM;  Location: [facility];  Service:        Allergies   Allergen Reactions     Barley Other (See Comments)     CELIAC     Gluten Other (See Comments)     Humira [Adalimumab] Other (See Comments)     lg bumps on injection sites     Ibuprofen      Patient has Crohns disease and is unable to take this medication.     Latex Itching     Latex Itching     Rye Grass Other (See Comments)     CELIAC     Wheat Containing Products      Remicade [Infliximab] Rash         Current Outpatient Medications:      acetaminophen (TYLENOL) 500 MG tablet, Take 1,000 mg by mouth., Disp: , Rfl:      SUMAtriptan (IMITREX) 50 MG tablet, TAKE 1 TABLET BY MOUTH AT ONSET OF HEADACHE FOR MIGRAINE MAY REPEAT IN 2 HOURS  MAX 8 TABLETS 24 HOURS, Disp: , Rfl: 3     CYCLOBENZAPRINE HCL (FLEXERIL  ORAL), Take by mouth., Disp: , Rfl:      GABAPENTIN ORAL, , Disp: , Rfl:      hydrocortisone 1 % cream, Apply sparingly to affected area three times daily for 14 days., Disp: , Rfl:      meloxicam (MOBIC) 15 MG tablet, , Disp: , Rfl: 3     methocarbamol (ROBAXIN) 750 MG tablet, Take 750 mg by mouth 3 (three) times a day as needed., Disp: , Rfl: 0     nitrofurantoin, macrocrystal-monohydrate, (MACROBID) 100 MG capsule, Take 100 mg by mouth 2 (two) times a day., Disp: , Rfl: 0     norethindrone (MICRONOR) 0.35 mg tablet, Take 1 tablet by mouth daily., Disp: , Rfl: 3    Family History   Problem Relation Age of Onset     Hypertension Mother        Social History     Socioeconomic History     Marital status: Single     Spouse name: Not on file     Number of children: Not on file     Years of education: Not on file     Highest education level: Not on file   Occupational History     Not on file   Social Needs     Financial resource strain: Not on file     Food insecurity:     Worry: Not on file     Inability: Not on file     Transportation needs:     Medical: Not on file     Non-medical: Not on file   Tobacco Use     Smoking status: Current Every Day Smoker     Packs/day: 0.50     Types: Cigarettes     Smokeless tobacco: Never Used   Substance and Sexual Activity     Alcohol use: Yes     Comment: occasional use     Drug use: No     Sexual activity: Not on file   Lifestyle     Physical activity:     Days per week: Not on file     Minutes per session: Not on file     Stress: Not on file   Relationships     Social connections:     Talks on phone: Not on file     Gets together: Not on file     Attends [employer] service: Not on file     Active member of club or organization: Not on file     Attends meetings of clubs or organizations: Not on file     Relationship status: Not on file     Intimate partner violence:     Fear of current or ex partner: Not on file     Emotionally abused: Not on file     Physically abused: Not on file      Forced sexual activity: Not on file   Other Topics Concern     Not on file   Social History Narrative     Not on file       10 point Review of Systems is negative except as mentioned below.         Vitals:    02/28/19 1013   BP: 118/62   Pulse: 68   Resp: 20       BMI= Body mass index is 31.46 kg/m .    OBJECTIVE:  General appearance: Patient is alert and fully cooperative with history & exam.  No sign of distress is noted during the visit.  Vascular: Dorsalis pedis and posterior tibial pulses are palpable. There is pedal hair growth bilaterally.  CFT < 3 sec from anterior tibial surface to distal digits bilaterally. There is no appreciable edema noted.  Dermatologic: Turgor and texture are within normal limits. No coloration or temperature changes. No primary or secondary lesions noted.  Neurologic: All epicritic and proprioceptive sensations are grossly intact bilaterally.  Musculoskeletal: All active and passive ankle, subtalar, midtarsal, and 1st MPJ range of motion are grossly intact without pain or crepitus, with the exception of none. Manual muscle strength is 5/5 bilaterally in all compartments. All dorsiflexors, plantarflexors, invertors, evertors are intact bilaterally.  Mild tenderness present to plantar medial aspect of the right heel on palpation.  No tenderness to bilateral feet or ankles with range of motion. Calf is soft and non-tenderwithout warmth or induration    Imaging:     No results found.         TREATMENT:  I informed the patient that I do not believe she needs any additional surgery in an attempt to treat her mild plantar fasciitis.  I am recommending no orthotics.  The patient is to return to the clinic as needed.        Yuri Arreola; EDWAR  [facility]iatry

## 2021-06-24 NOTE — TELEPHONE ENCOUNTER
She would like to reschedule the surgery that she cancelled back in October. Plantar fascia release. She was hoping to schedule as soon as 03/01/19 if possible.    Her last office visit was 04/24/18. Should she schedule office visit prior to surgery?

## 2021-06-24 NOTE — PROGRESS NOTES
Surgery/Procedure: release fascia plantar right foot     Special Equipment: to be determined    [place] [facility] [place]    Date: 03/01/19    Time: 830am     Surgeon: Dr. Arreola    Assist: no    Length of Surgery: 60 min    OR Confirmed/ :  Yes with zoa  on 02/19/19    Orders In:  Yes    Provider Team Notified:  Yes    Entered on [employer] Calendar:  Yes    Post Op: 03/07 and 3/14/19 @ [facility]

## 2021-07-03 NOTE — ADDENDUM NOTE
Addendum Note by Jr Cabral DPM at 4/24/2018  3:20 PM     Author: Jr Cabral DPM Service: -- Author Type: Physician    Filed: 2/19/2019 12:56 PM Encounter Date: 4/24/2018 Status: Signed    : Jr Cabral DPM (Physician)    Addended by: JR CABRAL on: 2/19/2019 12:56 PM        Modules accepted: Orders, SmartSet

## 2021-07-12 ENCOUNTER — OFFICE VISIT (OUTPATIENT)
Dept: FAMILY MEDICINE | Facility: CLINIC | Age: 31
End: 2021-07-12
Payer: COMMERCIAL

## 2021-07-12 ENCOUNTER — TELEPHONE (OUTPATIENT)
Dept: FAMILY MEDICINE | Facility: CLINIC | Age: 31
End: 2021-07-12

## 2021-07-12 VITALS
DIASTOLIC BLOOD PRESSURE: 90 MMHG | SYSTOLIC BLOOD PRESSURE: 125 MMHG | BODY MASS INDEX: 36.43 KG/M2 | HEIGHT: 64 IN | TEMPERATURE: 98.3 F | WEIGHT: 213.4 LBS | OXYGEN SATURATION: 98 % | HEART RATE: 99 BPM | RESPIRATION RATE: 16 BRPM

## 2021-07-12 DIAGNOSIS — G89.29 CHRONIC BILATERAL LOW BACK PAIN WITHOUT SCIATICA: ICD-10-CM

## 2021-07-12 DIAGNOSIS — Z32.00 PREGNANCY EXAMINATION OR TEST, PREGNANCY UNCONFIRMED: Primary | ICD-10-CM

## 2021-07-12 DIAGNOSIS — M54.50 CHRONIC BILATERAL LOW BACK PAIN WITHOUT SCIATICA: ICD-10-CM

## 2021-07-12 DIAGNOSIS — M54.40 ACUTE BILATERAL LOW BACK PAIN WITH SCIATICA, SCIATICA LATERALITY UNSPECIFIED: ICD-10-CM

## 2021-07-12 PROCEDURE — 99213 OFFICE O/P EST LOW 20 MIN: CPT | Mod: GC | Performed by: STUDENT IN AN ORGANIZED HEALTH CARE EDUCATION/TRAINING PROGRAM

## 2021-07-12 PROCEDURE — 36415 COLL VENOUS BLD VENIPUNCTURE: CPT | Performed by: STUDENT IN AN ORGANIZED HEALTH CARE EDUCATION/TRAINING PROGRAM

## 2021-07-12 PROCEDURE — 84702 CHORIONIC GONADOTROPIN TEST: CPT | Performed by: STUDENT IN AN ORGANIZED HEALTH CARE EDUCATION/TRAINING PROGRAM

## 2021-07-12 ASSESSMENT — MIFFLIN-ST. JEOR: SCORE: 1683.23

## 2021-07-12 NOTE — PROGRESS NOTES
Preceptor Attestation:    I discussed the patient with the resident and evaluated the patient in person. I have verified the content of the note, which accurately reflects my assessment of the patient and the plan of care.   Supervising Physician:  Hussain Alex MD.

## 2021-07-12 NOTE — TELEPHONE ENCOUNTER
[facility] phone call message- patient requesting results:    Test: Lab    Date of test: 7.12.21    Additional Comments: RESULTS    OK to leave a message on voice mail? Yes       Primary language: English      needed? No    Call taken on July 12, 2021 at 3:36 PM by Kenzie Adhikari

## 2021-07-12 NOTE — PROGRESS NOTES
"There are no exam notes on file for this visit.  Chief Complaint   Patient presents with     other     breast pain and wants to get breat reduction. blood draw for pregnancy.     Blood pressure (!) 125/90, pulse 99, temperature 98.3  F (36.8  C), temperature source Oral, resp. rate 16, height 1.626 m (5' 4.02\"), weight 96.8 kg (213 lb 6.4 oz), last menstrual period 2021, SpO2 98 %, not currently breastfeeding.           NILSA Sweet is a 30 year old  female with a PMH significant for:     Patient Active Problem List   Diagnosis     Health Care Home     Crohns disease     Need for prophylactic vaccination and inoculation against varicella     Abnormal Pap smear of cervix     Chronic pain     Adjustment disorder with depressed mood     Celiac disease     Chronic pain syndrome     Immunosuppressed status (H)     Multiple joint pain     Back pain     Overweight (BMI 25.0-29.9)     Fibromyalgia     H/O  delivery, currently pregnant     Need for varicella vaccine     Exposure to gonorrhea     Trichomonas vaginalis (TV) infection     S/P laparoscopy     She presents with abdominal pain, breast tenderness and is concerned that she's pregnant again. Tested herself with OTC strips and tested negative. She had an ectopic pregnancy in May that needed surgical removal of her right fallopian tube. Was instructed that if she felt pregnant again she would need blood test for HCG and early ultrasound to ensure intra-uterine pregnancy. Patient is actively trying for pregnancy. Last menstrual period is 21.     If she is not pregnant she wants to have surgical breast reduction, her breasts have caused her back and neck pain all of her life. She states they are simply too big for her body.          OBJECTIVE     Vitals:    21 1338   BP: (!) 125/90   BP Location: Left arm   Patient Position: Sitting   Cuff Size: Adult Large   Pulse: 99   Resp: 16   Temp: 98.3  F (36.8  C)   TempSrc: Oral   SpO2: 98% " "  Weight: 96.8 kg (213 lb 6.4 oz)   Height: 1.626 m (5' 4.02\")     Body mass index is 36.61 kg/m .    Constitutional: Awake, alert, cooperative, no acute distress, and appears stated age.  Eyes: sclera clear, conjunctiva normal.  ENT: NC/AT.   Neck: Supple, symmetrical, trachea midline. No submandibular LAD.  Back: Symmetric, no curvature  Lungs: No increased WOB. CTABL, no crackles or wheezing appreciated.  Cardiovascular: Appears well perfused. RRR, normal S1 and S2, no S3 or S4, and no murmur appreciated.  Abdomen: Normal BS, soft, non-distended, non-tender, no masses palpated  Musculoskeletal: No redness, warmth, or swelling of the joints. Tone is normal.  Neurologic: A&Ox3.  Cranial nerves II-XII are grossly intact.  Sensory is intact and gait is normal.  Neuropsychiatric: Normal affect, mood, orientation, memory and insight.  Skin: No visible rashes, erythema, pallor, petechia or purpura.    No flowsheet data found.  PHQ-9 SCORE 11/10/2017 12/15/2017 9/26/2019   PHQ-9 Total Score - - -   PHQ-9 Total Score 12 24 20     No results found for any visits on 07/12/21.    ASSESSMENT AND PLAN     Kee was seen today for other.    Diagnoses and all orders for this visit:    Pregnancy examination or test, pregnancy unconfirmed  -     Cancel: HCG qualitative urine; Future  -     Beta-HCG Quantitative; Future  -     Beta-HCG Quantitative    Acute bilateral low back pain with sciatica, sciatica laterality unspecified    Chronic bilateral low back pain without sciatica  -     Plastic Surgery Referral; Future    Quantitative HCG for pregnancy testing, she was discharged on 5/16 after right salpingectomy for ectopic pregnancy in the right fallopian tube. If HCG is positive will need a transvaginal ultrasound to confirm intra-uterine pregnancy.      No follow-ups on file.    Gonzalo Corona MD  7/12/2021    "

## 2021-07-13 LAB — HCG SERPL-ACNC: <2 MLU/ML (ref 0–4)

## 2021-07-13 NOTE — TELEPHONE ENCOUNTER
Gonzalo Corona MD   7/13/2021 10:39 AM CDT       Called patient with results.     BHCG undetectable, no pregnancy. Also appropriate resolution of ectopic pregnancy.     Dr. Corona

## 2021-07-14 NOTE — TELEPHONE ENCOUNTER
FUTURE VISIT INFORMATION      FUTURE VISIT INFORMATION:    Date: 9/21/21    Time: 8:30am    Location: [facility] [place] [facility]  REFERRAL INFORMATION:    Referring provider:  Gonzalo Corona MD    Referring providers clinic:  [facility]    Reason for visit/diagnosis  Breast Reduction Surgery for Back Pain    RECORDS REQUESTED FROM:       Clinic name Comments Records Status Imaging Status   [facility] OV/referral 7/12/21 EPIC

## 2021-08-08 ENCOUNTER — APPOINTMENT (OUTPATIENT)
Dept: ULTRASOUND IMAGING | Facility: HOSPITAL | Age: 31
End: 2021-08-08
Attending: EMERGENCY MEDICINE
Payer: COMMERCIAL

## 2021-08-08 ENCOUNTER — HOSPITAL ENCOUNTER (EMERGENCY)
Facility: HOSPITAL | Age: 31
Discharge: HOME | End: 2021-08-08
Attending: EMERGENCY MEDICINE | Admitting: PHYSICIAN ASSISTANT
Payer: COMMERCIAL

## 2021-08-08 ENCOUNTER — APPOINTMENT (OUTPATIENT)
Dept: ULTRASOUND IMAGING | Facility: HOSPITAL | Age: 31
End: 2021-08-08
Payer: COMMERCIAL

## 2021-08-08 VITALS
SYSTOLIC BLOOD PRESSURE: 144 MMHG | TEMPERATURE: 98.2 F | WEIGHT: 213 LBS | DIASTOLIC BLOOD PRESSURE: 85 MMHG | RESPIRATION RATE: 24 BRPM | OXYGEN SATURATION: 100 % | BODY MASS INDEX: 32.28 KG/M2 | HEART RATE: 68 BPM | HEIGHT: 68 IN

## 2021-08-08 DIAGNOSIS — Z34.90 EARLY STAGE OF PREGNANCY: ICD-10-CM

## 2021-08-08 DIAGNOSIS — R10.9 ABDOMINAL PAIN: ICD-10-CM

## 2021-08-08 LAB
ALBUMIN SERPL-MCNC: 4.2 G/DL (ref 3.5–5)
ALBUMIN UR-MCNC: NEGATIVE MG/DL
ALP SERPL-CCNC: 84 U/L (ref 45–120)
ALT SERPL W P-5'-P-CCNC: 16 U/L (ref 0–45)
ANION GAP SERPL CALCULATED.3IONS-SCNC: 12 MMOL/L (ref 5–18)
APPEARANCE UR: CLEAR
APTT PPP: 28 SECONDS (ref 22–38)
AST SERPL W P-5'-P-CCNC: 16 U/L (ref 0–40)
BACTERIA #/AREA URNS HPF: ABNORMAL /HPF
BILIRUB DIRECT SERPL-MCNC: 0.2 MG/DL
BILIRUB SERPL-MCNC: 0.4 MG/DL (ref 0–1)
BILIRUB UR QL STRIP: NEGATIVE
BUN SERPL-MCNC: 9 MG/DL (ref 8–22)
CALCIUM SERPL-MCNC: 9.4 MG/DL (ref 8.5–10.5)
CHLORIDE BLD-SCNC: 110 MMOL/L (ref 98–107)
CLUE CELLS: NORMAL
CO2 SERPL-SCNC: 19 MMOL/L (ref 22–31)
COLOR UR AUTO: ABNORMAL
CREAT SERPL-MCNC: 0.78 MG/DL (ref 0.6–1.1)
ERYTHROCYTE [DISTWIDTH] IN BLOOD BY AUTOMATED COUNT: 12.8 % (ref 10–15)
GFR SERPL CREATININE-BSD FRML MDRD: >90 ML/MIN/1.73M2
GLUCOSE BLD-MCNC: 79 MG/DL (ref 70–125)
GLUCOSE UR STRIP-MCNC: NEGATIVE MG/DL
HCG SERPL QL: POSITIVE
HCG SERPL-ACNC: 38 MLU/ML (ref 0–4)
HCT VFR BLD AUTO: 42.7 % (ref 35–47)
HGB BLD-MCNC: 14.9 G/DL (ref 11.7–15.7)
HGB UR QL STRIP: NEGATIVE
INR PPP: 0.98 (ref 0.9–1.15)
KETONES UR STRIP-MCNC: 40 MG/DL
LEUKOCYTE ESTERASE UR QL STRIP: NEGATIVE
LIPASE SERPL-CCNC: 28 U/L (ref 0–52)
MCH RBC QN AUTO: 31.8 PG (ref 26.5–33)
MCHC RBC AUTO-ENTMCNC: 34.9 G/DL (ref 31.5–36.5)
MCV RBC AUTO: 91 FL (ref 78–100)
MUCOUS THREADS #/AREA URNS LPF: PRESENT /LPF
NITRATE UR QL: NEGATIVE
PH UR STRIP: 6 [PH] (ref 5–7)
PLATELET # BLD AUTO: 229 10E3/UL (ref 150–450)
POTASSIUM BLD-SCNC: 3.7 MMOL/L (ref 3.5–5)
PROT SERPL-MCNC: 7.8 G/DL (ref 6–8)
RBC # BLD AUTO: 4.68 10E6/UL (ref 3.8–5.2)
RBC URINE: <1 /HPF
SODIUM SERPL-SCNC: 141 MMOL/L (ref 136–145)
SP GR UR STRIP: 1.02 (ref 1–1.03)
SQUAMOUS EPITHELIAL: 1 /HPF
TRICHOMONAS, WET PREP: NORMAL
UROBILINOGEN UR STRIP-MCNC: 2 MG/DL
WBC # BLD AUTO: 9.7 10E3/UL (ref 4–11)
WBC URINE: <1 /HPF
WBC'S/HIGH POWER FIELD, WET PREP: NORMAL
YEAST, WET PREP: NORMAL

## 2021-08-08 PROCEDURE — 82248 BILIRUBIN DIRECT: CPT | Performed by: PHYSICIAN ASSISTANT

## 2021-08-08 PROCEDURE — 76801 OB US < 14 WKS SINGLE FETUS: CPT

## 2021-08-08 PROCEDURE — 85730 THROMBOPLASTIN TIME PARTIAL: CPT | Performed by: PHYSICIAN ASSISTANT

## 2021-08-08 PROCEDURE — 99285 EMERGENCY DEPT VISIT HI MDM: CPT | Mod: 25

## 2021-08-08 PROCEDURE — 87491 CHLMYD TRACH DNA AMP PROBE: CPT | Performed by: PHYSICIAN ASSISTANT

## 2021-08-08 PROCEDURE — 83690 ASSAY OF LIPASE: CPT | Performed by: PHYSICIAN ASSISTANT

## 2021-08-08 PROCEDURE — 76775 US EXAM ABDO BACK WALL LIM: CPT

## 2021-08-08 PROCEDURE — 84702 CHORIONIC GONADOTROPIN TEST: CPT | Performed by: PHYSICIAN ASSISTANT

## 2021-08-08 PROCEDURE — 80053 COMPREHEN METABOLIC PANEL: CPT | Performed by: PHYSICIAN ASSISTANT

## 2021-08-08 PROCEDURE — 82310 ASSAY OF CALCIUM: CPT | Performed by: PHYSICIAN ASSISTANT

## 2021-08-08 PROCEDURE — 84703 CHORIONIC GONADOTROPIN ASSAY: CPT | Performed by: PHYSICIAN ASSISTANT

## 2021-08-08 PROCEDURE — 81001 URINALYSIS AUTO W/SCOPE: CPT | Performed by: PHYSICIAN ASSISTANT

## 2021-08-08 PROCEDURE — 85610 PROTHROMBIN TIME: CPT | Performed by: PHYSICIAN ASSISTANT

## 2021-08-08 PROCEDURE — 250N000011 HC RX IP 250 OP 636: Performed by: PHYSICIAN ASSISTANT

## 2021-08-08 PROCEDURE — 36415 COLL VENOUS BLD VENIPUNCTURE: CPT | Performed by: PHYSICIAN ASSISTANT

## 2021-08-08 PROCEDURE — 96374 THER/PROPH/DIAG INJ IV PUSH: CPT

## 2021-08-08 PROCEDURE — 85027 COMPLETE CBC AUTOMATED: CPT | Performed by: PHYSICIAN ASSISTANT

## 2021-08-08 PROCEDURE — 96361 HYDRATE IV INFUSION ADD-ON: CPT

## 2021-08-08 PROCEDURE — 258N000003 HC RX IP 258 OP 636: Performed by: PHYSICIAN ASSISTANT

## 2021-08-08 PROCEDURE — 96376 TX/PRO/DX INJ SAME DRUG ADON: CPT

## 2021-08-08 PROCEDURE — 87210 SMEAR WET MOUNT SALINE/INK: CPT | Performed by: PHYSICIAN ASSISTANT

## 2021-08-08 PROCEDURE — 87591 N.GONORRHOEAE DNA AMP PROB: CPT | Performed by: PHYSICIAN ASSISTANT

## 2021-08-08 RX ADMIN — HYDROMORPHONE HYDROCHLORIDE 1 MG: 1 INJECTION, SOLUTION INTRAMUSCULAR; INTRAVENOUS; SUBCUTANEOUS at 09:34

## 2021-08-08 RX ADMIN — HYDROMORPHONE HYDROCHLORIDE 0.5 MG: 1 INJECTION, SOLUTION INTRAMUSCULAR; INTRAVENOUS; SUBCUTANEOUS at 11:15

## 2021-08-08 RX ADMIN — SODIUM CHLORIDE 1000 ML: 9 INJECTION, SOLUTION INTRAVENOUS at 09:34

## 2021-08-08 ASSESSMENT — MIFFLIN-ST. JEOR: SCORE: 1744.66

## 2021-08-08 NOTE — ED NOTES
Patient sleeping when I went in the room to round on her. Rates pain 7/10 and did not request pain meds. Awaiting UA, pelvic exam and US results. Vital signs stable.

## 2021-08-08 NOTE — ED PROVIDER NOTES
ED PROVIDER NOTE    EMERGENCY DEPARTMENT ENCOUNTER      NAME: Kee Phipps  AGE: 30 year old female  YOB: 1990  MRN: [id number]  EVALUATION DATE & TIME: 8/8/2021  8:58 AM    PCP: [place]    ED PROVIDER: Maricruz Ann PA-C      CC: Abdominal pain      FINAL IMPRESSION:  1. Abdominal pain    2. Early stage of pregnancy          MEDICAL DECISION MAKING:    Pertinent Labs & Imaging studies reviewed. (See chart for details)     30-year-old female with a history of Crohn's disease (s/p sb resection), ectopic (s/p right salpingectomy), chronic pain, fibromyalgia presenting with severe pain across her lower abdomen that started acutely this morning.  No nausea or vomiting.    On arrival here she is tachycardic and very uncomfortable appearing.  She is unable to sit still, pacing around the room.  She has pain across the lower abdomen.    differential is quite broad.  Consider bowel obstruction, perforation, ectopic, diverticulitis, colitis, GI bleed, UTI, kidney stone.    Initiated work-up with labs, UA.  Given Dilaudid for pain.  Her hCG level came back at 38.  Given concern for early pregnancy and possible ectopic, obtained pelvic ultrasound.  Also obtained a renal ultrasound of the right kidney to evaluate for any hydronephrosis as ureteral stone still on differential.  Her other blood work was unremarkable.    Ultrasound was unremarkable.  No IUP seen but this is not surprising giving her low hCG.  I did do a pelvic exam and send cultures.  No signs of PID.  Her UA is without signs of infection or hematuria.      Her symptoms markedly improved during her ED stay.  Repeat abdominal exam negative.  I discussed the case with OB/GYN and given her stable vitals, reassuring exam stable hemoglobin, it was felt patient stable for discharge home.    I had a long discussion with the patient regarding her work-up today and plans for follow-up.  She does have an OB/GYN provider who she can see in the  "next 48 hours for recheck of her hCG.  In the meantime she knows to return to the emergency department at any time if she has new or worsening symptoms.    At the conclusion of the encounter I discussed the results of all of the tests and the disposition. The questions were answered. The patient or family acknowledged understanding and was agreeable with the care plan.         ED COURSE  9:11 AM  Met and evaluated patient. Discussed ED plan.   10:14 AM Rechecked, feeling much better.    10:36 AM Spoke with ultrasound to get patient over there urgently.  10:39 AM I updated the patient    10:41 AM I staffed the patient with Dr. Vann.  1:10 PM I updated patient and did a pelvic exam. Abdominal exam negative  1:40 PM I spoke with FLOWER Castelan.  1:50 PM Updated patient on plan. Patient comfortable with discharge      MEDICATIONS GIVEN IN THE EMERGENCY:  Medications   0.9% sodium chloride BOLUS (0 mLs Intravenous Stopped 8/8/21 1120)   HYDROmorphone (DILAUDID) injection 1 mg (1 mg Intravenous Given 8/8/21 0934)   HYDROmorphone (DILAUDID) injection 0.5 mg (0.5 mg Intravenous Given 8/8/21 1115)       NEW PRESCRIPTIONS STARTED AT TODAY'S ER VISIT  New Prescriptions    No medications on file          =================================================================    HPI    Patient information was obtained from: Patient    Use of Intrepreter: N/A         Noriskathy Phipps is a 30 year old female with a blood type of O+ and a pertinent history of Crohn's disease, chronic pain, fibromyalgia, small bowel resection, ectopic pregnancy (May 2021) required removal of right fallopian tube, negative pregnancy test (7/12/21), who presents to the ED with abdominal pain.    Patient reports that pain across her lower abdomen started acutely about an hour prior to arrival, after having intercourse. She's \"been feeling pregnant\" the last few days, but had a period last week and has had negative pregnancy tests. Patient notes that her toes " are numb and taking deep breaths hurts her abdomen. She denies nausea, vomiting, or any other current complaints.      REVIEW OF SYSTEMS   Constitutional: Negative for fevers, chills.  Vision: Negative for vision changes  HENT:  Negative for congestion, sore throat  Pulmonary: Negative for shortness of breath  Cardiac: Negative for chest pain  GI: + abdominal pain  : Negative for urinary symptoms  Musculoskeletal: Negative for extremity pain  Neuro: Negative for weakness, numbness    All other systems reviewed and are negative        PAST MEDICAL HISTORY:  Past Medical History:   Diagnosis Date     Abnormal Pap smear of cervix      Anemia      Anxiety      Arthritis      Bilateral plantar fasciitis      Celiac disease 2014     Celiac disease      Crohn disease (H)      Crohn's disease (H) 2012     Depression      Depressive disorder      Fibromyalgia      Fibromyalgia      H/O miscarriage, currently pregnant     miscarriage .     HPV (human papilloma virus) infection      Migraine      Plantar fasciitis     bilateral      delivery 2013    34 weeks. Early rupture of membranes       PAST SURGICAL HISTORY:  Past Surgical History:   Procedure Laterality Date     APPENDECTOMY       BOWEL RESECTION      illium      CERVIX LESION DESTRUCTION       DILATION AND CURETTAGE SUCTION, TREAT MISSED , 1ST TRIMESTER      miscarriage at 8 wks gest     DILATION AND CURETTAGE SUCTION, TREAT MISSED , 1ST TRIMESTER  2019     FASCIOTOMY LOWER EXTREMITY Left 2018    Left foot     FOOT SURGERY Left      LAPAROSCOPY DIAGNOSTIC (GYN) N/A 2019    Procedure: LAPAROSCOPY, DIAGNOSTIC;  Surgeon: Anni Velasquez MD;  Location: [facility]     PLANTAR FASCIA RELEASE Left 4/10/2018    Procedure:  PLANTAR FASCIOTOMY LEFT FOOT ;  Surgeon: Yuri Arreola DPM;  Location: [facility];  Service:      SALPINGECTOMY Right 05/15/2021    rt sided ruptured ectopic     SMALL BOWEL  RESECTION       TONSILLECTOMY             CURRENT MEDICATIONS:    No current facility-administered medications for this encounter.    Current Outpatient Medications:      acetaminophen (TYLENOL) 325 MG tablet, Take 2 tablets (650 mg) by mouth every 4 hours as needed for mild pain, Disp: 100 tablet, Rfl: 0     glycerin (LAXATIVE) 1.2 g suppository, Place 1 suppository rectally daily as needed (constipation) . Take after trial of Metamucil powder for 3-4 days without relief., Disp: 10 suppository, Rfl: 0     Prenatal Vit-Fe Fumarate-FA (PRENATAL MULTIVITAMIN W/IRON) 27-0.8 MG tablet, Take 1 tablet by mouth daily, Disp: 90 tablet, Rfl: 3     psyllium (METAMUCIL/KONSYL) 58.6 % powder, Take 6 g (1 teaspoonful) by mouth 2 times daily as needed for constipation, Disp: 173 g, Rfl: 0    ALLERGIES:  Allergies   Allergen Reactions     Gluten Meal      Humira [Humira]      Caused huge lumps at injection site.      Ibuprofen      Patient has Crohns disease and is unable to take this medication.     Latex Itching     Remicade [Infliximab] Rash       FAMILY HISTORY:  Family History   Problem Relation Age of Onset     Cancer Maternal Grandmother         bone cancer     Hypertension Mother      Asthma Brother      Asthma Brother      Asthma Brother      Asthma Brother      Asthma Son      Crohn's Disease Paternal Uncle      Diabetes No family hx of      Coronary Artery Disease No family hx of      Hyperlipidemia No family hx of      Cerebrovascular Disease No family hx of      Breast Cancer No family hx of      Colon Cancer No family hx of      Prostate Cancer No family hx of      Other Cancer No family hx of      Depression No family hx of      Anxiety Disorder No family hx of      Mental Illness No family hx of      Substance Abuse No family hx of      Anesthesia Reaction No family hx of      Osteoporosis No family hx of      Genetic Disorder No family hx of      Thyroid Disease No family hx of      Obesity No family hx of       Unknown/Adopted No family hx of      Heart Disease No family hx of        SOCIAL HISTORY:   Smoking: No  Alcohol: Alcohol user  Illicit drug: No drug use        VITALS:  Vitals:    08/08/21 1115 08/08/21 1245 08/08/21 1300 08/08/21 1315   BP: 134/67 117/79 128/79 124/76   Pulse: 96  96 83   Resp:       Temp:       SpO2: 100%  100% 100%   Weight:       Height:           PHYSICAL EXAM    General Appearance:  Alert, cooperative, restless, uncomfortable appearing  HENT: Normocephalic without obvious deformity, atraumatic. Mucous membranes moist   Eyes: Conjunctiva clear, Lids normal. No discharge.   Respiratory: No distress. Lungs clear to ausculation bilaterally. No wheezes, rhonchi or stridor  Cardiovascular: Regular rate and rhythm, no murmur. Normal cap refill. No peripheral edema  GI: Abdomen soft, + moderate tenderness across lower abdomen - worse in RLQ  Pelvic: + Normal female external genitalia.  No rashes or lesions.  No blood or discharge in vaginal vault. Cervical Os is closed.  No cervical motion tenderness. No adnexal tenderness on bimanual exam.    : No CVA tenderness  Musculoskeletal: Moving all extremities. No swelling.   Integument: Warm, dry, no rashes or lesions  Neurologic: Alert and orientated x3. No focal deficits.  Psych: Normal mood and affect        LAB:  Labs Ordered and Resulted from Time of ED Arrival Up to the Time of Departure from the ED   BASIC METABOLIC PANEL - Abnormal; Notable for the following components:       Result Value    Chloride 110 (*)     Carbon Dioxide (CO2) 19 (*)     All other components within normal limits   HCG QUALITATIVE PREGNANCY - Abnormal; Notable for the following components:    hCG Serum Qualitative Positive (*)     All other components within normal limits   HCG QUANTITATIVE PREGNANCY - Abnormal; Notable for the following components:    hCG Quantitative 38 (*)     All other components within normal limits   CBC WITH PLATELETS - Normal   HEPATIC FUNCTION PANEL  - Normal   PARTIAL THROMBOPLASTIN TIME - Normal   INR - Normal   LIPASE - Normal   ROUTINE UA WITH MICROSCOPIC REFLEX TO CULTURE   MAY SALINE LOCK IV   CALL   PREP FOR PROCEDURE   WET PREPARATION   CHLAMYDIA TRACHOMATIS PCR   NEISSERIA GONORRHOEAE PCR       RADIOLOGY:  US Kidney Right   Final Result   IMPRESSION:   1. Normal right kidney.               OB  US 1st trimester w transvag   Final Result   IMPRESSION:    1. An IUP is not visible. Suggest a follow-up beta-hCG or ultrasound in 7-10 days.   2. No findings at this time to suggest a repeat ectopic pregnancy.            I, Naina Arevalo, am serving as a scribe to document services personally performed by Maricruz Ann PA-C based on my observation and the provider's statements to me. I, Maricruz Ann PA-C attest that Naina Arevalo is acting in a scribe capacity, has observed my performance of the services and has documented them in accordance with my direction.    Maricruz Ann PA-C   Emergency Medicine     Maricruz Ann PA-C  08/08/21 [id number]

## 2021-08-08 NOTE — ED PROVIDER NOTES
"At this point I agree with the current assessment done in the Emergency Department. I, Alyssa Vann DO, have reviewed the documentation, personally taken the patient's history, performed an exam and agree with the physical finds, diagnosis and management plan.     I saw the patient with: BRIDGET Regan     Kee Phipps is a 30 year oldfemale, with a history including Crohn's disease (s/p sb resection), ectopic (s/p right salpingectomy), chronic pain, fibromyalgia who presents to the Emergency Department for the evaluation of severe lower abdomen pain.     Patient reports that pain across her lower abdomen started acutely about an hour prior to arrival, after having intercourse. She's \"been feeling pregnant\" the last few days, but had a period last week and has had negative pregnancy tests. Patient notes that her toes are numb and taking deep breaths hurts her abdomen. She denies nausea, vomiting, or any other current complaints.    The creation of this record is based on the scribe s observations of the work being performed by Alyssa Vann DO and the provider s statements to them. It was created on his behalf by Sammy Messina, a trained medical scribe. This document has been checked and approved by the attending provider.      ROS:   GI: Negative for nausea and vomiting. Positive for abdominal pain.  Neurological: Positive for numbness (toes).    Social:   Smoking: No  Alcohol: Alcohol user  Illicit drug: No drug use    Physical Exam:   BP (!) 127/90   Pulse (!) 128   Temp 98.2  F (36.8  C)   Resp 12   Ht 1.727 m (5' 8\")   Wt 96.6 kg (213 lb)   SpO2 100%   BMI 32.39 kg/m    General presentation: Alert, Vital signs reviewed. No acute distress. Not ill appearing. Normal Appearance  Abdominal: The abdomen is soft. No rigidity, guarding, or rebound. Bowel sounds normal. mild suprapubic tenderness to palpation      Results for orders placed or performed during the hospital encounter of 08/08/21 "   OB  US 1st trimester w transvag    Impression    IMPRESSION:   1. An IUP is not visible. Suggest a follow-up beta-hCG or ultrasound in 7-10 days.  2. No findings at this time to suggest a repeat ectopic pregnancy.   US Kidney Right    Impression    IMPRESSION:  1. Normal right kidney.         Basic metabolic panel   Result Value Ref Range    Sodium 141 136 - 145 mmol/L    Potassium 3.7 3.5 - 5.0 mmol/L    Chloride 110 (H) 98 - 107 mmol/L    Carbon Dioxide (CO2) 19 (L) 22 - 31 mmol/L    Anion Gap 12 5 - 18 mmol/L    Urea Nitrogen 9 8 - 22 mg/dL    Creatinine 0.78 0.60 - 1.10 mg/dL    Calcium 9.4 8.5 - 10.5 mg/dL    Glucose 79 70 - 125 mg/dL    GFR Estimate >90 >60 mL/min/1.73m2   CBC with platelets   Result Value Ref Range    WBC Count 9.7 4.0 - 11.0 10e3/uL    RBC Count 4.68 3.80 - 5.20 10e6/uL    Hemoglobin 14.9 11.7 - 15.7 g/dL    Hematocrit 42.7 35.0 - 47.0 %    MCV 91 78 - 100 fL    MCH 31.8 26.5 - 33.0 pg    MCHC 34.9 31.5 - 36.5 g/dL    RDW 12.8 10.0 - 15.0 %    Platelet Count 229 150 - 450 10e3/uL   Hepatic function panel   Result Value Ref Range    Bilirubin Total 0.4 0.0 - 1.0 mg/dL    Bilirubin Direct 0.2 <=0.5 mg/dL    Protein Total 7.8 6.0 - 8.0 g/dL    Albumin 4.2 3.5 - 5.0 g/dL    Alkaline Phosphatase 84 45 - 120 U/L    AST 16 0 - 40 U/L    ALT 16 0 - 45 U/L   Partial thromboplastin time   Result Value Ref Range    aPTT 28 22 - 38 Seconds   Result Value Ref Range    INR 0.98 0.90 - 1.15   UA with Microscopic reflex to Culture    Specimen: Urine, Clean Catch   Result Value Ref Range    Color Urine Light Yellow Colorless, Straw, Light Yellow, Yellow    Appearance Urine Clear Clear    Glucose Urine Negative Negative mg/dL    Bilirubin Urine Negative Negative    Ketones Urine 40  (A) Negative mg/dL    Specific Gravity Urine 1.025 1.001 - 1.030    Blood Urine Negative Negative    pH Urine 6.0 5.0 - 7.0    Protein Albumin Urine Negative Negative mg/dL    Urobilinogen Urine 2.0 (A) <2.0 mg/dL    Nitrite  Urine Negative Negative    Leukocyte Esterase Urine Negative Negative    Bacteria Urine None Seen None Seen /HPF    Mucus Urine Present (A) None Seen /LPF    RBC Urine <1 <=2 /HPF    WBC Urine <1 <=5 /HPF    Squamous Epithelials Urine 1 <=1 /HPF   Result Value Ref Range    Lipase 28 0 - 52 U/L   HCG QUALitative pregnancy (blood)   Result Value Ref Range    hCG Serum Qualitative Positive (A) Negative   HCG quantitative pregnancy (blood)   Result Value Ref Range    hCG Quantitative 38 (H) 0 - 4 mlU/mL   Wet prep    Specimen: Vagina; Swab   Result Value Ref Range    Trichomonas Absent Absent    Yeast Absent Absent    Clue Cells Absent Absent    WBCs/high power field None None     US Kidney Right   Final Result   IMPRESSION:   1. Normal right kidney.               OB  US 1st trimester w transvag   Final Result   IMPRESSION:    1. An IUP is not visible. Suggest a follow-up beta-hCG or ultrasound in 7-10 days.   2. No findings at this time to suggest a repeat ectopic pregnancy.            MDM: 30-year-old female presented to the ED for evaluation of sudden onset of suprapubic abdominal pain that occurred during sexual intercourse.  The patient denied any other significant symptoms associated with the abdominal pain.  Upon arrival to the ED the patient was noted to be cardiac but she was otherwise hemodynamically stable.  The patient had received multiple doses of IV pain medications prior to my evaluation.  At the time of my exam she did not appear to be in any obvious distress or discomfort.  She had mild tenderness palpation located in the suprapubic region of her abdomen without evidence of an acute abdomen.    The patient's initial TB testing was positive.  The quantitative hCG was likely 38, however.  CBC, BMP, lipase, hepatic panel, UA, and wet prep were all reassuring.  Patient also went an ultrasound of her right kidney and a pelvic ultrasound both of which were nondiagnostic.  Cultures testing for both GC and  chlamydia have also been obtained and are currently pending.    The patient was reevaluated informed of the reassuring lab and imaging results.  She was informed that she was pregnant.  The patient was instructed to follow-up with her OB physician for reevaluation or to return back to ED sooner for any worsening abdominal pain or any other new or concerning symptoms.      ISoo Jaremy Dale, DO, personally performed the services described in this documentation, as scribed by Sammy Messina in my presence, and it is both accurate and complete.       Alyssa Vann DO  08/08/21 155

## 2021-08-08 NOTE — ED TRIAGE NOTES
Pt comes in screaming and crying in pain. Pt states the pain came on suddenlly this am. Pain is 10/10. Pt did have a tubal pregnancy in may.

## 2021-08-08 NOTE — DISCHARGE INSTRUCTIONS
Your work-up today shows your hCG level to be 38.  This could mean you are very early in pregnancy.  Your other blood work is unremarkable and your urine is clear.  Your ultrasound does not show any intrauterine pregnancy or signs of active ectopic but it could be too early to see either of these.    Given your vitals have been stable and your pain has been controlled and your tenderness seems to be improved, we are going to discharge you home this evening but you need to follow-up very closely with your OB/GYN in 48 hours.  In the meantime, if you have fevers, worsening pain, vomiting, bleeding return to the ER.

## 2021-08-09 ENCOUNTER — TELEPHONE (OUTPATIENT)
Dept: FAMILY MEDICINE | Facility: CLINIC | Age: 31
End: 2021-08-09

## 2021-08-09 NOTE — TELEPHONE ENCOUNTER
Pt was seen at [facility] yesterday, 8/8/21 due to abd pain after intercourse.  Quant hcg was 38 (3 week range) and OB u/s: IUP not visible and ectopic not seen.  Normal rt sided kidney ultrasound.  Labs were also normal.  She was given IV Dilaudid which helped decrease the pain.    Called pt and she states that she is still having abd pain but is wondering if it is a Crohn's flare-up as soon as she eats she has to go to the bathroom.  She had 4 BM's yesterday.  She denies blood in the stools and states that the pain is not as bad as yesterday.  Told her that she should call her GI specialist to let them know about her symptoms and early pregnancy.  She states that she is not currently on any meds for her Crohn's but was suppose to start Stelara.  Told her to ask if this is still okay to take or if there is a safer alternative.  Scheduled her an appt to see Dr Carrillo tomorrow, 8/10/21 at 9:40 AM for follow-up and repeat quant hcg.  Told her to call if she has any questions or concerns./NG    Per web search: Stelara and Pregnancy  There are no well-controlled studies of Stelara in pregnant women. Animal studies found no evidence of harm. Stelara should be used during pregnancy only if the potential benefit justifies the potential risk to the unborn baby. It is not known if this medication can harm your unborn baby.  Dec 21, 2017

## 2021-08-09 NOTE — TELEPHONE ENCOUNTER
[facility] phone call message- general phone call:    Reason for call: The pt called to talk to the nurse about her pregnancy she went to the ER and was told she was a high risk pregnancy  And would like a call back asap    Return call needed: Yes    OK to leave a message on voice mail? Yes    Primary language: English      needed? No    Call taken on August 9, 2021 at 8:55 AM by Nahum Lawrence

## 2021-08-10 ENCOUNTER — OFFICE VISIT (OUTPATIENT)
Dept: FAMILY MEDICINE | Facility: CLINIC | Age: 31
End: 2021-08-10
Payer: COMMERCIAL

## 2021-08-10 VITALS
HEART RATE: 84 BPM | WEIGHT: 210.2 LBS | RESPIRATION RATE: 18 BRPM | OXYGEN SATURATION: 97 % | SYSTOLIC BLOOD PRESSURE: 136 MMHG | TEMPERATURE: 98 F | BODY MASS INDEX: 31.96 KG/M2 | DIASTOLIC BLOOD PRESSURE: 95 MMHG

## 2021-08-10 DIAGNOSIS — Z32.00 PREGNANCY EXAMINATION OR TEST, PREGNANCY UNCONFIRMED: Primary | ICD-10-CM

## 2021-08-10 DIAGNOSIS — F43.21 ADJUSTMENT DISORDER WITH DEPRESSED MOOD: ICD-10-CM

## 2021-08-10 LAB
C TRACH DNA SPEC QL NAA+PROBE: NEGATIVE
HCG SERPL-ACNC: 93 MLU/ML (ref 0–4)
HGB BLD-MCNC: 14.8 G/DL (ref 11.7–15.7)
N GONORRHOEA DNA SPEC QL NAA+PROBE: NEGATIVE

## 2021-08-10 PROCEDURE — 84702 CHORIONIC GONADOTROPIN TEST: CPT | Performed by: STUDENT IN AN ORGANIZED HEALTH CARE EDUCATION/TRAINING PROGRAM

## 2021-08-10 PROCEDURE — 99213 OFFICE O/P EST LOW 20 MIN: CPT | Mod: GC | Performed by: STUDENT IN AN ORGANIZED HEALTH CARE EDUCATION/TRAINING PROGRAM

## 2021-08-10 PROCEDURE — 36415 COLL VENOUS BLD VENIPUNCTURE: CPT | Performed by: STUDENT IN AN ORGANIZED HEALTH CARE EDUCATION/TRAINING PROGRAM

## 2021-08-10 PROCEDURE — 85018 HEMOGLOBIN: CPT | Performed by: STUDENT IN AN ORGANIZED HEALTH CARE EDUCATION/TRAINING PROGRAM

## 2021-08-10 ASSESSMENT — ANXIETY QUESTIONNAIRES
6. BECOMING EASILY ANNOYED OR IRRITABLE: NEARLY EVERY DAY
GAD7 TOTAL SCORE: 21
2. NOT BEING ABLE TO STOP OR CONTROL WORRYING: NEARLY EVERY DAY
3. WORRYING TOO MUCH ABOUT DIFFERENT THINGS: NEARLY EVERY DAY
5. BEING SO RESTLESS THAT IT IS HARD TO SIT STILL: NEARLY EVERY DAY
7. FEELING AFRAID AS IF SOMETHING AWFUL MIGHT HAPPEN: NEARLY EVERY DAY
1. FEELING NERVOUS, ANXIOUS, OR ON EDGE: NEARLY EVERY DAY
IF YOU CHECKED OFF ANY PROBLEMS ON THIS QUESTIONNAIRE, HOW DIFFICULT HAVE THESE PROBLEMS MADE IT FOR YOU TO DO YOUR WORK, TAKE CARE OF THINGS AT HOME, OR GET ALONG WITH OTHER PEOPLE: EXTREMELY DIFFICULT

## 2021-08-10 ASSESSMENT — PATIENT HEALTH QUESTIONNAIRE - PHQ9
SUM OF ALL RESPONSES TO PHQ QUESTIONS 1-9: 24
5. POOR APPETITE OR OVEREATING: NEARLY EVERY DAY

## 2021-08-10 NOTE — PROGRESS NOTES
Hospitalization Follow-up Visit         [facility]       Hospital Follow-up Visit:    Hospital:  [facility]   Date of Admission: 21  Date of Discharge: 21  Reason(s) for Admission: Abdominal pain             Problems taking medications regularly:  None       Post Discharge Medication Reconciliation: discharge medications reconciled and changed, per note/orders.       Problems adhering to non-medication therapy:  None       Medications reviewed by: by myself.    Summary of hospitalization:  [facility] discharge summary reviewed  Diagnostic Tests/Treatments reviewed.  Follow up needed: quant hcg  Other Healthcare Providers Involved in Patient s Care:         None  Update since discharge: improved.   Plan of care communicated with patient            Abdominal pain after intercourse, seen 21 in ED. Quant hcg was 38 and OB US was obtained: IUP not visible and ectopic not seem. Normal right kidney US. Other labs normal. Patient actually concerned about a Crohn's flare given some diarrhea.     Pain has improved, but still having pressure and discomfort. LMP was 7/15, before that was 6/18.   She had diarrhea day of ED visit and now still having some diarrhea. BM is bloody. This has been going on for the last couple weeks. Usual flare will have 5-6 bowel movements especially after eating. Stelara first dose was a couple months ago. Follows with [facility]. No fevers/chills. No vaginal bleeding. No nausea or vomiting.     She has a history of ectopic, just this past May. This felt more like an ectopic to her. .    Depression and anxiety high and have been for the last year. This was related to menses.     Not on anything for mood right now. She is interested in therapy and has been in therapy before.          Review of Systems:   CONSTITUTIONAL: no fatigue, no unexpected change in weight  SKIN: no worrisome rashes, no worrisome moles, no worrisome lesions  EYES: no acute vision problems or  changes  ENT: no ear problems, no mouth problems, no throat problems  RESP: no significant cough, no shortness of breath  CV: no chest pain, no palpitations, no new or worsening peripheral edema  GI: no nausea, no vomiting, no constipation, no diarrhea  : no frequency, no dysuria, no hematuria  MS: no claudication, no myalgias, no joint aches  NEURO: no weakness, no dizziness, no syncope, no headaches  ENDOCRINE: no temperature intolerance, no skin/hair changes  PSYCHIATRIC: NEGATIVE for changes in mood or affect            Physical Exam:     Vitals:    08/10/21 0951 08/10/21 0953   BP: (!) 137/99 (!) 136/95   BP Location:  Right arm   Patient Position:  Sitting   Cuff Size:  Adult Regular   Pulse: 84    Resp: 18    Temp: 98  F (36.7  C)    TempSrc: Oral    SpO2: 97%    Weight: 95.3 kg (210 lb 3.2 oz)      Body mass index is 31.96 kg/m .    GENERAL: healthy, alert, well nourished, well hydrated, no distress  HENT: ear canals- normal; TMs- normal; Nose- normal; Mouth- no ulcers, no lesions  NECK: no tenderness, no adenopathy, no asymmetry, no masses, no stiffness; thyroid- normal to palpation  RESP: lungs clear to auscultation - no rales, no rhonchi, no wheezes  CV: regular rates and rhythm, normal S1 S2, no S3 or S4 and no murmur, no click or rub -  ABDOMEN: soft, no tenderness, no  hepatosplenomegaly, no masses, normal bowel sounds  MS: extremities- no gross deformities noted, no edema  SKIN: no suspicious lesions, no rashes  NEURO: strength and tone- normal, sensory exam- grossly normal, mentation- intact, speech- normal, reflexes- symmetric         Results:   Results from the last 24 hours No results found for this or any previous visit (from the past 24 hour(s)).    Assessment and Plan      Diagnoses and all orders for this visit:    Pregnancy examination or test, pregnancy unconfirmed  Will trend hcg, check hemoglobin to make sure stable. This is a planned pregnancy. Red flags for ectopic discussed with  patient.   -     hCG Quantitative Pregnancy; Future  -     Hemoglobin; Future    Adjustment disorder with depressed mood  No SI, not interested in meds right now but did discuss Zoloft could be a option.   -     MENTAL HEALTH REFERRAL  -; Future          Options for treatment and follow-up care were reviewed with the patient  Kee Phipps   engaged in the decision making process and verbalized understanding of the options discussed and agreed with the final plan.      Discussed with Dr. Cote.    Akilah Carrillo MD PGY3  [facility]

## 2021-08-10 NOTE — NURSING NOTE
Primary Care PTSD Screen    In your life, have you ever had any experience that was so frightening, horrible, or upsetting that, in the past month, you...    1.) Have had nightmares about it or thought about it when you did not want to? yes    2.) Tried hard not to think about it or went out of your way to avoid situations that remind you of it? yes    3.) Were constantly on guard, watchful, or easily startled? yes    4.) Felt numb or detached from others, activities, or your surroundings? yes

## 2021-08-10 NOTE — PROGRESS NOTES
Preceptor Attestation:    I discussed the patient with the resident and evaluated the patient in person. I have verified the content of the note, which accurately reflects my assessment of the patient and the plan of care.   Supervising Physician:  Luis E Cote MD.

## 2021-08-11 ENCOUNTER — TELEPHONE (OUTPATIENT)
Dept: FAMILY MEDICINE | Facility: CLINIC | Age: 31
End: 2021-08-11

## 2021-08-11 ASSESSMENT — ANXIETY QUESTIONNAIRES: GAD7 TOTAL SCORE: 21

## 2021-08-11 NOTE — TELEPHONE ENCOUNTER
[facility] phone call message- general phone call:    Reason for call: blood work results.she would like Pretty to call her back with these    Action desired: call back.    Return call needed: Yes    OK to leave a message on voice mail? Yes    Advised patient to response may take up to 2 business days: Yes    Primary language: English      needed? No    Call taken on August 11, 2021 at 10:03 AM by Kenzie Adhikari

## 2021-08-11 NOTE — TELEPHONE ENCOUNTER
Called pt and gave her the results of her quant which has increased to 93.  Pt asked for help with scheduling an appt with Dr Park in 2 weeks to determine if/when she can have an ultrasound.  Discussed due to her h/o ectopic that she should call the clinic right away if she has abd pain (especially one sided), vaginal bleeding, dizziness or any other concerns.  Appt scheduled Tuesday, 8/24/21 at 9:00 AM with Dr Park.  Routed note to Dr Park and Dr Carrillo./NG

## 2021-08-12 ENCOUNTER — DOCUMENTATION ONLY (OUTPATIENT)
Dept: PSYCHOLOGY | Facility: CLINIC | Age: 31
End: 2021-08-12

## 2021-08-12 ENCOUNTER — TELEPHONE (OUTPATIENT)
Dept: FAMILY MEDICINE | Facility: CLINIC | Age: 31
End: 2021-08-12

## 2021-08-12 ENCOUNTER — HOSPITAL ENCOUNTER (EMERGENCY)
Facility: HOSPITAL | Age: 31
Discharge: HOME | End: 2021-08-12
Payer: COMMERCIAL

## 2021-08-12 NOTE — PROGRESS NOTES
Behavioral Health Team,    Patient is being referred for mental health services by their provider, Dr. Carrillo.  Please advise if we are able to see patient for in house treatment or if a community option would be best.    Thank you,    Akilah Solis  8/12/2021

## 2021-08-12 NOTE — PATIENT INSTRUCTIONS
08/12/21   MENTAL HEALTH REFERRAL     Mental Health referral routed to behavioral health team for recommendations. See Documentation Only encounter for more information.     Akilah Solis

## 2021-08-12 NOTE — TELEPHONE ENCOUNTER
[facility] phone call message-patient reporting a symptom:     Symptom:     Pt's having sharp pain and cramping on her lower left side.         Same Day Visit Offered: No    Additional comments:     Wondering if she should go to ER.    OK to leave message on voice mail? Yes    Primary language: English      needed? No    Call taken on August 12, 2021 at 10:10 AM by Daylin Rodrigez

## 2021-08-12 NOTE — TELEPHONE ENCOUNTER
Pt is newly pregnant and states that when she woke up this morning she noticed constant cramping/sharp pain on left lower side/abd and goes into her back.  She rates the pain at 8-9/10.  She denies vaginal bleeding, UTI symptoms, and vag infection symptoms.  Pt has crohn's and did have a normal regular soft BM yesterday.  She states that it hurts to breathe in and when touches that area.  Pt had rt side ectopic requiring salpingectomy a few months ago.  Pt is very worried and due to extreme pain is going to go to [facility] ER for eval.  Routed note to Dr Park./YOLI

## 2021-08-20 ENCOUNTER — OFFICE VISIT (OUTPATIENT)
Dept: FAMILY MEDICINE | Facility: CLINIC | Age: 31
End: 2021-08-20
Payer: COMMERCIAL

## 2021-08-20 ENCOUNTER — TELEPHONE (OUTPATIENT)
Dept: FAMILY MEDICINE | Facility: CLINIC | Age: 31
End: 2021-08-20

## 2021-08-20 DIAGNOSIS — R42 DIZZINESS: ICD-10-CM

## 2021-08-20 DIAGNOSIS — Z34.90 EARLY STAGE OF PREGNANCY: Primary | ICD-10-CM

## 2021-08-20 LAB
HCG SERPL-ACNC: 4675 MLU/ML (ref 0–4)
HGB BLD-MCNC: 14.4 G/DL (ref 11.7–15.7)

## 2021-08-20 PROCEDURE — 84702 CHORIONIC GONADOTROPIN TEST: CPT

## 2021-08-20 PROCEDURE — 36415 COLL VENOUS BLD VENIPUNCTURE: CPT

## 2021-08-20 PROCEDURE — 99213 OFFICE O/P EST LOW 20 MIN: CPT | Mod: GC

## 2021-08-20 PROCEDURE — 85018 HEMOGLOBIN: CPT

## 2021-08-20 NOTE — TELEPHONE ENCOUNTER
[facility] phone call message-patient reporting a symptom:     Symptom: Pt is currently 5 weeks pregnant.  She is bleeding and having abdominal pain on her left side.  She is wondering what to do.    Same Day Visit Offered: no    Additional comments: none    OK to leave message on voice mail? Yes    Primary language: English      needed? No    Call taken on August 20, 2021 at 10:03 AM by Karel Darnell

## 2021-08-20 NOTE — TELEPHONE ENCOUNTER
"Patient states she has had \"bleeding\" and left side pain for approx 1 week and she is 5 wks pregnant. Bleeding not enough to cover pantyliner she also noticed yellow/whitish discharge on liner.  Pt agreeable to being seen and scheduled appt for today.    Note routed to Dr.Wicks Zavala RN  "

## 2021-08-20 NOTE — PROGRESS NOTES
Review of Dr. Carrillo's order and note indicates that this is a referral for therapy to address depressed mood and anxiety.  Did note a positive PTSD screen at the last visit with Dr. Carrillo and a past  referral from 9/26/19 for similar reasons.  Was offered a visit with Dr. Liu at that time but did not follow up. There were no safety concerns noted at the last visit.  Will be following up with Dr. Park on 8/24/21 while I am in [facility] and I will yellow dot the visit to provide support for the referral if we are unable to reach her via phone or MyChart prior to that date.    PHQ 9/6/2018 9/26/2019 8/10/2021   PHQ-9 Total Score - 20 24   Q9: Thoughts of better off dead/self-harm past 2 weeks Not at all Not at all Not at all     Upon review of the schedule today, Dr. Chahal appears to have some limited space to  this patient.  Will ask our referral team to reach out to Ms. Phipps to offer visit with Dr. Chahal.  If Dr. Chahal's schedule will not work for Ms. Phipps, we can offer the following community based options for care:    [facility]  [address]   [address]  [place] [address]  [phone] (for appointments)  Fax: [phone]  [facility] - [address]  [address]  [place] [address]  Phone:  [phone]  Fax: [phone]  Hours:  Monday - Friday 8:30 - 5:00pm    [facility]  [address]  [address]  [place] [address]  [phone]    [facility]  [address]  [address]  [place], [place] [address]  [phone]    [facility]  [address]  [place] [address]  [phone]    Let me know if you have questions or would like additional follow up from me. Thanks!      Faith Barreto, Ph.D.,     Disclaimer  The above treatment recommendations are based on consultation with the patient's  primary care provider and a review of relevant information in EPIC. I have not personally examined the patient. All recommendations should be implemented with considerations of the patient's relevant prior history and current clinical status. Please contact me with any questions about the care of this patient.

## 2021-08-20 NOTE — PROGRESS NOTES
Assessment & Plan     Early stage of pregnancy  Patient states 5w1d pregant based on LMP and OB US completed on 8/8 in the ED. Per chart review, US tech ruled out ectopic pregnancy at the time. Uterine measuring 7.7 x 4.1 x 3.9 cm. Also noted on US was a tiny < 1 cm  fibroioid in the L mid body myometriuym. Will order repeat US to confirm intrauterine gestation and quantitative hCG. Personal history of Chron's disease being treated with stelera.   - US OB <14 WKS SINGLE OR FIRST GESTATION  - hCG Quantitative Pregnancy  -call patient once results come in  -continue regular prenatal follow up    Dizziness  Patient endorses feeling dizzy when going up the stairs. CBC and BMP was within normal limits on 8/12. Will order hemoglobin to rule out acute blood loss anemia due to patient's h/o of recent vaginal bleeding and pmhx of crohn's disease.   -Hemoglobin level today   -will recommend prenatal vitamins at next OB visit       FURTHER TESTING:       - OB ultrasound   -quantitative HCG    Return in about 3 days (around 8/23/2021) for Follow up.    Carlos Eduardo Spaulding MD  [facility]    Carlos Eduardo Spaulding DO, PGY1  [facility]    Today I precepted with Dr. Jorge Luis Ch MD, who agrees with the assessment and plan.      Bismark Sweet is a 30 year old who presents for the following health issues: vaginal bleeding    HPI       Patient is a 29yo F with pmhx significant for Crohn's disease, Celiac disease, and Fibromyalgia who presented c/o Left sided pain after finding out she was pregant on 8/8. Had Blood work don including HcG counts. Reports last count was 3582 on aug 17th. Also received cares through [facility]. H/o ectopic pregnancy with right fallopian tube removed. US done at [facility] on 8/17. She states she was told of poss gestational sac but not sure. Also showed cysts on R ovary. pregnancy dated 3a2ksey at the time. Patient states she is now 5w1d today  . Associated symptoms include migraines and bleeding consistent of light pink to dark red today. Some vaginal vaginal discharge. States she experienced worst low back pain last night. Pain had been intermittent since the 8th.     Patient currently sexually active.  last on 8/15. Pain started around then.     Yesterday felt dizzy walking uo the stairs, felt like falling down.   Patient endorses history of chrons disease, celiac, and fibromyalgia.     With patient history of Crohn's disease, bowel movemetns fluctuate between diarrhea and constipation. In the past week, had both diarrhea and constipation.       Review of Systems   Constitutional, HEENT, cardiovascular, pulmonary, gi and gu systems are negative, except as otherwise noted.      Objective    /82   Pulse 98   Temp 99.2  F (37.3  C) (Oral)   Wt 96.4 kg (212 lb 9.6 oz)   BMI 32.33 kg/m    Body mass index is 32.33 kg/m .   /82   Pulse 98   Temp 99.2  F (37.3  C) (Oral)   Wt 96.4 kg (212 lb 9.6 oz)   BMI 32.33 kg/m    Blood pressure 125/82, pulse 98, temperature 99.2  F (37.3  C), temperature source Oral, weight 96.4 kg (212 lb 9.6 oz), not currently breastfeeding.    There are no exam notes on file for this visit.    Physical Exam   GENERAL: healthy, alert and no distress  EYES: Eyes grossly normal to inspection, PERRL and conjunctivae and sclerae normal  RESP: lungs clear to auscultation - no rales, rhonchi or wheezes  CV: regular rate and rhythm, normal S1 S2, no S3 or S4, no murmur, click or rub, no peripheral edema and peripheral pulses strong  ABDOMEN: soft, mild suprapubic tenderness, no hepatosplenomegaly, no masses and bowel sounds normal  MS: no gross musculoskeletal defects noted, no edema  SKIN: no suspicious lesions or rashes  NEURO: Normal strength and tone, mentation intact and speech normal  PSYCH: mentation appears normal, affect normal/bright

## 2021-08-20 NOTE — LETTER
September 7, 2021      Kee Phipps  [address]  [place] [address]        Dear ,    We are writing to inform you of your test results.    Result of quant hCG within normal limits. Hemoglobin is also normal. You can follow up with OB US and schedule routine OB visits with provider.       Resulted Orders   hCG Quantitative Pregnancy   Result Value Ref Range    hCG Quantitative 4,675 (H) 0 - 4 mlU/mL      Comment:      Non-pregnant female . . . . . . . . . . . . . 0-4 (<5) mIU/mL  Equivocal result for early pregnancy . . . . 5-24 mIU/mL  Values in pregnancy should double every 2-3 days for the first 6 weeks. Patients with very low levels of hCG should be resampled and retested after 48 hours.   For diagnostic purposes, hCG results should be used   conjunction with other data.   If the hCG level is inconsistent with clinical evidence,   results should be confirmed by an alternate hCG method.   This assay is approved for use in the early detection   of pregnancy only. It is not approved for any other   uses such as tumor marker screening or monitoring.   Hemoglobin   Result Value Ref Range    Hemoglobin 14.4 11.7 - 15.7 g/dL       If you have any questions or concerns, please call the clinic at the number listed above.       Sincerely,      Jorge Luis Ch MD

## 2021-08-20 NOTE — PROGRESS NOTES
Preceptor Attestation:    I discussed the patient with the resident and evaluated the patient in person. I have verified the content of the note, which accurately reflects my assessment of the patient and the plan of care.   Supervising Physician:  Jorge Luis Ch MD.

## 2021-08-20 NOTE — PATIENT INSTRUCTIONS
Thank you for trusting us with your care today. I have ordered an OB ultrasound for you. Our office will follow up about the best time that work for you to come in on Monday. Additionally, I have ordered a blood test to measure the level of pregnancy hormone. I will call you when those results come in.       Thank you.

## 2021-08-23 ENCOUNTER — TELEPHONE (OUTPATIENT)
Dept: FAMILY MEDICINE | Facility: CLINIC | Age: 31
End: 2021-08-23

## 2021-08-23 ENCOUNTER — ANCILLARY PROCEDURE (OUTPATIENT)
Dept: ULTRASOUND IMAGING | Facility: CLINIC | Age: 31
End: 2021-08-23
Attending: FAMILY MEDICINE
Payer: COMMERCIAL

## 2021-08-23 VITALS
TEMPERATURE: 99.2 F | SYSTOLIC BLOOD PRESSURE: 125 MMHG | BODY MASS INDEX: 32.33 KG/M2 | WEIGHT: 212.6 LBS | HEART RATE: 98 BPM | DIASTOLIC BLOOD PRESSURE: 82 MMHG

## 2021-08-23 DIAGNOSIS — Z34.90 EARLY STAGE OF PREGNANCY: ICD-10-CM

## 2021-08-23 PROCEDURE — 76817 TRANSVAGINAL US OBSTETRIC: CPT | Performed by: RADIOLOGY

## 2021-08-23 PROCEDURE — 76801 OB US < 14 WKS SINGLE FETUS: CPT | Performed by: RADIOLOGY

## 2021-08-23 NOTE — TELEPHONE ENCOUNTER
"Pt had ultrasound today which shows single IUP at 5w5d with LORIE 4/20/21 (LMP LORIE 4/21/21) and FHR 89-91 bpm.  Pt needs to schedule NOB with Dr Park.  Attempted to call pt but received message \"the person you are calling cannot receive calls at this time.\"  Pt has an appt to see Dr Park for tomorrow for f/u results.  Routed note to Dr Park./NG  "

## 2021-08-23 NOTE — RESULT ENCOUNTER NOTE
Called patient multiple times at the number provided. Message states patient cannot receive calls. Result of quant hCG within normal limits. Hemoglobin is also normal. Patient can follow up with OB US and schedule routine OB visits with provider.

## 2021-08-24 ENCOUNTER — OFFICE VISIT (OUTPATIENT)
Dept: FAMILY MEDICINE | Facility: CLINIC | Age: 31
End: 2021-08-24
Payer: COMMERCIAL

## 2021-08-24 VITALS
RESPIRATION RATE: 18 BRPM | BODY MASS INDEX: 32.17 KG/M2 | HEART RATE: 92 BPM | TEMPERATURE: 99 F | DIASTOLIC BLOOD PRESSURE: 84 MMHG | WEIGHT: 211.6 LBS | OXYGEN SATURATION: 96 % | SYSTOLIC BLOOD PRESSURE: 121 MMHG

## 2021-08-24 DIAGNOSIS — R11.0 NAUSEA: ICD-10-CM

## 2021-08-24 DIAGNOSIS — F43.21 ADJUSTMENT DISORDER WITH DEPRESSED MOOD: ICD-10-CM

## 2021-08-24 DIAGNOSIS — Z34.90 EARLY STAGE OF PREGNANCY: Primary | ICD-10-CM

## 2021-08-24 DIAGNOSIS — N92.6 MISSED MENSES: ICD-10-CM

## 2021-08-24 PROCEDURE — 99213 OFFICE O/P EST LOW 20 MIN: CPT | Mod: GC | Performed by: STUDENT IN AN ORGANIZED HEALTH CARE EDUCATION/TRAINING PROGRAM

## 2021-08-24 RX ORDER — PYRIDOXINE HCL (VITAMIN B6) 25 MG
25 TABLET ORAL 3 TIMES DAILY PRN
Qty: 30 TABLET | Refills: 3 | Status: SHIPPED | OUTPATIENT
Start: 2021-08-24 | End: 2022-01-20

## 2021-08-24 RX ORDER — PRENATAL VIT/IRON FUM/FOLIC AC 27MG-0.8MG
1 TABLET ORAL DAILY
Qty: 90 TABLET | Refills: 3 | Status: SHIPPED | OUTPATIENT
Start: 2021-08-24 | End: 2022-01-20

## 2021-08-24 NOTE — PATIENT INSTRUCTIONS
It was great to meet you in clinic today! It appears things are improving. You may take vitamin B6 and half a tablet of unisom to help with nausea. We will see you in a few weeks for your first OB visit!

## 2021-08-24 NOTE — PROGRESS NOTES
"Assessment & Plan     Early stage of pregnancy  Nausea  Reviewed chart with Kee, noting that her quant hcg has appropriately doubled, and reassuringly, she has a IUP with FHT. Moreover, she has had improvement in vaginal bleeding. Nausea is most likely related to early pregnancy, and should resolve with time. She is open to unisom and vitB6.   - doxylamine (UNISOM) 25 MG TABS tablet  Dispense: 30 tablet; Refill: 1  - pyridOXINE (VITAMIN B6) 25 MG tablet  Dispense: 30 tablet; Refill: 3  - NOB 9/8/21    Missed menses  - Prenatal Vit-Fe Fumarate-FA (PRENATAL MULTIVITAMIN W/IRON) 27-0.8 MG tablet  Dispense: 90 tablet; Refill: 3    Adjustment disorder with depressed mood  - social work scheduled follow up      Review of the result(s) of each unique test - US, b-hcg, CBC  30 minutes spent on the date of the encounter doing chart review, history and exam, documentation and further activities per the note     BMI:   Estimated body mass index is 32.17 kg/m  as calculated from the following:    Height as of 8/8/21: 1.727 m (5' 8\").    Weight as of this encounter: 96 kg (211 lb 9.6 oz).   Patient is pregnant, will discuss weight gain in pregnancy at first OB visit.     NOB scheduled     Return for in person.    Stormy Park MD  [facility]  Precepted with Dr. Rocael Sofia   Kee is a 31 year old who presents for the following health issues  accompanied by her partner:    HPI     Presents for follow up. On chart review, patient noted to have vaginal bleeding, initially concerned about ectopic pregnancy. Since previous follow ups, she was noted to have US significant for IUP, FHT 90. She also has resolution of vaginal bleeding. She reports feeling tired all the time, and is feeling nauseated, though has not vomited.     Social work also discussed mental health with Kee, and was able to schedule her for an appointment with Dr. Chahal on 8/31/21    Review of Systems   Complete ROS  " Negative aside noted in HPI.      Objective    /84   Pulse 92   Temp 99  F (37.2  C) (Oral)   Resp 18   Wt 96 kg (211 lb 9.6 oz)   SpO2 96%   BMI 32.17 kg/m    Body mass index is 32.17 kg/m .  Physical Exam   GENERAL: healthy, alert and no distress  RESP: lungs clear to auscultation - no rales, rhonchi or wheezes  CV: regular rate and rhythm, normal S1 S2, no S3 or S4, no murmur, click or rub, no peripheral edema and peripheral pulses strong  MS: no gross musculoskeletal defects noted, no edema    Office Visit on 08/20/2021   Component Date Value Ref Range Status     hCG Quantitative 08/20/2021 4,675* 0 - 4 mlU/mL Final    Non-pregnant female . . . . . . . . . . . . . 0-4 (<5) mIU/mL  Equivocal result for early pregnancy . . . . 5-24 mIU/mL  Values in pregnancy should double every 2-3 days for the first 6 weeks. Patients with very low levels of hCG should be resampled and retested after 48 hours.   For diagnostic purposes, hCG results should be used   conjunction with other data.   If the hCG level is inconsistent with clinical evidence,   results should be confirmed by an alternate hCG method.   This assay is approved for use in the early detection   of pregnancy only. It is not approved for any other   uses such as tumor marker screening or monitoring.     Hemoglobin 08/20/2021 14.4  11.7 - 15.7 g/dL Final       ----- Service Performed and Documented by Resident or Fellow ------

## 2021-08-25 ENCOUNTER — TELEPHONE (OUTPATIENT)
Dept: FAMILY MEDICINE | Facility: CLINIC | Age: 31
End: 2021-08-25

## 2021-08-25 DIAGNOSIS — O23.599 BACTERIAL VAGINOSIS IN PREGNANCY: Primary | ICD-10-CM

## 2021-08-25 DIAGNOSIS — B96.89 BACTERIAL VAGINOSIS IN PREGNANCY: Primary | ICD-10-CM

## 2021-08-25 RX ORDER — METRONIDAZOLE 7.5 MG/G
1 GEL VAGINAL DAILY
Qty: 70 G | Refills: 0 | Status: SHIPPED | OUTPATIENT
Start: 2021-08-25 | End: 2021-08-30

## 2021-08-25 NOTE — TELEPHONE ENCOUNTER
Pt states that she had stopped bleeding 3 days ago but it started up again last night.  She states that the blood is dark red with brown streaks and is light.  She states that she is having lower abd pain all the way across and feels pressure with urination.  She denies burning with urination and states that when she urinates it is mostly in large amounts.  She states that the vaginal discharge has a foul odor and she gets frequent BV.  She had been taking Boric Acid but cannot now that she is pregnant and cannot tolerate Metronidazole oral pills due to her Crohn's.  Pt is wondering if she can get a rx for Metro gel sent to her pharmacy?    Gave info to Dr Ch and he will send the rx.  Pt should call if symptoms worsening/not improving./NG

## 2021-08-26 NOTE — TELEPHONE ENCOUNTER
8/25/21 3:40 PM Pt called back and info given that Metro gel rx was sent to [facility].  Told her to call if symptoms worsen or if she has further questions or concerns./NG

## 2021-08-27 ENCOUNTER — TELEPHONE (OUTPATIENT)
Dept: FAMILY MEDICINE | Facility: CLINIC | Age: 31
End: 2021-08-27

## 2021-08-27 NOTE — TELEPHONE ENCOUNTER
Called pt and she states the [facility].  Discussed that the u/s is essentially the same as what she had here on Monday.  Discussed that baby's heart beat is on the low end but may be due to being early in the pregnancy.  Pt asked if she should be concerned about the ovarian cysts?  Explained that they are small and the same as previous u/s.  Explained that she will get an ultrasound in the future to sudarshan fetal heart beat and ovarian cysts.  Told her that Dr Palacios will let her know at her NOB appt when the next u/s will be done.  Pt denied further questions or concerns./NG

## 2021-08-27 NOTE — TELEPHONE ENCOUNTER
[facility] phone call message- general phone call:    Reason for call: the Pt called to ask for nick to look at her ultrasound she had done yesterday and call her back     Return call needed: Yes    OK to leave a message on voice mail? Yes    Primary language: English      needed? No    Call taken on August 27, 2021 at 12:21 PM by Nahum Lawrence

## 2021-09-08 ENCOUNTER — OFFICE VISIT (OUTPATIENT)
Dept: FAMILY MEDICINE | Facility: CLINIC | Age: 31
End: 2021-09-08
Payer: COMMERCIAL

## 2021-09-08 ENCOUNTER — ALLIED HEALTH/NURSE VISIT (OUTPATIENT)
Dept: FAMILY MEDICINE | Facility: CLINIC | Age: 31
End: 2021-09-08
Payer: COMMERCIAL

## 2021-09-08 VITALS
RESPIRATION RATE: 16 BRPM | WEIGHT: 214 LBS | HEIGHT: 63 IN | SYSTOLIC BLOOD PRESSURE: 112 MMHG | BODY MASS INDEX: 37.92 KG/M2 | OXYGEN SATURATION: 97 % | HEART RATE: 82 BPM | TEMPERATURE: 98.1 F | DIASTOLIC BLOOD PRESSURE: 77 MMHG

## 2021-09-08 DIAGNOSIS — K50.90 CROHN'S DISEASE (H): ICD-10-CM

## 2021-09-08 DIAGNOSIS — O09.899 H/O PRETERM DELIVERY, CURRENTLY PREGNANT: ICD-10-CM

## 2021-09-08 DIAGNOSIS — K59.09 OTHER CONSTIPATION: ICD-10-CM

## 2021-09-08 DIAGNOSIS — O09.90 SUPERVISION OF HIGH RISK PREGNANCY, ANTEPARTUM: ICD-10-CM

## 2021-09-08 DIAGNOSIS — Z28.39 SUSCEPTIBLE TO VARICELLA (NON-IMMUNE), CURRENTLY PREGNANT IN FIRST TRIMESTER: ICD-10-CM

## 2021-09-08 DIAGNOSIS — O09.211 HIGH RISK PREGNANCY DUE TO HISTORY OF PRETERM LABOR IN FIRST TRIMESTER: Primary | ICD-10-CM

## 2021-09-08 DIAGNOSIS — O34.80 OVARIAN CYST COMPLICATING PREGNANCY, ANTEPARTUM: ICD-10-CM

## 2021-09-08 DIAGNOSIS — Z91.89 AT RISK FOR HYPERTENSION: ICD-10-CM

## 2021-09-08 DIAGNOSIS — Z87.51 HISTORY OF PRETERM LABOR: ICD-10-CM

## 2021-09-08 DIAGNOSIS — N83.209 OVARIAN CYST COMPLICATING PREGNANCY, ANTEPARTUM: ICD-10-CM

## 2021-09-08 DIAGNOSIS — M79.7 FIBROMYALGIA: ICD-10-CM

## 2021-09-08 DIAGNOSIS — K58.1 IRRITABLE BOWEL SYNDROME WITH CONSTIPATION: ICD-10-CM

## 2021-09-08 DIAGNOSIS — O09.891 HISTORY OF PRETERM DELIVERY, CURRENTLY PREGNANT IN FIRST TRIMESTER: ICD-10-CM

## 2021-09-08 DIAGNOSIS — O09.891 SUSCEPTIBLE TO VARICELLA (NON-IMMUNE), CURRENTLY PREGNANT IN FIRST TRIMESTER: ICD-10-CM

## 2021-09-08 DIAGNOSIS — O99.331 TOBACCO USE IN PREGNANCY, ANTEPARTUM, FIRST TRIMESTER: ICD-10-CM

## 2021-09-08 DIAGNOSIS — F43.21 ADJUSTMENT DISORDER WITH DEPRESSED MOOD: ICD-10-CM

## 2021-09-08 DIAGNOSIS — K90.0 CELIAC DISEASE: ICD-10-CM

## 2021-09-08 LAB
ABO/RH(D): NORMAL
ANTIBODY SCREEN: NEGATIVE
ERYTHROCYTE [DISTWIDTH] IN BLOOD BY AUTOMATED COUNT: 12.5 % (ref 10–15)
HCT VFR BLD AUTO: 39 % (ref 35–47)
HGB BLD-MCNC: 13.7 G/DL (ref 11.7–15.7)
HIV 1+2 AB+HIV1 P24 AG SERPL QL IA: NEGATIVE
MCH RBC QN AUTO: 31.8 PG (ref 26.5–33)
MCHC RBC AUTO-ENTMCNC: 35.1 G/DL (ref 31.5–36.5)
MCV RBC AUTO: 91 FL (ref 78–100)
PLATELET # BLD AUTO: 219 10E3/UL (ref 150–450)
RBC # BLD AUTO: 4.31 10E6/UL (ref 3.8–5.2)
SPECIMEN EXPIRATION DATE: NORMAL
SPECIMEN EXPIRATION DATE: NORMAL
WBC # BLD AUTO: 9.7 10E3/UL (ref 4–11)

## 2021-09-08 PROCEDURE — 99207 PR PRENATAL VISIT: CPT | Mod: GC | Performed by: STUDENT IN AN ORGANIZED HEALTH CARE EDUCATION/TRAINING PROGRAM

## 2021-09-08 PROCEDURE — 85027 COMPLETE CBC AUTOMATED: CPT | Performed by: STUDENT IN AN ORGANIZED HEALTH CARE EDUCATION/TRAINING PROGRAM

## 2021-09-08 PROCEDURE — 86850 RBC ANTIBODY SCREEN: CPT | Performed by: STUDENT IN AN ORGANIZED HEALTH CARE EDUCATION/TRAINING PROGRAM

## 2021-09-08 PROCEDURE — 99207 PR NO CHARGE NURSE ONLY: CPT

## 2021-09-08 PROCEDURE — 36415 COLL VENOUS BLD VENIPUNCTURE: CPT | Performed by: STUDENT IN AN ORGANIZED HEALTH CARE EDUCATION/TRAINING PROGRAM

## 2021-09-08 PROCEDURE — 86787 VARICELLA-ZOSTER ANTIBODY: CPT | Performed by: STUDENT IN AN ORGANIZED HEALTH CARE EDUCATION/TRAINING PROGRAM

## 2021-09-08 PROCEDURE — 87086 URINE CULTURE/COLONY COUNT: CPT | Performed by: STUDENT IN AN ORGANIZED HEALTH CARE EDUCATION/TRAINING PROGRAM

## 2021-09-08 PROCEDURE — 86901 BLOOD TYPING SEROLOGIC RH(D): CPT | Performed by: STUDENT IN AN ORGANIZED HEALTH CARE EDUCATION/TRAINING PROGRAM

## 2021-09-08 PROCEDURE — 86900 BLOOD TYPING SEROLOGIC ABO: CPT | Performed by: STUDENT IN AN ORGANIZED HEALTH CARE EDUCATION/TRAINING PROGRAM

## 2021-09-08 PROCEDURE — 83655 ASSAY OF LEAD: CPT | Mod: 90 | Performed by: STUDENT IN AN ORGANIZED HEALTH CARE EDUCATION/TRAINING PROGRAM

## 2021-09-08 PROCEDURE — 87340 HEPATITIS B SURFACE AG IA: CPT | Performed by: STUDENT IN AN ORGANIZED HEALTH CARE EDUCATION/TRAINING PROGRAM

## 2021-09-08 PROCEDURE — 86780 TREPONEMA PALLIDUM: CPT | Performed by: STUDENT IN AN ORGANIZED HEALTH CARE EDUCATION/TRAINING PROGRAM

## 2021-09-08 PROCEDURE — 87389 HIV-1 AG W/HIV-1&-2 AB AG IA: CPT | Performed by: STUDENT IN AN ORGANIZED HEALTH CARE EDUCATION/TRAINING PROGRAM

## 2021-09-08 PROCEDURE — 99000 SPECIMEN HANDLING OFFICE-LAB: CPT | Performed by: STUDENT IN AN ORGANIZED HEALTH CARE EDUCATION/TRAINING PROGRAM

## 2021-09-08 PROCEDURE — 86762 RUBELLA ANTIBODY: CPT | Performed by: STUDENT IN AN ORGANIZED HEALTH CARE EDUCATION/TRAINING PROGRAM

## 2021-09-08 RX ORDER — POLYETHYLENE GLYCOL 3350 17 G/17G
17 POWDER, FOR SOLUTION ORAL DAILY
Qty: 510 G | Refills: 1 | Status: SHIPPED | OUTPATIENT
Start: 2021-09-08 | End: 2022-01-20

## 2021-09-08 ASSESSMENT — MIFFLIN-ST. JEOR: SCORE: 1670.95

## 2021-09-08 NOTE — PROGRESS NOTES
First Obstetric Visit           Assessment and Plan     Kee was seen today for nob.    Diagnoses and all orders for this visit:    High risk pregnancy due to history of  labor in first trimester  History of  labor  Irritable bowel syndrome with constipation  Susceptible to Varicella (non-immune), currently pregnant in first trimester  Constipation  Noted history of  delivery, current smoking, and some potential bleeding history puts her at risk for  and post-partum hemorrhage. Did use bedside OB US per patient's request, able to view pregnancy, though unable to assess FHT. Recommended that she follow up with either MFM or clinic for repeat US. Moreover, recommended that she continue to follow up with GI regarding her IBS and celiac disease. Will consider coagulation testing in the future to assess for possible underlying coagulopathy that may be contributing to her bleeding risk.   -     ABO/Rh Type-HML; Future  -     Antibody Screen; Future  -     Chlamydia/Gono Amplified; Future  -     CBC with Plt; Future  -     Culture Urine  -     Hepatitis B Surface Ag; Future  -     HIV Ag/Ab Screen Cascade; Future  -     Lead, Blood; Future  -     Rubella Antibody IgG; Future  -     Syphilis Screen Cascade; Future  -     GYN Cytology; Future  -     Kuldeep Zoster Imm Status Nimo; Future  -     ABO/Rh Type-HML  -     Antibody Screen  -     CBC with Plt  -     Hepatitis B Surface Ag  -     HIV Ag/Ab Screen Cascade  -     Lead, Blood  -     Rubella Antibody IgG  -     Syphilis Screen Cascade  -     Kuldeep Zoster Imm Status Nimo  -     Mat Fetal [facility]tr Referral - Pregnancy; Future  -     Orthopedic  Referral; Future  -     INR; Future  -     Partial thromboplastin time; Future  -     US OB <14 WKS SINGLE OR FIRST GESTATION; Future  -     polyethylene glycol (MIRALAX) 17 GM/Dose powder; Take 17 g by mouth daily          31 year old  , 7w6d weeks of pregnancy with LORIE of 2022 by LMP of  Patient's last menstrual period was 07/15/2021..      Pregnancy Risk Assessment: High Risk pregnancy  Discussed high risk conditions as follows:  delivery at 34 weeks, current smoker    -Dating US obtained and dating confirmed?   Yes   -Taking PNV/Folate?     Yes       - Ordered new OB labs: blood type and antibody screen, HIV, VDRL, hep B, rubella, UA/UC, gonorrhea/chlamydia, done today. Patient will reschedule for pap    -Discussed risks and benefits of first trimester screen for trisomies, patient accepted. [facility] referral placed.  - Discussed genetic screening. Patient does want to pursue screening.   - Discussed screening for sickle cell anemia. Patient does not  want to pursue Hb electrophoresis.     - Discussed early screening for gestational diabetes. The patient does not have a history of GDM, BMI>30, h/o prediabetes/glucose intolerance, first degree relative with GDM or DM, or chronic HTN, so WILL NOT obtain early GCT or A1c.    - The patient does not h/o severe, early preeclampsia with delivery <34weeks, preeclampsia in more than one pregnancy, pre-gestational diabetes, chronic hypertension, renal disease, or autoimmune disease so WILL NOT start low dose aspirin (81mg) and calcium supplementation (1g-1.5g/day elemental = 2500mg- 3750mg/day Calcium carbonate) to prevent preeclampsia.    - The patient  does have a history of spontaneous  birth so  WILL consider progesterone starting at 16-20 weeks and/or serial transvaginal cervical length ultrasounds from 16-24 weeks.    - Prenatal vitamins ordered.  - Flu shot offered and was Declined.    Counseling given:   - Follow up in 4 weeks for return OB visit.  - Recommended weight gain for pregnancy: 11-20 lbs (pregravid BMI >30)      - Instructed on best evidence for: healthy diet and foods to avoid; exercise and activity during pregnancy; avoiding exposure to toxoplasmosis; safe use of seatbelts during pregnancy; and maintenance of a generally  healthy lifestyle  -Patient to see OB educator/ RN today and/or next visit.    Discussed the harms, benefits, side effects and alternative therapies for current prescribed and OTC medications.      Patient Instructions   It was great to meet you in clinic today! We discussed your prenatal history, and would recommend that you continue to follow up with GI. MFM should call you to schedule you for your appointment, and we will call you with results.       Options for treatment and follow-up care were reviewed with the patient and/or guardian. Kee Phipps and/or guardian engaged in the decision making process and verbalized understanding of the options discussed and agreed with the final plan.    Review of prior external note(s) from - Outside records from ED visits  Review of the result(s) of each unique test - US, bhcg qual and quant, cbc  60 minutes spent on the date of the encounter doing chart review, history and exam, documentation and further activities per the note      Stormy Palacios MD  Precepted with Dr. Iris HOLCOMB       Kee Phipps is a 31 year old woman who presents for an initial prenatal visit at 7w6d weeks of pregnancy with LORIE of Apr 21, 2022 by LMP of Patient's last menstrual period was 07/15/2021. she has a significant PMH and past ob hx: history of IBS with recent start on Stelara by her GI doc. Noted that her specialist is aware she is pregnant. She also has a history of fibromyalgia and celiac disease. She currently smokes 5 cigarettes a day, and is planning on quitting on her own with her partner. Finally, she does have a hx of trichomonas and gonorrhea, though is presently asymptomatic, and does not believe she is at risk for reinfection.  Regarding her OB history, she delivered her first baby at 34w via induction, and later developed SVT. Patient also has history of ectopic pregnancy. She also notes that she bleeds easily that some times takes 30mins or more to clot. Mentions  that family members on both sides of her and her partner's have different diagnoses with hearing issues, scoliosis, and potentially cerebral palsy.     She has had some bleeding since her LMP. She has had this on and off and has been followed with ultrasounds, which have been reassuring for the most part. Presently, this has resolved.  She has had mild nausea. She continues to take vitb6 and unisom, she does not need a refill.  Weight loss has not occurred.    This was a planned pregnancy.     OTHER CONCERNS: no              Labor Risk Assessment     Is the patient's age <18 or >40?     No  Patint's BMI is Body mass index is 37.45 kg/m .   Does patient have a BMI < 18.5?     No  Prior delivery within 6 months?      No  Ever delivered prior to 37 weeks gestation?  Yes 34w  Pregnancy occur via In Vitro Fertilization?   No  Are you carrying twins?       No    The patient has the following additional risk factors for  labor: none    as documented     Labor Risk Summary:  Pt is high risk for  Labor  Yes               Past Medical History     Past Medical History:   Diagnosis Date     Abnormal Pap smear of cervix      Anemia      Anxiety      Arthritis      Bilateral plantar fasciitis      Celiac disease 2014     Celiac disease      Crohn disease (H)      Crohn's disease (H) 2012     Depression      Depressive disorder      Fibromyalgia      Fibromyalgia      H/O miscarriage, currently pregnant     miscarriage .     HPV (human papilloma virus) infection      Migraine      Plantar fasciitis     bilateral      delivery 2013    34 weeks. Early rupture of membranes     See nurse history note, reviewed in detail.             Current Medications      Current Outpatient Medications   Medication Sig Dispense Refill     polyethylene glycol (MIRALAX) 17 GM/Dose powder Take 17 g by mouth daily 510 g 1     Prenatal Vit-Fe Fumarate-FA (PRENATAL MULTIVITAMIN W/IRON) 27-0.8 MG tablet Take  "1 tablet by mouth daily 90 tablet 3     pyridOXINE (VITAMIN B6) 25 MG tablet Take 1 tablet (25 mg) by mouth 3 times daily as needed (nausea) 30 tablet 3     acetaminophen (TYLENOL) 325 MG tablet Take 2 tablets (650 mg) by mouth every 4 hours as needed for mild pain 100 tablet 0     doxylamine (UNISOM) 25 MG TABS tablet Take 0.5 tablets (12.5 mg) by mouth At Bedtime 30 tablet 1     glycerin (LAXATIVE) 1.2 g suppository Place 1 suppository rectally daily as needed (constipation) . Take after trial of Metamucil powder for 3-4 days without relief. 10 suppository 0     psyllium (METAMUCIL/KONSYL) 58.6 % powder Take 6 g (1 teaspoonful) by mouth 2 times daily as needed for constipation 173 g 0     ustekinumab (STELARA) 45 MG/0.5ML SOSY Inject 45 mg Subcutaneous once               Review of Systems      ROS:  No - Headache  No - Changes in vision  No - Chest Pain  No - Shortness of Breath  YES - Nausea   No - Vomiting  YES - Abdominal pain   No - Contractions  No - Dysuria   No - Vaginal Discharge    No - Vaginal bleeding   No - Loss of Fluid   Yes - Extremity swelling     ====================================================           Physical Exam      /77 (BP Location: Left arm, Patient Position: Sitting, Cuff Size: Adult Large)   Pulse 82   Temp 98.1  F (36.7  C) (Oral)   Resp 16   Ht 1.61 m (5' 3.39\")   Wt 97.1 kg (214 lb)   LMP 07/15/2021   SpO2 97%   BMI 37.45 kg/m    GENERAL: healthy, alert and no distress  NECK: no tenderness, no adenopathy, no asymmetry, no masses, no stiffness; thyroid- normal to palpation  RESP: lungs clear to auscultation - no rales, no rhonchi, no wheezes  CV: regular rates and rhythm, normal S1 S2, no S3 or S4 and no murmur, no click or rub -  ABDOMEN: soft, no tenderness, no  hepatosplenomegaly, no masses, normal bowel sounds  MS: extremities- no gross deformities noted, no edema  - female: deferred    Past labs reviewed:  O POS  Varicella immune No  Hepatitis B immune " No  Last pap 2017.  She is due for this today, though she deferred and will do it at a next visit    =========================================

## 2021-09-08 NOTE — Clinical Note
Please review and close this encounter on behalf of the preceptor that is away for greater than 7 days. No additional attestation statement needed. Thank you, Kimberly

## 2021-09-08 NOTE — PROGRESS NOTES
09/08/21-  Appears patient had an appt with Dr. Chahal that was cancelled. I spoke with the patient today and she is no longer interested in therapy services at this time.     Akilah Solis

## 2021-09-08 NOTE — PATIENT INSTRUCTIONS
It was great to meet you in clinic today! We discussed your prenatal history, and would recommend that you continue to follow up with GI. [facility] should call you to schedule you for your appointment, and we will call you with results.    BIRTH AND 17-alpha hydroxyprogesterone caproate (17P) TREATMENT    What is  birth?      birth is the delivery of a baby before 37 weeks of gestation.  Approximately 1 in every 12 babies in [place] is born premature (that s approximately 6,000 babies every year)!     birth is often unexpected, but can happen for many reasons. There are some known risk factors, which include:   -pregnancy with twins or triplets   -being  race  -some problems with the uterus or cervix   -some medical problems like high blood pressure   -smoking, drinking, or using illegal drugs while pregnant   -infections like urinary tract infection, bacterial vaginosis and chlamydia while    pregnant  -depression, stress, and anxiety    But the biggest risk factor is having a previous  baby. In fact, women who have one  birth have a 30-50% chance of their next baby coming early too.     What are the risks to a baby that delivers prematurely?     birth is the number one cause of  death worldwide. This risk increases the earlier the baby is born.  Prematurity can also cause serious long term health problems for babies such as breathing problems, infections, bleeding into the brain, as well as long term developmental problems like cerebral palsy, learning impairments, hearing and vision problems.    How can I prevent  birth in my pregnancy?  Overall, the best thing to prevent  delivery is to stay healthy during your pregnancy, and follow up with your doctor for your regular visits and screening tests.  If you have a history of  birth in one of your past pregnancies, you may benefit from weekly injections of 17P.     What is 17P?  The  full name is 17-alpha hydroxyprogesterone caproate. This is a form of your body s natural  pregnancy hormone , progesterone. The brand name of this is Kachemak, and it is FDA approved for prevention of  birth.  This medication is given in once weekly injections from week 16-20 through the 36th week of pregnancy. Research shows that women taking 17P can reduce their risk of  birth from 50% to 36%. The treatment has also been shown to reduce the risk of complications after birth, such as bleeding in the brain and need for supplemental oxygen.    It is important to complete the treatment once you start, as the risk of  birth goes up if you stop the injections early.    How can I get started on the Linette treatment?  First, you should talk with your doctor to find out if this is a good option for you.  It is safe for pregnant women and for the fetus, but if you have some medical problems like uncontrolled blood pressure, breast cancer, or liver disease you should talk about it with your doctor first.  Your clinic will work with you to help determine insurance coverage and preauthorization if needed.  Then you will schedule weekly visits for 17P injections in addition to your routine prenatal visits with your doctor.    Side Effects of Mekena  The most common side effects  reported by Kachemak users are   injection site reactions (pain [35%], swelling  [17%], pruritus (itching) [6%], nodule [5%]), urticaria (rash) (12%), pruritus (generalized itching (8%), nausea (6%), and diarrhea (2%).

## 2021-09-08 NOTE — PROGRESS NOTES
Past Medical History     Do you have a history of any of the following medical conditions?    Condition No/Yes/Details   Hypertension No    Heart disease, mitral valve prolapse, rheumatic fever No    Asthma or another chronic lung disease No    An autoimmune disorder  YES Crohn's. Fibromyalgia, Celiac Disease   Kidney disease No    Frequent urinary tract infections No    Epilepsy, seizures, or spells No    Migraine headaches  YES once a week, worse with pregnancy   Stroke, loss of sensation/function, seizures, or other neuro problem No    Diabetes No    Thyroid problems or have you taken thyroid medication No    Hepatitis, liver disease, jaundice No    Blood clots, phlebitis, pulmonary embolism or varicose veins No    Excessive bleeding after surgery or dental work No    Do you have more bleeding than other women after a cut or a scratch?  YES takes 30 min to stop, bruises easy   Anemia  YES taking PNV's   Blood transfusions No         Would you refuse a blood transfusion?       No   If yes, then ask next question. If no, skip next question.   Would you rather die than receive a blood transfusion? No    Breast problems No    Have you ever ?  YES plans to breastfeed   Abnormalities of the uterus No    Abnormal pap smear  YES LSIL Pap   Have you ever been treated for depression?  YES in the past   Are you having problems with crying spells or loss of self-esteem? No    Have you ever required psychiatric care? No    Have you been physically, sexually, or emotionally hurt by someone? No    Have you been in a major accident or suffered serious trauma? No    Have you ever had any gynecological surgical procedures such as cervical conization, LEEP, laser treatment, cryosurgery of the cervix, or a dilatation and curettage?  YES D&C   Have you had any other surgical procedures?  YES see surgical list        Have you ever had any complications from anesthesia? No    Have you ever been hospitalized for a  nonsurgical reason?  YES in the past            Substance use and exposure     Does anyone in your home smoke?  YES smoking 5 cigs per day, set quit date   Do you use tobacco products or betel nut? No    Do you drink beer, wine, or hard liquor? No    Do you use any of the following: marijuana, speed, cocaine, heroin, hallucinogens, or other drugs? No               Symptoms since Last Menstrual Period     Do you currently have any of the following symptoms: No/Yes/Details        Abdominal pain  YES belly button x's 2 days        Blood in the stools or urine  YES has Crohn's last month bloody diarrhea        Chest pain No         Shortness of breath  YES walking up the stairs        coughing or vomiting up blood No         Your heart racing or skipping beats No         Nausea or vomiting  YES nausea but no vomiting, decreased appetite        Pain on urination No         Vaginal discharge or bleeding No                Genetic Screening          Is the patient 35 years or older? No      Do you have a history of any of the following No/Yes/Details        A metabolic disorder (e.g. Insulin-dependent DM, PKU) No         Recurrent pregnancy loss or still birth No      Do you, the baby's father, or anyone in your families have          Thalassemia AND MCV <80 No         Hemophilia No         Neural tube defect No }        Congenital heart defect No         Sickle cell disease or trait No         Muscular dystrophy No         Cystic fibrosis No         Mental retardation or autism No         Down's syndrome No         Nilo-Sach's disease No         Clark's chorea No         Any other inherited genetic or chromosomal disorder No         A child with birth defects not listed above  YES brother with scoliosis and congenital                Infection History     Ever treated for tuberculosis or had a positive skin test No    Ever had genital herpes (or has your partner) No    Had a rash or viral illness since LMP No     Ever had a sexually transmitted infection  YES gonorrhea   Ever had chicken pox or the vaccine  YES as a child   Have you had a sexual partner who is HIV positive No    Ever had any other serious infectious disease No                Risk Assessment     Average Risk Category  No significant risk factors: Yes    At Risk Category (up to 3)  Teen pregnancy: No  Poor social situation: No  Domestic abuse: No  Financial difficulties: No  Smoker: Yes  H/O  deliver: Yes  H/O drug abuse: No  Non-English speaking: No  Advanced maternal age: No  GDM risks: No  Previous C/S: No  H/O PIH: No  H/O STIs: Yes  H/O mental health concerns: Yes  Onset care > 20 weeks: No      High Risk Category (4 or more At Risk or)  Diabetes/GDM: No  Multiple gestation: No  Chronic hypertension: No  Significant hx of asthma: No  Fetal demise > 20 weeks: No  Positive tox screen: No  Current mental health treatment: No  Other: Crohn's Disease, Celiac Disease, Fibromyalgia    Risk: High Risk   Date determined: 21

## 2021-09-08 NOTE — PROGRESS NOTES
Preceptor Attestation:    I discussed the patient with the resident and evaluated the patient in person. I have verified the content of the note, which accurately reflects my assessment of the patient and the plan of care.   Supervising Physician:  Vance Simmons MD.

## 2021-09-09 LAB
BACTERIA UR CULT: NO GROWTH
HBV SURFACE AG SERPL QL IA: NONREACTIVE
RUBV IGG SERPL QL IA: POSITIVE
T PALLIDUM AB SER QL: NEGATIVE

## 2021-09-10 DIAGNOSIS — M79.7 FIBROMYALGIA: Primary | ICD-10-CM

## 2021-09-10 DIAGNOSIS — O09.899 H/O PRETERM DELIVERY, CURRENTLY PREGNANT: ICD-10-CM

## 2021-09-10 DIAGNOSIS — K50.919 CROHN'S DISEASE WITH COMPLICATION, UNSPECIFIED GASTROINTESTINAL TRACT LOCATION (H): ICD-10-CM

## 2021-09-10 LAB
LEAD BLDV-MCNC: <2 UG/DL
VZV IGG SER QL IA: POSITIVE

## 2021-09-11 ENCOUNTER — DOCUMENTATION ONLY (OUTPATIENT)
Dept: FAMILY MEDICINE | Facility: CLINIC | Age: 31
End: 2021-09-11

## 2021-09-12 NOTE — PROGRESS NOTES
Kee Phipps paged the service around 9:15 PM.  She is 31 years old and 8 weeks pregnant.  Concerning about her son who might have chickenpox.  Patient stated that her son who is 8 years old with a history of allergies and asthma started having bump on his skin this morning.  Started as 2  then became 7 by the end of the day.  The bump is on is shorter plate and arm.  Patient said the pump are risen, around one mm in diameter.  Her descriptions fit the macules description.There are some itching and little bit red.  Patient stated that her son has no fever, 97.6.  No sore throat not feeling tired and has no loss of appetite.  Her son up-to-date on his immunization, started school 2 days ago.  Patient was recommended to avoid any contact with her son and seek an immediate medical attention for her son at the clinic on Monday.    Dr. James, PGY2

## 2021-09-13 ENCOUNTER — TRANSCRIBE ORDERS (OUTPATIENT)
Dept: MATERNAL FETAL MEDICINE | Facility: CLINIC | Age: 31
End: 2021-09-13

## 2021-09-13 DIAGNOSIS — O26.90 PREGNANCY RELATED CONDITION, ANTEPARTUM: Primary | ICD-10-CM

## 2021-09-13 PROBLEM — F43.21 ADJUSTMENT DISORDER WITH DEPRESSED MOOD: Status: ACTIVE | Noted: 2021-09-08

## 2021-09-13 PROBLEM — A59.01 TRICHOMONAS VAGINALIS (TV) INFECTION: Status: RESOLVED | Noted: 2019-04-01 | Resolved: 2021-09-13

## 2021-09-13 PROBLEM — N83.209 OVARIAN CYST COMPLICATING PREGNANCY, ANTEPARTUM: Status: ACTIVE | Noted: 2021-08-26

## 2021-09-13 PROBLEM — Z20.2 EXPOSURE TO GONORRHEA: Status: RESOLVED | Noted: 2018-03-30 | Resolved: 2021-09-13

## 2021-09-13 PROBLEM — K90.0 CELIAC DISEASE: Status: ACTIVE | Noted: 2021-09-08

## 2021-09-13 PROBLEM — M79.7 FIBROMYALGIA: Status: ACTIVE | Noted: 2021-09-08

## 2021-09-13 PROBLEM — O34.80 OVARIAN CYST COMPLICATING PREGNANCY, ANTEPARTUM: Status: ACTIVE | Noted: 2021-08-26

## 2021-09-13 PROBLEM — Z98.890 S/P LAPAROSCOPY: Status: RESOLVED | Noted: 2019-08-16 | Resolved: 2021-09-13

## 2021-09-16 PROBLEM — Z91.89 AT RISK FOR HYPERTENSION: Status: ACTIVE | Noted: 2021-09-08

## 2021-09-16 PROBLEM — O09.90 SUPERVISION OF HIGH RISK PREGNANCY, ANTEPARTUM: Status: ACTIVE | Noted: 2021-09-08

## 2021-09-16 NOTE — NURSING NOTE
Family Medicine OB Education    I provided the following OB education to Kee Phipps.    Discussed that Dr Palacios should be the doctor that she sees for her prenatal visits and that Dr Palacios will try hard to be the one to deliver her baby.  Discussed that if Dr Palacios was unavailable that one of our other physicians would deliver the baby and that this could be a male or female provider.  Briefly discussed residency program and that multiple doctors would be present at time of delivery.  Sheet given and discussed fetal growth and development.  Sheet given and discussed warning signs with reasons to call clinic, L&D, or 24 hr HE  line with questions or concerns (phone numbers given).  Sheet given and discussed Tdap to be given after 27 weeks gestation and the importance of family members get immunized before delivery.  Proof of pregnancy completed and given to pt.  Gave pt preregistration form for hospital to complete.  See questionnaire and pregnancy risk assessment for further information in provider encounter.    Name of provider who requested the OB education: Dr Palacios  Name of provider on site (faculty or community preceptor) at the time of performing the OB education: Dr Iris Rios, RN, BSN

## 2021-09-19 ENCOUNTER — HEALTH MAINTENANCE LETTER (OUTPATIENT)
Age: 31
End: 2021-09-19

## 2021-09-20 ENCOUNTER — OFFICE VISIT (OUTPATIENT)
Dept: FAMILY MEDICINE | Facility: CLINIC | Age: 31
End: 2021-09-20
Payer: COMMERCIAL

## 2021-09-20 ENCOUNTER — TELEPHONE (OUTPATIENT)
Dept: SURGERY | Facility: CLINIC | Age: 31
End: 2021-09-20

## 2021-09-20 ENCOUNTER — ANCILLARY PROCEDURE (OUTPATIENT)
Dept: ULTRASOUND IMAGING | Facility: CLINIC | Age: 31
End: 2021-09-20
Attending: FAMILY MEDICINE
Payer: COMMERCIAL

## 2021-09-20 ENCOUNTER — TELEPHONE (OUTPATIENT)
Dept: FAMILY MEDICINE | Facility: CLINIC | Age: 31
End: 2021-09-20

## 2021-09-20 VITALS
DIASTOLIC BLOOD PRESSURE: 76 MMHG | SYSTOLIC BLOOD PRESSURE: 113 MMHG | RESPIRATION RATE: 18 BRPM | TEMPERATURE: 97.7 F | HEART RATE: 92 BPM | HEIGHT: 64 IN | OXYGEN SATURATION: 97 % | BODY MASS INDEX: 36.37 KG/M2 | WEIGHT: 213 LBS

## 2021-09-20 DIAGNOSIS — M79.18 LEFT BUTTOCK PAIN: Primary | ICD-10-CM

## 2021-09-20 DIAGNOSIS — A59.01 TRICHOMONAL VAGINITIS DURING PREGNANCY IN FIRST TRIMESTER: ICD-10-CM

## 2021-09-20 DIAGNOSIS — Z87.51 HISTORY OF PRETERM LABOR: ICD-10-CM

## 2021-09-20 DIAGNOSIS — M79.7 FIBROMYALGIA: ICD-10-CM

## 2021-09-20 DIAGNOSIS — N89.8 VAGINAL DISCHARGE: ICD-10-CM

## 2021-09-20 DIAGNOSIS — O23.591 TRICHOMONAL VAGINITIS DURING PREGNANCY IN FIRST TRIMESTER: ICD-10-CM

## 2021-09-20 LAB
CLUE CELLS: ABNORMAL
TRICHOMONAS, WET PREP: PRESENT
WBC'S/HIGH POWER FIELD, WET PREP: ABNORMAL
YEAST, WET PREP: ABNORMAL

## 2021-09-20 PROCEDURE — 87210 SMEAR WET MOUNT SALINE/INK: CPT | Performed by: STUDENT IN AN ORGANIZED HEALTH CARE EDUCATION/TRAINING PROGRAM

## 2021-09-20 PROCEDURE — 99214 OFFICE O/P EST MOD 30 MIN: CPT | Mod: GC | Performed by: STUDENT IN AN ORGANIZED HEALTH CARE EDUCATION/TRAINING PROGRAM

## 2021-09-20 PROCEDURE — 76801 OB US < 14 WKS SINGLE FETUS: CPT | Performed by: RADIOLOGY

## 2021-09-20 RX ORDER — LIDOCAINE/PRILOCAINE 2.5 %-2.5%
CREAM (GRAM) TOPICAL 2 TIMES DAILY PRN
Qty: 30 G | Refills: 1 | Status: SHIPPED | OUTPATIENT
Start: 2021-09-20 | End: 2021-09-20

## 2021-09-20 RX ORDER — METRONIDAZOLE 500 MG/1
500 TABLET ORAL 2 TIMES DAILY
Qty: 14 TABLET | Refills: 0 | Status: SHIPPED | OUTPATIENT
Start: 2021-09-20 | End: 2021-09-27

## 2021-09-20 RX ORDER — LIDOCAINE/PRILOCAINE 2.5 %-2.5%
CREAM (GRAM) TOPICAL 2 TIMES DAILY PRN
Qty: 30 G | Refills: 1 | Status: SHIPPED | OUTPATIENT
Start: 2021-09-20 | End: 2022-01-20

## 2021-09-20 RX ORDER — METRONIDAZOLE 7.5 MG/G
GEL VAGINAL
Qty: 150 G | Refills: 0 | Status: SHIPPED | OUTPATIENT
Start: 2021-09-20 | End: 2021-09-20 | Stop reason: ALTCHOICE

## 2021-09-20 ASSESSMENT — MIFFLIN-ST. JEOR: SCORE: 1676.16

## 2021-09-20 NOTE — TELEPHONE ENCOUNTER
LM for patein in regards to cancelling appointment tomorrow with Kaleigh REYES in Plastic due to patient's current condition.    Patient can be rescheduled to  approximately 10/21/22.    Call back number was provided.

## 2021-09-20 NOTE — TELEPHONE ENCOUNTER
Noriskathy states she has been having left hip pain and back pain since her pregnancy. This was aggravate this morning by lying down for her ultra sound. Scheduled for appointment in one hour with Dr. Mcintosh. ./ERIK

## 2021-09-20 NOTE — PROGRESS NOTES
Assessment & Plan  31 year old  at 9w4d with LORIE 2022 based on  LMP and 9 week US. She is here today to discuss left gluteal pain.    Diagnoses and all orders for this visit:    Left buttock pain  Pain is worst to palpation over the left gluteal muscles and is well localized. No known injury. History of fibromyalgia and chronic pain. Discussed some stretches as well as topical pain relief. Pt agreed to see physical therapy.  -     Physical Therapy Referral; Future  -     lidocaine-prilocaine (EMLA) 2.5-2.5 % external cream; Apply topically 2 times daily as needed for moderate pain    Fibromyalgia  Patient has seen rheumatology in the past and would like to return. She was unable to make an appointment and was told she needed another referral.  -     Rheumatology Referral; Future    Trichomonal vaginitis during pregnancy in first trimester  Vaginal discharge  Pt has a history of recurrent bacterial vaginosis. Pt describes recent odorous vaginal discharge and vaginal discomfort consistent with previous episodes of BV. She agreed to self-swabbing for wet prep.  Wet prep resulted with trichomoniasis which was not present in August. Patient agreed to take Metronidazole orally. She follows with a GI specialist for her Crohn disease and her specialist is aware that she is pregnant.   -     Wet preparation  - metroNIDAZOLE (FLAGYL) 500 MG tablet; Take 1 tablet (500 mg) by mouth 2 times daily for 7 days  Dispense: 14 tablet; Refill: 0      Weight gain adequate: 0 kg (0 lb) to date, out of recommended total of 11-20 lbs (pregravid BMI >30)    Patient Instructions   Thank you for discussing your health with us today!    We discussed the following during your visit:    1. For the left buttock pain:  a. Try using the lidocaine cream twice a day. Massage it in as massage can also help with this pain  b. I have referred you to physical therapy  2. Recurrent BV  a. We will run the wet prep today  b. Take 10 days of  the Metro Gel  c. After, use Metrogel twice a week (or every 3 days) for the next 2 months.     Please make an appointment in 3 weeks to follow up on pregnancy.    As always, please call the clinic if you have any questions or concerns.        Return to clinic in 4 weeks.  Ritu Melgoza, MS3    Resident/Fellow Attestation   I, Jarad Mcintosh, was present with the medical/TOBY student who participated in the service and in the documentation of the note.  I have verified the history and personally performed the physical exam and medical decision making.  I agree with the assessment and plan of care as documented in the note.      Jarad Mcintosh MD  PGY3    This patient was discussed with Addie Martinez MD, who agrees with the above assessment and plan.      Subjective  Concerns: Pt is a 30yo female with a history of fibromyalgia, Crohn's Disease, Celiac Disease, Chronic Pain who is currently 9 weeks pregnant. She presents today with left hip and gluteal pain, as well as recurrence of vaginal discharge and discomfort. She has been experiencing left hip pain that radiates down her left thigh and is worsened with lying down. Yesterday, the pain became much worse and she states that she could barely get up from bed. 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 Pt denies any known injuries. Warm baths have helped but the pain returns right after the bath. Pt states that tylenol does not help her significantly.    Pt has a history of recurrent bacterial vaginosis. Previous symptoms of foul-smelling vaginal discharge and vaginal discomfort are consistent with current symptoms, of 2-3 days. She typically has benefited from boric acid suppositories in the past, but hasn't tried this due to concern for her pregnancy. She does not wish to take oral antibiotics, because they have called gastric bleeding, in the context of her Crohn's  disease, in the past.  No pain with urination.    ROS:  No - Headache  No - Changes in vision  No - Chest Pain  No - Shortness of Breath  No - Nausea   No - Vomiting  No - Abdominal pain   No - Contractions  No - Dysuria   No - Vaginal Discharge    No - Vaginal bleeding   No - Loss of Fluid   No - Extremity swelling   Absent - Fetal movement       Patient Active Problem List   Diagnosis     Health Care Home     Crohns disease     Abnormal Pap smear of cervix     Chronic pain     Adjustment disorder with depressed mood     Celiac disease     Chronic pain syndrome     Immunosuppressed status (H)     Multiple joint pain     Fibromyalgia     H/O  delivery, currently pregnant     Ovarian cyst complicating pregnancy, antepartum     Tobacco use in pregnancy, antepartum, first trimester     Supervision of high risk pregnancy, antepartum     BMI 36.0-36.9,adult     At risk for hypertension       Kee Phipps speaks English so an  was not used today.      Objective  LMP 07/15/2021   Gen: Alert, oriented, well appearing, no distress  CV: RRR, no rubs/murmurs/gallops, 2+ radial pulses  Lungs: CTAB, normal work of breathing  Abd: NBS, soft, non-tender, non-distended  Msk: No pain to palpation over spinous processes. Pain to palpation of left buttock along gluteus to greater trochanter. Pain with leftward twisting, no pain with extensions, flexion, or side bend of back. Strength intact bilaterally.     Results  Blood type: O POS  Results for orders placed or performed in visit on 21   US OB <14 WKS SINGLE OR FIRST GESTATION     Status: None    Narrative    EXAM: US OB < 14 WEEKS SINGLE  LOCATION: [facility]  DATE/TIME: 2021 9:45 AM    INDICATION: please schedule when patient is ~9-10 weeks  COMPARISON: None.  TECHNIQUE: Transabdominal scans were performed.    FINDINGS:  UTERUS: Retroverted. Single normal appearing intrauterine gestation sac. Mean gestational sac diameter is  3.8 cm. The gestational sac contains a fetal pole and yolk sac.  CRL: Measures 3.4 cm, equals 10 weeks, 2 days.  FETAL HEART RATE: 144 bpm.  AMNIOTIC FLUID: Normal.  PLACENTA: Not yet formed. No evidence for sub-chorionic hemorrhage.    RIGHT OVARY: Not visualized, obscured by bowel.  LEFT OVARY: Contains a corpus luteum.      Impression    IMPRESSION:     Single living intrauterine gestation at 10 weeks, 2 days, EDC 4/16/2022.

## 2021-09-20 NOTE — PATIENT INSTRUCTIONS
Thank you for discussing your health with us today!    We discussed the following during your visit:    1. For the left buttock pain:  a. Try using the lidocaine cream twice a day. Massage it in as massage can also help with this pain  b. I have referred you to physical therapy  2. Recurrent BV  a. We will run the wet prep today  b. Take 10 days of the Metro Gel  c. After, use Metrogel twice a week (or every 3 days) for the next 2 months.     Please make an appointment in 3 weeks to follow up on pregnancy.    As always, please call the clinic if you have any questions or concerns.

## 2021-09-21 ENCOUNTER — PRE VISIT (OUTPATIENT)
Dept: PLASTIC SURGERY | Facility: CLINIC | Age: 31
End: 2021-09-21

## 2021-09-23 ENCOUNTER — ANCILLARY PROCEDURE (OUTPATIENT)
Dept: ULTRASOUND IMAGING | Facility: HOSPITAL | Age: 31
End: 2021-09-23
Attending: EMERGENCY MEDICINE
Payer: COMMERCIAL

## 2021-09-23 ENCOUNTER — TELEPHONE (OUTPATIENT)
Dept: FAMILY MEDICINE | Facility: CLINIC | Age: 31
End: 2021-09-23

## 2021-09-23 ENCOUNTER — HOSPITAL ENCOUNTER (EMERGENCY)
Facility: HOSPITAL | Age: 31
Discharge: HOME | End: 2021-09-23
Attending: EMERGENCY MEDICINE | Admitting: EMERGENCY MEDICINE
Payer: COMMERCIAL

## 2021-09-23 VITALS
BODY MASS INDEX: 36.37 KG/M2 | OXYGEN SATURATION: 98 % | RESPIRATION RATE: 18 BRPM | SYSTOLIC BLOOD PRESSURE: 130 MMHG | HEART RATE: 85 BPM | WEIGHT: 213 LBS | TEMPERATURE: 97.9 F | DIASTOLIC BLOOD PRESSURE: 81 MMHG | HEIGHT: 64 IN

## 2021-09-23 DIAGNOSIS — O20.9 VAGINAL BLEEDING IN PREGNANCY, FIRST TRIMESTER: ICD-10-CM

## 2021-09-23 PROCEDURE — 76815 OB US LIMITED FETUS(S): CPT

## 2021-09-23 PROCEDURE — 99284 EMERGENCY DEPT VISIT MOD MDM: CPT | Mod: 25

## 2021-09-23 ASSESSMENT — MIFFLIN-ST. JEOR: SCORE: 1676.16

## 2021-09-23 NOTE — ED TRIAGE NOTES
Patient comes to ED for evaluation after episode of vaginal bleeding aprx 40 minutes ago. Patient 10 weeks pregnant.

## 2021-09-23 NOTE — ED PROVIDER NOTES
EMERGENCY DEPARTMENT ENCOUNTER      NAME: Kee Phipps  AGE: 31 year old female  YOB: 1990  MRN: [id number]  EVALUATION DATE & TIME: No admission date for patient encounter.    PCP: Stormy Palacios    ED PROVIDER: Estela Groves MD    Chief Complaint   Patient presents with     Vaginal Bleeding-pregnant (Cpm)         FINAL IMPRESSION:  1. Vaginal bleeding in pregnancy, first trimester          ED COURSE & MEDICAL DECISION MAKING:    Pertinent Labs & Imaging studies reviewed. (See chart for details)  31 year old female  at approximately 10 weeks based on LMP who presents to the Emergency Department for evaluation of lower abdominal cramping and vaginal bleeding x1 hour.  Differential includes pregnancy, threatened AB, subchorionic hemorrhage.  Unlikely ectopic as she is already had ultrasound revealing intrauterine pregnancy.  This is naturally conceived and not assisted so unlikely heterotopic pregnancy at this gestational age.    Bedside ultrasound performed revealing intrauterine pregnancy measuring 10 weeks 0 days with fetal heart rate in the 140s.  No signs of subchorionic hemorrhage.  Patient reassured.  She is O+.  Discharged home.  Discussed needed outpatient follow-up with OB/GYN for repeat ultrasound.  Patient understanding plan and discharged home.         3:10 PM I met with the patient to gather history and to perform my initial exam. I discussed the plan for care while in the Emergency Department. PPE (gloves, surgical mask) was worn during patient encounters.       At the conclusion of the encounter I discussed the results of all of the tests and the disposition. The questions were answered. The patient or family acknowledged understanding and was agreeable with the care plan.      MEDICATIONS GIVEN IN THE EMERGENCY:  Medications - No data to display    NEW PRESCRIPTIONS STARTED AT TODAY'S ER VISIT  New Prescriptions    No medications on file       "    =================================================================    HPI    Patient information was obtained from: Patient    Use of Intrepreter: N/A        Kee Phipps is a 31 year old female with pertinent medical history of  (currently 10 weeks pregnant), Crohn's disease, and Celiac disease who presents for evaluation of vaginal bleeding.    Patient reports about 40 minutes prior to arrival to the ED, she had sudden onset of vaginal bleeding with diffuse lower abdominal cramping. She states she has filled one pad with bright red blood. Pain also radiates to her lower back, but she notes she has been experiencing back pain for \"awhile now\". Patient is 10 weeks pregnant based on ultrasound and last menstrual cycle. She follows with [facility]. Patient has a history of 4 terminations, 1 miscarriage, and 1 living baby at 10 years old. 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 Denies any recent sexual activity. Patient endorses having Trichimonas. Patient denies additional medical concerns or complaints at this time.       REVIEW OF SYSTEMS  Constitutional:  Denies fever, chills, weight loss or weakness  Respiratory: No SOB, wheeze or cough  Cardiovascular:  No CP, palpitations  GI:  Denies nausea, vomiting, diarrhea. Endorses abdominal pain.  : Denies dysuria, denies hematuria. Endorses vaginal bleeding.   Musculoskeletal:  Denies any new muscle/joint pain, swelling or loss of function. Endorses back pain.  All other systems negative unless noted in HPI.      PAST MEDICAL HISTORY:  Past Medical History:   Diagnosis Date     Abnormal Pap smear of cervix      Anemia      Anxiety      Arthritis      Bilateral plantar fasciitis      Celiac disease 2014     Celiac disease      Crohn disease (H)      Crohn's disease (H) 2012     Depression      Depressive disorder      Fibromyalgia      Fibromyalgia      H/O miscarriage, currently pregnant     miscarriage " .     HPV (human papilloma virus) infection      Migraine      Plantar fasciitis     bilateral      delivery 2013    34 weeks. Early rupture of membranes       PAST SURGICAL HISTORY:  Past Surgical History:   Procedure Laterality Date     APPENDECTOMY       BOWEL RESECTION      illium      CERVIX LESION DESTRUCTION       DILATION AND CURETTAGE SUCTION, TREAT MISSED , 1ST 2010    miscarriage at 8 wks gest     DILATION AND CURETTAGE SUCTION, TREAT MISSED , 1ST TRIMESTER  2019     FASCIOTOMY LOWER EXTREMITY Left 2018    Left foot     FOOT SURGERY Left      LAPAROSCOPY DIAGNOSTIC (GYN) N/A 2019    Procedure: LAPAROSCOPY, DIAGNOSTIC;  Surgeon: Douglas Nathan, Anni Lu MD;  Location:  OR     PLANTAR FASCIA RELEASE Left 4/10/2018    Procedure:  PLANTAR FASCIOTOMY LEFT FOOT ;  Surgeon: Yuri Arreola DPM;  Location: [facility];  Service:      SALPINGECTOMY Right 05/15/2021    rt sided ruptured ectopic     SMALL BOWEL RESECTION       TONSILLECTOMY         CURRENT MEDICATIONS:    Cannot display prior to admission medications because the patient has not been admitted in this contact.       ALLERGIES:  Allergies   Allergen Reactions     Gluten Meal      Humira [Humira]      Caused huge lumps at injection site.      Ibuprofen      Patient has Crohns disease and is unable to take this medication.     Latex Itching     Remicade [Infliximab] Rash       FAMILY HISTORY:  Family History   Problem Relation Age of Onset     Hypertension Mother      Seizure Disorder Mother      Asthma Brother      Asthma Brother      Asthma Brother      Asthma Brother      Cancer Maternal Grandmother         bone cancer     Asthma Son      Crohn's Disease Paternal Uncle      Diabetes No family hx of      Coronary Artery Disease No family hx of      Hyperlipidemia No family hx of      Cerebrovascular Disease No family hx of      Breast Cancer No family hx of      Colon Cancer  "No family hx of      Prostate Cancer No family hx of      Other Cancer No family hx of      Depression No family hx of      Anxiety Disorder No family hx of      Mental Illness No family hx of      Substance Abuse No family hx of      Anesthesia Reaction No family hx of      Osteoporosis No family hx of      Genetic Disorder No family hx of      Thyroid Disease No family hx of      Obesity No family hx of      Unknown/Adopted No family hx of      Heart Disease No family hx of        SOCIAL HISTORY:  Social History     Tobacco Use     Smoking status: Current Every Day Smoker     Packs/day: 0.03     Types: Cigarettes     Smokeless tobacco: Never Used   Vaping Use     Vaping Use: Never used   Substance Use Topics     Alcohol use: Not Currently     Comment: occ     Drug use: No        VITALS:  Patient Vitals for the past 24 hrs:   BP Temp Temp src Pulse Resp SpO2 Height Weight   09/23/21 1504 130/81 97.9  F (36.6  C) Temporal 85 18 98 % 1.626 m (5' 4\") 96.6 kg (213 lb)       PHYSICAL EXAM    General Appearance: Well-appearing, well-nourished, no acute distress   Head:  Normocephalic  Eyes:   conjunctiva/corneas clear  ENT:   membranes are moist without pallor  Neck:  Supple  Cardio:  Regular rate and rhythm, no murmur/gallop/rub  Pulm:  No respiratory distress, clear to auscultation bilaterally  Back:  No CVA tenderness, normal ROM  Abdomen:  Soft, obese, non-tender, non distended,no rebound or guarding.  Extremities: Moves all extremities normally, normal gait  Skin:  Skin warm, dry, no rashes  Neuro:  Alert and oriented ×3, moving all extremities, no gross sensory defects        PROCEDURES:  PROCEDURE: Emergency Department Limited Bedside Screening Ultrasound   ANATOMICAL WINDOW:  Suprapubic trans and sagittal   INDICATIONS: Vaginal bleeding   PROCEDURE PROVIDER: Dr. Groves   FINDINGS:  Patient consented to bedside ultrasound.  Curvilinear probe used in the suprapubic transverse and sagittal planes revealing " intrauterine pregnancy measuring 10 weeks 0 days based on crown-rump length with fetal heart rate in the 140s.  No signs of subchorionic hemorrhage.   IMAGES PRINTED & SCANNED OR SAVED TO MEMORY: YES         The creation of this record is based on the scribe s observations of the work being performed by Estela Groves MD and the provider s statements to them. It was created on his behalf by Lolis Garcia, a trained medical scribe. This document has been checked and approved by the attending provider.    Estela Groves MD  Emergency Medicine  [facility] EMERGENCY DEPARTMENT  [facility] [place] [facility] [address]  [phone]  Dept: [phone]       Estela Groves MD  09/23/21 3056

## 2021-09-23 NOTE — TELEPHONE ENCOUNTER
"Pt is 10 wks pregnant and states that when she went to the bathroom she noticed a \"lot\" of bright red blood on her thighs and in the toilet.  She states that she passed a dime size blood clot.  She did not have any abd cramping but states that she is now starting to have rt sided cramps.  She was having vaginal discharge prior to bleeding and is taking the medication for Trichomonas.  Advised pt to go to ER and pt states that she will go to [facility] ER.  Routed note to Dr Palacios./YOLI  "

## 2021-09-23 NOTE — DISCHARGE INSTRUCTIONS
Your ultrasound today showed intrauterine pregnancy with heart rate in the 140s measuring 10 weeks 0 days.  Follow-up with your OB doctor at [facility] as discussed to repeat ultrasound.

## 2021-09-24 ENCOUNTER — TELEPHONE (OUTPATIENT)
Dept: FAMILY MEDICINE | Facility: CLINIC | Age: 31
End: 2021-09-24

## 2021-09-24 NOTE — TELEPHONE ENCOUNTER
[facility] phone call message- general phone call:    Reason for call: She would like to talk to Pretty lopez her ER visit yesterday.    Action desired: call back.    Return call needed: Yes    OK to leave a message on voice mail? Yes    Advised patient to response may take up to 2 business days: Yes    Primary language: English      needed? No    Call taken on September 24, 2021 at 9:02 AM by Kenzie Adhikari

## 2021-09-24 NOTE — TELEPHONE ENCOUNTER
Patient seen in ER yesterday for vaginal bleeding during pregnancy. Ultra sound revealed intrauterine pregnancy 10 weeks 0 days with no signs of subchorionic hemorrhage and patient discharged.     Patient shares she is still having vaginal bleeding. Scheduled for appointment with Dr. Mora today. ./ERIK

## 2021-09-27 NOTE — RESULT ENCOUNTER NOTE
Drew Mcknight, please call patient with results: it appears she did not review the below message via [employer]:     The results of your ultrasound are normal: baby's heart rate is strong, and we are able to see all of the necessary structures. Please let us know if you have any questions or concerns.     Please also ask if she is getting care through [facility], as I see she has an appointment set up for 9/28.

## 2021-09-29 NOTE — TELEPHONE ENCOUNTER
Called pt to see how she was doing.  She states that she has been doing okay and now she is just noticing a very small amount of pink spotting when she wipes after urinating.  She denies vaginal infection symptoms.  She states that she did have sexual intercourse yesterday and did not notice any increase in vag bleeding.  Discussed abstaining from intercourse until the vaginal spotting has stopped.  Pt is scheduled to have u/s at [facility] on 10/7/21 and is okay with waiting until then to have u/s.  Assisted pt with scheduling SHAY for Monday, 10/18/21 at 1:10 PM with Dr Palacios.  Told her to call if she has any questions or concerns or increased spotting.  Pt verbalized understanding and denied further questions or concerns./NG

## 2021-09-30 ENCOUNTER — TELEPHONE (OUTPATIENT)
Dept: FAMILY MEDICINE | Facility: CLINIC | Age: 31
End: 2021-09-30

## 2021-09-30 NOTE — TELEPHONE ENCOUNTER
Prior Authorization Specialty Medication Request    Medication/Dose: lidocaine-prilocaine (EMLA) 2.5-2.5 % external cream  ICD code (if different than what is on RX):  Left buttock pain [M79.18]  - Primary   Previously Tried and Failed:  NA    Insurance Name:   -BLUE PLUS : [phone]     Benefit plan: [id number]-BLUE PLUS ADVANTAGE [place] Ph: [phone]        Insurance ID: Blue Plus [place]  Insurance Phone Number: See above    Pharmacy Information (if different than what is on RX)  Name:    Phone:

## 2021-10-04 ENCOUNTER — PRE VISIT (OUTPATIENT)
Dept: MATERNAL FETAL MEDICINE | Facility: CLINIC | Age: 31
End: 2021-10-04

## 2021-10-04 NOTE — TELEPHONE ENCOUNTER
Central Prior Authorization Team   Phone: [phone]    PA Initiation    Medication: PA for Lidocaine-Prilocaine  Insurance Company: Blue Plus PMAP - Phone [phone] Fax [phone]  Pharmacy Filling the Rx: [id number] - [place], [place] - [address] AT [facility]  Filling Pharmacy Phone: [phone]  Filling Pharmacy Fax:    Start Date: 10/4/2021

## 2021-10-05 NOTE — PROGRESS NOTES
Maternal-Fetal Medicine Consultation    Kee Phipps  : 1990  MRN: [id number]    REFERRAL:  Kee Phipps is a 31 year old sent by Dr. Lozano from [facility] for MFM consultation.    HPI:  Kee Phipps is a 31 year old  at 12w0d by LMP consistent with 10w2d US here for MFM consultation regarding Crohn's disease and hx  delivery.    She is here with her partner Demetrio    Her Crohn's disease was diagnosed in  just after delivery of her son. Her flare occurred during pregnancy and she was treated with steroids with improvement. After delivery she underwent colonoscopy with biopsy to confirm diagnosis. She follows with [facility]. She is currently on ustekinumab, which was just started last month. She complains may be experiencing a current flare, vs experiencing pain as a result of her fibromyalgia. The pain is actually improving over the past week.     She also has a history of  delivery. On 13 she had a  of her son at 34w0d. PPROM occurred at around 31-32 weeks (not all records are available). The etiology for PPROM was not identified.     Prenatal Care:  Primary OB care this pregnancy has been with Dr. Lozano from [facility].    Obstetrics History:  OB History    Para Term  AB Living   8 1 0 1 6 1   SAB TAB Ectopic Multiple Live Births   2 3 0 0 1      # Outcome Date GA Lbr [place]/2nd Weight Sex Delivery Anes PTL Lv   8 Current            7 AB 05/15/21 6w0d    ECTOPIC         Birth Comments: rt sided salpingectomy for ruptured ectopic   6 TAB 19           5 SAB 19 5w1d          4 TAB 2018     TAB         Birth Comments: surgical    3 TAB 16 8w3d    TAB         Birth Comments: Pt had elective surgical .   2  13 34w0d  2.41 kg (5 lb 5 oz) M Vag-Spont EPI Y DEVIN      Birth Comments: feeding tube for a few weeks      Complications:  premature rupture of  membranes (PPROM) delivered, current hospitalization      Name: Sajan Ward SAB 11/08/10              Birth Comments: 1st trimester, D&C       Gynecologic History:  - LMP: 7/15/21  - Last Pap: LSIL (2017)  - Denies any history of abnormal pap smears  - Denies prior cervical surgery or procedures  - Denies any history of frequent UTIs, vaginal infections, or STIs    Past Medical History:  Past Medical History:   Diagnosis Date     Abnormal Pap smear of cervix      Anemia      Anxiety      Arthritis      Bilateral plantar fasciitis      Celiac disease 2014     Celiac disease      Crohn disease (H)      Crohn's disease (H) 2012     Depression      Depressive disorder      Fibromyalgia      Fibromyalgia      H/O miscarriage, currently pregnant     miscarriage .     HPV (human papilloma virus) infection      Migraine      Plantar fasciitis     bilateral      delivery 2013    34 weeks. Early rupture of membranes       Past Surgical History:  Past Surgical History:   Procedure Laterality Date     APPENDECTOMY       BOWEL RESECTION      illium      CERVIX LESION DESTRUCTION       DILATION AND CURETTAGE SUCTION, TREAT MISSED , 1ST TRIMESTER      miscarriage at 8 wks gest     DILATION AND CURETTAGE SUCTION, TREAT MISSED , 1ST TRIMESTER  2019     FASCIOTOMY LOWER EXTREMITY Left 2018    Left foot     FOOT SURGERY Left      LAPAROSCOPY DIAGNOSTIC (GYN) N/A 2019    Procedure: LAPAROSCOPY, DIAGNOSTIC;  Surgeon: Anni Velasquez MD;  Location: [facility] OR     PLANTAR FASCIA RELEASE Left 4/10/2018    Procedure:  PLANTAR FASCIOTOMY LEFT FOOT ;  Surgeon: Yuri Arreola DPM;  Location: [facility];  Service:      SALPINGECTOMY Right 05/15/2021    rt sided ruptured ectopic     SMALL BOWEL RESECTION       TONSILLECTOMY         Current Medications:  Prior to Admission medications    Medication Sig Last Dose Taking? Auth Provider   acetaminophen (TYLENOL)  "325 MG tablet Take 2 tablets (650 mg) by mouth every 4 hours as needed for mild pain   Maximino Sagastume MD   doxylamine (UNISOM) 25 MG TABS tablet Take 0.5 tablets (12.5 mg) by mouth At Bedtime  Patient not taking: Reported on 2021   Luis E Cote MD   glycerin (LAXATIVE) 1.2 g suppository Place 1 suppository rectally daily as needed (constipation) . Take after trial of Metamucil powder for 3-4 days without relief.  Patient not taking: Reported on 2021   Billie Gibbons MD   lidocaine-prilocaine (EMLA) 2.5-2.5 % external cream Apply topically 2 times daily as needed for moderate pain   Jarad Mcintosh MD   polyethylene glycol (MIRALAX) 17 GM/Dose powder Take 17 g by mouth daily  Patient not taking: Reported on 2021   Vance Simmons MD   Prenatal Vit-Fe Fumarate-FA (PRENATAL MULTIVITAMIN W/IRON) 27-0.8 MG tablet Take 1 tablet by mouth daily   Luis E Cote MD   psyllium (METAMUCIL/KONSYL) 58.6 % powder Take 6 g (1 teaspoonful) by mouth 2 times daily as needed for constipation  Patient not taking: Reported on 2021   Billie Gibbons MD   pyridOXINE (VITAMIN B6) 25 MG tablet Take 1 tablet (25 mg) by mouth 3 times daily as needed (nausea)  Patient not taking: Reported on 2021   Luis E Cote MD   ustekinumab (STELARA) 45 MG/0.5ML SOSY Inject 45 mg Subcutaneous once    Reported, Patient       Allergies:  Gluten meal, Humira [humira], Ibuprofen, Latex, and Remicade [infliximab]    ROS:  10-point ROS negative except as in HPI     PHYSICAL EXAM:  Deferred     Other Imaging:   See \"Imaging\" tab for results of today's ultrasound.    ASSESSMENT/PLAN:  Kee Phipps is a 31 year old  at 12w0d by LMP consistent with 10w2d US here for MFM consultation regarding:    # Crohn's disease  Crohn s disease is characterized by transmural inflammation of the intestinal wall often involving the terminal ileum and can involve any part of the GI tract.  The inflammation may result in " intestinal strictures or intestinal fistulas or abscesses.  Patients are usually managed with medications including 5-aminosalicylates, prednisone, 6-mercaptopurine or azathioprine, and infliximab.  Medications that are considered compatible with pregnancy include aminosalicylates and corticosteroids.  Medications with limited data, but which can be used with caution if necessary for symptom control, include azathioprine, mercaptopurine, and cyclosporine. Metronidazole can be used in short courses. Infliximab may be used in the first two trimesters of pregnancy, with discontinuation in the third trimester to reduce the risk of  immunosuppression.  Ciprofloxacin and loperamide should be avoided during pregnancy. .  Methotrexate and diphenoxylate are contraindicated.  Ms. Phipps is currently taking ustekinumab.  This medication has limited safety data but likely benefits of controlling Crohn's disease outweighs the risks. Ideally will try to time last dose before delivery to achieve the lowest possible serum concentration.  Patients with Crohn's disease are at risk for iron, folate, vitamin B12, calcium and vitamin D deficiency.      The effect of the pregnancy on Crohn s disease is best estimated by the status of the disease at the time of conception.  Complications of Crohn's disease occurring during pregnancy are similar to those seen in nonpregnant patients.  Patients with active perianal disease may be at risk for worsening disease activity and injury during delivery, including risk of fistula; so scheduled  delivery should be considered in patients with perianal disease.  On the other hand,  delivery can be difficult in a patient with active or complicated bowel disease, as adhesive disease increases the risks of damage to bowel and the possibility of post-operative complications like obstructions.    Data suggests that women with Crohn s disease are at an increased risk for low birth  weight infants and premature delivery.  Therefore, monitoring of fetal growth is advised in women with Crohn s disease.      Recommendations:  - Close follow-up with gastroenterology. See Dr. Cui's note from today for updated plan. Continuation of necessary medications to control symptoms of Crohn s disease with titration of medications per gastroenterology  - Supplement iron, folate, vitamin B12, calcium and vitamin D if deficiencies noted.    - Comprehensive ultrasound at 18 weeks  - Growth ultrasounds every four weeks starting at 24 weeks (24, 28, 32, 36 weeks)  - Weekly  testing at 32 weeks  - Delivery at 39 weeks  - Stress-dose steroids during delivery as indicated      # Hx of spontaneous  birth/ PPROM  Today we discussed the incidence and epidemiology of  birth (PTB).  There are multiple etiologies of spontaneous PTB, including but not limited to infection, bleeding, uterine distension, cervical insufficiency and stress.   However, most spontaneous  deliveries occur in patients with no risk factors.  A history of prior  delivery is a significant risk factor for recurrence in a subsequent pregnancy.  Recurrent  delivery has been linked to maternal ethnicity, genitourinary infection, and especially gestational age at the first  delivery: the earlier the delivery, the greater the likelihood of recurrence.  We discussed that recurrent  birth can happen at the same gestational age as a prior  birth but that it cannot be predicted and it could recur at an earlier gestational age.  We also discussed the concept of cervical insufficiency which is another cause of  birth and is classically described as painless cervical dilation.  Most women with cervical insufficiency which is either undiagnosed or those undergoing expectant management will eventually develop symptoms, which may be pressure, back pain, cramping, leakage of fluid, vaginal  bleeding and ultimately contractions.  Mid-trimester cervical dilation increases the risk of intra-amniotic infection which can then cause contractions and labor.      We discussed the increased  morbidity and mortality with prematurity which is the rationale for trying to prevent recurrent  birth.  We follow cervical lengths every two from 16 to 23 weeks in women with a history of PTB; an ultrasound indicated cerclage may be considered if shortening is detected prior to 23-24 weeks, given that all cases of cervical insufficiency may not fit into the classic patterns by history.    We discussed the data regarding the use of weekly intramuscular injections of 17 hydroxyprogesterone caproate (17-OHP) supplementation as a means to modify the risk of recurrent  birth.  A multicenter, double-blind randomized controlled trial found a 34% reduction in recurrent  birth <37 weeks (Juan Manuel et al, 2003) with the use of 17-OHP .  This trial also found a significant reduction in recurrent  birth <35 and <32 weeks.  A more recent international, double-blind randomized controlled trial (PROLONG) found no reduction in the rate of recurrent  birth <35 weeks ([place] et al, 2019), calling into question the established use of 17-OHP.  Importantly, both studies indicate that 17-OHP is safe, at least in the short term.       The Society for Maternal Fetal Medicine interprets these disparate results as possibly partially related to the different populations in the two studies (59% black in [facility] versus 90% white in [place]; 50%  in [facility] versus 90% in [place]; 20% tobacco use in [facility] versus 8% in [place]; 32% had more than one prior PTB in [facility] versus 12% in [place]; 91% with at least on additional risk factor for PTB in [facility] versus 48% in [place]).  The current conclusion of [facility] is that it is reasonable to offer 17-OHP to women with a profile more representative of the very high  risk patient, but that all women at risk of recurrent  birth should have a discussion of the risks/benefits and undergo a shared decision-making process.    After the publication of the [place] (PROLONG) study an FDA Advisory Committee recommended withdrawing Linette (17 - OHP) off the market.  The FDA decision is still pending.  [facility] did not change their position.     After a discussion of the above Ms. Phipps was undecided on whether she would like to take these precautions.    Recommendations:  - Optimize modifiable risk factors: smoking cessation, avoidance of cocaine, maintenance of adequate nutrition.  - 17-hydroxyprogesterone caproate 250mg IM weekly from 16-36 weeks' gestation. She will consider this and receive this through her primary OB provider if she decides she would like this.   - Baseline transvaginal cervical length at 16 weeks' gestation with measurements every 2 weeks through 23 weeks. This will be done through our clinic.  - Ultrasound-indicated cerclage +/- vaginal progesterone should be considered if shortening <25 mm is diagnosed <24 weeks.  - Clinical evaluation of  labor symptoms.         The patient was seen and evaluated with Dr. Cifuentes    Thank you for allowing us to participate in the care of your patient. Please do not hesitate to contact us if you have further questions regarding the management of your patient.       Ashlee Arita MD  ObGyn Resident, PGY-2  2021 4:11 PM

## 2021-10-06 NOTE — TELEPHONE ENCOUNTER
Called and checked on status of request, this still in process. This was marked for urgent review, per rep urgent reviews may take up to 72 business hours.

## 2021-10-07 ENCOUNTER — OFFICE VISIT (OUTPATIENT)
Dept: MATERNAL FETAL MEDICINE | Facility: CLINIC | Age: 31
End: 2021-10-07
Payer: COMMERCIAL

## 2021-10-07 ENCOUNTER — LAB (OUTPATIENT)
Dept: LAB | Facility: CLINIC | Age: 31
End: 2021-10-07
Attending: OBSTETRICS & GYNECOLOGY
Payer: COMMERCIAL

## 2021-10-07 ENCOUNTER — HOSPITAL ENCOUNTER (OUTPATIENT)
Dept: ULTRASOUND IMAGING | Facility: CLINIC | Age: 31
End: 2021-10-07
Attending: OBSTETRICS & GYNECOLOGY
Payer: COMMERCIAL

## 2021-10-07 DIAGNOSIS — Z36.0 ENCOUNTER FOR ANTENATAL SCREENING FOR CHROMOSOMAL ANOMALIES: ICD-10-CM

## 2021-10-07 DIAGNOSIS — Z36.0 ENCOUNTER FOR ANTENATAL SCREENING FOR CHROMOSOMAL ANOMALIES: Primary | ICD-10-CM

## 2021-10-07 DIAGNOSIS — O26.90 PREGNANCY RELATED CONDITION, ANTEPARTUM: ICD-10-CM

## 2021-10-07 DIAGNOSIS — M79.7 FIBROMYALGIA: ICD-10-CM

## 2021-10-07 DIAGNOSIS — O09.899 H/O PRETERM DELIVERY, CURRENTLY PREGNANT: ICD-10-CM

## 2021-10-07 DIAGNOSIS — K50.919 CROHN'S DISEASE WITH COMPLICATION, UNSPECIFIED GASTROINTESTINAL TRACT LOCATION (H): ICD-10-CM

## 2021-10-07 PROCEDURE — 99205 OFFICE O/P NEW HI 60 MIN: CPT | Mod: 25 | Performed by: OBSTETRICS & GYNECOLOGY

## 2021-10-07 PROCEDURE — 99204 OFFICE O/P NEW MOD 45 MIN: CPT

## 2021-10-07 PROCEDURE — G0463 HOSPITAL OUTPT CLINIC VISIT: HCPCS | Mod: 25

## 2021-10-07 PROCEDURE — 96040 HC GENETIC COUNSELING, EACH 30 MINUTES: CPT | Performed by: GENETIC COUNSELOR, MS

## 2021-10-07 PROCEDURE — 36415 COLL VENOUS BLD VENIPUNCTURE: CPT

## 2021-10-07 PROCEDURE — 76801 OB US < 14 WKS SINGLE FETUS: CPT

## 2021-10-07 PROCEDURE — 76801 OB US < 14 WKS SINGLE FETUS: CPT | Mod: 26 | Performed by: OBSTETRICS & GYNECOLOGY

## 2021-10-07 NOTE — TELEPHONE ENCOUNTER
Called [employer] clinical review [phone] to check on status of request.     Per rep this was just approved today, we will be receiving an approval letter sometime today.   Will call pharmacy to see if they can process this through

## 2021-10-07 NOTE — PROGRESS NOTES
[facility]  Genetic Counseling Consult    Patient: Kee Phipps YOB: 1990   Date of Service: 10/07/21      Kee Phipps was seen at [facility] for genetic consultation to discuss the options for routine screening for fetal chromosome abnormalities. She also has a history of Crohn's disease, celiac disease, and fibromyalgia. Kee was accompanied to today's visit by her partner, Demetrio.      Impression/Plan:   1.  Kee had an ultrasound and blood draw for NIPT (NIPS test through [facility]).  Results are expected within 5-10 days, and will be available in EPIC.  We will contact her to discuss the results, and a copy will be forwarded to the office of the referring OB provider. Kee provided verbal permission for detailed results to be left on her voicemail. She would like the predicted fetal sex to be called her to her sister-in-law, Arcelia; a consent to share protected health information was signed today.    2. Maternal serum AFP (single marker screen) is recommended after 15 weeks to screen for open neural tube defects. A quad screen should not be performed.    3. Kee also had a joint MFM and GI consult today to discuss her personal medical history and management. Please see corresponding documentation for additional details.    Pregnancy History:   /Parity:    Age at Delivery: 31 year old  LORIE: 2022, by Last Menstrual Period  Gestational Age: 12w0d    This pregnancy has been complicated by bleeding, cramping, and the passage of clots. Kee has had ultrasound since these complications and has had no new symptoms since.    Kee s pregnancy history is significant for:  o Two early miscarriages, one in  and one in .   o History of three elective termination procedures.   o  pre-term vaginal delivery, male, alive and well today.  o  ectopic pregnancy requiring      This  pregnancy has a different father of the baby than Kee's previous pregnancies. The FOB, Demetrio, has no other children.    Medical History:   Kee has a history of Crohn's disease, celiac disease, and fibromyalgia. She follows with gastroenterology. She also had a GI and MFM joint consultation today. We discussed that each of these conditions is a multifactorial autoimmune condition. Unfortunately, the genetics of these conditions is not well-understood. Instead, these conditions are thought to be multifactorial, meaning that there are many genetic and non-genetic factors that contribute to the development of the condition. Recurrence risk is likely higher among first-degree relatives of the affected individual and decreases with distance in relationship to other second-degree and third-degree relatives. We reviewed that at this time there is no specific genetic testing for these conditions that can be done to predict onset.     Family History:   A three-generation pedigree was obtained, and is scanned under the  Media  tab.   The following significant findings were reported by Kee:    Kee has four full-siblings and two maternal half-siblings. One of her full-brothers has a history of congenital hearing loss. He has a cochlear implant and has limited hearing function in one ear. He also has a history of scoliosis. We reviewed that hearing loss is relatively common in the human population in that profound congenital hearing loss is estimated to occur in about 1 in 1000 births.  Approximately half of cases are thought to be due to environmental factors (acoustic trauma, ototoxic drug exposure, and bacterial or viral infections such as rubella or cytomegalovirus) and half due to genetic causes. The majority (70%) of cases of congenital deafness associated with genetic factors are classified as non-syndromic (the deafness is not associated with other clinical findings that define a recognized syndrome). In the  remaining 30%, one of more than 400 forms of syndromic deafness can be diagnosed because of other associated clinical findings. It is difficult to determine the recurrence risk for family members without a known cause for the hearing loss. Inheritance patterns of genetic hearing loss can include AD, AR, X-linked and mitochondrial. We reviewed that there are carrier screening options for some of the more common etiologies.   Kee shared that her parents also experienced one stillbirth around six months of pregnancy. No further information is known about the cause of the demise in this pregnancy. We discussed how it is possible that Kee's sibling passed due to an underlying congenital abnormality (such as a cardiac defect), a genetic syndrome (such as a metabolic diease or other syndrome), or potentially an infection. However, in the absence of a diagnosis it is not possible to determine recurrence risks for Marisela pregnancy.      Kee's partner, Demetrio, is 25 and healthy. He has one full-sister and two maternal half-siblings. All have children who are doing well.     Demetrio has one maternal first-cousin who has limited mobility; she has used a wheel-chair since childhood. She has some mild delays, is in her 30s, and is independent. Demetrio remembers being told that his cousin had a difficult birth and that the umbilical cord was wrapped around her neck. We discussed the possibility that this history is consistent with cerebral palsy due to birth trauma and is likely not genetic. However, if more information were ever learned about her underlying condition, we would be able to discuss recurrence risk with more specificity.    Otherwise, the reported family history is negative for multiple miscarriages, stillbirths, birth defects, intellectual disability, known genetic conditions, and consanguinity.       Carrier Screening:     Expanded carrier screening for mutations in a large panel of genes associated with  autosomal recessive conditions including cystic fibrosis, thalassemias, hearing loss, spinal muscular atrophy, and others, is now available. We also reviewed that expanded carrier screening can assess for common X-linked recessive disorders such as Fragile X syndrome.       We discussed that expanded carrier screening is designed to identify carrier status for conditions that are primarily childhood or adolescent onset. Expanded carrier screening does not evaluate for adult-onset conditions such as hereditary cancer syndromes, dementia/ Alzheimer's disease, or cardiovascular disease risk factors. Additionally, expanded carrier screening is not comprehensive for all known genetic diseases or inherited conditions. This is a screening test, and residual carrier status risk figures will be provided to the patient after results become available. Carrier screening is not meant to diagnose the patient with a condition, and generally carriers are asymptomatic. However, certain genes may confer increased risks for various health concerns in carriers (LIEN, DMD, FMR1).      The patient has declined the carrier screening options reviewed today.       Risk Assessment for Chromosome Conditions:   We explained that the risk for fetal chromosome abnormalities increases with maternal age. We discussed specific features of common chromosome abnormalities, including Down syndrome, trisomy 13, trisomy 18, and sex chromosome conditions.      - At age 31 at midtrimester, the risk to have a baby with Down syndrome is 1 in 760.     - At age 31 at midtrimester, the risk to have a baby with any chromosome abnormality is 1 in 380.     Testing Options:   We discussed the following options:   First trimester screening    First trimester ultrasound with nuchal translucency and nasal bone assessments, maternal plasma hCG, SKYLER-A, and AFP measurement    Screens for fetal trisomy 21, trisomy 13, and trisomy 18    Cannot screen for open neural tube  defects; maternal serum AFP after 15 weeks is recommended     Non-invasive Prenatal Testing (NIPT)    Maternal plasma cell-free DNA testing; first trimester ultrasound with nuchal translucency and nasal bone assessment is recommended, when appropriate    Screens for fetal trisomy 21, trisomy 13, trisomy 18, and sex chromosome aneuploidy    Cannot screen for open neural tube defects; maternal serum AFP after 15 weeks is recommended     Chorionic villus sampling (CVS)    Invasive procedure typically performed in the first trimester by which placental villi are obtained for the purpose of chromosome analysis and/or other prenatal genetic analysis    Diagnostic results; >99% sensitivity for fetal chromosome abnormalities    Cannot test for open neural tube defects; maternal serum AFP after 15 weeks is recommended     Genetic Amniocentesis    Invasive procedure typically performed in the second trimester by which amniotic fluid is obtained for the purpose of chromosome analysis and/or other prenatal genetic analysis    Diagnostic results; >99% sensitivity for fetal chromosome abnormalities    AFAFP measurement tests for open neural tube defects     Comprehensive (Level II) ultrasound: Detailed ultrasound performed between 18-22 weeks gestation to screen for major birth defects and markers for aneuploidy.      We reviewed the benefits and limitations of this testing.  Screening tests provide a risk assessment specific to the pregnancy for certain fetal chromosome abnormalities, but cannot definitively diagnose or exclude a fetal chromosome abnormality.  Follow-up genetic counseling and consideration of diagnostic testing is recommended with any abnormal screening result.     Diagnostic tests carry inherent risks- including risk of miscarriage- that require careful consideration.  These tests can detect fetal chromosome abnormalities with greater than 99% certainty.  Results can be compromised by maternal cell  contamination or mosaicism, and are limited by the resolution of cytogenetic G-banding technology.  There is no screening nor diagnostic test that can detect all forms of birth defects or mental disability.     It was a pleasure to be involved with Kee s care. Face-to-face time of the meeting was 45 minutes.    Sara Monroe MS, [facility]  Licensed Genetic Counselor  [facility]  Maternal Fetal Medicine  [email or web address]  [phone]

## 2021-10-07 NOTE — PATIENT INSTRUCTIONS
It is most important to control and prevent active disease in pregnancy.     RECOMMENDATIONS (Discussed with Dr. Zhao - GI doc at [facility])  ----Obtain a fecal calprotectin check  ---------------If elevated, increase Stelara to every 4 weeks and consider a 30 day treatment course of Uceris  ----Monitor fecal calprotectin every trimester and postpartum  ----Continue Stelara through pregnancy, but adjust Stelara dose so that patient is due for her Stelara 24-48 hr after delivery   ----Restage disease with MRE and colonoscopy after delivery

## 2021-10-07 NOTE — TELEPHONE ENCOUNTER
Prior Authorization Approval    Authorization Effective Date: 10/7/2021  Authorization Expiration Date: 10/7/2022  Medication: PA for Lidocaine-Prilocaine  Approved Dose/Quantity:   Reference #:     Insurance Company: [employer] Clinical Review - Phone [phone] Fax [phone]  Expected CoPay:       CoPay Card Available:      Foundation Assistance Needed:    Which Pharmacy is filling the prescription (Not needed for infusion/clinic administered): [id number] - [place], [place] - [address] AT [facility]  Pharmacy Notified: Yes  Patient Notified: Yes  Will copy approval once received.  Pharmacy will contact patient when ready for .

## 2021-10-07 NOTE — PROGRESS NOTES
Pt presents to [facility] [place] for assessment and evaluation of her pregnancy due to crohns and hx of PPROM with PTD at 34 weeks. Pt met with GC. See separate note for today's screening options. Pt had us done and reviewed by Dr. Cifuentes. See epic for today's us findings. Pt met with Dr. Cifuentes and Dr. Arita's consult discussion and recommendations. Questions answered. Pt will go to the lab for blood draw today. Will return at 16 week for tv, at 18 weeks for TV/L2 us at 20 weeks for TV at 22 weeks for Rl2/TV and 24 weeks for TV. Pt will need q4 week growths after. Pt states understanding. Discharged stable. Lucy Pugh RN

## 2021-10-07 NOTE — PROGRESS NOTES
Pt met with Dr. Cui with GI. See note for today's consult for discussion and recommendations.. Lucy Pugh RN

## 2021-10-07 NOTE — PROGRESS NOTES
CD NEW -- IPREPP    PATIENT: Kee Phipps    MRN: [id number]    Date of Birth 1990    Tel: [phone] (home)     PCP: Stormy Palacios     HPI: Ms. Phipps is a 31 year old female here for pregnancy management in the setting of T1 and Crohn's ileocolitis.     Diagnosed with Crohn's ileocolitis in 2012 (hospitalization for bloody diarrhea). CT at that time showed ileitis. Subsequent icscope was normal, but TI could not intubated. A later icscope did show ulcers in the TI and throughout the colon. Initially treated with mesalamine 2.4 g daily and prednisone. She proved to be steroid dependent and transitioned to Remicade, c/b neck rash at second infusion. Then Humira with 2nd injection c/b injection site reaction.  Had a break from medical therapy for a few months, then started Entyvio at the end of 2014. In 2015, she underwent a R hemicolectomy for an obstruction.     She had a miscarriage in 2019. 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  In 2013 she had pregnancy with early rupture of membrane, hospitalization for bedrest, delivery of her son at 34 weeks who was then hospitalized in the NICU for 2 weeks.    Of note, Kee states that she was diagnosed with celiac disease. Last EGD in 2014 was normal. Per my discussion with Dr. Zhao, she is not familiar with the basis of this diagnosis.     Noteworthy diet history- does not follow a GFD, mostly due to cost    HBI  General well-being 2 = poor  Abdominal pain 2 = moderate, lower, bl  Number of liquid stools per day up to 6  Abdominal mass none  Current Complications none    Constitutional symptoms:  Fever NO  Weight loss NO    Petty Classification  AGE AT DIAGNOSIS: A2 between 17 and 40 y  CURRENT DISEASE LOCATION: L3: ileocolonic  L4 upper GI: NO  DISEASE BEHAVIOR (since disease onset): B1: non-stricturing, non-penetrating  Perianal disease: NO    Total number of IBD surgeries (except perianal): 1, R  hemicolectomy in     Current IBD Medications:  Stelara q8w (last injection )    Past IBD Medications:   Mesalamine  Prednisone  Remicade  Humira  Entyvio      Past Medical History:   Diagnosis Date     Abnormal Pap smear of cervix      Anemia      Anxiety      Arthritis      Bilateral plantar fasciitis      Celiac disease 2014     Celiac disease      Crohn disease (H)      Crohn's disease (H) 2012     Depression      Depressive disorder      Fibromyalgia      Fibromyalgia      H/O miscarriage, currently pregnant     miscarriage .     HPV (human papilloma virus) infection      Migraine      Plantar fasciitis     bilateral      delivery 2013    34 weeks. Early rupture of membranes        Past Surgical History:   Procedure Laterality Date     APPENDECTOMY       BOWEL RESECTION      illium      CERVIX LESION DESTRUCTION       DILATION AND CURETTAGE SUCTION, TREAT MISSED , 1ST TRIMESTER      miscarriage at 8 wks gest     DILATION AND CURETTAGE SUCTION, TREAT MISSED , 1ST TRIMESTER  2019     FASCIOTOMY LOWER EXTREMITY Left 2018    Left foot     FOOT SURGERY Left      LAPAROSCOPY DIAGNOSTIC (GYN) N/A 2019    Procedure: LAPAROSCOPY, DIAGNOSTIC;  Surgeon: Anni Velasquez MD;  Location:  OR     PLANTAR FASCIA RELEASE Left 4/10/2018    Procedure:  PLANTAR FASCIOTOMY LEFT FOOT ;  Surgeon: Yuri Arreola DPM;  Location: [facility];  Service:      SALPINGECTOMY Right 05/15/2021    rt sided ruptured ectopic     SMALL BOWEL RESECTION       TONSILLECTOMY         Social History     Tobacco Use     Smoking status: Current Every Day Smoker     Packs/day: 0.03     Types: Cigarettes     Smokeless tobacco: Never Used   Substance Use Topics     Alcohol use: Not Currently     Comment: occ       Family History   Problem Relation Age of Onset     Hypertension Mother      Seizure Disorder Mother      Asthma Brother      Asthma Brother      Asthma  Brother      Asthma Brother      Cancer Maternal Grandmother         bone cancer     Asthma Son      Crohn's Disease Paternal Uncle      Diabetes No family hx of      Coronary Artery Disease No family hx of      Hyperlipidemia No family hx of      Cerebrovascular Disease No family hx of      Breast Cancer No family hx of      Colon Cancer No family hx of      Prostate Cancer No family hx of      Other Cancer No family hx of      Depression No family hx of      Anxiety Disorder No family hx of      Mental Illness No family hx of      Substance Abuse No family hx of      Anesthesia Reaction No family hx of      Osteoporosis No family hx of      Genetic Disorder No family hx of      Thyroid Disease No family hx of      Obesity No family hx of      Unknown/Adopted No family hx of      Heart Disease No family hx of        Allergies   Allergen Reactions     Gluten Meal      Humira [Humira]      Caused huge lumps at injection site.      Ibuprofen      Patient has Crohns disease and is unable to take this medication.     Latex Itching     Remicade [Infliximab] Rash        Outpatient Encounter Medications as of 10/7/2021   Medication Sig Dispense Refill     acetaminophen (TYLENOL) 325 MG tablet Take 2 tablets (650 mg) by mouth every 4 hours as needed for mild pain 100 tablet 0     doxylamine (UNISOM) 25 MG TABS tablet Take 0.5 tablets (12.5 mg) by mouth At Bedtime (Patient not taking: Reported on 9/20/2021) 30 tablet 1     glycerin (LAXATIVE) 1.2 g suppository Place 1 suppository rectally daily as needed (constipation) . Take after trial of Metamucil powder for 3-4 days without relief. (Patient not taking: Reported on 9/20/2021) 10 suppository 0     lidocaine-prilocaine (EMLA) 2.5-2.5 % external cream Apply topically 2 times daily as needed for moderate pain 30 g 1     polyethylene glycol (MIRALAX) 17 GM/Dose powder Take 17 g by mouth daily (Patient not taking: Reported on 9/20/2021) 510 g 1     Prenatal Vit-Fe Fumarate-FA  (PRENATAL MULTIVITAMIN W/IRON) 27-0.8 MG tablet Take 1 tablet by mouth daily 90 tablet 3     psyllium (METAMUCIL/KONSYL) 58.6 % powder Take 6 g (1 teaspoonful) by mouth 2 times daily as needed for constipation (Patient not taking: Reported on 9/20/2021) 173 g 0     pyridOXINE (VITAMIN B6) 25 MG tablet Take 1 tablet (25 mg) by mouth 3 times daily as needed (nausea) (Patient not taking: Reported on 9/20/2021) 30 tablet 3     ustekinumab (STELARA) 45 MG/0.5ML SOSY Inject 45 mg Subcutaneous once        No facility-administered encounter medications on file as of 10/7/2021.      NSAID  NO    Review of Systems  Complete 10 System ROS performed. All are negative except as documented below, in the HPI, or in patient questionnaire from today's visit.    1) Constitutional: No fevers, chills, night sweats or malaise, weight loss or gain  2) Skin: No rash  3) Pulmonary: No wheeze, SOB, cough, sputum or hemoptysis  4) Cardiovascular: No Chest pain or palpitations  5) Genitourinary: No blood in urine or dysuria  6) Endocrine: No increased sweating, hunger, thirst or thyroid problems  7) Hematologic: No bruising and easy bleeding  8) Musculoskeletal: no new pain in joints or limitation in ROM  9) Neurologic: No dizziness, paresthesias or weakness or falls  10) Psychiatric:  not depressed/anxious, no sleep problems    PHYSICAL EXAM  Vitals: LMP 07/15/2021     General appearance  Healthy appearing adult, in no acute distress     Eyes  Sclera anicteric  Pupils round and reactive to light     Ears, nose, mouth and throat  No obvious external lesions of ears and nose  Hearing intact     Neck  Symmetric  No obvious external lesions     Respiratory  Normal respiration, no use of accessory muscles      MSK  Gait normal     Skin  No rashes or jaundice      Psychiatric  Oriented to person, place and time  Appropriate mood and affect.       DATA:  Reviewed in detail past documentation, medications and prior workup available in electronic  health records or through outside records.    PERTINENT STUDIES:  Most recent CBC:  WBC   Date Value Ref Range Status   08/16/2019 7.3 4.0 - 11.0 10e9/L Final     WBC Count   Date Value Ref Range Status   09/08/2021 9.7 4.0 - 11.0 10e3/uL Final   ]  Hemoglobin   Date Value Ref Range Status   09/08/2021 13.7 11.7 - 15.7 g/dL Final   08/16/2019 13.0 11.7 - 15.7 g/dL Final   ]   Platelet Count   Date Value Ref Range Status   09/08/2021 219 150 - 450 10e3/uL Final   08/16/2019 180 150 - 450 10e9/L Final       Most recent coag:  INR   Date Value Ref Range Status   08/08/2021 0.98 0.90 - 1.15 Final     Comment:     Effective 7/11/2021, the reference range for this assay has changed.   08/16/2019 1.07 0.86 - 1.14 Final       Most recent hepatic panel:  AST   Date Value Ref Range Status   08/08/2021 16 0 - 40 U/L Final   08/16/2019 8 0 - 45 U/L Final     ALT   Date Value Ref Range Status   08/08/2021 16 0 - 45 U/L Final   08/16/2019 15 0 - 50 U/L Final     No results found for: BILICONJ   Bilirubin Total   Date Value Ref Range Status   08/08/2021 0.4 0.0 - 1.0 mg/dL Final   08/16/2019 0.4 0.2 - 1.3 mg/dL Final     Albumin   Date Value Ref Range Status   08/08/2021 4.2 3.5 - 5.0 g/dL Final   08/16/2019 3.5 3.4 - 5.0 g/dL Final     Alkaline Phosphatase   Date Value Ref Range Status   08/08/2021 84 45 - 120 U/L Final   08/16/2019 62 40 - 150 U/L Final       Most recent creatinine:  Creatinine   Date Value Ref Range Status   08/08/2021 0.78 0.60 - 1.10 mg/dL Final   08/16/2019 0.67 0.52 - 1.04 mg/dL Final     DRUG MONITORING  Records not available    Endoscopy:   EGD 2014 normal.     Imaging:  CT 11/2020:    IMPRESSION:  1.  Right hemicolectomy with ileocolic anastomosis. No small bowel wall thickening identified.   2.  A loop of small bowel in the pelvis is mildly prominent. There is trace free fluid adjacent to this loop of bowel and the right adnexa. The source of the fluid is unclear.  3.  Mild wall thickening of the  descending colon could be transient related to underdistention or could be a mild inflammatory colitis.    IMPRESSION:    Ms. Phipps is a 31 year old here with Crohn's ileocolitis, s/p R hemicolectomy in 2015, previously failed Remicade, Humira and Entyvio with a history of noncompliance, currently on Stelara. She is now 12 weeks pregnant. Adele reports a personal history of celiac disease and is not on a GFD. Per my discussion with her GI physician, Dr. Zhao, the basis of this diagnosis is unclear.     Kee is reporting abdominal pain (bl, lower) and frequent BMs (up to 6 per day). At this time, I recommend screening for active disease with a fecal calprotectin check.     # T1 pregnancy  # Frequent stools, lower abdominal pain  # Crohn's ileocolitis, s/p R hemicolectomy in 2015, currently on Stelara  # History of exposure to multiple biologics  # Reported personal history of celiac disease, without medical record corroboration     PLAN    It is most important to control and prevent active disease in pregnancy.     RECOMMENDATIONS (Dr. Zhao - GI doc at [facility] - was contacted and a message with my direct call back number was provided)  ----Obtain a fecal calprotectin check  ---------------If elevated, increase Stelara to every 4 weeks and consider a 30 day treatment course of budesonide  ----Monitor fecal calprotectin every trimester and postpartum  ----Continue Stelara through pregnancy, but adjust Stelara dose so that patient is due for her Stelara 24-48 hr after delivery   ----Restage disease with MRE and colonoscopy after delivery  ----Greatly appreciate [facility]'s recommendations, including increased monitoring during pregnancy, given Kee's moderate disease requiring biologic therapy    Return if symptoms worsen or fail to improve.    Fernanda Cui MD   of Medicine  Division of Gastroenterology, Hepatology and Nutrition  [facility]    October 7, 2021    50 minutes spent on the  date of the encounter performing chart review, history and exam, documentation and further activities as noted above.

## 2021-10-08 ENCOUNTER — HOSPITAL ENCOUNTER (OUTPATIENT)
Dept: PHYSICAL THERAPY | Facility: REHABILITATION | Age: 31
End: 2021-10-08
Payer: COMMERCIAL

## 2021-10-08 DIAGNOSIS — M79.18 LEFT BUTTOCK PAIN: ICD-10-CM

## 2021-10-08 DIAGNOSIS — M53.3 PAIN OF LEFT SACROILIAC JOINT: ICD-10-CM

## 2021-10-08 DIAGNOSIS — M54.50 LOW BACK PAIN DURING PREGNANCY IN FIRST TRIMESTER: Primary | ICD-10-CM

## 2021-10-08 DIAGNOSIS — M62.81 MUSCLE WEAKNESS (GENERALIZED): ICD-10-CM

## 2021-10-08 DIAGNOSIS — O26.891 LOW BACK PAIN DURING PREGNANCY IN FIRST TRIMESTER: Primary | ICD-10-CM

## 2021-10-08 PROCEDURE — 97161 PT EVAL LOW COMPLEX 20 MIN: CPT | Mod: GP | Performed by: PHYSICAL THERAPIST

## 2021-10-08 PROCEDURE — 97112 NEUROMUSCULAR REEDUCATION: CPT | Mod: GP | Performed by: PHYSICAL THERAPIST

## 2021-10-08 PROCEDURE — 97110 THERAPEUTIC EXERCISES: CPT | Mod: GP | Performed by: PHYSICAL THERAPIST

## 2021-10-08 NOTE — PROGRESS NOTES
[facility]          OUTPATIENT PHYSICAL THERAPY ORTHOPEDIC EVALUATION  PLAN OF TREATMENT FOR OUTPATIENT REHABILITATION  (COMPLETE FOR INITIAL CLAIMS ONLY)  Patient's Last Name, First Name, M.I.  YOB: 1990  MoiseKee STOKES    Provider s Name:  [facility]   Medical Record No.  [id number]   Start of Care Date:  10/08/21   Onset Date:  08/02/21   Type:     _X__PT   ___OT   ___SLP Medical Diagnosis:  Left buttock pain     PT Diagnosis:  left low back pain during pregnancy, L sacroiliac joint pain, muscle weakness,    Visits from SOC:  1      _________________________________________________________________________________  Plan of Treatment/Functional Goals:  gait training, joint mobilization, manual therapy, neuromuscular re-education, ROM, strengthening, stretching     Cryotherapy (limited due to pregnancy)     Goals  Goal Identifier: HEP  Goal Description: Pt will be independent in HEP  Target Date: 11/07/21    Goal Identifier: Walking  Goal Description: Pt will be able to walk for 10-15 min with <3/10 pain for community mobility   Target Date: 01/06/22    Goal Identifier: Household chores  Goal Description: Pt will be able to do 10-20 min of household chores with <4/10 pain   Target Date: 01/06/22                                                           Therapy Frequency:  other (see comments) (1x or every other week )  Predicted Duration of Therapy Intervention:  12 weeks for up to 12 sessions    Zora Barahona, PT                 I CERTIFY THE NEED FOR THESE SERVICES FURNISHED UNDER        THIS PLAN OF TREATMENT AND WHILE UNDER MY CARE     (Physician co-signature of this document indicates review and certification of the therapy plan).                       Certification Date From:  10/08/21   Certification Date To:  01/06/22    Referring Provider:  Jarad Mcintosh MD       10/08/21 1100   General Information   Type of Visit  "Initial OP Ortho PT Evaluation   Start of Care Date 10/08/21   Referring Physician Jarad Mcintosh MD   Patient/Family Goals Statement \"start to get relief and feel normal\"   Date of Order 09/20/21   Certification Required? Yes   Medical Diagnosis Left buttock pain   General Information Comments Pt is 12w1d pregnant.    Body Part(s)   Body Part(s) Lumbar Spine/SI   Presentation and Etiology   Pertinent history of current problem (include personal factors and/or comorbidities that impact the POC) Pt reports left buttocks pain and pain all throughout her body. She reports colby shoulder pain, elbow pain, knee pain and ankle pain. She noticed L buttocks pain after she became pregnant. The pain is very sharp and happens after doing more activity then she will rest and try to get back up- it will become painful. The pain will typically last the rest of the day and then improve the next morning. Walking and standing will cause pain for >10 minutes. 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    Impairments A. Pain;F. Decreased strength and endurance;G. Impaired balance;H. Impaired gait;D. Decreased ROM   Functional Limitations perform activities of daily living   Symptom Location Left buttocks   How/Where did it occur From insidious onset   Onset date of current episode/exacerbation 08/02/21   Chronicity New   Pain rating (0-10 point scale) Best (/10);Worst (/10)   Best (/10) 5   Worst (/10) 10   Pain quality A. Sharp   Frequency of pain/symptoms A. Constant   Pain/symptoms exacerbated by B. Walking;C. Lifting;J. ADL;I. Bending;K. Home tasks   Pain/symptoms eased by C. Rest;A. Sitting   Progression of symptoms since onset: Improved   Fall Risk Screen   Fall screen completed by PT   Have you fallen 2 or more times in the past year? Yes   Have you fallen and had an injury in the past year? Yes   Is patient a fall risk? No   Fall screen comments She was running down the stairs  "   Abuse Screen (yes response referral indicated)   Feels Unsafe at Home or Work/School no   Feels Threatened by Someone no   Does Anyone Try to Keep You From Having Contact with Others or Doing Things Outside Your Home? no   Physical Signs of Abuse Present no   Lumbar Spine/SI Objective Findings   Flexion ROM to toes   Extension ROM WNL with ERP   Right Side Bending ROM WNL with pain on L   Left Side Bending ROM WNL   Lumbar ROM Comment Rotation: R mild with pain, L WNL   Pelvic Screen R LE appears longer, R ASIS forward   Hip Screen supine PROM with mild pain on L with end range flexion and ER, otherwise WNL colby, neg scour, NAHUM, FADIR colby   Hip Flexion (L2) Strength 5/5 on R, 4/5 on L   Knee Extension (L3) Strength 5/5 on R, 3+/5 on L    Ankle Dorsiflexion (L4) Strength 5/5 on R, 4-/5 on L   Great Toe Extension (L5) Strength 5/5 colby   Ankle Plantar Flexion (S1) Strength 1/5 colby   SLR neg colby   Crossover SLR neg colby   Slump Test + on L, - on R   Palpation tender along L buttocks   Planned Therapy Interventions   Planned Therapy Interventions gait training;joint mobilization;manual therapy;neuromuscular re-education;ROM;strengthening;stretching   Planned Modality Interventions   Planned Modality Interventions Cryotherapy  (limited due to pregnancy)   Clinical Impression   Criteria for Skilled Therapeutic Interventions Met yes, treatment indicated   PT Diagnosis left low back pain during pregnancy, L sacroiliac joint pain, muscle weakness,    Influenced by the following impairments pelvic malalignment, muscle weakness,  + L neurodynamic testing, pain   Functional limitations due to impairments standing, walking, bending, lifting, household chores, bed mobility   Clinical Presentation Stable/Uncomplicated   Clinical Decision Making (Complexity) Low complexity   Therapy Frequency other (see comments)  (1x or every other week )   Predicted Duration of Therapy Intervention (days/wks) 12 weeks for up to 12 sessions    Risk & Benefits of therapy have been explained Yes   Patient, Family & other staff in agreement with plan of care Yes   ORTHO GOALS   PT Ortho Eval Goals 1;2;3   Ortho Goal 1   Goal Identifier HEP   Goal Description Pt will be independent in HEP   Target Date 11/07/21   Ortho Goal 2   Goal Identifier Walking   Goal Description Pt will be able to walk for 10-15 min with <3/10 pain for community mobility    Target Date 01/06/22   Ortho Goal 3   Goal Identifier Household chores   Goal Description Pt will be able to do 10-20 min of household chores with <4/10 pain    Target Date 01/06/22   Total Evaluation Time   PT Eval, Low Complexity Minutes (72487) 25   Therapy Certification   Certification date from 10/08/21   Certification date to 01/06/22   Medical Diagnosis Left buttock pain     Zora Barahona, PT, DPT, CLT-RICH  Initial Assessment        See Epic Evaluation Start of Care Date: 10/08/21

## 2021-10-13 ENCOUNTER — TELEPHONE (OUTPATIENT)
Dept: FAMILY MEDICINE | Facility: CLINIC | Age: 31
End: 2021-10-13

## 2021-10-13 ENCOUNTER — OFFICE VISIT (OUTPATIENT)
Dept: FAMILY MEDICINE | Facility: CLINIC | Age: 31
End: 2021-10-13
Payer: COMMERCIAL

## 2021-10-13 VITALS
DIASTOLIC BLOOD PRESSURE: 78 MMHG | TEMPERATURE: 98.7 F | SYSTOLIC BLOOD PRESSURE: 111 MMHG | BODY MASS INDEX: 37.14 KG/M2 | OXYGEN SATURATION: 98 % | RESPIRATION RATE: 16 BRPM | WEIGHT: 216.4 LBS | HEART RATE: 97 BPM

## 2021-10-13 DIAGNOSIS — K50.819 CROHN'S DISEASE OF SMALL AND LARGE INTESTINES WITH COMPLICATION (H): ICD-10-CM

## 2021-10-13 DIAGNOSIS — M79.7 FIBROMYALGIA: ICD-10-CM

## 2021-10-13 DIAGNOSIS — N89.8 VAGINAL DISCHARGE: ICD-10-CM

## 2021-10-13 DIAGNOSIS — O09.211 HIGH RISK PREGNANCY DUE TO HISTORY OF PRETERM LABOR IN FIRST TRIMESTER: Primary | ICD-10-CM

## 2021-10-13 LAB
ALBUMIN UR-MCNC: NEGATIVE MG/DL
APPEARANCE UR: CLEAR
BILIRUB UR QL STRIP: NEGATIVE
CLUE CELLS: ABNORMAL
COLOR UR AUTO: YELLOW
GLUCOSE UR STRIP-MCNC: NEGATIVE MG/DL
HGB UR QL STRIP: NEGATIVE
KETONES UR STRIP-MCNC: ABNORMAL MG/DL
LEUKOCYTE ESTERASE UR QL STRIP: NEGATIVE
NITRATE UR QL: NEGATIVE
PH UR STRIP: 6 [PH] (ref 5–8)
SP GR UR STRIP: 1.02 (ref 1–1.03)
TRICHOMONAS, WET PREP: ABNORMAL
UROBILINOGEN UR STRIP-ACNC: 0.2 E.U./DL
WBC'S/HIGH POWER FIELD, WET PREP: ABNORMAL
YEAST, WET PREP: ABNORMAL

## 2021-10-13 PROCEDURE — 81003 URINALYSIS AUTO W/O SCOPE: CPT | Performed by: STUDENT IN AN ORGANIZED HEALTH CARE EDUCATION/TRAINING PROGRAM

## 2021-10-13 PROCEDURE — 87210 SMEAR WET MOUNT SALINE/INK: CPT | Performed by: STUDENT IN AN ORGANIZED HEALTH CARE EDUCATION/TRAINING PROGRAM

## 2021-10-13 PROCEDURE — 99207 PR PRENATAL VISIT: CPT | Mod: GC | Performed by: STUDENT IN AN ORGANIZED HEALTH CARE EDUCATION/TRAINING PROGRAM
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
## 2021-10-13 NOTE — TELEPHONE ENCOUNTER
Pt is 12w6d pregnant and states that she is currently having a flare up of her Crohn's.  She has been having the usual abd cramping and frequent stools of undigested food.  She states that she is going to [facility] right now to do testing.  Pt states that [facility] provider told her that she is at increased risk for SROM. She states that she has noticed a clear/milk fluid from her vagina.  She states that yesterday it was enough to saturate her underwear.  She is wearing a pad today and there is a small amount on the pad.  She denies vaginal itching and vaginal burning but does have h/o frequent BV.  Offered appt and pt is scheduled to see Dr Behm today, 10/13/21 at 12:50 PM.  Routed note to Dr Behm and Dr Palacios./YOLI

## 2021-10-13 NOTE — PROGRESS NOTES
Preceptor Attestation:    I discussed the patient with the resident and evaluated the patient in person. I have verified the content of the note, which accurately reflects my assessment of the patient and the plan of care.   Supervising Physician:  Reyes Fair MD.

## 2021-10-14 LAB — SCANNED LAB RESULT: NORMAL

## 2021-10-15 ENCOUNTER — TELEPHONE (OUTPATIENT)
Dept: MATERNAL FETAL MEDICINE | Facility: CLINIC | Age: 31
End: 2021-10-15

## 2021-10-15 NOTE — TELEPHONE ENCOUNTER
October 15, 2021     I spoke with Kee regarding her NIPT results.     Results indicate NO ANEUPLOIDY DETECTED for chromosomes 21, 18, 13, or sex chromosomes. Predicted fetal sex was called out to Kee's sister-in-law, Arcelia as previously requested.     This puts her current pregnancy at low risk for Down syndrome, trisomy 18, trisomy 13 and sex chromosome abnormalities. This test is reported to have the following sensitivities: Down syndrome- >99.9%, trisomy 18- >99.9%, and trisomy 13- >99.9%. Although these results are reassuring, this does not replace a standard chromosome analysis from a chorionic villus sampling or amniocentesis.     MSAFP is the appropriate second trimester screening test for open neural tube defects; the maternal quad screen is not recommended.    Her results are available in her Epic chart for her primary OB to review.     Sara Monroe MS, [facility]  Licensed Genetic Counselor  [facility]  Maternal Fetal Medicine  [email or web address]  [phone]

## 2021-10-18 NOTE — PROGRESS NOTES
Preceptor Attestation:    I discussed the patient with the resident and evaluated the patient in person. I have verified the content of the note, which accurately reflects my assessment of the patient and the plan of care.   Supervising Physician:  Lucy Martinez MD.

## 2021-10-26 ENCOUNTER — TELEPHONE (OUTPATIENT)
Dept: PHYSICAL THERAPY | Facility: REHABILITATION | Age: 31
End: 2021-10-26

## 2021-10-26 NOTE — TELEPHONE ENCOUNTER
Spoke with pt about NS#1- pt thought her appointment was on Thursday. Reminded of next PT appointment and pt states she can make it.    Zora Barahona, PT, DPT, CLMARLON-RICH

## 2021-10-30 ENCOUNTER — HOSPITAL ENCOUNTER (EMERGENCY)
Facility: HOSPITAL | Age: 31
Discharge: LEFT BEFORE DISCHARGE | End: 2021-10-30
Attending: EMERGENCY MEDICINE | Admitting: EMERGENCY MEDICINE
Payer: COMMERCIAL

## 2021-10-30 VITALS
SYSTOLIC BLOOD PRESSURE: 118 MMHG | BODY MASS INDEX: 36.54 KG/M2 | RESPIRATION RATE: 16 BRPM | WEIGHT: 214 LBS | TEMPERATURE: 98 F | DIASTOLIC BLOOD PRESSURE: 64 MMHG | OXYGEN SATURATION: 98 % | HEIGHT: 64 IN | HEART RATE: 90 BPM

## 2021-10-30 DIAGNOSIS — Z34.92 SECOND TRIMESTER PREGNANCY: ICD-10-CM

## 2021-10-30 DIAGNOSIS — M54.50 CHRONIC LEFT-SIDED LOW BACK PAIN WITHOUT SCIATICA: ICD-10-CM

## 2021-10-30 DIAGNOSIS — G89.29 CHRONIC LEFT-SIDED LOW BACK PAIN WITHOUT SCIATICA: ICD-10-CM

## 2021-10-30 LAB
ALBUMIN UR-MCNC: 10 MG/DL
APPEARANCE UR: CLEAR
BACTERIA #/AREA URNS HPF: ABNORMAL /HPF
BILIRUB UR QL STRIP: NEGATIVE
COLOR UR AUTO: ABNORMAL
GLUCOSE UR STRIP-MCNC: NEGATIVE MG/DL
HGB UR QL STRIP: NEGATIVE
KETONES UR STRIP-MCNC: ABNORMAL MG/DL
LEUKOCYTE ESTERASE UR QL STRIP: NEGATIVE
MUCOUS THREADS #/AREA URNS LPF: PRESENT /LPF
NITRATE UR QL: NEGATIVE
PH UR STRIP: 6.5 [PH] (ref 5–7)
RBC URINE: 1 /HPF
SP GR UR STRIP: 1.03 (ref 1–1.03)
SQUAMOUS EPITHELIAL: 5 /HPF
UROBILINOGEN UR STRIP-MCNC: 3 MG/DL
WBC URINE: 2 /HPF

## 2021-10-30 PROCEDURE — 99283 EMERGENCY DEPT VISIT LOW MDM: CPT

## 2021-10-30 PROCEDURE — 81001 URINALYSIS AUTO W/SCOPE: CPT | Performed by: EMERGENCY MEDICINE

## 2021-10-30 ASSESSMENT — MIFFLIN-ST. JEOR: SCORE: 1680.7

## 2021-10-31 NOTE — ED TRIAGE NOTES
Pt states she is 15 weeks pregnant and has had low back pain into left hip for the last 3 months. Diagnosed with sciatica but thinks that isn't right. Denies recent trauma. No urinary concerns. Denies fevers.

## 2021-10-31 NOTE — ED NOTES
Pt not found in room when ultrasound ready.  Pt appears to have left without checking out and did not sign any AMA paperwork.  MD updated.

## 2021-10-31 NOTE — ED PROVIDER NOTES
"EMERGENCY DEPARTMENT NOTE     Name: Kee Phipps    Age/Sex: 31 year old female   MRN: [id number]   Evaluation Date & Time:  10/30/2021  8:13 PM    PCP:    Stormy Palacios   ED Provider: Nitesh Rutledge D.O.       CHIEF COMPLAINT    Back Pain and Musculoskeletal Problem       DIAGNOSIS & DISPOSITION     1. Chronic left-sided low back pain without sciatica    2. Second trimester pregnancy      DISPOSITION: Left without formal discharge planning.    At the conclusion of the encounter I discussed the results of all of the tests and the disposition. The questions were answered. The patient or family acknowledged understanding and was agreeable with the care plan.    TOTAL CRITICAL CARE TIME (EXCLUDING PROCEDURES): Not applicable    PROCEDURES:   None    EMERGENCY DEPARTMENT COURSE/MEDICAL DECISION MAKING   8:49 PM I met with the patient to gather history and to perform my initial exam.  We discussed treatment options and the plan for care while in the Emergency Department. PPE: Provider wore gloves, N95 mask, eye protection, surgical cap, and paper mask.  10:46 PM The leana's RN informed me that the patient left against medical advice.    Kee Phipps is a 31 year old female who is 15 weeks pregnant and a , with relevant past history of Crohn's disease, fibromyalgia, ovarian cyst complicating pregnancy, chronic continuous use of opioids, and anxiety who presents to the emergency department for evaluation of back pain.   Triage note reviewed:Pt ambulatory without difficulty. Given heat wrap for left hip/low back. Last Tylenol earlier today.     Pt states she is 15 weeks pregnant and has had low back pain into left hip for the last 3 months. Diagnosed with sciatica but thinks that isn't right. Denies recent trauma. No urinary concerns. Denies fevers.     Vital signs:/64   Pulse 90   Temp 98  F (36.7  C) (Temporal)   Resp 16   Ht 1.626 m (5' 4\")   Wt 97.1 kg (214 lb)   LMP 07/15/2021   SpO2 " 98%   BMI 36.73 kg/m    Pertinent physical exam findings:  Abdomen; soft nontender, positive bowel sounds.  No organomegaly or mass.  Fundus is not palpated  Musculoskeletal: No CVA tenderness, some tenderness at the left iliac crest minimal.  Neuro: 5 out of 5 motor strength of the lower extremities with intact sensation light touch and positional  Diagnostic studies:  Imaging:None  Lab:None  Interventions:Joseph  Medical decision making: The patient, who is currently 15 weeks pregnant, presents with chronic  lower left back/flank  pain .  Abdomen nontender, neurologically intact.  DM suggested musculoskeletal etiology.  Patient was concerned this represented complication in pregnancy including abnormal position of the baby.  After initial discussion was going to obtain renal ultrasound to exclude any evidence of urolithiasis and also do OB ultrasound and check urinalysis.  Urinalysis was negative.  Prior to obtaining imaging studies nursing staff reported that the patient had left without further discharge planning.    ED INTERVENTIONS   Medications - No data to display    DISCHARGE MEDICATIONS        Review of your medicines      UNREVIEWED medicines. Ask your doctor about these medicines      Dose / Directions   acetaminophen 325 MG tablet  Commonly known as: TYLENOL      Dose: 650 mg  Take 2 tablets (650 mg) by mouth every 4 hours as needed for mild pain  Quantity: 100 tablet  Refills: 0     doxylamine 25 MG Tabs tablet  Commonly known as: UNISOM  Used for: Early stage of pregnancy, Nausea      Dose: 12.5 mg  Take 0.5 tablets (12.5 mg) by mouth At Bedtime  Quantity: 30 tablet  Refills: 1     glycerin 1.2 g suppository  Commonly known as: LAXATIVE  Used for: Other constipation      Dose: 1 suppository  Place 1 suppository rectally daily as needed (constipation) . Take after trial of Metamucil powder for 3-4 days without relief.  Quantity: 10 suppository  Refills: 0     lidocaine-prilocaine 2.5-2.5 % external  "cream  Commonly known as: EMLA  Used for: Left buttock pain      Apply topically 2 times daily as needed for moderate pain  Quantity: 30 g  Refills: 1     polyethylene glycol 17 GM/Dose powder  Commonly known as: MIRALAX  Used for: History of  labor      Dose: 17 g  Take 17 g by mouth daily  Quantity: 510 g  Refills: 1     prenatal multivitamin w/iron 27-0.8 MG tablet  Used for: Missed menses      Dose: 1 tablet  Take 1 tablet by mouth daily  Quantity: 90 tablet  Refills: 3     psyllium 58.6 % powder  Commonly known as: METAMUCIL/KONSYL  Used for: Other constipation      Dose: 1 teaspoonful  Take 6 g (1 teaspoonful) by mouth 2 times daily as needed for constipation  Quantity: 173 g  Refills: 0     pyridOXINE 25 MG tablet  Commonly known as: VITAMIN B6  Used for: Early stage of pregnancy, Nausea      Dose: 25 mg  Take 1 tablet (25 mg) by mouth 3 times daily as needed (nausea)  Quantity: 30 tablet  Refills: 3     ustekinumab 45 MG/0.5ML Sosy  Commonly known as: STELARA      Dose: 45 mg  Inject 45 mg Subcutaneous once  Refills: 0              INFORMATION SOURCE AND LIMITATIONS    History/Exam limitations: none  Patient information was obtained from: the patient   Use of : N/A    HISTORY OF PRESENT ILLNESS   Kee Phipps female who is 15 weeks pregnant and a  with a relevant past history of Crohn's disease, fibromyalgia, ovarian cyst complicating pregnancy, chronic continuous use of opioids, and anxiety, who presents to this ED via walk in for evaluation of back pain.    The patient reports that she is 15 weeks pregnant and has been experiencing constant, worsening, left lower back pain for several weeks. She states that this area is swollen, hot to the touch, and causes pain with ambulation, noting that her left hip feels like it is \"going to snap.\" The patient also endorses a decrease in urine output, as well as a feeling that she is not able to fully empty her bladder. The patient denies " weakness, numbness or tingling to her left leg. The patient denies diarrhea, rectal bleeding, fever, and any other symptoms or complaints at this time.         REVIEW OF SYSTEMS:   Constitutional: Negative for fever.   HENT: Negative for URI symptoms or sore throat.    Cardiac: Negative for chest pain, palpitations, near syncope or syncope  Respiratory: Negative for cough and shortness of breath.    Gastrointestinal: Negative for abdominal pain, nausea, vomiting, constipation, diarrhea, rectal bleeding or melena.  Genitourinary: Negative for dysuria, flank pain and hematuria.   Musculoskeletal: Positive for lower left back pain, back swelling, gait change, hip pain.   Skin: Negative for  rash  Neurological: Negative for weakness/tingling/numbness to left leg, dizziness, headache, syncope, speech difficulty, unilateral weakness or imbalance with walking.   Hematological: Negative for adenopathy. Does not bruise/bleed easily.   Psychiatric/Behavioral: Negative for confusion.       PATIENT HISTORY     Past Medical History:   Diagnosis Date     Abnormal Pap smear of cervix      Anemia      Anxiety      Arthritis      Bilateral plantar fasciitis      Celiac disease 2014     Celiac disease      Crohn disease (H)      Crohn's disease (H) 2012     Depression      Depressive disorder      Fibromyalgia      Fibromyalgia      H/O miscarriage, currently pregnant      HPV (human papilloma virus) infection      Migraine      Plantar fasciitis       delivery 2013     Patient Active Problem List   Diagnosis     Health Care Home     Crohns disease     Abnormal Pap smear of cervix     Chronic pain     Adjustment disorder with depressed mood     Celiac disease     Chronic pain syndrome     Immunosuppressed status (H)     Multiple joint pain     Fibromyalgia     H/O  delivery, currently pregnant     Ovarian cyst complicating pregnancy, antepartum     Tobacco use in pregnancy, antepartum, first trimester      "Supervision of high risk pregnancy, antepartum     BMI 36.0-36.9,adult     At risk for hypertension     Trichomonal vaginitis during pregnancy in first trimester     Chronic, continuous use of opioids     Past Surgical History:   Procedure Laterality Date     APPENDECTOMY       BOWEL RESECTION      illium      CERVIX LESION DESTRUCTION       DILATION AND CURETTAGE SUCTION, TREAT MISSED , 1ST TRIMESTER      miscarriage at 8 wks gest     DILATION AND CURETTAGE SUCTION, TREAT MISSED , 1ST TRIMESTER  2019     FASCIOTOMY LOWER EXTREMITY Left 2018    Left foot     FOOT SURGERY Left      LAPAROSCOPY DIAGNOSTIC (GYN) N/A 2019    Procedure: LAPAROSCOPY, DIAGNOSTIC;  Surgeon: Anni Velasquez MD;  Location: [facility] OR     PLANTAR FASCIA RELEASE Left 4/10/2018    Procedure:  PLANTAR FASCIOTOMY LEFT FOOT ;  Surgeon: Yuri Arreola DPM;  Location: [facility];  Service:      SALPINGECTOMY Right 05/15/2021    rt sided ruptured ectopic     SMALL BOWEL RESECTION       TONSILLECTOMY       Social Histrory  Smoking:  Alcohol Use:  Allergies   Allergen Reactions     Gluten Meal      Humira [Humira]      Caused huge lumps at injection site.      Ibuprofen      Patient has Crohns disease and is unable to take this medication.     Latex Itching     Remicade [Infliximab] Rash         OUTPATIENT MEDICATIONS     Discharge Medication List as of 10/30/2021 11:30 PM         Vitals:    10/30/21 2002   BP: 118/64   Pulse: 90   Resp: 16   Temp: 98  F (36.7  C)   TempSrc: Temporal   SpO2: 98%   Weight: 97.1 kg (214 lb)   Height: 1.626 m (5' 4\")       Physical Exam   Constitutional: Oriented to person, place, and time. Appears well-developed and well-nourished.   HEENT:    Head: Atraumatic.   Neck: Normal range of motion. Neck supple.   Cardiovascular: Normal rate, regular rhythm and normal heart sounds.  Normal pulse to left lower extremity.   Pulmonary/Chest: Normal effort  and breath " sounds normal.   Abdominal: Soft. Bowel sounds are normal. Unable to palpitate uterus.   Musculoskeletal: Normal range of motion.   Back: Left lumbar peritoneal tenderness. L4, L5 and S1 motor function intact.   Neurological: Alert and oriented to person, place, and time. Normal strength.No sensory deficit. No cranial nerve deficit . Skin: Skin is warm and dry.   Psychiatric: Normal mood and affect. Behavior is normal. Thought content normal.       DIAGNOSTICS    LABORATORY FINDINGS (REVIEWED AND INTERPRETED):  Labs Ordered and Resulted from Time of ED Arrival to Time of ED Departure   ROUTINE UA WITH MICROSCOPIC REFLEX TO CULTURE - Abnormal       Result Value    Color Urine Light Yellow      Appearance Urine Clear      Glucose Urine Negative      Bilirubin Urine Negative      Ketones Urine Trace (*)     Specific Gravity Urine 1.028      Blood Urine Negative      pH Urine 6.5      Protein Albumin Urine 10  (*)     Urobilinogen Urine 3.0 (*)     Nitrite Urine Negative      Leukocyte Esterase Urine Negative      Bacteria Urine Few (*)     Mucus Urine Present (*)     RBC Urine 1      WBC Urine 2      Squamous Epithelials Urine 5 (*)          IMAGING (REVIEWED AND INTERPRETED):  No orders to display           IEleni, am serving as a scribe to document services personally performed by Nitesh Rutledge D.O., based on my observation and the provider s statements to me.    INitesh D.O., attest that Eleni Glaser is acting in a scribe capacity, has observed my performance of the services and has documented them in accordance with my direction.    Nitesh Rutledge D.O.  EMERGENCY MEDICINE   10/30/21  [facility] EMERGENCY DEPARTMENT  [address] [place] [id number] [phone]  Dept: [phone]       Nitesh Rutledge DO  10/31/21 2319

## 2021-10-31 NOTE — ED TRIAGE NOTES
Pt ambulatory without difficulty. Given heat wrap for left hip/low back. Last Tylenol earlier today.

## 2021-11-01 ENCOUNTER — TELEPHONE (OUTPATIENT)
Dept: FAMILY MEDICINE | Facility: CLINIC | Age: 31
End: 2021-11-01

## 2021-11-01 NOTE — TELEPHONE ENCOUNTER
"Pt is 15w4d and states that she went to [facility] ER due to left sided low back/hip pain.  She states that she has had it for awhile but it has progressively gotten worse to the point that it feels like her hip is going to give out and it is \"excruciating\".  She states that she is doing physical therapy already but is wondering if she could also do pool therapy?  Discussed with pt that nurse will also check with Dr Spaulding to see if she would be able to do OMT.  Assisted pt with scheduling SHAY with Dr Palacios on Monday, 21 at 8:00 AM.  Pt states that she will see MFM on Thursday for ultrasound and it will be determined if she needs cerclage or vaginal progesterone due to her h/o PPROM with  delivery with her last baby.  Routed note to Dr Spaulding and Dr Palacios./NG  "

## 2021-11-02 ENCOUNTER — TELEPHONE (OUTPATIENT)
Dept: PHYSICAL THERAPY | Facility: REHABILITATION | Age: 31
End: 2021-11-02

## 2021-11-03 NOTE — TELEPHONE ENCOUNTER
LMTCC for pt to assist her with scheduling a 40 min appt with Dr Spaulding for OMT assessment and tx./NG

## 2021-11-03 NOTE — TELEPHONE ENCOUNTER
Pt returned call.     Scheduled for 11/17 @ 10AM w/ Dr. Spaulding   Complex Repair And Rhombic Flap Text: The defect edges were debeveled with a #15 scalpel blade.  The primary defect was closed partially with a complex linear closure.  Given the location of the remaining defect, shape of the defect and the proximity to free margins a rhombic flap was deemed most appropriate for complete closure of the defect.  Using a sterile surgical marker, an appropriate advancement flap was drawn incorporating the defect and placing the expected incisions within the relaxed skin tension lines where possible.    The area thus outlined was incised deep to adipose tissue with a #15 scalpel blade.  The skin margins were undermined to an appropriate distance in all directions utilizing iris scissors.

## 2021-11-04 ENCOUNTER — ANCILLARY PROCEDURE (OUTPATIENT)
Dept: ULTRASOUND IMAGING | Facility: HOSPITAL | Age: 31
End: 2021-11-04
Attending: OBSTETRICS & GYNECOLOGY
Payer: COMMERCIAL

## 2021-11-04 ENCOUNTER — OFFICE VISIT (OUTPATIENT)
Dept: MATERNAL FETAL MEDICINE | Facility: HOSPITAL | Age: 31
End: 2021-11-04
Attending: OBSTETRICS & GYNECOLOGY
Payer: COMMERCIAL

## 2021-11-04 DIAGNOSIS — O26.90 PREGNANCY RELATED CONDITION, ANTEPARTUM: ICD-10-CM

## 2021-11-04 DIAGNOSIS — O09.899 H/O PRETERM DELIVERY, CURRENTLY PREGNANT: Primary | ICD-10-CM

## 2021-11-04 PROCEDURE — 76817 TRANSVAGINAL US OBSTETRIC: CPT

## 2021-11-04 PROCEDURE — 99207 PR NO CHARGE LOS: CPT | Performed by: OBSTETRICS & GYNECOLOGY

## 2021-11-04 PROCEDURE — 76817 TRANSVAGINAL US OBSTETRIC: CPT | Mod: 26 | Performed by: OBSTETRICS & GYNECOLOGY

## 2021-11-04 NOTE — PROGRESS NOTES
"Please see \"Imaging\" tab under Chart Review for full details.    Linda Mann MD  Maternal Fetal Medicine    "

## 2021-11-05 NOTE — PROGRESS NOTES
Preceptor Attestation:    I discussed the patient with the resident and evaluated the patient in person. I have verified the content of the note, which accurately reflects my assessment of the patient and the plan of care.   Supervising Physician:  Misael Montano MD.                    Sski Pregnancy And Lactation Text: This medication is Pregnancy Category D and isn't considered safe during pregnancy. It is excreted in breast milk.

## 2021-11-08 ENCOUNTER — OFFICE VISIT (OUTPATIENT)
Dept: FAMILY MEDICINE | Facility: CLINIC | Age: 31
End: 2021-11-08
Payer: COMMERCIAL

## 2021-11-08 VITALS
DIASTOLIC BLOOD PRESSURE: 80 MMHG | BODY MASS INDEX: 36.87 KG/M2 | WEIGHT: 214.8 LBS | OXYGEN SATURATION: 94 % | SYSTOLIC BLOOD PRESSURE: 116 MMHG | TEMPERATURE: 98.3 F | RESPIRATION RATE: 18 BRPM | HEART RATE: 92 BPM

## 2021-11-08 DIAGNOSIS — D84.9 IMMUNOSUPPRESSED STATUS (H): ICD-10-CM

## 2021-11-08 DIAGNOSIS — O09.899 H/O PRETERM DELIVERY, CURRENTLY PREGNANT: ICD-10-CM

## 2021-11-08 DIAGNOSIS — K50.819 CROHN'S DISEASE OF SMALL AND LARGE INTESTINES WITH COMPLICATION (H): ICD-10-CM

## 2021-11-08 DIAGNOSIS — O99.332 HIGH RISK PREGNANCY DUE TO SMOKING IN SECOND TRIMESTER: ICD-10-CM

## 2021-11-08 DIAGNOSIS — M25.559 HIP PAIN: ICD-10-CM

## 2021-11-08 DIAGNOSIS — Z91.89 AT RISK FOR HYPERTENSION: ICD-10-CM

## 2021-11-08 DIAGNOSIS — K90.0 CELIAC DISEASE: ICD-10-CM

## 2021-11-08 DIAGNOSIS — M79.7 FIBROMYALGIA: ICD-10-CM

## 2021-11-08 DIAGNOSIS — O09.212 HIGH RISK PREGNANCY DUE TO HISTORY OF PRETERM LABOR IN SECOND TRIMESTER: Primary | ICD-10-CM

## 2021-11-08 PROCEDURE — 99207 PR PRENATAL VISIT: CPT | Mod: GC | Performed by: STUDENT IN AN ORGANIZED HEALTH CARE EDUCATION/TRAINING PROGRAM

## 2021-11-08 RX ORDER — HYDROXYZINE PAMOATE 25 MG/1
25 CAPSULE ORAL 3 TIMES DAILY PRN
Qty: 30 CAPSULE | Refills: 3 | Status: SHIPPED | OUTPATIENT
Start: 2021-11-08 | End: 2022-01-20

## 2021-11-08 RX ORDER — POLYETHYLENE GLYCOL 3350 17 G
2 POWDER IN PACKET (EA) ORAL
Qty: 90 LOZENGE | Refills: 1 | Status: SHIPPED | OUTPATIENT
Start: 2021-11-08 | End: 2022-01-20

## 2021-11-08 NOTE — PATIENT INSTRUCTIONS
BIRTH AND 17-alpha hydroxyprogesterone caproate (17P) TREATMENT    What is  birth?      birth is the delivery of a baby before 37 weeks of gestation.  Approximately 1 in every 12 babies in [place] is born premature (that s approximately 6,000 babies every year)!     birth is often unexpected, but can happen for many reasons. There are some known risk factors, which include:   -pregnancy with twins or triplets   -being  race  -some problems with the uterus or cervix   -some medical problems like high blood pressure   -smoking, drinking, or using illegal drugs while pregnant   -infections like urinary tract infection, bacterial vaginosis and chlamydia while    pregnant  -depression, stress, and anxiety    But the biggest risk factor is having a previous  baby. In fact, women who have one  birth have a 30-50% chance of their next baby coming early too.     What are the risks to a baby that delivers prematurely?     birth is the number one cause of  death worldwide. This risk increases the earlier the baby is born.  Prematurity can also cause serious long term health problems for babies such as breathing problems, infections, bleeding into the brain, as well as long term developmental problems like cerebral palsy, learning impairments, hearing and vision problems.    How can I prevent  birth in my pregnancy?  Overall, the best thing to prevent  delivery is to stay healthy during your pregnancy, and follow up with your doctor for your regular visits and screening tests.  If you have a history of  birth in one of your past pregnancies, you may benefit from weekly injections of 17P.     What is 17P?  The full name is 17-alpha hydroxyprogesterone caproate. This is a form of your body s natural  pregnancy hormone , progesterone. The brand name of this is Linette, and it is FDA approved for prevention of  birth.  This medication  is given in once weekly injections from week 16-20 through the 36th week of pregnancy. Research shows that women taking 17P can reduce their risk of  birth from 50% to 36%. The treatment has also been shown to reduce the risk of complications after birth, such as bleeding in the brain and need for supplemental oxygen.    It is important to complete the treatment once you start, as the risk of  birth goes up if you stop the injections early.    How can I get started on the Linette treatment?  First, you should talk with your doctor to find out if this is a good option for you.  It is safe for pregnant women and for the fetus, but if you have some medical problems like uncontrolled blood pressure, breast cancer, or liver disease you should talk about it with your doctor first.  Your clinic will work with you to help determine insurance coverage and preauthorization if needed.  Then you will schedule weekly visits for 17P injections in addition to your routine prenatal visits with your doctor.    Side Effects of Mekena  The most common side effects  reported by [facility] users are   injection site reactions (pain [35%], swelling  [17%], pruritus (itching) [6%], nodule [5%]), urticaria (rash) (12%), pruritus (generalized itching (8%), nausea (6%), and diarrhea (2%).

## 2021-11-08 NOTE — PROGRESS NOTES
"  Assessment & Plan  31 year old  at 16w4d with LORIE 2022 based on LMP    Kee was seen today for prenatal care and weight check.    Diagnoses and all orders for this visit:    High risk pregnancy due to history of  labor in second trimester  Fibromyalgia  Immunosuppressed status (H)  Hip pain  -     hydrOXYzine (VISTARIL) 25 MG capsule; Take 1 capsule (25 mg) by mouth 3 times daily as needed for other (muscle pain)  BMI 36.0-36.9,adult  At risk for hypertension  Celiac disease  High risk pregnancy due to smoking in second trimester  -     nicotine (COMMIT) 2 MG lozenge; Place 1 lozenge (2 mg) inside cheek every hour as needed for smoking cessation  Crohn's disease of small and large intestines with complication (H)      She is on Stelara for her Chron's disease. Per her GI doctor, she should have this increased every 4 weeks during pregnancy. Monitoring of her fecal calprotectin, and timing of dose so that she is due for this 24-48h post delivery.    Is being followed by [facility] for additional prenatal monitoring. At her visit in 10/2021, she was told, \"17-hydroxyprogesterone caproate 250mg IM weekly from 16-36 weeks' gestation. She will consider this and receive this through her primary OB provider if she decides she would like this.\" She does want to start this, and will discuss ordering this with clinic staff. She will also continue following with [facility] for routine ultrasounds.     Her hip pain is likely muscular with her pregnancy, especially given characteristic and location of pain. In the abscence of GI symptoms, does not appear to be related to GI hx. Discussed with her that she can try PRN vistaril at night to help with relaxing her muscles. Counseled her to not take it in the day as it can cause drowsiness.     Also prescribed her nicotine lozenge to help with smoking cessation.     She is down 2lbs, which may be diet related. Does not appear that she has viral illness causing symptoms. " She also does not have pain with meals that may be indicative of GI hx. Recommended that she should try to eat smaller amounts more frequently to see if this will help. Expect that she will start to gain more weight going forward given gestational age.     Weight gain adequate: 0.363 kg (12.8 oz) to date, out of recommended total of 11-20 lbs (pregravid BMI >30)    Patient Instructions      BIRTH AND 17-alpha hydroxyprogesterone caproate (17P) TREATMENT    What is  birth?      birth is the delivery of a baby before 37 weeks of gestation.  Approximately 1 in every 12 babies in [place] is born premature (that s approximately 6,000 babies every year)!     birth is often unexpected, but can happen for many reasons. There are some known risk factors, which include:   -pregnancy with twins or triplets   -being  race  -some problems with the uterus or cervix   -some medical problems like high blood pressure   -smoking, drinking, or using illegal drugs while pregnant   -infections like urinary tract infection, bacterial vaginosis and chlamydia while    pregnant  -depression, stress, and anxiety    But the biggest risk factor is having a previous  baby. In fact, women who have one  birth have a 30-50% chance of their next baby coming early too.     What are the risks to a baby that delivers prematurely?     birth is the number one cause of  death worldwide. This risk increases the earlier the baby is born.  Prematurity can also cause serious long term health problems for babies such as breathing problems, infections, bleeding into the brain, as well as long term developmental problems like cerebral palsy, learning impairments, hearing and vision problems.    How can I prevent  birth in my pregnancy?  Overall, the best thing to prevent  delivery is to stay healthy during your pregnancy, and follow up with your doctor for your regular visits and  screening tests.  If you have a history of  birth in one of your past pregnancies, you may benefit from weekly injections of 17P.     What is 17P?  The full name is 17-alpha hydroxyprogesterone caproate. This is a form of your body s natural  pregnancy hormone , progesterone. The brand name of this is Reader, and it is FDA approved for prevention of  birth.  This medication is given in once weekly injections from week 16-20 through the 36th week of pregnancy. Research shows that women taking 17P can reduce their risk of  birth from 50% to 36%. The treatment has also been shown to reduce the risk of complications after birth, such as bleeding in the brain and need for supplemental oxygen.    It is important to complete the treatment once you start, as the risk of  birth goes up if you stop the injections early.    How can I get started on the Reader treatment?  First, you should talk with your doctor to find out if this is a good option for you.  It is safe for pregnant women and for the fetus, but if you have some medical problems like uncontrolled blood pressure, breast cancer, or liver disease you should talk about it with your doctor first.  Your clinic will work with you to help determine insurance coverage and preauthorization if needed.  Then you will schedule weekly visits for 17P injections in addition to your routine prenatal visits with your doctor.    Side Effects of Mekena  The most common side effects  reported by [facility] users are   injection site reactions (pain [35%], swelling  [17%], pruritus (itching) [6%], nodule [5%]), urticaria (rash) (12%), pruritus (generalized itching (8%), nausea (6%), and diarrhea (2%).       Return to clinic in 4 weeks.    Stormy Palacios MD  I precepted today with Billie Gibbons MD.    Review of prior external note(s) from - [facility], GI  60 minutes spent on the date of the encounter doing chart review, history and exam, documentation and further  activities per the note    Subjective  Concerns: she has been having L back pain since her pregnancy started, and she was seen in the ED for this. Appears that there was no acute issues causing this pain, and Kee notes Tylenol has not helped. Pain is more of a dull ache, does not radiate down legs, and is not sharp or shooting. She does have a hx of Celiac and Crohn's disease, and feels as though she is not experiencing a flair. She also has fibromyalgia. Discussed that she does not have this kind of hip pain with her fibromyalgia pain.     Discussed her appetite. She feels as though she lost weight since last visit here. Mentions eating 1 time a day, and does not have an appetite. Does not feel nauseated, no vomiting, no fevers/chills.    We also discussed smoking. Kee continues to smoke, though has cut down to 4 cigarettes/day. She says she tried nicotine replacement gum in the past with success, and is interested in trying the lozenge.     In a broader sense, reviewed [facility] note, which mentions that there was discussion of starting weekly progesterone injections to help with preventing  delivery. Discussed this further today, and she is open to starting this soon. She notes that at her last US through [facility], her cervix was closed. It also appears that she will have routine US to monitor for cervical insufficiency, and for consideration about cerclage.     She is having a girl, and is planning on naming her Nubian.     ROS:  No - Headache  No - Changes in vision  No - Chest Pain  No - Shortness of Breath  No - Nausea   No - Vomiting  No - Abdominal pain   No - Contractions  No - Dysuria   No - Vaginal Discharge    No - Vaginal bleeding   No - Loss of Fluid   No - Extremity swelling   Present - Fetal movement       Patient Active Problem List   Diagnosis     Health Care Home     Crohns disease     Abnormal Pap smear of cervix     Chronic pain     Adjustment disorder with depressed mood     Celiac disease  "    Chronic pain syndrome     Immunosuppressed status (H)     Multiple joint pain     Fibromyalgia     H/O  delivery, currently pregnant     Ovarian cyst complicating pregnancy, antepartum     Tobacco use in pregnancy, antepartum, first trimester     Supervision of high risk pregnancy, antepartum     BMI 36.0-36.9,adult     At risk for hypertension     Trichomonal vaginitis during pregnancy in first trimester     Chronic, continuous use of opioids       Kee Phipps speaks English so an  was not used today.    Guidance:  signs of miscarriage  smoking intervention  OTC medications  weight gain and exercise  quickening  fetal survey ultrasound    Going to [facility]? Did not address    Objective  /80   Pulse 92   Temp 98.3  F (36.8  C) (Oral)   Resp 18   Wt 97.4 kg (214 lb 12.8 oz)   LMP 07/15/2021   SpO2 94%   BMI 36.87 kg/m    No distress.  Gravid abdomen.  .  Fundal height 16.5 cm.  trace edema.    Results  Blood type: O POS  No results found for any visits on 21.  Evaluation for 17P   Is this current pregnancy a george pregnancy? Yes  Has this patient experienced a spontaneous,  birth or  rupture of membranes between 20 - 37 weeks of a george pregnancy? Yes    Both answers are \"Yes\" the patient may be a candidate for \"17P\" [place].       Contraindications    Current or history of thrombosis or thromboembolic disorders    Known or suspected breast cancer, other hormone-sensitive cancer or history of these conditions    Undiagnosed abnormal vaginal bleeding unrelated to pregnancy    Cholestatic jaundice of pregnancy    Liver tumors, benign or malignant, or active liver disease    Uncontrolled hypertension    Linette should be discontinued if thrombosis or thromboembolism occurs    Allergic reactions, including urticaria, pruritus and angioedema have been reported with use of Linette or with other products containing castor oil.    Dosage and Administration      " Administer intramuscularly at a dose of 250 mg (1 mL) once weekly     Begin treatment between 16 weeks, 0 days and 20 weeks, 6 days of gestation     Continue administration once weekly until week 37 (through 36 weeks, 6 days)  of          gestation or delivery, whichever occurs first     Adverse Reactions  Most common adverse reactions reported in = 2% of subjects and at a higher rate in the  Linette group than in the control group are injection site reactions (pain [35%], swelling  [17%], pruritus [6%], nodule [5%]), urticaria (12%), pruritus (8%), nausea (6%), and  diarrhea (2%). (6.1)}     Assessment:  Kee is a 31 year old  at 16w4d with a history of prior spontaneous george  birth.  Given this history, Kee is at risk for recurrent  birth in this pregnancy.  I discussed the availability of 17-alpha hydroxyprogesterone caproate (17P), which has been demonstrated to reduce the incidence of recurrent  birth and Kee does meet criteria for weekly 17P administration in this pregnancy.    Patient has no contraindications to 17P.    Informed patient that 17P requires a commitment to weekly injection which should begin before 20+6 weeks and abrupt discontinuation can increase the risk for  birth.  Patient agrees to proceed with weekly 17P injections.      Plan:   Will discuss starting this plan:  17P 250mg IM weekly beginning between 16-20 weeks through 36 weeks gestation  Serial transvaginal ultrasound for cervical length from 16 through 22-23 weeks gestation  Screen for asymptomatic bacteriuria at first prenatal visit and treat with appropriate antibiotics if present  Smoking risk discussed. Discussed strategies to deal with stress.  Nicotine gum started.

## 2021-11-09 NOTE — PROGRESS NOTES
Outpatient Physical Therapy Discharge Note     Patient: Kee Phipps  : 1990    Beginning/End Dates of Reporting Period:  10/8/21    Referring Provider: Jarad Mcintosh MD    Therapy Diagnosis: left low back pain during pregnancy, L sacroiliac joint pain, muscle weakness       Goals:  Goal Identifier HEP   Goal Description Pt will be independent in HEP   Target Date 21   Date Met      Progress (detail required for progress note):       Goal Identifier Walking   Goal Description Pt will be able to walk for 10-15 min with <3/10 pain for community mobility    Target Date 22   Date Met      Progress (detail required for progress note):       Goal Identifier Household chores   Goal Description Pt will be able to do 10-20 min of household chores with <4/10 pain    Target Date 22   Date Met      Progress (detail required for progress note):       Goal Identifier     Goal Description     Target Date     Date Met      Progress (detail required for progress note):       Goal Identifier     Goal Description     Target Date     Date Met      Progress (detail required for progress note):       Goal Identifier     Goal Description     Target Date     Date Met      Progress (detail required for progress note):       Goal Identifier     Goal Description     Target Date     Date Met      Progress (detail required for progress note):       Goal Identifier     Goal Description     Target Date     Date Met      Progress (detail required for progress note):             Plan:  Discharge from therapy.    Discharge:    Reason for Discharge: Pt has not shown for x 3 appointments. Will be discharged per attendance policy. Goals not met as pt did not return to PT.    Equipment Issued: none    Discharge Plan: Patient to continue home program.    Zora Barahona, PT, DPT, CLT-RICH

## 2021-11-09 NOTE — ADDENDUM NOTE
Encounter addended by: Zora Barahona, PT on: 11/9/2021 12:13 PM   Actions taken: Episode resolved, Clinical Note Signed

## 2021-11-12 ENCOUNTER — TELEPHONE (OUTPATIENT)
Dept: FAMILY MEDICINE | Facility: CLINIC | Age: 31
End: 2021-11-12
Payer: COMMERCIAL

## 2021-11-12 NOTE — TELEPHONE ENCOUNTER
Pt is 17 wks pregnant and states that when she was brushing her teeth 30 min ago she noticed black spots/blurry vision in her left eye.  She states that it has stayed the same since it started.  She denies HA, eye pain, and eye redness/drainage.  She states that this happened before last year and was told it was a migraine with aura.  Pt states that she has not had migraines in a long time.  Pt does not wear glasses or contacts.  Told her that if her eye symptoms do not improve or she develops other symptoms that she needs to schedule an appt here or with eye doctor.  Pt verbalized understanding./NG

## 2021-11-17 ENCOUNTER — OFFICE VISIT (OUTPATIENT)
Dept: FAMILY MEDICINE | Facility: CLINIC | Age: 31
End: 2021-11-17
Payer: COMMERCIAL

## 2021-11-17 VITALS
TEMPERATURE: 97.9 F | RESPIRATION RATE: 16 BRPM | DIASTOLIC BLOOD PRESSURE: 79 MMHG | OXYGEN SATURATION: 97 % | SYSTOLIC BLOOD PRESSURE: 123 MMHG | WEIGHT: 219.8 LBS | BODY MASS INDEX: 37.73 KG/M2 | HEART RATE: 97 BPM

## 2021-11-17 DIAGNOSIS — M79.7 FIBROMYALGIA: Primary | ICD-10-CM

## 2021-11-17 DIAGNOSIS — M99.05 SOMATIC DYSFUNCTION OF HIP REGION: ICD-10-CM

## 2021-11-17 DIAGNOSIS — M25.559 HIP PAIN: ICD-10-CM

## 2021-11-17 DIAGNOSIS — O09.899 H/O PRETERM DELIVERY, CURRENTLY PREGNANT: ICD-10-CM

## 2021-11-17 PROCEDURE — 99213 OFFICE O/P EST LOW 20 MIN: CPT | Mod: 25

## 2021-11-17 PROCEDURE — 98925 OSTEOPATH MANJ 1-2 REGIONS: CPT | Mod: GC

## 2021-11-17 NOTE — PROGRESS NOTES
Assessment & Plan     Fibromyalgia  Hip pain in pregnancy  Somatic dysfunction of hip region  After a thorough segmental and regional osteopathic assessment of the thoracic/lumbar spine, sacroilliac joints, and sacrum, patient found to exhibit posterior bilateral T10-L1 paraspinal muscle hypertonicity and left posterior L5 counterstrain tenderpoint. patient would benefit from OMT using indirect method versus direct technique given she is 18 weeks pregnant. Thus myofascial release is a safe and effective treatment for this patient. Educated patient on the risks and benefits of OMT. Explained she may experience soreness after the treatment which should resolve with tylenol.  Patient consented to OMT. Myofacial release applied with post-treatment reduction of pain at least 70% and return of normal gait.   - OSTEOPATHIC MANIP,1-2 BODY REGN  -follow up 4-6 weeks if necessary     Return in about 4 weeks (around 12/15/2021), or if symptoms worsen or fail to improve.    Carlos Eduardo Spaulding DO, PGY1  [facility]    Today I precepted with Dr. Iris POTTS, who agrees with the assessment and plan.      Subjective   Kee is a 31 year old who presents for the following health issues: hip pain     HPI     Patient is almost 18 weeks pregnant with past medical history of fibromyalgia. She has started to experience lower back/buttock pain earlier in this pregnancy. Patient reports she's tried physical therapy for back pain, has not helped.   She denies any alarming signs/symptoms. No iguinal pain, burning sesnation.     Pain rated 5/10 right now. 10/10 pain during workday. Pinching pain. Pain is non-radiating. Stays in the gluteal region, does not come to the front or down legs. No pelvic pain.     Review of Systems   Constitutional, HEENT, cardiovascular, pulmonary, gi and gu systems are negative, except as otherwise noted.      Objective    /79 (BP Location: Left arm, Patient Position: Sitting, Cuff Size:  Adult Regular)   Pulse 97   Temp 97.9  F (36.6  C) (Oral)   Resp 16   Wt 99.7 kg (219 lb 12.8 oz)   LMP 07/15/2021   SpO2 97%   BMI 37.73 kg/m    Body mass index is 37.73 kg/m .  Physical Exam  Musculoskeletal:        Back:       Comments: Segmental mid thoracic hypertonicity of paraspinal muscles at T10-L1    Posterior lumbar L1 Counterstrain tenderpoint         GENERAL: healthy, alert and no distress  NECK: no adenopathy, no asymmetry, masses, or scars and thyroid normal to palpation  RESP: lungs clear to auscultation - no rales, rhonchi or wheezes  CV: regular rate and rhythm, normal S1 S2, no S3 or S4, no murmur, click or rub, no peripheral edema and peripheral pulses strong  ABDOMEN: soft, nontender, no hepatosplenomegaly, no masses and bowel sounds normal  MS: no gross musculoskeletal defects noted, no edema

## 2021-11-18 ENCOUNTER — OFFICE VISIT (OUTPATIENT)
Dept: MATERNAL FETAL MEDICINE | Facility: HOSPITAL | Age: 31
End: 2021-11-18
Attending: OBSTETRICS & GYNECOLOGY
Payer: COMMERCIAL

## 2021-11-18 ENCOUNTER — ANCILLARY PROCEDURE (OUTPATIENT)
Dept: ULTRASOUND IMAGING | Facility: HOSPITAL | Age: 31
End: 2021-11-18
Attending: OBSTETRICS & GYNECOLOGY
Payer: COMMERCIAL

## 2021-11-18 DIAGNOSIS — O26.90 PREGNANCY RELATED CONDITION, ANTEPARTUM: ICD-10-CM

## 2021-11-18 DIAGNOSIS — O09.899 H/O PRETERM DELIVERY, CURRENTLY PREGNANT: Primary | ICD-10-CM

## 2021-11-18 PROCEDURE — 99207 PR NO CHARGE LOS: CPT | Performed by: OBSTETRICS & GYNECOLOGY

## 2021-11-18 PROCEDURE — 76811 OB US DETAILED SNGL FETUS: CPT | Mod: 26 | Performed by: OBSTETRICS & GYNECOLOGY

## 2021-11-18 PROCEDURE — 76817 TRANSVAGINAL US OBSTETRIC: CPT | Mod: 26 | Performed by: OBSTETRICS & GYNECOLOGY

## 2021-11-18 PROCEDURE — 76817 TRANSVAGINAL US OBSTETRIC: CPT

## 2021-11-19 NOTE — PATIENT INSTRUCTIONS
Thank you for trusting us with your care.     Here's a summary of the visit today:   1. You have somatic dysfunction of the left hip/gluteus muscle  2. We treated the tender spots using OMT   3. You may take tylenol if you experience soreness   4. RTC if symptoms worsen or don't get better       Thank you.     Dr. Spaulding

## 2021-11-22 NOTE — PROGRESS NOTES
Preceptor Attestation:   I was present for and supervised portions of procedure. I have verified the content of the note, which accurately reflects my assessment of the patient and the plan of care.   Supervising Physician:  Vance Simmons MD.

## 2021-11-23 ENCOUNTER — TELEPHONE (OUTPATIENT)
Dept: FAMILY MEDICINE | Facility: CLINIC | Age: 31
End: 2021-11-23
Payer: COMMERCIAL

## 2021-11-23 DIAGNOSIS — K21.00 GASTROESOPHAGEAL REFLUX DISEASE WITH ESOPHAGITIS WITHOUT HEMORRHAGE: ICD-10-CM

## 2021-11-23 DIAGNOSIS — O09.212 HIGH RISK PREGNANCY DUE TO HISTORY OF PRETERM LABOR IN SECOND TRIMESTER: Primary | ICD-10-CM

## 2021-11-23 RX ORDER — FAMOTIDINE 20 MG/1
20 TABLET, FILM COATED ORAL 2 TIMES DAILY
Qty: 360 TABLET | Refills: 0 | Status: SHIPPED | OUTPATIENT
Start: 2021-11-23 | End: 2022-01-28 | Stop reason: ALTCHOICE

## 2021-11-23 RX ORDER — PRENATAL VIT/IRON FUM/FOLIC AC 27MG-0.8MG
1 TABLET ORAL DAILY
Qty: 90 TABLET | Refills: 3 | Status: SHIPPED | OUTPATIENT
Start: 2021-11-23 | End: 2022-03-09

## 2021-11-23 NOTE — TELEPHONE ENCOUNTER
Pt states that she would like Dr Palacios to send a new rx for PNV's and medication for heartburn to [facility] on [address].    Pt states that she discussed progesterone to prevent  labor and would like to be sure that Dr Palacios makes sure that vaginal progesterone won't cause her to have more frequent BV infections.  Pt does NOT want to do Linette injections.  She states that 2 weeks ago MFM told her that they will continue to monitor to see if she needs to have a cerclage.  Pt has an appt with [facility] on 21 for repeat cervical length and so far her cervical length u/s have shown her cervix to be 3 cm or longer.  Routed note to Dr Palacios./YOLI

## 2021-12-02 ENCOUNTER — OFFICE VISIT (OUTPATIENT)
Dept: MATERNAL FETAL MEDICINE | Facility: HOSPITAL | Age: 31
End: 2021-12-02
Attending: OBSTETRICS & GYNECOLOGY
Payer: COMMERCIAL

## 2021-12-02 ENCOUNTER — ANCILLARY PROCEDURE (OUTPATIENT)
Dept: ULTRASOUND IMAGING | Facility: HOSPITAL | Age: 31
End: 2021-12-02
Attending: OBSTETRICS & GYNECOLOGY
Payer: COMMERCIAL

## 2021-12-02 DIAGNOSIS — O26.90 PREGNANCY RELATED CONDITION, ANTEPARTUM: ICD-10-CM

## 2021-12-02 DIAGNOSIS — O09.899 H/O PRETERM DELIVERY, CURRENTLY PREGNANT: Primary | ICD-10-CM

## 2021-12-02 PROCEDURE — 76817 TRANSVAGINAL US OBSTETRIC: CPT | Mod: 26 | Performed by: OBSTETRICS & GYNECOLOGY

## 2021-12-02 PROCEDURE — 99207 PR NO CHARGE LOS: CPT | Performed by: OBSTETRICS & GYNECOLOGY

## 2021-12-02 PROCEDURE — 76817 TRANSVAGINAL US OBSTETRIC: CPT

## 2021-12-02 NOTE — PROGRESS NOTES
Please refer to ultrasound report under 'Imaging' Studies of 'Chart Review' tabs.    Joseph Cifuentes M.D.

## 2021-12-06 ENCOUNTER — TELEPHONE (OUTPATIENT)
Dept: FAMILY MEDICINE | Facility: CLINIC | Age: 31
End: 2021-12-06

## 2021-12-06 NOTE — TELEPHONE ENCOUNTER
"Pt is 20w4d pregnant and states that since she woke up this morning (about 3 hrs), she has had left arm numbness.  She states that she has tingling in her fingers and forearm feels \"almost painful\".  She states that her fingers look a little swollen but color looks the same on both arms.  She states that her left arm feels a little colder than the right as well.  She states that she often wakes up with numbness and tingling in her arm/fingers but it usually goes away when she shakes it out.    She also states that she has been noticing the her stomach gets hard with shooting pain in lower back going up which lasts for one hour and feels vaginal pressure once a day.  Baby is active.  Pt denies vaginal bleeding and fluid leaking.  She has had \"lots\" of mucus vaginal discharge.  Appt made for today, 12/6/21 at 10:40 AM with Dr Walton./YOLI  "

## 2021-12-06 NOTE — TELEPHONE ENCOUNTER
Called pt and she states that she was not able to get a ride into the clinic today.  She states that her arm is completely better now.  Discussed trying not to lie on that arm when she is sleeping.  Discussed reasons to call right away including prolonged numbness/tingling, decreased ROM, increased swelling, or if arm feels cold. Pt again denies injury, has no issue with ROM, and denies HA, blurry vision, and rt sided epigastric pain.  Told her to call if she develops any of the above symptoms.  Discussed if she has 5 or more contractions in 1 hour to call the clinic.  Assisted pt with scheduling SHAY for Friday, 12/10/21 at 1:30 PM with Dr Palacios./YOLI

## 2021-12-15 NOTE — TELEPHONE ENCOUNTER
Called pt and she states that she is doing okay and has not had any further issues with her arm but 2 days ago started having numbness in her knee up into her thigh.  She states that it will happen when she has been lying down for awhile or when she first stands up.  She denies redness, swelling, and has not fallen.  Assisted her with rescheduling her SHAY for Monday, 12/20/21 at 1;30 PM with Dr Palacios./YOLI

## 2021-12-16 ENCOUNTER — ANCILLARY PROCEDURE (OUTPATIENT)
Dept: ULTRASOUND IMAGING | Facility: HOSPITAL | Age: 31
End: 2021-12-16
Attending: OBSTETRICS & GYNECOLOGY
Payer: COMMERCIAL

## 2021-12-16 ENCOUNTER — OFFICE VISIT (OUTPATIENT)
Dept: MATERNAL FETAL MEDICINE | Facility: HOSPITAL | Age: 31
End: 2021-12-16
Attending: OBSTETRICS & GYNECOLOGY
Payer: COMMERCIAL

## 2021-12-16 ENCOUNTER — TELEPHONE (OUTPATIENT)
Dept: FAMILY MEDICINE | Facility: CLINIC | Age: 31
End: 2021-12-16
Payer: COMMERCIAL

## 2021-12-16 VITALS — TEMPERATURE: 98 F | HEART RATE: 86 BPM | SYSTOLIC BLOOD PRESSURE: 105 MMHG | DIASTOLIC BLOOD PRESSURE: 67 MMHG

## 2021-12-16 DIAGNOSIS — O09.899 H/O PRETERM DELIVERY, CURRENTLY PREGNANT: Primary | ICD-10-CM

## 2021-12-16 DIAGNOSIS — M79.7 FIBROMYALGIA: ICD-10-CM

## 2021-12-16 DIAGNOSIS — K50.919 CROHN'S DISEASE WITH COMPLICATION, UNSPECIFIED GASTROINTESTINAL TRACT LOCATION (H): ICD-10-CM

## 2021-12-16 DIAGNOSIS — O26.90 PREGNANCY RELATED CONDITION, ANTEPARTUM: ICD-10-CM

## 2021-12-16 PROCEDURE — 76817 TRANSVAGINAL US OBSTETRIC: CPT

## 2021-12-16 PROCEDURE — 76816 OB US FOLLOW-UP PER FETUS: CPT | Mod: 26 | Performed by: OBSTETRICS & GYNECOLOGY

## 2021-12-16 PROCEDURE — 99207 PR NO CHARGE LOS: CPT | Performed by: OBSTETRICS & GYNECOLOGY

## 2021-12-16 NOTE — TELEPHONE ENCOUNTER
Received a call from [facility] that at pt's appt today she was complaining of HA, vision changes, and rt upper quad pain.  Vitals were normal in the clinic.   Pt told the provider that she was not sure when to call/go to hospital or clinic.  [facility] provider would like [facility] to call pt to follow-up.    Called pt and she states that this morning right when she was heading out the door to her [facility] appt she felt like her heart rate was very fast, had HA, vision changes, and rt upper quad pain.  She states that her symptoms resolved quickly and she is fine now.  Discussed that she should call the clinic right away if her symptoms return or if she has questions or concerns.  Pt is scheduled for tomorrow, 12/17/21 with Dr Spaulding for OMT.  Told her to let Dr Spaulding know if she has any questions or concerns and that her vitals will be checked at the visit.  Pt is also scheduled to see Dr Palacios on Monday, 12/20/21 for SHAY.  Routed note to Dr Spaulding and Dr Palacios./YOLI

## 2021-12-16 NOTE — PROGRESS NOTES
Please see full imaging report from [employer] program under imaging tab.    Tono Ryan MD  Maternal Fetal Medicine

## 2021-12-17 ENCOUNTER — OFFICE VISIT (OUTPATIENT)
Dept: FAMILY MEDICINE | Facility: CLINIC | Age: 31
End: 2021-12-17
Payer: COMMERCIAL

## 2021-12-17 VITALS
DIASTOLIC BLOOD PRESSURE: 76 MMHG | WEIGHT: 222 LBS | TEMPERATURE: 98.1 F | OXYGEN SATURATION: 99 % | SYSTOLIC BLOOD PRESSURE: 117 MMHG | BODY MASS INDEX: 38.11 KG/M2 | HEART RATE: 100 BPM | RESPIRATION RATE: 16 BRPM

## 2021-12-17 DIAGNOSIS — R00.0 TACHYCARDIA: Primary | ICD-10-CM

## 2021-12-17 LAB
ANION GAP SERPL CALCULATED.3IONS-SCNC: 9 MMOL/L (ref 5–18)
BUN SERPL-MCNC: 7 MG/DL (ref 8–22)
CALCIUM SERPL-MCNC: 9.1 MG/DL (ref 8.5–10.5)
CHLORIDE BLD-SCNC: 107 MMOL/L (ref 98–107)
CO2 SERPL-SCNC: 21 MMOL/L (ref 22–31)
CREAT SERPL-MCNC: 0.6 MG/DL (ref 0.6–1.1)
GFR SERPL CREATININE-BSD FRML MDRD: >90 ML/MIN/1.73M2
GLUCOSE BLD-MCNC: 83 MG/DL (ref 70–125)
MAGNESIUM SERPL-MCNC: 1.8 MG/DL (ref 1.8–2.6)
POTASSIUM BLD-SCNC: 3.8 MMOL/L (ref 3.5–5)
SODIUM SERPL-SCNC: 137 MMOL/L (ref 136–145)

## 2021-12-17 PROCEDURE — 99214 OFFICE O/P EST MOD 30 MIN: CPT | Mod: GC

## 2021-12-17 PROCEDURE — 36415 COLL VENOUS BLD VENIPUNCTURE: CPT

## 2021-12-17 PROCEDURE — 93000 ELECTROCARDIOGRAM COMPLETE: CPT

## 2021-12-17 PROCEDURE — 80048 BASIC METABOLIC PNL TOTAL CA: CPT

## 2021-12-17 PROCEDURE — 83735 ASSAY OF MAGNESIUM: CPT

## 2021-12-17 RX ORDER — LABETALOL 100 MG/1
100 TABLET, FILM COATED ORAL 2 TIMES DAILY PRN
Qty: 30 TABLET | Refills: 0 | Status: SHIPPED | OUTPATIENT
Start: 2021-12-17 | End: 2022-01-20

## 2021-12-17 NOTE — PROGRESS NOTES
Preceptor Attestation:    I discussed the patient with the resident and evaluated the patient in person. I personally viewed the EKG and agree with the interpretation documented by the resident. I have verified the content of the note, which accurately reflects my assessment of the patient and the plan of care.   Supervising Physician:  Kyler García MD.

## 2021-12-17 NOTE — PATIENT INSTRUCTIONS
Thank you for trusting us with your care.     Here's a summary of the visit today:   1. Make appt at [facility] to have holter monitor placed   2.  medication from pharmacy   3. Stop at lab for blood draw   4. Follow up appt on Monday 12/20 with Dr. Palacios      Thank you.     Dr. Spaulding

## 2021-12-17 NOTE — PROGRESS NOTES
"  Assessment & Plan     Tachycardia, unspecified   Patient is a 30yo female  at 22weeks gestation with past medical history significant for Crohn's disease and fibromyalgia who presented for OMT treatment. However, initial vital signs significant for elevated pulse of 180. 12-lead EKG ordered and pulse recheck was 100 bpm. EKG is NSR. Patient has been experiencing \"heart racing\" for the past couple of weeks. Associated blurry vision, headache and left sided arm numbness that self-resolves. Similar symptoms in her first pregnancy. Valsalva maneuver in the exam room helped. Concern for  sinus tachycardia versus paroxysmal ventricular tachycardia versus other cardiac arhythmia. Less likely anxiety-induced sinus tachycardia given heart rate reach 200's. Will refer for 24-hour Holter monitor, check electrolytes including magnesium.  Prescribed labetolol 100 mg BID PRN. Follow up with primary OB provider on 2021. OMT is not indicated during this visit. Patient understands and agrees with the plan.   - Holter Monitor 24 hour Adult Pediatric  -labetalol (NORMODYNE) 100 MG tablet PRN  -Basic metabolic panel  -EKG 12-lead, tracing only  -Magnesium  -OMT not indicated this visit    Return in about 3 days (around 2021) for Follow up.    Carlos Eduardo Spaulding DO, PGY1  [facility]    Today I precepted with Dr. Kyler García MD, who agrees with the assessment and plan.      Bismark Sweet is a 31 year old who presents for the following health issues: OMT, fast heart    HPI     Patient was coming for return OMT treatment. She is reporting intermittent heart racing with associated   heart palpitations, swelling in hands, arm numbness that resolves \"after shaking it off\". Sharp lower abdominal pain also noted when she is walking around, working.     Had episode yesterday where she experienced heart palpitations and blurry vision with left-arm numbness and headache that self-resolved.     Review of " Systems   Constitutional, HEENT, cardiovascular, pulmonary, gi and gu systems are negative, except as otherwise noted.      Objective    /76 (BP Location: Left arm, Patient Position: Sitting, Cuff Size: Adult Regular)   Pulse 100   Temp 98.1  F (36.7  C) (Oral)   Resp 16   Wt 100.7 kg (222 lb)   LMP 07/15/2021   SpO2 99%   BMI 38.11 kg/m    Body mass index is 38.11 kg/m .  Physical Exam   GENERAL: healthy, alert and no distress  NECK: no adenopathy, no asymmetry, masses, or scars and thyroid normal to palpation  RESP: lungs clear to auscultation - no rales, rhonchi or wheezes  CV: regular rate and rhythm, normal S1 S2, no S3 or S4, no murmur, click or rub, no peripheral edema and peripheral pulses strong  ABDOMEN: soft, nontender, no hepatosplenomegaly, no masses and bowel sounds normal  MS: no gross musculoskeletal defects noted, no edema    EKG - Reviewed and interpreted by me appears normal, NSR, normal axis, normal intervals, no acute ST/T changes c/w ischemia, no LVH by voltage criteria, unchanged from previous tracings

## 2021-12-18 ENCOUNTER — TELEPHONE (OUTPATIENT)
Dept: FAMILY MEDICINE | Facility: CLINIC | Age: 31
End: 2021-12-18
Payer: COMMERCIAL

## 2021-12-18 NOTE — TELEPHONE ENCOUNTER
[facility] Answering Service Pager Note    I received an answering service page at 08:10 regarding holter monitor question.    I called Ms. Phipps and we spoke on the phone. She called [facility] and was unable to get a holter monitor today. She stated someone from [facility] has not called her yet however. She denies any heart racing episodes so far, was just wondering about her holter. Denied anxiety episodes, chest pain, dyspnea. I told her they will contact her when they are available, otherwise it may be during next week. She is to follow up as previously directed with Dr. Palacios for a return OB visit 12/20/21. We also discussed deep breathing and grounding techniques to control her anxiety. She denied any issues with her pregnancy today.     Ms. Phipps stated understanding and will follow up on 12/20/21 for SHAY visit.    Saulo Segura DO, PGY-2  [facility] Medicine

## 2021-12-20 ENCOUNTER — OFFICE VISIT (OUTPATIENT)
Dept: FAMILY MEDICINE | Facility: CLINIC | Age: 31
End: 2021-12-20
Payer: COMMERCIAL

## 2021-12-20 VITALS
HEART RATE: 100 BPM | TEMPERATURE: 98.1 F | OXYGEN SATURATION: 98 % | WEIGHT: 222 LBS | DIASTOLIC BLOOD PRESSURE: 76 MMHG | SYSTOLIC BLOOD PRESSURE: 110 MMHG | BODY MASS INDEX: 38.11 KG/M2 | RESPIRATION RATE: 20 BRPM

## 2021-12-20 DIAGNOSIS — O09.212 HIGH RISK PREGNANCY DUE TO HISTORY OF PRETERM LABOR IN SECOND TRIMESTER: Primary | ICD-10-CM

## 2021-12-20 DIAGNOSIS — R00.0 TACHYCARDIA: ICD-10-CM

## 2021-12-20 DIAGNOSIS — R60.0 LOWER EXTREMITY EDEMA: ICD-10-CM

## 2021-12-20 PROCEDURE — 59425 ANTEPARTUM CARE ONLY: CPT | Mod: GC | Performed by: STUDENT IN AN ORGANIZED HEALTH CARE EDUCATION/TRAINING PROGRAM

## 2021-12-20 NOTE — LETTER
December 20, 2021    Kee Phipps  [address]  [place] [address]    To Whom it May Concern:     Kee was seen on 12/20/21, and we have determined that she is unable to stand for more than 15 minutes per hour, and requires access to be able to sit as it pertains to her pregnancy.         Sincerely,        Stormy Palacios MD
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
## 2021-12-20 NOTE — PATIENT INSTRUCTIONS
Please  holter monitor, continue to drink plenty of water, and you may wear compression stockings to help with swelling.

## 2021-12-20 NOTE — PROGRESS NOTES
Preceptor Attestation:    I discussed the patient with the resident and evaluated the patient in person. I have verified the content of the note, which accurately reflects my assessment of the patient and the plan of care.   Supervising Physician:  Kyler García MD.

## 2021-12-21 ENCOUNTER — HOSPITAL ENCOUNTER (OUTPATIENT)
Dept: CARDIOLOGY | Facility: HOSPITAL | Age: 31
Discharge: HOME | End: 2021-12-21
Attending: FAMILY MEDICINE | Admitting: FAMILY MEDICINE
Payer: COMMERCIAL

## 2021-12-21 DIAGNOSIS — R00.0 TACHYCARDIA: ICD-10-CM

## 2021-12-21 PROCEDURE — 93225 XTRNL ECG REC<48 HRS REC: CPT

## 2021-12-24 ENCOUNTER — TELEPHONE (OUTPATIENT)
Dept: FAMILY MEDICINE | Facility: CLINIC | Age: 31
End: 2021-12-24
Payer: COMMERCIAL

## 2021-12-24 DIAGNOSIS — L29.9 ITCHING: Primary | ICD-10-CM

## 2021-12-24 RX ORDER — HYDROXYZINE PAMOATE 25 MG/1
25-50 CAPSULE ORAL 3 TIMES DAILY PRN
Qty: 30 CAPSULE | Refills: 0 | Status: SHIPPED | OUTPATIENT
Start: 2021-12-24 | End: 2022-01-20

## 2021-12-24 NOTE — TELEPHONE ENCOUNTER
Patient called [facility] OB floor c/o itching and concern for cholestasis. Writer current OB resident returned call.    at 23&1 weeks.   S: Pt having itching for 2 days. She says it is very intense and uncomfortable, waking her from sleep. She was googling and concerned about cholestasis and if the baby was getting the nutrients it needs. She denies fever, rash, abdominal pain, vaginal bleeding or leakage of fluid. She is having normal fetal movement.   She is followed by MFM for hx  labor.    A: 31 year old  at 23w1d with concern for pregnancy induced pruritis vs cholestasis vs other.     P:   - Hydroxyzine 25-50 mg TID PRN  - Follow up Monday in clinic  - Consider trending bile salts at that time    Discussed this plan with the patient. She was agreeable to this. I did offer that she could come to [facility] and be evaluated in triage, but she felt comfortable following up Monday.    Lucy Gaytan MD PGY-1    Discussed with Dr. Fair

## 2021-12-27 ENCOUNTER — OFFICE VISIT (OUTPATIENT)
Dept: FAMILY MEDICINE | Facility: CLINIC | Age: 31
End: 2021-12-27
Payer: COMMERCIAL

## 2021-12-27 VITALS
TEMPERATURE: 98.9 F | SYSTOLIC BLOOD PRESSURE: 110 MMHG | HEART RATE: 99 BPM | RESPIRATION RATE: 16 BRPM | DIASTOLIC BLOOD PRESSURE: 76 MMHG | OXYGEN SATURATION: 99 %

## 2021-12-27 DIAGNOSIS — J02.0 STREPTOCOCCAL SORE THROAT: ICD-10-CM

## 2021-12-27 DIAGNOSIS — R05.9 COUGH: Primary | ICD-10-CM

## 2021-12-27 DIAGNOSIS — O09.899 H/O PRETERM DELIVERY, CURRENTLY PREGNANT: ICD-10-CM

## 2021-12-27 LAB
DEPRECATED S PYO AG THROAT QL EIA: NEGATIVE
FLUAV AG SPEC QL IA: NEGATIVE
FLUBV AG SPEC QL IA: NEGATIVE
GROUP A STREP BY PCR: NOT DETECTED

## 2021-12-27 PROCEDURE — U0005 INFEC AGEN DETEC AMPLI PROBE: HCPCS | Performed by: STUDENT IN AN ORGANIZED HEALTH CARE EDUCATION/TRAINING PROGRAM

## 2021-12-27 PROCEDURE — 99213 OFFICE O/P EST LOW 20 MIN: CPT | Mod: CS | Performed by: STUDENT IN AN ORGANIZED HEALTH CARE EDUCATION/TRAINING PROGRAM

## 2021-12-27 PROCEDURE — 87651 STREP A DNA AMP PROBE: CPT | Performed by: STUDENT IN AN ORGANIZED HEALTH CARE EDUCATION/TRAINING PROGRAM

## 2021-12-27 PROCEDURE — 87804 INFLUENZA ASSAY W/OPTIC: CPT | Performed by: STUDENT IN AN ORGANIZED HEALTH CARE EDUCATION/TRAINING PROGRAM

## 2021-12-27 PROCEDURE — U0003 INFECTIOUS AGENT DETECTION BY NUCLEIC ACID (DNA OR RNA); SEVERE ACUTE RESPIRATORY SYNDROME CORONAVIRUS 2 (SARS-COV-2) (CORONAVIRUS DISEASE [COVID-19]), AMPLIFIED PROBE TECHNIQUE, MAKING USE OF HIGH THROUGHPUT TECHNOLOGIES AS DESCRIBED BY CMS-2020-01-R: HCPCS | Performed by: STUDENT IN AN ORGANIZED HEALTH CARE EDUCATION/TRAINING PROGRAM

## 2021-12-27 NOTE — PATIENT INSTRUCTIONS
Instructions for Patients  Your symptoms show that you may have coronavirus (COVID-19). This illness can cause fever, cough and trouble breathing. Many people get a mild case and get better on their own. Some people can get very sick.     Not all patients are tested for COVID-19. If you need to be tested, your care team will let you know.    How can I protect others?    Without a test, we can t know for sure that you have COVID-19. For safety, it s very important to follow these rules.    Stay home and away from others (self-isolate) until:    You ve had no fever--and no medicine that reduces fever--for 1 full day (24 hours), And     Your other symptoms have resolved (gotten better). For example, your cough or breathing has improved, And     At least 10 days have passed since your symptoms started.    During this time:    Stay in your own room (and use your own bathroom), if you can.    Stay away from others in your home. No hugging, kissing or shaking hands.    No visitors.    Don t go to work, school or anywhere else.     Clean  high touch  surfaces often (doorknobs, counters, handles, etc.). Use a household cleaning spray or wipes.    Cover your mouth and nose with a mask, tissue or washcloth to avoid spreading germs.    Wash your hands and face often. Use soap and water.    For more tips, go to https://www.cdc.gov/coronavirus/2019-ncov/downloads/10Things.pdf.    How can I take care of myself?    1. Get lots of rest. Drink extra fluids (unless a doctor has told you not to).     2. Take Tylenol (acetaminophen) for fever or pain. If you have liver or kidney problems, ask your family doctor if it's okay to take Tylenol.     Adults can take either:     650 mg (two 325 mg pills) every 4 to 6 hours, or     1,000 mg (two 500 mg pills) every 8 hours as needed.     Note: Don't take more than 3,000 mg in one day.   Acetaminophen is found in many medicines (both prescribed and over-the-counter medicines). Read all labels to  be sure you don't take too much.   For children, check the Tylenol bottle for the right dose. The dose is based on  the child's age or weight.    3. If you have other health problems (like cancer, heart failure, an organ transplant or severe kidney disease): Call your specialty clinic if you don't feel better in the next 2 days.    4. Know when to call 911: If your breathing is so bad that it keeps you from doing normal activities, call 911 or go to the emergency room. Tell them that you've been staying home and may have COVID-19.      Thank you for limiting contact with others, wearing a simple mask to cover your cough, practice good hand hygiene habits and accessing our virtual services where possible to limit the spread of this virus.    For more information about COVID19 and options for caring for yourself at home, please visit the CDC website at https://www.cdc.gov/coronavirus/2019-ncov/about/steps-when-sick.html  For more options for care at [facility], please visit our website at [email or web address]

## 2021-12-27 NOTE — PROGRESS NOTES
SUBJECTIVE       Kee Phipps is a 31 year old  female with a PMH significant for   Patient Active Problem List   Diagnosis     Health Care Home     Crohns disease     Abnormal Pap smear of cervix     Chronic pain     Adjustment disorder with depressed mood     Celiac disease     Immunosuppressed status (H)     Fibromyalgia     H/O  delivery, currently pregnant     Ovarian cyst complicating pregnancy, antepartum     Tobacco use in pregnancy, antepartum, first trimester     Supervision of high risk pregnancy, antepartum     BMI 36.0-36.9,adult     At risk for hypertension     Trichomonal vaginitis during pregnancy in first trimester      who presents with   Chief Complaint   Patient presents with     Suspected Covid     body aches, sore throat, congestion, headaches, pt is pregnant 23 weeks, ONSET:  boyfriend is POSITIVE with COVID-19 21, symptoms 21, took at home rapid with NEGATIVE 21,   .  Patient endorses symptoms for the last 3 day/s. Patient is experiencing chills, stuffy nose, sore throat, headache and body aches. Patient is 23 weeks pregnant and has had a normal prenatal course so far, her boyfriend tested positive for COVID-19 21 however she tested negative. Their course is same.  Patient has tried Vaporizer, Fluids and Rest and feels that her symptoms are manageable with cough drops. Patient is not considered high risk based on medical history. Patient denies any travel, denies exposure to persons with known travel, and reports exposure to patients with known COVID19. She is concerned about baby's movements being diminished compared to usually, baby is still moving but just not as much. She denies any vaginal discharge or bleeding, no abdominal pain, no worsening nausea/vomiting.        REVIEW OF SYSTEMS     Head: No headache or facial pain  Neck: Yes throat pain, No swallowing problems  ENT: No ear pain and nasal congestion   Chest: No chest pain   GI: No  constipation, diarrhea, no nausea or vomiting  Skin: No rash        OBJECTIVE     Vitals:    12/27/21 1054   BP: 110/76   BP Location: Left arm   Patient Position: Left side   Cuff Size: Adult Large   Pulse: 99   Resp: 16   Temp: 98.9  F (37.2  C)   TempSrc: Oral   SpO2: 99%       Constitutional: Awake, alert, cooperative, no apparent distress, and appears stated age. Appears well hydrated  Eyes: Lids and lashes normal, sclera clear, conjunctiva normal.  ENT: Normocephalic, without obvious abnormality, atramatic, tonsils 2+ with no erythema or exudate, no lymphadenopathy    Lungs: No increased work of breathing, good air exchange, lungs clear to auscultation bilaterally, no crackles or wheezing.  Cardiovascular: Regular rate and rhythm, normal S1 and S2, no S3 or S4, and no murmur noted.  Musculoskeletal: No redness, warmth, or swelling of the joints.  Full range of motion noted.  Neurologic: Awake, alert, oriented to name, place and time.  Cranial nerves II-XII are grossly intact.  Skin: No rash    Results for orders placed or performed in visit on 12/27/21 (from the past 24 hour(s))   Influenza A & B Antigen - Clinic Collect    Specimen: Nose; Swab   Result Value Ref Range    Influenza A antigen Negative Negative    Influenza B antigen Negative Negative    Narrative    Test results must be correlated with clinical data. If necessary, results should be confirmed by a molecular assay or viral culture.   Streptococcus A Rapid Scr w Reflx to PCR    Specimen: Throat; Swab   Result Value Ref Range    Group A Strep antigen Negative Negative       ASSESSMENT AND PLAN      Kee was seen today for suspected covid.    Diagnoses and all orders for this visit:    Cough  -     Symptomatic; Unknown COVID-19 Virus (Coronavirus) by PCR Nose  -     Influenza A & B Antigen - Clinic Collect  -     Streptococcus A Rapid Scr w Reflx to PCR  -     Group A Streptococcus PCR Throat Swab    Streptococcal sore throat  -     Streptococcus  A Rapid Scr w Reflx to PCR  -     Group A Streptococcus PCR Throat Swab      - Patient deemed to be safe to continue supportive cares at home  - Patient provided with the following education:  - Patient is positive for COVID until proven otherwise, recommended 2 day isolation period with anticipated 10 day isolation period once covid PCR results return  - Patient declined any other prescriptions for symptom management.   - Fetal heart tones were detected via Doptone -142, continue to monitor babies activity and follow up with Dr. Palacios on 12/30 for routine prenatal screening as planned.    I discussed the patient with Dr. Reyes Fair MD who is in agreement with the assessment and plan.      Gonzalo Corona MD

## 2021-12-28 ENCOUNTER — TELEPHONE (OUTPATIENT)
Dept: FAMILY MEDICINE | Facility: CLINIC | Age: 31
End: 2021-12-28
Payer: COMMERCIAL

## 2021-12-28 LAB — SARS-COV-2 RNA RESP QL NAA+PROBE: POSITIVE

## 2021-12-28 NOTE — TELEPHONE ENCOUNTER
[facility] phone call message- general phone call:    Reason for call: Would like to speak with nurse Olsen.     Return call needed: Yes    OK to leave a message on voice mail? Yes    Primary language: English      needed? No    Call taken on December 28, 2021 at 11:17 AM by Grisel Flores-Cardona

## 2021-12-30 ENCOUNTER — VIRTUAL VISIT (OUTPATIENT)
Dept: RHEUMATOLOGY | Facility: CLINIC | Age: 31
End: 2021-12-30
Payer: COMMERCIAL

## 2021-12-30 ENCOUNTER — TELEPHONE (OUTPATIENT)
Dept: RHEUMATOLOGY | Facility: CLINIC | Age: 31
End: 2021-12-30

## 2021-12-30 DIAGNOSIS — M25.50 POLYARTHRALGIA: Primary | ICD-10-CM

## 2021-12-30 DIAGNOSIS — K50.819 CROHN'S DISEASE OF SMALL AND LARGE INTESTINES WITH COMPLICATION (H): ICD-10-CM

## 2021-12-30 DIAGNOSIS — M79.7 FIBROMYALGIA: ICD-10-CM

## 2021-12-30 PROCEDURE — 99204 OFFICE O/P NEW MOD 45 MIN: CPT | Mod: 95 | Performed by: INTERNAL MEDICINE

## 2021-12-30 NOTE — TELEPHONE ENCOUNTER
Called and spoke with patient, patient was not available to schedule a lab appt and follow up with me. She stated that she will call the clinic back. Please assist patient with a lab only appointment and a follow up appointment with Dr. Avilez.

## 2021-12-30 NOTE — PROGRESS NOTES
Kee is a 31 year old who is being evaluated via a billable video visit.      How would you like to obtain your AVS? Mail a copy  If the video visit is dropped, the invitation should be resent by: Text to cell phone: [phone]   Will anyone else be joining your video visit? No      Video Start Time: 942am  Video-Visit Details    Type of service:  Video Visit    Video End Time:10:00 AM    Originating Location (pt. Location): Home    Distant Location (provider location):  [facility]     Platform used for Video Visit: [facility]    This document was created using a software with less than 100% fidelity, at times resulting in unintended, even erroneous syntax and grammar.  The reader is advised to keep this under consideration while reviewing, interpreting this note.    ASSESSMENT AND PLAN:  Kee Phipps 31 year old female is seen here on 12/30/21 for evaluation of widespread polyarthralgias, having got worse during pregnancy of which she is on 6 months, with the background of fibromyalgia diagnosed by rheumatologist, unclear or nonexistent response to various interventions for Crohn's as per as her joint symptoms are concerned, requires further work-up, and in person further evaluation.  This is discussed.  The association of inflammatory joint disease which can sometimes look like rheumatoid arthritis with Crohn's was outlined.  Various methods to differentiate pain from fibromyalgia and inflammatory joint disease of Crohn's outlined.  Including a therapeutic trial such as of prednisone.  This would need to be carefully evaluated in light of her pregnancy.  Diagnoses and all orders for this visit:  Polyarthralgia  -     Erythrocyte sedimentation rate auto; Future  -     CRP inflammation; Future  -     CBC with Platelets & Differential; Future  -     Albumin level; Future  -     ALT; Future  -     Anti Nuclear Nimo IgG by IFA with Reflex; Future  -     Creatinine; Future  -     Hepatitis C  antibody; Future  -     Rheumatoid factor; Future  -     Cyclic Citrullinated Peptide Antibody IgG; Future  Fibromyalgia  -     Rheumatology Referral  -     Erythrocyte sedimentation rate auto; Future  -     CRP inflammation; Future  -     CBC with Platelets & Differential; Future  -     Albumin level; Future  -     ALT; Future  -     Anti Nuclear Nimo IgG by IFA with Reflex; Future  -     Creatinine; Future  -     Hepatitis C antibody; Future  -     Rheumatoid factor; Future  -     Cyclic Citrullinated Peptide Antibody IgG; Future  Crohn's disease of small and large intestines with complication (H)  -     Erythrocyte sedimentation rate auto; Future  -     CRP inflammation; Future  -     CBC with Platelets & Differential; Future  -     Albumin level; Future  -     ALT; Future  -     Anti Nuclear Nimo IgG by IFA with Reflex; Future  -     Creatinine; Future  -     Hepatitis C antibody; Future  -     Rheumatoid factor; Future  -     Cyclic Citrullinated Peptide Antibody IgG; Future    HISTORY OF PRESENTING ILLNESS:  Kee Phipps, 31 year old, female is here for evaluation of widespread of painful joints.  She noted that as she is 6 months pregnant and since her pregnancy her joint symptoms have gotten worse.  More than 6 years ago she was told that she has fibromyalgia.  She remembers seeing a rheumatologist.  She also been found to have Crohn's disease.  She remembers that when she was on Entyvio infusions her joint pains and Crohn's both were very well controlled.  Prior to that she had been on Remicade and Humira for Crohn's and does not qualify does have more joint symptoms.  Most recently about 4 years ago she was given prednisone for Crohn's and again does not recall if there was a significant improvement in her joint symptoms at that point.  Currently she is on Stelara that was started 3 months ago.  At 1 point she was told that she may have rheumatoid arthritis as well.  In this background she reports that she  has been hurting in almost all her joints, upper and lower extremities neck and back, the symptoms got worse after she found out she is pregnant having started pain in her lower back.  This sequence of events of pain getting worse during pregnancy has happened in the past to the point where she had had to have abortions as she could not stand the pain.  At the time she has had miscarriages.  In total she has had 6 pregnancies and one child.  She feels that there is hardly any area that does not hurt.  She is not sure if there is clear diurnal variation.  She feels pain all the time.  There is no personal or family history of psoriasis rheumatoid arthritis or lupus.  She used to work in residential and a commercial painting up and [place] she feels has affected her body too much she changed her job to work at [employer] and that too was too hard so currently she is not working.    ALLERGIES:Gluten meal, Humira [humira], Ibuprofen, Latex, and Remicade [infliximab]    PAST MEDICAL/ACTIVE PROBLEMS/MEDICATION/ FAMILY HISTORY/SOCIAL DATA:  The patient has a family history of  Past Medical History:   Diagnosis Date     Abnormal Pap smear of cervix      Anemia      Anxiety      Arthritis      Bilateral plantar fasciitis      Celiac disease 2014     Celiac disease      Crohn disease (H)      Crohn's disease (H) 2012     Depression      Depressive disorder      Fibromyalgia      Fibromyalgia      H/O miscarriage, currently pregnant     miscarriage .     HPV (human papilloma virus) infection      Migraine      Plantar fasciitis     bilateral      delivery 2013    34 weeks. Early rupture of membranes     History   Smoking Status     Current Every Day Smoker     Packs/day: 0.03     Types: Cigarettes   Smokeless Tobacco     Never Used     Comment: Esigs      Patient Active Problem List   Diagnosis     Health Care Home     Crohns disease     Abnormal Pap smear of cervix     Chronic pain     Adjustment  disorder with depressed mood     Celiac disease     Immunosuppressed status (H)     Fibromyalgia     H/O  delivery, currently pregnant     Ovarian cyst complicating pregnancy, antepartum     Tobacco use in pregnancy, antepartum, first trimester     Supervision of high risk pregnancy, antepartum     BMI 36.0-36.9,adult     At risk for hypertension     Trichomonal vaginitis during pregnancy in first trimester     Current Outpatient Medications   Medication Sig Dispense Refill     acetaminophen (TYLENOL) 325 MG tablet Take 2 tablets (650 mg) by mouth every 4 hours as needed for mild pain 100 tablet 0     Prenatal Vit-Fe Fumarate-FA (PRENATAL MULTIVITAMIN W/IRON) 27-0.8 MG tablet Take 1 tablet by mouth daily 90 tablet 3     ustekinumab (STELARA) 45 MG/0.5ML SOSY Inject 45 mg Subcutaneous once        doxylamine (UNISOM) 25 MG TABS tablet Take 0.5 tablets (12.5 mg) by mouth At Bedtime (Patient not taking: Reported on 2021) 30 tablet 1     famotidine (PEPCID) 20 MG tablet Take 1 tablet (20 mg) by mouth 2 times daily (Patient not taking: Reported on 2021) 360 tablet 0     glycerin (LAXATIVE) 1.2 g suppository Place 1 suppository rectally daily as needed (constipation) . Take after trial of Metamucil powder for 3-4 days without relief. (Patient not taking: Reported on 2021) 10 suppository 0     hydrOXYzine (VISTARIL) 25 MG capsule Take 1-2 capsules (25-50 mg) by mouth 3 times daily as needed for itching (Patient not taking: Reported on 2021) 30 capsule 0     hydrOXYzine (VISTARIL) 25 MG capsule Take 1 capsule (25 mg) by mouth 3 times daily as needed for other (muscle pain) (Patient not taking: Reported on 2021) 30 capsule 3     labetalol (NORMODYNE) 100 MG tablet Take 1 tablet (100 mg) by mouth 2 times daily as needed (for racing heart) (Patient not taking: Reported on 2021) 30 tablet 0     lidocaine-prilocaine (EMLA) 2.5-2.5 % external cream Apply topically 2 times daily as needed  for moderate pain (Patient not taking: Reported on 12/20/2021) 30 g 1     nicotine (COMMIT) 2 MG lozenge Place 1 lozenge (2 mg) inside cheek every hour as needed for smoking cessation (Patient not taking: Reported on 12/20/2021) 90 lozenge 1     polyethylene glycol (MIRALAX) 17 GM/Dose powder Take 17 g by mouth daily (Patient not taking: Reported on 9/20/2021) 510 g 1     Prenatal Vit-Fe Fumarate-FA (PRENATAL MULTIVITAMIN W/IRON) 27-0.8 MG tablet Take 1 tablet by mouth daily (Patient not taking: Reported on 12/30/2021) 90 tablet 3     psyllium (METAMUCIL/KONSYL) 58.6 % powder Take 6 g (1 teaspoonful) by mouth 2 times daily as needed for constipation (Patient not taking: Reported on 9/20/2021) 173 g 0     pyridOXINE (VITAMIN B6) 25 MG tablet Take 1 tablet (25 mg) by mouth 3 times daily as needed (nausea) (Patient not taking: Reported on 9/20/2021) 30 tablet 3       COMPREHENSIVE EXAMINATION:  There were no vitals filed for this visit.  A well appearing alert oriented female. Well appearing alert oriented.   Examination of the eyes revealed no redness, obvious scleromalacia.  ENT examination shows no nasal deformity such as of saddle type, external ear without signs of inflamm/deformity ation.  Cardiopulmonary examination without obvious signs of dyspnea, no wheezing is audible, no cyanosis.  There is no finger clubbing.  Skin examination performed for heliotrope, malar area eruption periungual erythema, lupus pernio.  Neurological examination shows the speech is fluent, no facial asymmetry, muscle power in the upper extremities proximally appears to be normal.  In the psychiatric examination the memory, orientation, attention, affect were observable and normal.  Joint examination of the DIPs, PIPs, MCPs, IP joints of the thumbs, wrists, elbows, for swelling, range of motion, for shoulders range of motion evaluated.  She does not have swelling of digits, range of motion is full, she has normal leg and face, there is  no dactylitis of digits.  Wrist elbow shoulder range of motion is full.    LAB / IMAGING DATA:  ALT   Date Value Ref Range Status   08/08/2021 16 0 - 45 U/L Final   05/15/2021 29 0 - 45 U/L Final   05/14/2021 24 0 - 45 U/L Final   08/16/2019 15 0 - 50 U/L Final   03/18/2015 16 0 - 50 U/L Final     Albumin   Date Value Ref Range Status   08/08/2021 4.2 3.5 - 5.0 g/dL Final   05/15/2021 3.9 3.5 - 5.0 g/dL Final   05/14/2021 4.1 3.5 - 5.0 g/dL Final   08/16/2019 3.5 3.4 - 5.0 g/dL Final   03/18/2015 3.4 3.4 - 5.0 g/dL Final       WBC   Date Value Ref Range Status   08/16/2019 7.3 4.0 - 11.0 10e9/L Final   03/18/2015 9.8 4.0 - 11.0 10e9/L Final     WBC Count   Date Value Ref Range Status   09/08/2021 9.7 4.0 - 11.0 10e3/uL Final   08/08/2021 9.7 4.0 - 11.0 10e3/uL Final     Hemoglobin   Date Value Ref Range Status   09/08/2021 13.7 11.7 - 15.7 g/dL Final   08/20/2021 14.4 11.7 - 15.7 g/dL Final   08/10/2021 14.8 11.7 - 15.7 g/dL Final   08/16/2019 13.0 11.7 - 15.7 g/dL Final   10/12/2017 12.4 11.7 - 15.7 g/dL Final   11/14/2016 13.6 11.7 - 15.7 g/dL Final     Platelet Count   Date Value Ref Range Status   09/08/2021 219 150 - 450 10e3/uL Final   08/08/2021 229 150 - 450 10e3/uL Final   05/15/2021 235 140 - 440 thou/uL Final   08/16/2019 180 150 - 450 10e9/L Final   03/18/2015 363 150 - 450 10e9/L Final       No results found for: BENITO

## 2022-01-02 ENCOUNTER — TELEPHONE (OUTPATIENT)
Dept: FAMILY MEDICINE | Facility: CLINIC | Age: 32
End: 2022-01-02
Payer: COMMERCIAL

## 2022-01-03 NOTE — TELEPHONE ENCOUNTER
[facility] Answering Service Pager Note    Re: Kee Phipps concerns about baby    Kee Phipps tested positive for COVID on 12/27/21 she is 24w3d pregnant, she has been feeling baby move less since she tested positive for COVID. Her covid symptoms are better but she does still have congestion. She denies any abdominal pain, nausea, vomiting, vaginal bleeding, vaginal discharge. She is concerned about the decreased movement from baby but states that she will still feel baby move but just less. Discussed the signs and symptoms of miscarriage and recommended presenting to ED if she has any of these symptoms. Otherwise discussed follow up with Dr. Palacios by 1/6/22 or later after her 10 day Quarantine to discuss her concerns.    Gonzalo Corona PGY2  [facility]

## 2022-01-04 ENCOUNTER — TELEPHONE (OUTPATIENT)
Dept: FAMILY MEDICINE | Facility: CLINIC | Age: 32
End: 2022-01-04
Payer: COMMERCIAL

## 2022-01-04 PROBLEM — O09.899 H/O PRETERM DELIVERY, CURRENTLY PREGNANT: Status: ACTIVE | Noted: 2021-09-08

## 2022-01-04 NOTE — TELEPHONE ENCOUNTER
Called pt to see how she was doing and she states that her symptoms have improved.  She states that she has some nasal congestion at night/early morning but is running a humidifier which helps and is getting sleep.  She vomited the other day but otherwise no other symptoms and feels fine now.  Scheduled her for a SHAY for Thursday, 1/6/22 with Dr Palacios.  Told her to call if she has further questions or concerns./NG

## 2022-01-06 ENCOUNTER — TELEPHONE (OUTPATIENT)
Dept: FAMILY MEDICINE | Facility: CLINIC | Age: 32
End: 2022-01-06

## 2022-01-06 ENCOUNTER — OFFICE VISIT (OUTPATIENT)
Dept: FAMILY MEDICINE | Facility: CLINIC | Age: 32
End: 2022-01-06
Payer: COMMERCIAL

## 2022-01-06 VITALS
OXYGEN SATURATION: 98 % | DIASTOLIC BLOOD PRESSURE: 84 MMHG | WEIGHT: 223.6 LBS | BODY MASS INDEX: 38.38 KG/M2 | SYSTOLIC BLOOD PRESSURE: 128 MMHG | TEMPERATURE: 98.3 F | RESPIRATION RATE: 20 BRPM | HEART RATE: 113 BPM

## 2022-01-06 DIAGNOSIS — I44.2 ACCELERATED IDIOVENTRICULAR RHYTHM (H): ICD-10-CM

## 2022-01-06 DIAGNOSIS — Z91.89 AT RISK FOR HYPERTENSION: ICD-10-CM

## 2022-01-06 DIAGNOSIS — K50.819 CROHN'S DISEASE OF SMALL AND LARGE INTESTINES WITH COMPLICATION (H): ICD-10-CM

## 2022-01-06 DIAGNOSIS — O09.212 HIGH RISK PREGNANCY DUE TO HISTORY OF PRETERM LABOR IN SECOND TRIMESTER: Primary | ICD-10-CM

## 2022-01-06 DIAGNOSIS — D84.9 IMMUNOSUPPRESSED STATUS (H): ICD-10-CM

## 2022-01-06 DIAGNOSIS — O09.212 HIGH RISK PREGNANCY DUE TO HISTORY OF PRETERM LABOR, SECOND TRIMESTER: Primary | ICD-10-CM

## 2022-01-06 DIAGNOSIS — O99.012 ANEMIA DURING PREGNANCY IN SECOND TRIMESTER: ICD-10-CM

## 2022-01-06 DIAGNOSIS — O09.212 HIGH RISK PREGNANCY DUE TO HISTORY OF PRETERM LABOR IN SECOND TRIMESTER: ICD-10-CM

## 2022-01-06 DIAGNOSIS — O09.899 H/O PRETERM DELIVERY, CURRENTLY PREGNANT: ICD-10-CM

## 2022-01-06 LAB
ERYTHROCYTE [DISTWIDTH] IN BLOOD BY AUTOMATED COUNT: 13.2 % (ref 10–15)
FERRITIN SERPL-MCNC: 47 NG/ML (ref 12–150)
FOLATE SERPL-MCNC: 29.4 NG/ML
GLUCOSE 1H P 50 G GLC PO SERPL-MCNC: 97 MG/DL (ref 70–129)
HCT VFR BLD AUTO: 31 % (ref 35–47)
HGB BLD-MCNC: 10.4 G/DL (ref 11.7–15.7)
HGB BLD-MCNC: 10.4 G/DL (ref 11.7–15.7)
IRON SATN MFR SERPL: 24 % (ref 20–50)
IRON SERPL-MCNC: 77 UG/DL (ref 42–175)
MCH RBC QN AUTO: 30.9 PG (ref 26.5–33)
MCHC RBC AUTO-ENTMCNC: 33.5 G/DL (ref 31.5–36.5)
MCV RBC AUTO: 92 FL (ref 78–100)
PLATELET # BLD AUTO: 175 10E3/UL (ref 150–450)
RBC # BLD AUTO: 3.37 10E6/UL (ref 3.8–5.2)
T PALLIDUM AB SER QL: NONREACTIVE
TIBC SERPL-MCNC: 316 UG/DL (ref 313–563)
TRANSFERRIN SERPL-MCNC: 253 MG/DL (ref 212–360)
VIT B12 SERPL-MCNC: 655 PG/ML (ref 193–986)
WBC # BLD AUTO: 6.9 10E3/UL (ref 4–11)

## 2022-01-06 PROCEDURE — 85027 COMPLETE CBC AUTOMATED: CPT | Performed by: STUDENT IN AN ORGANIZED HEALTH CARE EDUCATION/TRAINING PROGRAM

## 2022-01-06 PROCEDURE — 82950 GLUCOSE TEST: CPT | Performed by: STUDENT IN AN ORGANIZED HEALTH CARE EDUCATION/TRAINING PROGRAM

## 2022-01-06 PROCEDURE — 82746 ASSAY OF FOLIC ACID SERUM: CPT | Performed by: STUDENT IN AN ORGANIZED HEALTH CARE EDUCATION/TRAINING PROGRAM

## 2022-01-06 PROCEDURE — 86780 TREPONEMA PALLIDUM: CPT | Performed by: STUDENT IN AN ORGANIZED HEALTH CARE EDUCATION/TRAINING PROGRAM

## 2022-01-06 PROCEDURE — 86140 C-REACTIVE PROTEIN: CPT | Performed by: STUDENT IN AN ORGANIZED HEALTH CARE EDUCATION/TRAINING PROGRAM

## 2022-01-06 PROCEDURE — 83540 ASSAY OF IRON: CPT | Performed by: STUDENT IN AN ORGANIZED HEALTH CARE EDUCATION/TRAINING PROGRAM

## 2022-01-06 PROCEDURE — 84466 ASSAY OF TRANSFERRIN: CPT | Performed by: STUDENT IN AN ORGANIZED HEALTH CARE EDUCATION/TRAINING PROGRAM

## 2022-01-06 PROCEDURE — 36415 COLL VENOUS BLD VENIPUNCTURE: CPT | Performed by: STUDENT IN AN ORGANIZED HEALTH CARE EDUCATION/TRAINING PROGRAM

## 2022-01-06 PROCEDURE — 82728 ASSAY OF FERRITIN: CPT | Performed by: STUDENT IN AN ORGANIZED HEALTH CARE EDUCATION/TRAINING PROGRAM

## 2022-01-06 PROCEDURE — 99207 PR PRENATAL VISIT: CPT | Mod: GC | Performed by: STUDENT IN AN ORGANIZED HEALTH CARE EDUCATION/TRAINING PROGRAM

## 2022-01-06 PROCEDURE — 82607 VITAMIN B-12: CPT | Performed by: STUDENT IN AN ORGANIZED HEALTH CARE EDUCATION/TRAINING PROGRAM

## 2022-01-06 RX ORDER — CALCIUM CARBONATE 500 MG/1
1 TABLET, CHEWABLE ORAL 2 TIMES DAILY
Qty: 100 TABLET | Refills: 3 | Status: ON HOLD | OUTPATIENT
Start: 2022-01-06 | End: 2022-03-28

## 2022-01-06 RX ORDER — ACETAMINOPHEN 325 MG/1
650 TABLET ORAL EVERY 4 HOURS PRN
Qty: 100 TABLET | Refills: 0 | Status: SHIPPED | OUTPATIENT
Start: 2022-01-06 | End: 2022-01-13 | Stop reason: ALTCHOICE

## 2022-01-06 NOTE — TELEPHONE ENCOUNTER
Message left for pt to return call. It appears she does already have a lab appt scheduled for 1/10/22, still needs to schedule follow-up appointment with Dr. Avilez.

## 2022-01-06 NOTE — TELEPHONE ENCOUNTER
Pt states she saw in My Chart that her hgb is 10.4.  She is wondering if this is too low?  Discussed that it is a little low and Dr Palacios is running some other tests to see cause for low hgb to determine if need to add iron supplement or something else.  Discussed that her diabetes test came back normal.  Pt states that she would like Dr Palacios to call her once the other lab tests are back to let her know if there is anything else she needs to do.  Discussed the need to take her PNV daily.  Routed note to Dr Palacios./YOLI

## 2022-01-06 NOTE — PROGRESS NOTES
DME (Durable Medical Equipment) Orders and Documentation  Orders Placed This Encounter   Procedures     Breast Pump Order      The patient was assessed and it was determined the patient is in need of the following listed DME Supplies/Equipment. Please complete supporting documentation below to demonstrate medical necessity.

## 2022-01-06 NOTE — PATIENT INSTRUCTIONS
You may take tylenol as needed for pain, and you may schedule an appointment for OMT. We will continue to monitor your baby with ultrasounds and routine visits.

## 2022-01-06 NOTE — PROGRESS NOTES
Assessment & Plan  31 year old  at 25w0d with LROIE 2022 based on LMP    Kee was seen today for prenatal care.    Diagnoses and all orders for this visit:    High risk pregnancy due to history of  labor, second trimester    At risk for hypertension    Accelerated idioventricular rhythm (H)    Immunosuppressed status (H)    Crohn's disease of small and large intestines with complication (H)    H/O  delivery, currently pregnant    BMI 36.0-36.9,adult    Discussed with her that she is a high risk patient for covid infection secondary to her immunosuppressed status. Encouraged ongoing follow up with MFM given high risk nature of her pregnancy. Recommended continuing to monitor HR symptoms. Counseled her on signs and symptoms to watch out: dizziness, syncope, lightheadedness that would warrant her going to the ED for eval.     - tylenol PRN for pain  - OMT scheduled  - continue to monitor HR symptoms  -1h GTT  - hgb, syphilis, hgb low, added on additional iron studies.     Weight gain adequate: 4.355 kg (9 lb 9.6 oz) to date, out of recommended total of 11-20 lbs (pregravid BMI >30)    There are no Patient Instructions on file for this visit.    Return to clinic in 4 weeks.    Stormy Palacios MD  I precepted today with Chichi Victoria MD.    Review of the result(s) of each unique test - US, wet prep, covid, influenza, strep.   60 minutes spent on the date of the encounter doing chart review, history and exam, documentation and further activities per the note        Subjective  Concerns: 1. Followed up on her heart rate: though she had readings in the 200s at previous visits, her holter monitor was insignificant. She also continues to feel occasional palpitations, though does not have chest pain, shortness of breath, dizziness, syncope with episodes. Most recent episode occurred when she was walking up the stairs to this appointment, and she notes feeling better since sitting down.    2.  Patient tested positive for COVID ~10 days ago, and has been feeling better for the most part. She notes having ongoing congestion and muscle aches in her shoulders, posterior neck, and hips. This does not feel like her fibromyalgia pain. She wants to think about the COVID vaccine.    3. Has had reflux, in which the pepcid has not really helped. Worse after meals.     4. Discussed ongoing follow up with MFM: patient is scheduled to have q2w US to assess for  labor risk factors. Appears her next US will be ~.    5. Would like assistance for breast pump and     6. She had repeat wet prep on 10/13 to confirm resolution of trichomonas infection. She does not have issues with discharge, pain, odor, etc.     ROS:  No - Headache  No - Changes in vision  No - Chest Pain  No - Shortness of Breath  No - Nausea   YES - Vomiting, one episode following eating shrimp. No ongoing nausea or vomiting.   No - Abdominal pain   No - Contractions  No - Dysuria   No - Vaginal Discharge    No - Vaginal bleeding   No - Loss of Fluid   YES - Extremity swelling   Present - Fetal movement     Going to WIC? Yes      Risk Assessment   Average Risk Category  No significant risk factors: Yes    At Risk Category (up to 3)  Teen pregnancy: No  Poor social situation: No  Domestic abuse: No  Financial difficulties: No  Smoker: Yes  H/O  deliver: Yes  H/O drug abuse: No  Non-English speaking: No  Advanced maternal age: No  GDM risks: Yes  Previous C/S: No  H/O PIH: No  H/O STIs: Yes  H/O mental health concerns: Yes  Onset care > 20 weeks: No  Other: none    High Risk Category (4 or more At Risk or)  Diabetes/GDM: No  Multiple gestation: No  Chronic hypertension: No  Significant hx of asthma: No  Fetal demise > 20 weeks: No  Positive tox screen: No  Current mental health treatment: No  Other: fibromyalgia, Crohn's, COVID,     Risk: High Risk   Date determined: 2022    Patient Active Problem List   Diagnosis     Health Care Home      Crohns disease     Abnormal Pap smear of cervix     Chronic pain     Adjustment disorder with depressed mood     Celiac disease     Immunosuppressed status (H)     Fibromyalgia     H/O  delivery, currently pregnant     Ovarian cyst complicating pregnancy, antepartum     Tobacco use in pregnancy, antepartum, first trimester     High risk pregnancy due to history of  labor, second trimester     BMI 36.0-36.9,adult     At risk for hypertension     Trichomonal vaginitis during pregnancy in first trimester     Accelerated idioventricular rhythm (H)       Kee Phipps speaks English so an  was not used today.    Guidance:  breastfeeding  GDM  signs of  labor    Objective  /84   Pulse 113   Temp 98.3  F (36.8  C) (Oral)   Resp 20   Wt 101.4 kg (223 lb 9.6 oz)   LMP 07/15/2021   SpO2 98%   BMI 38.38 kg/m    No distress.  Gravid abdomen.  .  Fundal height 26 cm.  trace edema.    Results  Blood type: O POS  No results found for any visits on 22.

## 2022-01-06 NOTE — PROGRESS NOTES
Preceptor attestation:  Vital signs reviewed: /84   Pulse 113   Temp 98.3  F (36.8  C) (Oral)   Resp 20   Wt 101.4 kg (223 lb 9.6 oz)   LMP 07/15/2021   SpO2 98%   BMI 38.38 kg/m      Patient seen, evaluated, and discussed with the resident.  I have verified the content of the note, which accurately reflects my assessment of the patient and the plan of care.    Supervising physician: Chichi Victoria MD  [facility]

## 2022-01-07 ENCOUNTER — TELEPHONE (OUTPATIENT)
Dept: FAMILY MEDICINE | Facility: CLINIC | Age: 32
End: 2022-01-07
Payer: COMMERCIAL

## 2022-01-07 NOTE — TELEPHONE ENCOUNTER
Called to discuss hgb of 10.4 with patient. Noted with her that additional labs reflect that she has adequate vitamin and iron stores. Most likely this is multifactorial anemia in pregnancy: 1. Physiologic dilution. 2. Anemia of chronic disease as she has autoimmune hx, and vipul recently, it appears her rheumatologist is pursuing additional work up. Moreover, she does have ongoing symptoms of COVID with congestion, and this could also be source of infectious inflammation that may be contributing to anemia. I discussed with her to continue to follow up with her specialists to ensure that her Crohn's and other sources of autoimmunity are well controlled. Given her additional iron studies, recommended that we continue to monitor her labs, and in shared decision making, will not start her on additional iron supplementation at this time.    Stormy Palacios MD PGY2  [facility]

## 2022-01-08 ENCOUNTER — HEALTH MAINTENANCE LETTER (OUTPATIENT)
Age: 32
End: 2022-01-08

## 2022-01-08 LAB — CRP SERPL-MCNC: 11 MG/L (ref 0–8)

## 2022-01-10 ENCOUNTER — LAB (OUTPATIENT)
Dept: LAB | Facility: CLINIC | Age: 32
End: 2022-01-10
Payer: COMMERCIAL

## 2022-01-10 DIAGNOSIS — O99.012 ANEMIA DURING PREGNANCY IN SECOND TRIMESTER: ICD-10-CM

## 2022-01-10 DIAGNOSIS — M25.50 POLYARTHRALGIA: ICD-10-CM

## 2022-01-10 DIAGNOSIS — K50.819 CROHN'S DISEASE OF SMALL AND LARGE INTESTINES WITH COMPLICATION (H): ICD-10-CM

## 2022-01-10 DIAGNOSIS — M79.7 FIBROMYALGIA: ICD-10-CM

## 2022-01-10 LAB
ALBUMIN SERPL-MCNC: 3 G/DL (ref 3.5–5)
ALT SERPL W P-5'-P-CCNC: 14 U/L (ref 0–45)
BASOPHILS # BLD AUTO: 0 10E3/UL (ref 0–0.2)
BASOPHILS NFR BLD AUTO: 0 %
C REACTIVE PROTEIN LHE: 1.7 MG/DL (ref 0–0.8)
CREAT SERPL-MCNC: 0.59 MG/DL (ref 0.6–1.1)
EOSINOPHIL # BLD AUTO: 0 10E3/UL (ref 0–0.7)
EOSINOPHIL NFR BLD AUTO: 1 %
ERYTHROCYTE [DISTWIDTH] IN BLOOD BY AUTOMATED COUNT: 12.9 % (ref 10–15)
ERYTHROCYTE [SEDIMENTATION RATE] IN BLOOD BY WESTERGREN METHOD: 31 MM/HR (ref 0–20)
GFR SERPL CREATININE-BSD FRML MDRD: >90 ML/MIN/1.73M2
HCT VFR BLD AUTO: 31 % (ref 35–47)
HGB BLD-MCNC: 10.6 G/DL (ref 11.7–15.7)
IMM GRANULOCYTES # BLD: 0.3 10E3/UL
IMM GRANULOCYTES NFR BLD: 3 %
LYMPHOCYTES # BLD AUTO: 1 10E3/UL (ref 0.8–5.3)
LYMPHOCYTES NFR BLD AUTO: 13 %
MCH RBC QN AUTO: 31.1 PG (ref 26.5–33)
MCHC RBC AUTO-ENTMCNC: 34.2 G/DL (ref 31.5–36.5)
MCV RBC AUTO: 91 FL (ref 78–100)
MONOCYTES # BLD AUTO: 0.7 10E3/UL (ref 0–1.3)
MONOCYTES NFR BLD AUTO: 8 %
NEUTROPHILS # BLD AUTO: 6.1 10E3/UL (ref 1.6–8.3)
NEUTROPHILS NFR BLD AUTO: 75 %
PLATELET # BLD AUTO: 169 10E3/UL (ref 150–450)
RBC # BLD AUTO: 3.41 10E6/UL (ref 3.8–5.2)
RHEUMATOID FACT SER NEPH-ACNC: <15 IU/ML (ref 0–30)
WBC # BLD AUTO: 8.1 10E3/UL (ref 4–11)

## 2022-01-10 PROCEDURE — 85652 RBC SED RATE AUTOMATED: CPT

## 2022-01-10 PROCEDURE — 86431 RHEUMATOID FACTOR QUANT: CPT

## 2022-01-10 PROCEDURE — 82565 ASSAY OF CREATININE: CPT

## 2022-01-10 PROCEDURE — 36415 COLL VENOUS BLD VENIPUNCTURE: CPT

## 2022-01-10 PROCEDURE — 82040 ASSAY OF SERUM ALBUMIN: CPT

## 2022-01-10 PROCEDURE — 86803 HEPATITIS C AB TEST: CPT

## 2022-01-10 PROCEDURE — 86038 ANTINUCLEAR ANTIBODIES: CPT

## 2022-01-10 PROCEDURE — 86140 C-REACTIVE PROTEIN: CPT

## 2022-01-10 PROCEDURE — 86200 CCP ANTIBODY: CPT

## 2022-01-10 PROCEDURE — 84460 ALANINE AMINO (ALT) (SGPT): CPT

## 2022-01-10 PROCEDURE — 85025 COMPLETE CBC W/AUTO DIFF WBC: CPT

## 2022-01-10 NOTE — PROGRESS NOTES
1/10/22 Requested car seat for pt online through [employer].  They will contact pt once they are ready to deliver to arrange time for delivery and instruct pt on how to use car seat (usually takes 3 weeks from the time referral received to get call for delivery).  Emailed request for breast pump to [facility] [employer] [facility].  Breast pump will be delivered to pt's home./NG

## 2022-01-11 ENCOUNTER — TELEPHONE (OUTPATIENT)
Dept: RHEUMATOLOGY | Facility: CLINIC | Age: 32
End: 2022-01-11

## 2022-01-11 LAB
CCP AB SER IA-ACNC: <0.5 U/ML
HCV AB SERPL QL IA: NEGATIVE

## 2022-01-11 NOTE — TELEPHONE ENCOUNTER
Labs still in process. Informed patient, and scheduled follow up for pt to discuss lab results at visit on 1/18/21

## 2022-01-11 NOTE — TELEPHONE ENCOUNTER
Message sent to pt via [employer]. She just had her lab tests completed yesterday (1/10/22). Her last appointment with Dr. Avilez was only about 2 weeks ago, advised pt to call our office when she is ready to schedule.

## 2022-01-12 VITALS — BODY MASS INDEX: 35.82 KG/M2 | WEIGHT: 209.8 LBS | HEIGHT: 64 IN

## 2022-01-13 DIAGNOSIS — O09.212 HIGH RISK PREGNANCY DUE TO HISTORY OF PRETERM LABOR, SECOND TRIMESTER: ICD-10-CM

## 2022-01-13 DIAGNOSIS — O09.212 HIGH RISK PREGNANCY DUE TO HISTORY OF PRETERM LABOR, SECOND TRIMESTER: Primary | ICD-10-CM

## 2022-01-13 LAB — ANA SER QL IF: NEGATIVE

## 2022-01-13 RX ORDER — ACETAMINOPHEN 325 MG/1
650 TABLET ORAL EVERY 4 HOURS PRN
Qty: 100 TABLET | Refills: 0 | OUTPATIENT
Start: 2022-01-13

## 2022-01-13 RX ORDER — ACETAMINOPHEN 325 MG/1
325-650 TABLET ORAL EVERY 6 HOURS PRN
Qty: 100 TABLET | Refills: 1 | Status: SHIPPED | OUTPATIENT
Start: 2022-01-13 | End: 2022-01-28 | Stop reason: ALTCHOICE

## 2022-01-13 NOTE — TELEPHONE ENCOUNTER
Pharmacy comment that Dr. Palacios is rejected by the insurance as not enrolled. Need to send the pharmacy a new prescription with a provider that is enrolled with [employer] part D coverage.    Please advise.    ELIZABETH PenaA

## 2022-01-17 ENCOUNTER — OFFICE VISIT (OUTPATIENT)
Dept: MATERNAL FETAL MEDICINE | Facility: HOSPITAL | Age: 32
End: 2022-01-17
Attending: OBSTETRICS & GYNECOLOGY
Payer: COMMERCIAL

## 2022-01-17 ENCOUNTER — ANCILLARY PROCEDURE (OUTPATIENT)
Dept: ULTRASOUND IMAGING | Facility: HOSPITAL | Age: 32
End: 2022-01-17
Attending: OBSTETRICS & GYNECOLOGY
Payer: COMMERCIAL

## 2022-01-17 DIAGNOSIS — K50.919 CROHN'S DISEASE WITH COMPLICATION, UNSPECIFIED GASTROINTESTINAL TRACT LOCATION (H): ICD-10-CM

## 2022-01-17 DIAGNOSIS — M79.7 FIBROMYALGIA: ICD-10-CM

## 2022-01-17 DIAGNOSIS — O09.899 H/O PRETERM DELIVERY, CURRENTLY PREGNANT: ICD-10-CM

## 2022-01-17 DIAGNOSIS — O09.899 H/O PRETERM DELIVERY, CURRENTLY PREGNANT: Primary | ICD-10-CM

## 2022-01-17 PROCEDURE — 99207 PR NO CHARGE LOS: CPT | Performed by: OBSTETRICS & GYNECOLOGY

## 2022-01-17 PROCEDURE — 76816 OB US FOLLOW-UP PER FETUS: CPT | Mod: 26 | Performed by: OBSTETRICS & GYNECOLOGY

## 2022-01-17 PROCEDURE — 76816 OB US FOLLOW-UP PER FETUS: CPT

## 2022-01-17 NOTE — PROGRESS NOTES
"Please see \"Imaging\" tab under \"Chart Review\" for details of today's US.    Jennifer Granda, DO    "

## 2022-01-18 ENCOUNTER — OFFICE VISIT (OUTPATIENT)
Dept: RHEUMATOLOGY | Facility: CLINIC | Age: 32
End: 2022-01-18
Payer: COMMERCIAL

## 2022-01-18 VITALS
SYSTOLIC BLOOD PRESSURE: 118 MMHG | WEIGHT: 223 LBS | BODY MASS INDEX: 38.28 KG/M2 | DIASTOLIC BLOOD PRESSURE: 70 MMHG | HEART RATE: 98 BPM

## 2022-01-18 DIAGNOSIS — M25.50 POLYARTHRALGIA: Primary | ICD-10-CM

## 2022-01-18 DIAGNOSIS — K50.819 CROHN'S DISEASE OF SMALL AND LARGE INTESTINES WITH COMPLICATION (H): ICD-10-CM

## 2022-01-18 DIAGNOSIS — M79.7 FIBROMYALGIA: ICD-10-CM

## 2022-01-18 DIAGNOSIS — M35.7 HYPERMOBILITY SYNDROME: ICD-10-CM

## 2022-01-18 PROCEDURE — 99214 OFFICE O/P EST MOD 30 MIN: CPT | Performed by: INTERNAL MEDICINE

## 2022-01-18 NOTE — PROGRESS NOTES
Rheumatology follow-up visit note     Kee is a 31 year old female presents today for follow-up.    Kee was seen today for recheck.    Diagnoses and all orders for this visit:    Polyarthralgia    Crohn's disease of small and large intestines with complication (H)    Fibromyalgia    Hypermobility syndrome        This patient is here for polyarthralgias.  She has multiplicity of reasons potentially to add to the symptoms besides the previously diagnosed fibromyalgia by her rheumatologist elsewhere, there is discovery of hypermobility syndrome, background of Crohn's, and potentially progesterone driven musculoskeletal symptoms.  She does not have dactylitis she does not have enthesitis there is no clear evidence of synovitis such as in her metatarsophalangeal's.  We discussed these.  We will meet here after her baby is born..    HPI    This patient is here for follow-up for polyarthralgias.  She has noted pain in her hands, feet, shoulders, knees.  She noted that it is especially more bothersome first thing in the morning.  Today during examination she noted that she indeed has had feature suggestive of hypermobility and that he and her significant other were talking about that just before the appointment.  Her recent work-up is reviewed mild elevation of C-reactive protein and that of sed rate is discussed with her.  There are variety of reasons we reviewed can contribute to this besides a pregnancy and or Crohn's.   She noted that as she is 6 months pregnant and since her pregnancy her joint symptoms have gotten worse.  More than 6 years ago she was told that she has fibromyalgia.  She remembers seeing a rheumatologist.  She also been found to have Crohn's disease.  She remembers that when she was on Entyvio infusions her joint pains and Crohn's both were very well controlled.  Prior to that she had been on Remicade and Humira for Crohn's and does not qualify does have more joint symptoms.  Most recently  about 4 years ago she was given prednisone for Crohn's and again does not recall if there was a significant improvement in her joint symptoms at that point.  Currently she is on Stelara that was started 3 months ago.  At 1 point she was told that she may have rheumatoid arthritis as well.  In this background she reports that she has been hurting in almost all her joints, upper and lower extremities neck and back, the symptoms got worse after she found out she is pregnant having started pain in her lower back.  This sequence of events of pain getting worse during pregnancy has happened in the past to the point where she had had to have abortions as she could not stand the pain.  At the time she has had miscarriages.  In total she has had 6 pregnancies and one child.  She feels that there is hardly any area that does not hurt.  She is not sure if there is clear diurnal variation.  She feels pain all the time.  There is no personal or family history of psoriasis rheumatoid arthritis or lupus.  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    DETAILED EXAMINATION  01/18/22  :    There were no vitals filed for this visit.  Alert oriented. Head including the face is examined for malar rash, heliotropes, scarring, lupus pernio. Eyes examined for redness such as in episcleritis/scleritis, periorbital lesions.   Neck/ Face examined for parotid gland swelling, range of motion of neck.  Left upper and lower and right upper and lower extremities examined for tenderness, swelling, warmth of the appendicular joints, range of motion, edema, rash.  Some of the important findings included: She does not have synovitis in any palpable joints of upper and lower extremities she has mild tenderness in the right Achilles area without swelling or warmth.  She has  "hypermobility she is able to for example touch her thumb to the distal forearm, she has hyperextension of the fifth digit MCP, she has hyperextension of the elbow joints.  She does not have dactylitis of digits or toes.  She had minimal tenderness in her metatarsophalangeal joints #2 and 3 on the right side, she noted that the left side is \"numb\" since her ganglion cyst surgery.  Scar on her left foot dorsum, left heel medially.       Patient Active Problem List    Diagnosis Date Noted     Accelerated idioventricular rhythm (H) 2022     Priority: Medium     Trichomonal vaginitis during pregnancy in first trimester 2021     Priority: Medium     21 POSITIVE Trichomonas. 21 Treated with Metronidazole 500 mg BID x's 7 days.   NEEDS Test of Cure: on/after 21   NEEDS 3rd trimester wet prep and GC/chlamydia: 22        Crohns disease 2021     Priority: Medium     Crohn's Disease, Celiac Disease, and Fibromyalgia: Follows with [facility]. Received second Stelara injection on 21 at [facility].       Adjustment disorder with depressed mood 2021     Priority: Medium     H/O Adjustment disorder with depressed mood and anxiety: 21 Pt reports that she is feeling good/okay right now and denies need for therapy at this time.  She will let clinic know if she would like to see a therapist in the future.       Celiac disease 2021     Priority: Medium     Crohn's Disease, Celiac Disease, and Fibromyalgia: Follows with [facility]. Received second Stelara injection on 21 at [facility].       Fibromyalgia 2021     Priority: Medium     Crohn's Disease, Celiac Disease, and Fibromyalgia: Follows with [facility]. Received second Stelara injection on 21 at [facility].       H/O  delivery, currently pregnant 2021     Priority: Medium     H/O  Delivery: 13  delivery at 34 wks gestation at [facility] after prolonged PPROM at 31 wks.  Pt DECLINED vaginal progesterone.  Declines " Linette (17P injections).  Referred to [facility] for 1st trimester testing per pt's request.   21 Referred to [facility] for 1st trimester screening per patient's request.     Results indicate NO ANEUPLOIDY DETECTED for chromosomes 21, 18, 13, or sex chromosomes.   21 MFM: left lateral fundal placenta, nl CUONG, MVP 8.4 cm, and cervical length 30.3 mm and appears closed. PLAN: Serial cervical length u/s between 16-24 wks. 21 MFM: BREECH, left lateral fundal placenta, nl CUONG, MVP 5.8 cm,  g at 33%, no fetal anomalies detected, and cervix 32.8 mm long and closed. PLAN: Growth u/s in 4 wks and weekly BPP's at 32 wks due to pt having Crohn's/inflammatory bowel disease. Growth U/S APPT: Monday, 21 at 1:30 PM at [facility]  NEEDS weekly BPP starting at 32 wks (22)  NEED to ask if would like to do ultrasounds at [facility] or [place]- let Pretty know and will help her schedule.       Tobacco use in pregnancy, antepartum, first trimester 2021     Priority: Medium     Tobacco use in pregnancy: 21 Pt reports smoking up to 5 cigs per day but her and partner state that they will both stop smoking together once current pack is empty.  NEED to check at future visit to see how it is going and to see if need help with cessation.       High risk pregnancy due to history of  labor, second trimester 2021     Priority: Medium     High risk due to: Crohn's disease, celiac disease, fibromyalgia, h/o  delivery, h/o ectopic pregnancy with previous pregnancy, tobacco use in pregnancy,h/o STI's, and pre-pregnancy BMI 36 -Patient to see OB educator/ RN today and/or next visit per Dr Palacios./YOLI       BMI 36.0-36.9,adult 2021     Priority: Medium     Pre-pregnancy BMI 36       At risk for hypertension 2021     Priority: Medium     At risk for hypertension/preeclampsia: Pt needs to start ASA 81 mg/d at 12 wks gestation- on/after 10/7/21         due to BMI >30, mom has htn, and h/o  delivery.        Ovarian cyst complicating pregnancy, antepartum 2021     Priority: Medium     Ovarian Cysts 21 [facility]: single IUP, FHR 94 bpm (low normal FHR), nl CUONG, nl rt ovary and left ovary 2.1 cm corpus luteal cyst and couple simple and mildly complex adjacent cysts.  These are prominent and attn on follow-up imaging is recommended to ensure resolution. 21 U/S referral placed to do next ultrasound at 9-10 weeks gestation.  APPT: 21 here.       Immunosuppressed status (H) 2015     Priority: Medium     Chronic pain 2014     Priority: Medium     No indication for narcotics per GI  Due to Crohn's not responsive to other therapies. CPM visit 1&2 complete     CPM #1 completed? Yes, date: 2015  CPM #2 completed? Yes, date: 3/9/2015  Pain contract signed? Yes, date: 3/9/2015  Behavioral health consult completed? Yes, date: 2015  Personal care plan completed? Yes, date: 2015 See patient instructions               Abnormal Pap smear of cervix      Priority: Medium     17 LSIL Pap, pt referred to OBGYN for colp/consult  9/2/15 Colposcopy done at [facility].  7/8/15 ASCUS Pap- superior elkin os ulceration.  Pt referred to OBGYN.  14 COLP APPT: Repeat Pap- LSIL, ECC and cervical os bx- neg dysplasia, Ectocervix bx- Moderate squamous dysplasia.  Plan: refer to GYN.  DG  14 LGSIL Pap with + high risk HPV.   Cervical appearance: pink with darker pink lacy areas. Pt needs colposcopy ASAP!  13 LGSIL Pap with negative HPV.  Pt needs repeat Pap in 1yr.    09 nl Pap       Health Care Home 2012     Priority: Medium     Tier Level: 0    DX V65.8 REPLACED WITH [facility] CARE HOME (2013)       Past Surgical History:   Procedure Laterality Date     APPENDECTOMY       BOWEL RESECTION      illium      CERVIX LESION DESTRUCTION       DILATION AND CURETTAGE SUCTION, TREAT MISSED , 1ST TRIMESTER      miscarriage at 8 wks gest     DILATION AND CURETTAGE  SUCTION, TREAT MISSED , 1ST TRIMESTER  2019     FASCIOTOMY LOWER EXTREMITY Left 2018    Left foot     FOOT SURGERY Left      LAPAROSCOPY DIAGNOSTIC (GYN) N/A 2019    Procedure: LAPAROSCOPY, DIAGNOSTIC;  Surgeon: Anni Velasquez MD;  Location: [facility] OR     PLANTAR FASCIA RELEASE Left 4/10/2018    Procedure:  PLANTAR FASCIOTOMY LEFT FOOT ;  Surgeon: Yuri Arreola DPM;  Location: [facility];  Service:      SALPINGECTOMY Right 05/15/2021    rt sided ruptured ectopic     SMALL BOWEL RESECTION       TONSILLECTOMY        Past Medical History:   Diagnosis Date     Abnormal Pap smear of cervix      Accelerated idioventricular rhythm (H) 2022     Anemia      Anxiety      Arthritis      Bilateral plantar fasciitis      Celiac disease 2014     Celiac disease      Crohn disease (H)      Crohn's disease (H) 2012     Depression      Depressive disorder      Fibromyalgia      Fibromyalgia      H/O miscarriage, currently pregnant     miscarriage .     HPV (human papilloma virus) infection      Migraine      Plantar fasciitis     bilateral      delivery 2013    34 weeks. Early rupture of membranes     Allergies   Allergen Reactions     Gluten Meal      Humira [Humira]      Caused huge lumps at injection site.      Ibuprofen      Patient has Crohns disease and is unable to take this medication.     Latex Itching     Remicade [Infliximab] Rash     Current Outpatient Medications   Medication Sig Dispense Refill     acetaminophen (TYLENOL) 325 MG tablet Take 1-2 tablets (325-650 mg) by mouth every 6 hours as needed for mild pain 100 tablet 1     calcium carbonate (TUMS) 500 MG chewable tablet Take 1 tablet (500 mg) by mouth 2 times daily 100 tablet 3     doxylamine (UNISOM) 25 MG TABS tablet Take 0.5 tablets (12.5 mg) by mouth At Bedtime (Patient not taking: Reported on 2021) 30 tablet 1     famotidine (PEPCID) 20 MG tablet Take 1 tablet (20 mg) by mouth 2  times daily (Patient not taking: Reported on 12/20/2021) 360 tablet 0     glycerin (LAXATIVE) 1.2 g suppository Place 1 suppository rectally daily as needed (constipation) . Take after trial of Metamucil powder for 3-4 days without relief. (Patient not taking: Reported on 9/20/2021) 10 suppository 0     hydrOXYzine (VISTARIL) 25 MG capsule Take 1-2 capsules (25-50 mg) by mouth 3 times daily as needed for itching (Patient not taking: Reported on 12/30/2021) 30 capsule 0     hydrOXYzine (VISTARIL) 25 MG capsule Take 1 capsule (25 mg) by mouth 3 times daily as needed for other (muscle pain) (Patient not taking: Reported on 12/20/2021) 30 capsule 3     labetalol (NORMODYNE) 100 MG tablet Take 1 tablet (100 mg) by mouth 2 times daily as needed (for racing heart) (Patient not taking: Reported on 12/30/2021) 30 tablet 0     lidocaine-prilocaine (EMLA) 2.5-2.5 % external cream Apply topically 2 times daily as needed for moderate pain (Patient not taking: Reported on 12/20/2021) 30 g 1     nicotine (COMMIT) 2 MG lozenge Place 1 lozenge (2 mg) inside cheek every hour as needed for smoking cessation (Patient not taking: Reported on 12/20/2021) 90 lozenge 1     polyethylene glycol (MIRALAX) 17 GM/Dose powder Take 17 g by mouth daily (Patient not taking: Reported on 9/20/2021) 510 g 1     Prenatal Vit-Fe Fumarate-FA (PRENATAL MULTIVITAMIN W/IRON) 27-0.8 MG tablet Take 1 tablet by mouth daily (Patient not taking: Reported on 12/30/2021) 90 tablet 3     Prenatal Vit-Fe Fumarate-FA (PRENATAL MULTIVITAMIN W/IRON) 27-0.8 MG tablet Take 1 tablet by mouth daily 90 tablet 3     psyllium (METAMUCIL/KONSYL) 58.6 % powder Take 6 g (1 teaspoonful) by mouth 2 times daily as needed for constipation (Patient not taking: Reported on 9/20/2021) 173 g 0     pyridOXINE (VITAMIN B6) 25 MG tablet Take 1 tablet (25 mg) by mouth 3 times daily as needed (nausea) (Patient not taking: Reported on 9/20/2021) 30 tablet 3     ustekinumab (STELARA) 45  MG/0.5ML SOSY Inject 45 mg Subcutaneous once        family history includes Asthma in her brother, brother, brother, brother, and son; Cancer in her maternal grandmother; Crohn's Disease in her paternal uncle; Hypertension in her mother; Seizure Disorder in her mother.  Social Connections: Not on file          WBC   Date Value Ref Range Status   08/16/2019 7.3 4.0 - 11.0 10e9/L Final     WBC Count   Date Value Ref Range Status   01/10/2022 8.1 4.0 - 11.0 10e3/uL Final     RBC Count   Date Value Ref Range Status   01/10/2022 3.41 (L) 3.80 - 5.20 10e6/uL Final   08/16/2019 4.17 3.8 - 5.2 10e12/L Final     Hemoglobin   Date Value Ref Range Status   01/10/2022 10.6 (L) 11.7 - 15.7 g/dL Final   08/16/2019 13.0 11.7 - 15.7 g/dL Final     Hematocrit   Date Value Ref Range Status   01/10/2022 31.0 (L) 35.0 - 47.0 % Final   08/16/2019 37.6 35.0 - 47.0 % Final     MCV   Date Value Ref Range Status   01/10/2022 91 78 - 100 fL Final   08/16/2019 90 78 - 100 fl Final     MCH   Date Value Ref Range Status   01/10/2022 31.1 26.5 - 33.0 pg Final   08/16/2019 31.2 26.5 - 33.0 pg Final     Platelet Count   Date Value Ref Range Status   01/10/2022 169 150 - 450 10e3/uL Final   08/16/2019 180 150 - 450 10e9/L Final     % Lymphocytes   Date Value Ref Range Status   01/10/2022 13 % Final   08/16/2019 29.6 % Final     AST   Date Value Ref Range Status   08/08/2021 16 0 - 40 U/L Final   08/16/2019 8 0 - 45 U/L Final     ALT   Date Value Ref Range Status   01/10/2022 14 0 - 45 U/L Final   08/16/2019 15 0 - 50 U/L Final     Albumin   Date Value Ref Range Status   01/10/2022 3.0 (L) 3.5 - 5.0 g/dL Final   08/16/2019 3.5 3.4 - 5.0 g/dL Final     Alkaline Phosphatase   Date Value Ref Range Status   08/08/2021 84 45 - 120 U/L Final   08/16/2019 62 40 - 150 U/L Final     Creatinine   Date Value Ref Range Status   01/10/2022 0.59 (L) 0.60 - 1.10 mg/dL Final   08/16/2019 0.67 0.52 - 1.04 mg/dL Final     GFR Estimate   Date Value Ref Range Status    01/10/2022 >90 >60 mL/min/1.73m2 Final     Comment:     Effective December 21, 2021 eGFRcr in adults is calculated using the 2021 CKD-EPI creatinine equation which includes age and gender (Felicia et al., [place], DOI: 10.1056/ISKQoe6655853)   05/15/2021 >60 >60 mL/min/1.73m2 Final   08/16/2019 >90 >60 mL/min/[1.73_m2] Final     Comment:     Non  GFR Calc  Starting 12/18/2018, serum creatinine based estimated GFR (eGFR) will be   calculated using the Chronic Kidney Disease Epidemiology Collaboration   (CKD-EPI) equation.       GFR Estimate If Black   Date Value Ref Range Status   05/15/2021 >60 >60 mL/min/1.73m2 Final   08/16/2019 >90 >60 mL/min/[1.73_m2] Final     Comment:      GFR Calc  Starting 12/18/2018, serum creatinine based estimated GFR (eGFR) will be   calculated using the Chronic Kidney Disease Epidemiology Collaboration   (CKD-EPI) equation.       Erythrocyte Sedimentation Rate   Date Value Ref Range Status   01/10/2022 31 (H) 0 - 20 mm/hr Final     C-Reactive Protein   Date Value Ref Range Status   05/15/2013 1.2 (H) <0.9 mg/dL Final     CRP Inflammation   Date Value Ref Range Status   03/18/2015 16.0 (H) 0.0 - 8.0 mg/L Final     CRP   Date Value Ref Range Status   01/10/2022 1.7 (H) 0.0-<0.8 mg/dL Final

## 2022-01-20 ENCOUNTER — OFFICE VISIT (OUTPATIENT)
Dept: FAMILY MEDICINE | Facility: CLINIC | Age: 32
End: 2022-01-20
Payer: COMMERCIAL

## 2022-01-20 VITALS
BODY MASS INDEX: 38.11 KG/M2 | RESPIRATION RATE: 16 BRPM | TEMPERATURE: 98.1 F | WEIGHT: 222 LBS | SYSTOLIC BLOOD PRESSURE: 113 MMHG | HEART RATE: 104 BPM | DIASTOLIC BLOOD PRESSURE: 88 MMHG | OXYGEN SATURATION: 98 %

## 2022-01-20 DIAGNOSIS — R59.1 LYMPHADENOPATHY: Primary | ICD-10-CM

## 2022-01-20 DIAGNOSIS — G44.209 TENSION HEADACHE: ICD-10-CM

## 2022-01-20 DIAGNOSIS — M54.2 NECK PAIN: ICD-10-CM

## 2022-01-20 PROCEDURE — 99213 OFFICE O/P EST LOW 20 MIN: CPT | Mod: GC | Performed by: STUDENT IN AN ORGANIZED HEALTH CARE EDUCATION/TRAINING PROGRAM

## 2022-01-20 NOTE — PROGRESS NOTES
"    Assessment & Plan     Lymphadenopathy  Tension headache  Neck pain  Kee is a 31-year-old woman at 27 weeks gestation presenting for evaluation of right sided postauricular and posterior neck adenopathy with associated right-sided headache and neck stiffness.  Patient tested positive for COVID 12/27/2021, with some remaining rhinorrhea and nasal congestion, though most of her symptoms have resolved.  Does also have diagnosed fibromyalgia and concern for some autoimmune diagnoses, including celiac disease.  Adenopathy is likely related to recent viral infection, though unilateral adenopathy is not the typical presentation.  Recommended treating the area with cold compresses and using Tylenol for pain.  Recommended monitoring the adenopathy.  If adenopathy has not changed in 2 weeks or has not resolved in 4 weeks, would recommend ENT referral for further assessment.  Recommended following up with PCP.    Patient was discussed with attending physician, Dr. Vance Simmons MD, who agrees with the assessment and plan.    Linda Mora MD, PGY-2  [facility]  1/20/2022      Subjective   Kee Phipps is a 31 year old female who presents for the following health issues     Chief Complaint   Patient presents with     Headache     Pt states she has been having headaches for about a month now.  She states she takes Tylenol but it only gives minimal relief.       Mass     Pt states she was recently ill and has an enlarged lump behind her R ear.  She states there is two.      Currently about 6 months pregnant.   Feeling some \"knots\" on the right back of her neck. 2. First felt them last night. Tender to touch. Having some pain when she turns her neck to the left- pain behind her right eye.   Also having a headache. Having mild headaches for the last month, but increased yesterday and became noticeable. Pain is on the right side of her head. Having some tenderness when she swallows.   Had a rash on " her chest that just resolved  Covid positive 12/27/21, still having runny nose and congestion, but other symptoms have resolved.  Recently seen by rheumatology for diffuse body aches. Previous diagnosis of fibromyalgia, but concern for other conditions. Recommended follow up after delivery.   Hair is falling out. Has always had some breakage in the middle of the back of her head, but has been happening in the front since she got pregnant.       Objective    Vitals:    01/20/22 0930   BP: 113/88   BP Location: Left arm   Patient Position: Sitting   Cuff Size: Adult Large   Pulse: 104   Resp: 16   Temp: 98.1  F (36.7  C)   TempSrc: Oral   SpO2: 98%   Weight: 100.7 kg (222 lb)     Body mass index is 38.11 kg/m .  Physical Exam   General: alert, appears comfortable, no acute distress  HEENT: atraumatic, conjunctiva clear without erythema, EOM's intact, no nasal discharge, MMM, no pharyngeal erythema or exudate.   Neck: supple.  3, enlarged, tender, mobile lymph nodes approximately 1 cm in size on right posterior neck, one is located postauricularly 1 is located on posterior neck just inferior and posterior to the jawline, and the third is located on the right posterior neck inferior to hairline  Cardiac: normal rate and rhythm with no murmurs or extra sounds  Resp: lungs clear to auscultation bilaterally with no crackles or wheezes, no increased work of breathing  Skin: no rashes or suspicious legions on exposed skin  Neuro: CN's grossly intact  Psych: affect congruent with mood

## 2022-01-21 ENCOUNTER — TELEPHONE (OUTPATIENT)
Dept: FAMILY MEDICINE | Facility: CLINIC | Age: 32
End: 2022-01-21
Payer: COMMERCIAL

## 2022-01-21 NOTE — TELEPHONE ENCOUNTER
[facility] phone call message- general phone call:    Reason for call: Pt is calling to notify  that she is not apart of her insurance care team and pharmacy should be contacting as well to be able to get medications.     Return call needed: Yes    OK to leave a message on voice mail? Yes    Primary language: English      needed? No    Call taken on January 21, 2022 at 9:33 AM by Grisel Flores-Cardona   None

## 2022-01-22 ENCOUNTER — APPOINTMENT (OUTPATIENT)
Dept: MRI IMAGING | Facility: HOSPITAL | Age: 32
End: 2022-01-22
Attending: EMERGENCY MEDICINE
Payer: COMMERCIAL

## 2022-01-22 ENCOUNTER — HOSPITAL ENCOUNTER (EMERGENCY)
Facility: HOSPITAL | Age: 32
Discharge: HOME | End: 2022-01-22
Attending: EMERGENCY MEDICINE | Admitting: EMERGENCY MEDICINE
Payer: COMMERCIAL

## 2022-01-22 VITALS
RESPIRATION RATE: 20 BRPM | WEIGHT: 223 LBS | DIASTOLIC BLOOD PRESSURE: 88 MMHG | SYSTOLIC BLOOD PRESSURE: 137 MMHG | HEART RATE: 92 BPM | OXYGEN SATURATION: 98 % | BODY MASS INDEX: 38.07 KG/M2 | TEMPERATURE: 98.9 F | HEIGHT: 64 IN

## 2022-01-22 DIAGNOSIS — R51.9 ACUTE NONINTRACTABLE HEADACHE, UNSPECIFIED HEADACHE TYPE: ICD-10-CM

## 2022-01-22 LAB
ALBUMIN UR-MCNC: 30 MG/DL
ANION GAP SERPL CALCULATED.3IONS-SCNC: 9 MMOL/L (ref 5–18)
APPEARANCE UR: ABNORMAL
BACTERIA #/AREA URNS HPF: ABNORMAL /HPF
BASOPHILS # BLD MANUAL: 0 10E3/UL (ref 0–0.2)
BASOPHILS NFR BLD MANUAL: 0 %
BILIRUB UR QL STRIP: NEGATIVE
BUN SERPL-MCNC: 6 MG/DL (ref 8–22)
C REACTIVE PROTEIN LHE: 0.7 MG/DL (ref 0–0.8)
CALCIUM SERPL-MCNC: 9.1 MG/DL (ref 8.5–10.5)
CHLORIDE BLD-SCNC: 107 MMOL/L (ref 98–107)
CO2 SERPL-SCNC: 20 MMOL/L (ref 22–31)
COLOR UR AUTO: YELLOW
CREAT SERPL-MCNC: 0.57 MG/DL (ref 0.6–1.1)
DEPRECATED S PYO AG THROAT QL EIA: NEGATIVE
EOSINOPHIL # BLD MANUAL: 0.3 10E3/UL (ref 0–0.7)
EOSINOPHIL NFR BLD MANUAL: 2 %
ERYTHROCYTE [DISTWIDTH] IN BLOOD BY AUTOMATED COUNT: 12.8 % (ref 10–15)
ERYTHROCYTE [SEDIMENTATION RATE] IN BLOOD BY WESTERGREN METHOD: 39 MM/HR (ref 0–20)
FLUAV AG SPEC QL IA: NEGATIVE
FLUBV AG SPEC QL IA: NEGATIVE
GFR SERPL CREATININE-BSD FRML MDRD: >90 ML/MIN/1.73M2
GLUCOSE BLD-MCNC: 100 MG/DL (ref 70–125)
GLUCOSE UR STRIP-MCNC: NEGATIVE MG/DL
GROUP A STREP BY PCR: NOT DETECTED
HCT VFR BLD AUTO: 31.2 % (ref 35–47)
HGB BLD-MCNC: 10.8 G/DL (ref 11.7–15.7)
HGB UR QL STRIP: NEGATIVE
HOLD SPECIMEN: NORMAL
KETONES UR STRIP-MCNC: ABNORMAL MG/DL
LEUKOCYTE ESTERASE UR QL STRIP: ABNORMAL
LYMPHOCYTES # BLD MANUAL: 3.3 10E3/UL (ref 0.8–5.3)
LYMPHOCYTES NFR BLD MANUAL: 25 %
MCH RBC QN AUTO: 31.2 PG (ref 26.5–33)
MCHC RBC AUTO-ENTMCNC: 34.6 G/DL (ref 31.5–36.5)
MCV RBC AUTO: 90 FL (ref 78–100)
METAMYELOCYTES # BLD MANUAL: 0.1 10E3/UL
METAMYELOCYTES NFR BLD MANUAL: 1 %
MONOCYTES # BLD MANUAL: 1.2 10E3/UL (ref 0–1.3)
MONOCYTES NFR BLD MANUAL: 9 %
MUCOUS THREADS #/AREA URNS LPF: PRESENT /LPF
NEUTROPHILS # BLD MANUAL: 8.3 10E3/UL (ref 1.6–8.3)
NEUTROPHILS NFR BLD MANUAL: 63 %
NITRATE UR QL: NEGATIVE
PH UR STRIP: 6.5 [PH] (ref 5–7)
PLAT MORPH BLD: ABNORMAL
PLATELET # BLD AUTO: 189 10E3/UL (ref 150–450)
POTASSIUM BLD-SCNC: 3.5 MMOL/L (ref 3.5–5)
RBC # BLD AUTO: 3.46 10E6/UL (ref 3.8–5.2)
RBC MORPH BLD: ABNORMAL
RBC URINE: 1 /HPF
SODIUM SERPL-SCNC: 136 MMOL/L (ref 136–145)
SP GR UR STRIP: 1.03 (ref 1–1.03)
SQUAMOUS EPITHELIAL: 25 /HPF
UROBILINOGEN UR STRIP-MCNC: <2 MG/DL
VARIANT LYMPHS BLD QL SMEAR: PRESENT
WBC # BLD AUTO: 13.1 10E3/UL (ref 4–11)
WBC URINE: 4 /HPF

## 2022-01-22 PROCEDURE — 96361 HYDRATE IV INFUSION ADD-ON: CPT

## 2022-01-22 PROCEDURE — 81001 URINALYSIS AUTO W/SCOPE: CPT | Performed by: EMERGENCY MEDICINE

## 2022-01-22 PROCEDURE — 99284 EMERGENCY DEPT VISIT MOD MDM: CPT | Mod: 25

## 2022-01-22 PROCEDURE — 258N000003 HC RX IP 258 OP 636: Performed by: EMERGENCY MEDICINE

## 2022-01-22 PROCEDURE — 85652 RBC SED RATE AUTOMATED: CPT | Performed by: PHYSICIAN ASSISTANT

## 2022-01-22 PROCEDURE — 70551 MRI BRAIN STEM W/O DYE: CPT

## 2022-01-22 PROCEDURE — 96374 THER/PROPH/DIAG INJ IV PUSH: CPT

## 2022-01-22 PROCEDURE — 36415 COLL VENOUS BLD VENIPUNCTURE: CPT | Performed by: PHYSICIAN ASSISTANT

## 2022-01-22 PROCEDURE — 250N000011 HC RX IP 250 OP 636: Performed by: EMERGENCY MEDICINE

## 2022-01-22 PROCEDURE — 96375 TX/PRO/DX INJ NEW DRUG ADDON: CPT

## 2022-01-22 PROCEDURE — 250N000013 HC RX MED GY IP 250 OP 250 PS 637: Performed by: EMERGENCY MEDICINE

## 2022-01-22 PROCEDURE — 70544 MR ANGIOGRAPHY HEAD W/O DYE: CPT

## 2022-01-22 PROCEDURE — 87086 URINE CULTURE/COLONY COUNT: CPT | Performed by: EMERGENCY MEDICINE

## 2022-01-22 PROCEDURE — 80048 BASIC METABOLIC PNL TOTAL CA: CPT | Performed by: PHYSICIAN ASSISTANT

## 2022-01-22 PROCEDURE — 85014 HEMATOCRIT: CPT | Performed by: PHYSICIAN ASSISTANT

## 2022-01-22 PROCEDURE — 87804 INFLUENZA ASSAY W/OPTIC: CPT | Performed by: EMERGENCY MEDICINE

## 2022-01-22 PROCEDURE — 86140 C-REACTIVE PROTEIN: CPT | Performed by: PHYSICIAN ASSISTANT

## 2022-01-22 PROCEDURE — 87651 STREP A DNA AMP PROBE: CPT | Performed by: EMERGENCY MEDICINE

## 2022-01-22 RX ORDER — ACETAMINOPHEN 325 MG/1
650 TABLET ORAL ONCE
Status: COMPLETED | OUTPATIENT
Start: 2022-01-22 | End: 2022-01-22

## 2022-01-22 RX ORDER — METOCLOPRAMIDE HYDROCHLORIDE 5 MG/ML
10 INJECTION INTRAMUSCULAR; INTRAVENOUS ONCE
Status: COMPLETED | OUTPATIENT
Start: 2022-01-22 | End: 2022-01-22

## 2022-01-22 RX ORDER — METOCLOPRAMIDE 10 MG/1
10 TABLET ORAL 4 TIMES DAILY PRN
Qty: 20 TABLET | Refills: 0 | Status: SHIPPED | OUTPATIENT
Start: 2022-01-22 | End: 2022-01-28 | Stop reason: ALTCHOICE

## 2022-01-22 RX ORDER — DIPHENHYDRAMINE HYDROCHLORIDE 50 MG/ML
25 INJECTION INTRAMUSCULAR; INTRAVENOUS ONCE
Status: COMPLETED | OUTPATIENT
Start: 2022-01-22 | End: 2022-01-22

## 2022-01-22 RX ADMIN — DIPHENHYDRAMINE HYDROCHLORIDE 25 MG: 50 INJECTION, SOLUTION INTRAMUSCULAR; INTRAVENOUS at 12:54

## 2022-01-22 RX ADMIN — SODIUM CHLORIDE 1000 ML: 9 INJECTION, SOLUTION INTRAVENOUS at 12:51

## 2022-01-22 RX ADMIN — ACETAMINOPHEN 650 MG: 325 TABLET ORAL at 12:52

## 2022-01-22 RX ADMIN — METOCLOPRAMIDE HYDROCHLORIDE 10 MG: 5 INJECTION INTRAMUSCULAR; INTRAVENOUS at 12:55

## 2022-01-22 ASSESSMENT — MIFFLIN-ST. JEOR: SCORE: 1721.52

## 2022-01-22 NOTE — DISCHARGE INSTRUCTIONS
You were seen in the Emergency Department today for evaluation.  Your lab work showed no cause of your symptoms. Your imaging studies showed no significant cause of your symptoms. You were prescribed Reglan as needed for nausea or headache. You should take all medications as prescribed.  Follow up with your primary care physician to ensure resolution of symptoms. Return if you have new or worsening symptoms.

## 2022-01-22 NOTE — ED PROVIDER NOTES
"EMERGENCY DEPARTMENT ENCOUNTER      NAME: Kee Phipps  AGE: 31 year old female  YOB: 1990  MRN: [id number]  EVALUATION DATE & TIME: No admission date for patient encounter.    PCP: Stormy Palacios    ED PROVIDER: Francis Milner M.D.      Chief Complaint   Patient presents with     Joint Pain       FINAL IMPRESSION:  1. Acute nonintractable headache, unspecified headache type        ED COURSE & MEDICAL DECISION MAKIN year old female presents to the Emergency Department for evaluation of headache.  She is 27 weeks pregnant.  Recovering from COVID in December.  History of Crohn's disease, fibromyalgia, migraine headaches, anxiety.  She presents with right-sided frontal headache becoming more severe for a couple of days, also having some right-sided swelling and \"lumps \"I cannot appreciate much for lymphadenopathy in these areas.  Oral pharyngeal and otoscopic exams are unremarkable.  Patient is mildly tachycardic, otherwise vitally stable here, fully ambulatory, neurologically intact.  Labs are notable for mildly elevated white blood cell count, nonspecific, stable mild anemia of pregnancy, urine analysis with evidence of contamination, no signs of infection and not having symptoms.  She has a mild nonspecific elevation of her ESR, not diagnostic of anything like temporal arteritis.  CRP is within normal limits.  Given her persistent headache with a couple of significant hypercoagulable risk factors including recent COVID-19 infection and second trimester pregnancy, I did elect to obtain MRI with MR venogram to rule out sinus thrombosis or other acute intracranial process. Pending at shift change, please refer to note from Dr. Torres for remainder of ED course and ultimate disposition.    At the conclusion of the encounter I discussed the results of all of the tests and the disposition. The questions were answered. The patient or family acknowledged understanding and was agreeable " with the care plan.       MEDICATIONS GIVEN IN THE EMERGENCY:  Medications   0.9% sodium chloride BOLUS (1,000 mLs Intravenous New Bag 1/22/22 1251)   diphenhydrAMINE (BENADRYL) injection 25 mg (25 mg Intravenous Given 1/22/22 1254)   acetaminophen (TYLENOL) tablet 650 mg (650 mg Oral Given 1/22/22 1252)   metoclopramide (REGLAN) injection 10 mg (10 mg Intravenous Given 1/22/22 1255)       NEW PRESCRIPTIONS STARTED AT TODAY'S ER VISIT  New Prescriptions    No medications on file          =================================================================    HPI    Patient information was obtained from: patient    Use of : N/A      Kee Phipps is a 31 year old female with a pertinent history of Crohn's disease, ectopic pregnancy, accelerated idioventricular rhythm, Celiac disease, and fibromyalgia who presents to this ED via private vehicle for evaluation of joint pain.    Patient reports she is 27 weeks pregnant and has been recovering from COVID that she was diagnosed with in late September 2021. She reports concerns today with right sided facial pain and headaches, lymphadenopathy, and pain when she moves her right eye around. She states that she had a cough when she had COVID, and this is still present but mild. She reports that her headache became more intense this morning, and she also feels lightheaded. She endorses a sort throat, no difficulty breathing or swallowing. Otherwise denies any numbness, weakness, vision changes, or any other complaints.     REVIEW OF SYSTEMS   All systems reviewed and negative except as noted in HPI.    PAST MEDICAL HISTORY:  Past Medical History:   Diagnosis Date     Abnormal Pap smear of cervix      Accelerated idioventricular rhythm (H) 1/6/2022     Anemia      Anxiety      Arthritis      Bilateral plantar fasciitis      Celiac disease 9/2014     Celiac disease      Crohn disease (H)      Crohn's disease (H) 9/2012     Depression      Depressive disorder       Fibromyalgia      Fibromyalgia      H/O miscarriage, currently pregnant     miscarriage .     HPV (human papilloma virus) infection      Migraine      Plantar fasciitis     bilateral      delivery 2013    34 weeks. Early rupture of membranes       PAST SURGICAL HISTORY:  Past Surgical History:   Procedure Laterality Date     APPENDECTOMY       BOWEL RESECTION      illium      CERVIX LESION DESTRUCTION       DILATION AND CURETTAGE SUCTION, TREAT MISSED , 1ST TRIMESTER      miscarriage at 8 wks gest     DILATION AND CURETTAGE SUCTION, TREAT MISSED , 1ST TRIMESTER  2019     FASCIOTOMY LOWER EXTREMITY Left 2018    Left foot     FOOT SURGERY Left      LAPAROSCOPY DIAGNOSTIC (GYN) N/A 2019    Procedure: LAPAROSCOPY, DIAGNOSTIC;  Surgeon: Anni Velasquez MD;  Location: [facility]     PLANTAR FASCIA RELEASE Left 4/10/2018    Procedure:  PLANTAR FASCIOTOMY LEFT FOOT ;  Surgeon: Yuri Arreola DPM;  Location: [facility];  Service:      SALPINGECTOMY Right 05/15/2021    rt sided ruptured ectopic     SMALL BOWEL RESECTION       TONSILLECTOMY             CURRENT MEDICATIONS:    No current facility-administered medications for this encounter.     Current Outpatient Medications   Medication     acetaminophen (TYLENOL) 325 MG tablet     calcium carbonate (TUMS) 500 MG chewable tablet     famotidine (PEPCID) 20 MG tablet     Prenatal Vit-Fe Fumarate-FA (PRENATAL MULTIVITAMIN W/IRON) 27-0.8 MG tablet     ustekinumab (STELARA) 45 MG/0.5ML SOSY         ALLERGIES:  Allergies   Allergen Reactions     Gluten Meal      Humira [Humira]      Caused huge lumps at injection site.      Ibuprofen      Patient has Crohns disease and is unable to take this medication.     Latex Itching     Remicade [Infliximab] Rash       FAMILY HISTORY:  Family History   Problem Relation Age of Onset     Hypertension Mother      Seizure Disorder Mother      Asthma Brother      Asthma  Brother      Asthma Brother      Asthma Brother      Cancer Maternal Grandmother         bone cancer     Asthma Son      Crohn's Disease Paternal Uncle      Diabetes No family hx of      Coronary Artery Disease No family hx of      Hyperlipidemia No family hx of      Cerebrovascular Disease No family hx of      Breast Cancer No family hx of      Colon Cancer No family hx of      Prostate Cancer No family hx of      Other Cancer No family hx of      Depression No family hx of      Anxiety Disorder No family hx of      Mental Illness No family hx of      Substance Abuse No family hx of      Anesthesia Reaction No family hx of      Osteoporosis No family hx of      Genetic Disorder No family hx of      Thyroid Disease No family hx of      Obesity No family hx of      Unknown/Adopted No family hx of      Heart Disease No family hx of        SOCIAL HISTORY:   Social History     Socioeconomic History     Marital status: Single     Spouse name: Not on file     Number of children: 1     Years of education: 12     Highest education level: GED or equivalent   Occupational History     Occupation: student   Tobacco Use     Smoking status: Current Every Day Smoker     Packs/day: 0.03     Types: Cigarettes     Smokeless tobacco: Never Used     Tobacco comment: Esigs    Vaping Use     Vaping Use: Never used   Substance and Sexual Activity     Alcohol use: Not Currently     Comment: occ     Drug use: No     Sexual activity: Yes     Partners: Male   Other Topics Concern     Not on file   Social History Narrative     Not on file     Social Determinants of Health     Financial Resource Strain: Not on file   Food Insecurity: Not on file   Transportation Needs: Not on file   Physical Activity: Not on file   Stress: Not on file   Social Connections: Not on file   Intimate Partner Violence: Not on file   Housing Stability: Not on file       VITALS:  /74   Pulse 114   Temp 98.5  F (36.9  C) (Temporal)   Resp 18   Ht 1.626 m (5'  "4\")   Wt 101.2 kg (223 lb)   LMP 07/15/2021   SpO2 98%   BMI 38.28 kg/m      PHYSICAL EXAM    Constitutional: Well developed, Well nourished, NAD.  HENT: Normocephalic, Atraumatic. Neck Supple. TM clear bilaterally.  Eyes: EOMI, Conjunctiva normal.  Respiratory: Breathing comfortably on room air. Speaks full sentences easily. Lungs clear to ascultation.  Cardiovascular: Normal heart rate, Regular rhythm. No peripheral edema.  Abdomen: Soft, nontender  Musculoskeletal: Good range of motion in all major joints. No major deformities noted.  Integument: Warm, Dry.  Neurologic: Fully awake, alert, oriented.  Face is symmetric.  Speech is normal.  Cranial nerves II through XII intact.  Strength is 5 out of 5 throughout bilateral upper and lower extremities.  Sensation light touch intact x4.  Psychiatric: Cooperative. Affect appropriate.     LAB:  All pertinent labs reviewed and interpreted.  Labs Ordered and Resulted from Time of ED Arrival to Time of ED Departure   BASIC METABOLIC PANEL - Abnormal       Result Value    Sodium 136      Potassium 3.5      Chloride 107      Carbon Dioxide (CO2) 20 (*)     Anion Gap 9      Urea Nitrogen 6 (*)     Creatinine 0.57 (*)     Calcium 9.1      Glucose 100      GFR Estimate >90     ERYTHROCYTE SEDIMENTATION RATE AUTO - Abnormal    Erythrocyte Sedimentation Rate 39 (*)    CBC WITH PLATELETS AND DIFFERENTIAL - Abnormal    WBC Count 13.1 (*)     RBC Count 3.46 (*)     Hemoglobin 10.8 (*)     Hematocrit 31.2 (*)     MCV 90      MCH 31.2      MCHC 34.6      RDW 12.8      Platelet Count 189     ROUTINE UA WITH MICROSCOPIC REFLEX TO CULTURE - Abnormal    Color Urine Yellow      Appearance Urine Turbid (*)     Glucose Urine Negative      Bilirubin Urine Negative      Ketones Urine Trace (*)     Specific Gravity Urine 1.027      Blood Urine Negative      pH Urine 6.5      Protein Albumin Urine 30  (*)     Urobilinogen Urine <2.0      Nitrite Urine Negative      Leukocyte Esterase Urine " 250 Deniz/uL (*)     Bacteria Urine Few (*)     Mucus Urine Present (*)     RBC Urine 1      WBC Urine 4      Squamous Epithelials Urine 25 (*)    DIFFERENTIAL - Abnormal    % Neutrophils 63      % Lymphocytes 25      % Monocytes 9      % Eosinophils 2      % Basophils 0      % Metamyelocytes 1      Absolute Neutrophils 8.3      Absolute Lymphocytes 3.3      Absolute Monocytes 1.2      Absolute Eosinophils 0.3      Absolute Basophils 0.0      Absolute Metamyelocytes 0.1 (*)     RBC Morphology Confirmed RBC Indices      Platelet Assessment        Value: Automated Count Confirmed. Platelet morphology is normal.    Reactive Lymphocytes Present (*)    CRP INFLAMMATION - Normal    CRP 0.7     URINE CULTURE   INFLUENZA A/B ANTIGEN   STREPTOCOCCUS A RAPID SCREEN W REFELX TO PCR       RADIOLOGY:  Reviewed all pertinent imaging. Please see official radiology report.  MRV Brain wo Contrast    (Results Pending)   MR Brain w/o Contrast    (Results Pending)         I, Lorraine Kasper, am serving as a scribe to document services personally performed by Dr. Francis Milner, based on my observation and the provider's statements to me. I, Francis Milner MD attest that Lorraine Kasper is acting in a scribe capacity, has observed my performance of the services and has documented them in accordance with my direction.    Francis Milner M.D.  Emergency Medicine  [facility] EMERGENCY DEPARTMENT  [facility] [facility] [id number] [phone]  Dept: [phone]     Francis Milner MD  01/22/22 8978       Francis Milner MD  01/22/22 9527

## 2022-01-22 NOTE — ED TRIAGE NOTES
Pt reports 27 wks pregnant, covid positive on Dec 27.  Pt reports hx of chrohns, fibromyalgia, celiac dz. Pt reports Rt neck swollen lymph nodes , headache and gen. Joint pain.  Tylenol ineffective at home.

## 2022-01-22 NOTE — ED NOTES
PT DOES NOT WANT COVID SWAB AT THIS TIME,  After pt was explained procedure for PCR deep nasopharngeal swab process.

## 2022-01-23 LAB — BACTERIA UR CULT: NORMAL

## 2022-01-24 ENCOUNTER — OFFICE VISIT (OUTPATIENT)
Dept: FAMILY MEDICINE | Facility: CLINIC | Age: 32
End: 2022-01-24
Payer: COMMERCIAL

## 2022-01-24 ENCOUNTER — TELEPHONE (OUTPATIENT)
Dept: FAMILY MEDICINE | Facility: CLINIC | Age: 32
End: 2022-01-24

## 2022-01-24 VITALS
HEART RATE: 94 BPM | RESPIRATION RATE: 16 BRPM | DIASTOLIC BLOOD PRESSURE: 86 MMHG | OXYGEN SATURATION: 99 % | BODY MASS INDEX: 37.97 KG/M2 | WEIGHT: 222.4 LBS | SYSTOLIC BLOOD PRESSURE: 123 MMHG | HEIGHT: 64 IN | TEMPERATURE: 98.1 F

## 2022-01-24 DIAGNOSIS — M79.605 BILATERAL LEG PAIN: ICD-10-CM

## 2022-01-24 DIAGNOSIS — M79.604 BILATERAL LEG PAIN: ICD-10-CM

## 2022-01-24 DIAGNOSIS — H53.8 BLURRED VISION: ICD-10-CM

## 2022-01-24 DIAGNOSIS — R59.1 LYMPHADENOPATHY: Primary | ICD-10-CM

## 2022-01-24 DIAGNOSIS — N94.9 ROUND LIGAMENT PAIN: ICD-10-CM

## 2022-01-24 DIAGNOSIS — G44.209 TENSION HEADACHE: ICD-10-CM

## 2022-01-24 DIAGNOSIS — R35.0 URINARY FREQUENCY: ICD-10-CM

## 2022-01-24 DIAGNOSIS — K21.00 GASTROESOPHAGEAL REFLUX DISEASE WITH ESOPHAGITIS WITHOUT HEMORRHAGE: ICD-10-CM

## 2022-01-24 DIAGNOSIS — G43.809 OTHER MIGRAINE WITHOUT STATUS MIGRAINOSUS, NOT INTRACTABLE: ICD-10-CM

## 2022-01-24 LAB
ALBUMIN MFR UR ELPH: 11.7 MG/DL
ALBUMIN SERPL-MCNC: 3.1 G/DL (ref 3.5–5)
ALP SERPL-CCNC: 74 U/L (ref 45–120)
ALT SERPL W P-5'-P-CCNC: 13 U/L (ref 0–45)
ANION GAP SERPL CALCULATED.3IONS-SCNC: 12 MMOL/L (ref 5–18)
AST SERPL W P-5'-P-CCNC: 13 U/L (ref 0–40)
BILIRUB DIRECT SERPL-MCNC: 0.1 MG/DL
BILIRUB SERPL-MCNC: 0.3 MG/DL (ref 0–1)
BUN SERPL-MCNC: 7 MG/DL (ref 8–22)
CALCIUM SERPL-MCNC: 9.9 MG/DL (ref 8.5–10.5)
CHLORIDE BLD-SCNC: 106 MMOL/L (ref 98–107)
CO2 SERPL-SCNC: 21 MMOL/L (ref 22–31)
CREAT SERPL-MCNC: 0.59 MG/DL (ref 0.6–1.1)
CREAT UR-MCNC: 121 MG/DL
ERYTHROCYTE [DISTWIDTH] IN BLOOD BY AUTOMATED COUNT: 12.7 % (ref 10–15)
GFR SERPL CREATININE-BSD FRML MDRD: >90 ML/MIN/1.73M2
GLUCOSE BLD-MCNC: 74 MG/DL (ref 70–125)
HCT VFR BLD AUTO: 30.7 % (ref 35–47)
HGB BLD-MCNC: 10.7 G/DL (ref 11.7–15.7)
MCH RBC QN AUTO: 31.1 PG (ref 26.5–33)
MCHC RBC AUTO-ENTMCNC: 34.9 G/DL (ref 31.5–36.5)
MCV RBC AUTO: 89 FL (ref 78–100)
PLATELET # BLD AUTO: 191 10E3/UL (ref 150–450)
POTASSIUM BLD-SCNC: 4.3 MMOL/L (ref 3.5–5)
PROT SERPL-MCNC: 6.7 G/DL (ref 6–8)
PROT/CREAT 24H UR: 0.1 MG/MG CR
RBC # BLD AUTO: 3.44 10E6/UL (ref 3.8–5.2)
SODIUM SERPL-SCNC: 139 MMOL/L (ref 136–145)
WBC # BLD AUTO: 11.3 10E3/UL (ref 4–11)

## 2022-01-24 PROCEDURE — 36415 COLL VENOUS BLD VENIPUNCTURE: CPT | Performed by: STUDENT IN AN ORGANIZED HEALTH CARE EDUCATION/TRAINING PROGRAM

## 2022-01-24 PROCEDURE — 84156 ASSAY OF PROTEIN URINE: CPT | Performed by: STUDENT IN AN ORGANIZED HEALTH CARE EDUCATION/TRAINING PROGRAM

## 2022-01-24 PROCEDURE — 82248 BILIRUBIN DIRECT: CPT | Performed by: STUDENT IN AN ORGANIZED HEALTH CARE EDUCATION/TRAINING PROGRAM

## 2022-01-24 PROCEDURE — 99214 OFFICE O/P EST MOD 30 MIN: CPT | Mod: GC | Performed by: STUDENT IN AN ORGANIZED HEALTH CARE EDUCATION/TRAINING PROGRAM

## 2022-01-24 PROCEDURE — 85027 COMPLETE CBC AUTOMATED: CPT | Performed by: STUDENT IN AN ORGANIZED HEALTH CARE EDUCATION/TRAINING PROGRAM

## 2022-01-24 PROCEDURE — 80053 COMPREHEN METABOLIC PANEL: CPT | Performed by: STUDENT IN AN ORGANIZED HEALTH CARE EDUCATION/TRAINING PROGRAM

## 2022-01-24 RX ORDER — FAMOTIDINE 20 MG/1
20 TABLET, FILM COATED ORAL 2 TIMES DAILY
Qty: 60 TABLET | Refills: 3 | Status: SHIPPED | OUTPATIENT
Start: 2022-01-24 | End: 2022-01-27

## 2022-01-24 ASSESSMENT — MIFFLIN-ST. JEOR: SCORE: 1722.8

## 2022-01-24 NOTE — TELEPHONE ENCOUNTER
Pt is 27w4d pregnant and states that she went to the ER on Saturday due to 3 lumps on the side of her neck and HA.  She states that it hurts to look up or down.  She is also having swelling.  Her BP was 137/88 (per chart /74).  She states that she is also voiding 9 x's per night despite not drinking much prior to bed.  She denies burning but feels pressure and going in small amounts.  Advised appt and scheduled for TODAY at 1:10 PM with Dr Palacios.  Routed note to Dr Palacios./YOLI

## 2022-01-24 NOTE — PROGRESS NOTES
Assessment & Plan     Lymphadenopathy  Ultrasounded the mass, which was unrevealing. Given MRI results, most likely still consistent with lymphadenopathy.  Given the tender, immobility nature to this, could also consider other causes such as cyst, abscess as well.  Difficult to assess via ultrasound today.  I did recommend that she follow-up with this in 2 weeks to see if this will improve.    Blurred vision  Tension headache  Other migraine without status migrainosus, not intractable  Her blood pressure was normal today.  It is less likely that she is experiencing signs and symptoms of preeclampsia; however, she is having other symptoms that would be concerning for this which includes, blurred vision, headache, swelling of the feet, urinary changes.  I do think it is reasonable to check some basic labs as it relates to preeclampsia.  - CBC with platelets; Future  - Basic metabolic panel; Future  - Hepatic function panel; Future  - Protein  random urine; Future    Gastroesophageal reflux disease with esophagitis without hemorrhage  She had a recent insurance change which no longer covers the famotidine, I represcribed this medication under new insurance.  Patient continues to have GERD symptoms.  - famotidine (PEPCID) 20 MG tablet; Take 1 tablet (20 mg) by mouth 2 times daily    Round ligament pain  Bilateral leg pain  Bilateral lower leg pain and round ligament pain is most likely secondary to pregnancy, patient was open to binder at this time.  - Orthotics, Prosthetics and Custom Compression Order    Urinary frequency  Urinary frequency is most likely secondary to pressure of baby pressing down on her bladder.  Most reassuringly she did have a urine culture 2 days ago which was negative in the setting of the same symptoms.  Recommended continuing to monitor symptoms, hopefully abdominal binder would also help with alleviating pressure.      Review of prior external note(s) from - Previous ED visit, previous office  "visit  Review of the result(s) of each unique test - Urine culture, UA, CBC, MRI results  60 minutes spent on the date of the encounter doing chart review, history and exam, documentation and further activities per the note       BMI:   Estimated body mass index is 37.88 kg/m  as calculated from the following:    Height as of this encounter: 1.632 m (5' 4.25\").    Weight as of this encounter: 100.9 kg (222 lb 6.4 oz).   Patient is pregnant    Recommended that she follow-up in 2 weeks for routine OB visit, will consider doing glucose tolerance test at that time    Stormy Palacios MD PGY2  [facility]  Precepted with Dr. Victoria    Subjective   Kee Phipps is a 31 year old who presents for the following health issues     HPI     Presents for follow-up of emergency department.  Patient notes that she was having some swollen bumps on the side of her right neck, right-sided headache with blurry vision, and went to the emergency department for further evaluation.  Patient notes that she was told at the time that she needs to be worked up for preeclampsia especially because her blood pressure was noted to be in the 140 systolic.  She did not have this work-up done in the ED.  She did have an MRI done which was significant for scattered lymphadenopathy.    Patient notes that her headache has started to subside, and that the lumps on the side of her neck has \" been coming together\".  Continues to be tender to touch.      She also notes increased leg pain and swelling in both of her lower legs, bilateral lower abdominal pain, some ongoing blurry vision, some headache, increased urinary frequency.  For example, she was noting that she went to the bathroom approximately 8-9 times overnight prior to coming to clinic today.  She also had a urine culture done in the ED which was negative for findings that would be concerning for a UTI.    Review of Systems   Complete ROS normal aside noted in HPI    " "  Objective    /86 (BP Location: Left arm, Patient Position: Sitting, Cuff Size: Adult Regular)   Pulse 94   Temp 98.1  F (36.7  C) (Oral)   Resp 16   Ht 1.632 m (5' 4.25\")   Wt 100.9 kg (222 lb 6.4 oz)   LMP 07/15/2021   SpO2 99%   BMI 37.88 kg/m    Body mass index is 37.88 kg/m .    Physical Exam   GENERAL: healthy, alert and no distress  EYES: Eyes grossly normal to inspection, PERRL and conjunctivae and sclerae normal  HENT: ear canals and TM's normal, nose and mouth without ulcers or lesions.  Palpable right post auricular mass, not freely mobile, tender to palpation.  Suboccipital lymphadenopathy also noted on right side of head.  Left side normal  NECK: no adenopathy, no asymmetry, masses, or scars and thyroid normal to palpation  RESP: lungs clear to auscultation - no rales, rhonchi or wheezes  CV: regular rate and rhythm, normal S1 S2, no S3 or S4, no murmur, click or rub, no peripheral edema and peripheral pulses strong  ABDOMEN: soft, nontender, no hepatosplenomegaly, no masses and bowel sounds normal  MS: no gross musculoskeletal defects noted, no edema    Protein:Cr, cbc, bmp, lft pending    ----- Service Performed and Documented by Resident or Fellow ------            DME (Durable Medical Equipment) Orders and Documentation  Orders Placed This Encounter   Procedures     Orthotics, Prosthetics and Custom Compression Order      The patient was assessed and it was determined the patient is in need of the following listed DME Supplies/Equipment. Please complete supporting documentation below to demonstrate medical necessity.      DME All Other Item(s) Documentation    List reason for need and supporting documentation for medical necessity below for each DME item.     1.  Prescribe abdominal binder for pregnancy in the setting of round ligament pain and bilateral lower leg swelling and pain          "

## 2022-01-24 NOTE — PROGRESS NOTES
"Preceptor attestation:  Vital signs reviewed: /86 (BP Location: Left arm, Patient Position: Sitting, Cuff Size: Adult Regular)   Pulse 94   Temp 98.1  F (36.7  C) (Oral)   Resp 16   Ht 1.632 m (5' 4.25\")   Wt 100.9 kg (222 lb 6.4 oz)   LMP 07/15/2021   SpO2 99%   BMI 37.88 kg/m      Patient seen, evaluated, and discussed with the resident.  I have verified the content of the note, which accurately reflects my assessment of the patient and the plan of care.    Supervising physician: Chichi Victoria MD  [facility]  "

## 2022-01-26 ENCOUNTER — MYC MEDICAL ADVICE (OUTPATIENT)
Dept: FAMILY MEDICINE | Facility: CLINIC | Age: 32
End: 2022-01-26
Payer: COMMERCIAL

## 2022-01-26 ENCOUNTER — TELEPHONE (OUTPATIENT)
Dept: FAMILY MEDICINE | Facility: CLINIC | Age: 32
End: 2022-01-26
Payer: COMMERCIAL

## 2022-01-26 NOTE — TELEPHONE ENCOUNTER
Pt states that she has not heard from Dr Palacios yet regarding her lab results from 1/24/22.  Reviewed results with pt and discussed that they were essentially normal/negative and hgb is stable.  Pt is wondering what is causing her current symptoms?  Pt is taking a PNV but is not taking iron.  She is wondering if there is anything else that Dr Palacios recommends for her anemia?  Routed note to Dr Palacios./YOLI

## 2022-01-27 DIAGNOSIS — K21.00 GASTROESOPHAGEAL REFLUX DISEASE WITH ESOPHAGITIS WITHOUT HEMORRHAGE: ICD-10-CM

## 2022-01-27 RX ORDER — FAMOTIDINE 20 MG/1
20 TABLET, FILM COATED ORAL 2 TIMES DAILY
Qty: 60 TABLET | Refills: 3 | Status: SHIPPED | OUTPATIENT
Start: 2022-01-27 | End: 2022-04-07

## 2022-01-28 DIAGNOSIS — O26.892 HEADACHE IN PREGNANCY, ANTEPARTUM, SECOND TRIMESTER: Primary | ICD-10-CM

## 2022-01-28 DIAGNOSIS — R51.9 HEADACHE IN PREGNANCY, ANTEPARTUM, SECOND TRIMESTER: Primary | ICD-10-CM

## 2022-01-28 RX ORDER — SENNOSIDES 8.6 MG
650 CAPSULE ORAL EVERY 8 HOURS PRN
Qty: 100 TABLET | Refills: 0 | Status: SHIPPED | OUTPATIENT
Start: 2022-01-28 | End: 2022-03-03

## 2022-01-28 RX ORDER — METOCLOPRAMIDE 10 MG/1
10 TABLET ORAL 3 TIMES DAILY PRN
Qty: 100 TABLET | Refills: 0 | Status: ON HOLD | OUTPATIENT
Start: 2022-01-28 | End: 2022-03-28

## 2022-02-04 ENCOUNTER — DOCUMENTATION ONLY (OUTPATIENT)
Dept: FAMILY MEDICINE | Facility: CLINIC | Age: 32
End: 2022-02-04
Payer: COMMERCIAL

## 2022-02-05 ENCOUNTER — TELEPHONE (OUTPATIENT)
Dept: FAMILY MEDICINE | Facility: CLINIC | Age: 32
End: 2022-02-05
Payer: COMMERCIAL

## 2022-02-06 NOTE — CONFIDENTIAL NOTE
Answering Service    I received a page from the answering service 6:04 PM re: Possible ruptured membrane.     Kee reports that she has had clearish/yellow discharge coming from the vagina for the last couple of days. There is no smell or itchiness associated with the discharge. It is not very much (not soaking through pads). She is wondering if her membranes ruptured. She has been feeling chilled. Yesterday had an episode of one sided back pain. Today she has no cramping or contractions. Baby is moving normally. Denies vaginal bleeding.     Review of chart shows GA of 29w2d. She does have a history of  labor.     While it seems unlikely that she has ruptured membranes, she does have a history of  labor. It is reassuring that she is not experiencing cramping or contractions. Recommended that she schedule an appointment on Monday for evaluation of rupture of membranes and a wet prep.     Recommended that if she develops cramping, abdominal pain, more discharge/fluid loss, decreased fetal movement, or vaginal bleeding that she present to the emergency department. Recommended going to [place] given she is <30 weeks.     Encouraged Kee to call back with questions or concerns.     Valentina Hernandez MD PGY3  [facility]  2022, 6:04 PM

## 2022-02-07 ENCOUNTER — OFFICE VISIT (OUTPATIENT)
Dept: FAMILY MEDICINE | Facility: CLINIC | Age: 32
End: 2022-02-07
Payer: COMMERCIAL

## 2022-02-07 VITALS
OXYGEN SATURATION: 96 % | DIASTOLIC BLOOD PRESSURE: 80 MMHG | HEART RATE: 100 BPM | BODY MASS INDEX: 37.43 KG/M2 | WEIGHT: 219.8 LBS | TEMPERATURE: 98.3 F | RESPIRATION RATE: 16 BRPM | SYSTOLIC BLOOD PRESSURE: 122 MMHG

## 2022-02-07 DIAGNOSIS — N89.8 VAGINAL DISCHARGE: ICD-10-CM

## 2022-02-07 DIAGNOSIS — O09.93 SUPERVISION OF HIGH RISK PREGNANCY IN THIRD TRIMESTER: ICD-10-CM

## 2022-02-07 DIAGNOSIS — K50.819 CROHN'S DISEASE OF SMALL AND LARGE INTESTINES WITH COMPLICATION (H): Primary | ICD-10-CM

## 2022-02-07 LAB
CLUE CELLS: ABNORMAL
CRYSTALS AMN MICRO: NORMAL
TRICHOMONAS, WET PREP: ABNORMAL
WBC'S/HIGH POWER FIELD, WET PREP: ABNORMAL
YEAST, WET PREP: ABNORMAL

## 2022-02-07 PROCEDURE — 99207 PR PRENATAL VISIT: CPT | Mod: GC | Performed by: STUDENT IN AN ORGANIZED HEALTH CARE EDUCATION/TRAINING PROGRAM

## 2022-02-07 PROCEDURE — 87210 SMEAR WET MOUNT SALINE/INK: CPT | Performed by: STUDENT IN AN ORGANIZED HEALTH CARE EDUCATION/TRAINING PROGRAM

## 2022-02-07 NOTE — PROGRESS NOTES
Preceptor attestation:  Vital signs reviewed: /80   Pulse 100   Temp 98.3  F (36.8  C) (Oral)   Resp 16   Wt 99.7 kg (219 lb 12.8 oz)   LMP 07/15/2021   SpO2 96%   BMI 37.43 kg/m      Patient seen, evaluated, and discussed with the resident.  I have verified the content of the note, which accurately reflects my assessment of the patient and the plan of care.    Supervising physician: Chichi Victoria MD  [facility]

## 2022-02-07 NOTE — PROGRESS NOTES
Assessment and Plan    Kee was seen today for nonbloody, nonsmelling vaginal discharge for 3 days.  Previous history of remission delivery at 29 weeks.  He stated that discharge from yellowish stuff, looks the same as the previous discharge she had prior to her premature delivery.  No urinary symptoms.  No history of recent trauma or falls.  Vaginal exam is positive for discharge, posterior, long closed cervix    Diagnoses and all orders for this visit:    Crohn's disease of small and large intestines with complication (H)  -     US OB >14 Weeks Follow Up; Future  -     US OB Biophysical Profile w/o Non Stress (Single); Future  -     US OB >14 Weeks Follow Up; Future  -     US OB Biophysical Profile w/o Non Stress (Single); Future  -     US OB Biophysical Profile w/o Non Stress (Single); Future  -     US OB Biophysical Profile w/o Non Stress (Single); Future  -     US OB Biophysical Profile w/o Non Stress (Single); Future    Supervision of high risk pregnancy in third trimester  -     Fern Test for Rupture of Membranes  -     Wet preparation  -     US OB >14 Weeks Follow Up; Future  -     US OB Biophysical Profile w/o Non Stress (Single); Future  -     US OB >14 Weeks Follow Up; Future  -     US OB Biophysical Profile w/o Non Stress (Single); Future  -     US OB Biophysical Profile w/o Non Stress (Single); Future  -     US OB Biophysical Profile w/o Non Stress (Single); Future  -     US OB Biophysical Profile w/o Non Stress (Single); Future    Vaginal discharge  -     Fern Test for Rupture of Membranes  -     Wet preparation             Options for treatment and follow-up care were reviewed with the patient. Patient engaged in the decision making process and verbalized understanding of the options discussed and agreed with the final plan.    Patient was staffed with attending physician MD Johnny Medel MD PGY2           HPI       Kee Phipps is a 31 year old year old female w/ PMH of    Patient Active Problem List   Diagnosis     Health Care Home     Crohns disease     Abnormal Pap smear of cervix     Chronic pain     Adjustment disorder with depressed mood     Celiac disease     Immunosuppressed status (H)     Fibromyalgia     H/O  delivery, currently pregnant     Ovarian cyst complicating pregnancy, antepartum     Tobacco use in pregnancy, antepartum, first trimester     High risk pregnancy due to history of  labor, second trimester     BMI 36.0-36.9,adult     At risk for hypertension     Trichomonal vaginitis during pregnancy in first trimester     Accelerated idioventricular rhythm (H)    who presents for vaginal discharge that started last Saturday, 2 days prior to the encounter.  The discharge is with some yellow stuff, nonbloody, and without smell.  Patient stated that the discharge looks like the same discharge.his happen to her previous premature delivery.  Patient denies polyuria, dysuria, or any other urinary symptoms.  Denies fever, back pain, abdominal pain, trauma to her abdomen or falls.    +++++++      Problem, Medication and Allergy Lists were      Patient Active Problem List    Diagnosis Date Noted     Accelerated idioventricular rhythm (H) 2022     Priority: Medium     Trichomonal vaginitis during pregnancy in first trimester 2021     Priority: Medium     21 POSITIVE Trichomonas. 21 Treated with Metronidazole 500 mg BID x's 7 days.   NEEDS Test of Cure: on/after 21   NEEDS 3rd trimester wet prep and GC/chlamydia: 22        Crohns disease 2021     Priority: Medium     Crohn's Disease, Celiac Disease, and Fibromyalgia: Follows with [facility]. Received second Stelara injection on 21 at [facility].       Adjustment disorder with depressed mood 2021     Priority: Medium     H/O Adjustment disorder with depressed mood and anxiety: 21 Pt reports that she is feeling good/okay right now and denies need for therapy at this time.   She will let clinic know if she would like to see a therapist in the future.       Celiac disease 2021     Priority: Medium     Crohn's Disease, Celiac Disease, and Fibromyalgia: Follows with [facility]. Received second Stelara injection on 21 at [facility].       Fibromyalgia 2021     Priority: Medium     Crohn's Disease, Celiac Disease, and Fibromyalgia: Follows with [facility]. Received second Stelara injection on 21 at [facility].       H/O  delivery, currently pregnant 2021     Priority: Medium     H/O  Delivery: 13  delivery at 34 wks gestation at [facility] after prolonged PPROM at 31 wks.  Pt DECLINED vaginal progesterone.  Declines Meyers (17P injections).  Referred to [facility] for 1st trimester testing per pt's request.   21 Referred to [facility] for 1st trimester screening per patient's request.     Results indicate NO ANEUPLOIDY DETECTED for chromosomes 21, 18, 13, or sex chromosomes.   21 MFM: left lateral fundal placenta, nl CUONG, MVP 8.4 cm, and cervical length 30.3 mm and appears closed. PLAN: Serial cervical length u/s between 16-24 wks. 21 MFM: BREECH, left lateral fundal placenta, nl CUONG, MVP 5.8 cm,  g at 33%, no fetal anomalies detected, and cervix 32.8 mm long and closed. PLAN: Growth u/s in 4 wks and weekly BPP's at 32 wks due to pt having Crohn's/inflammatory bowel disease. Growth U/S APPT: Monday, 21 at 1:30 PM at [facility]  NEEDS weekly BPP starting at 32 wks (22)  NEED to ask if would like to do ultrasounds at [facility] or [place]- let Pretty know and will help her schedule.       Tobacco use in pregnancy, antepartum, first trimester 2021     Priority: Medium     Tobacco use in pregnancy: 21 Pt reports smoking up to 5 cigs per day but her and partner state that they will both stop smoking together once current pack is empty.  NEED to check at future visit to see how it is going and to see if need help with cessation.       High risk pregnancy  due to history of  labor, second trimester 2021     Priority: Medium     High risk due to: Crohn's disease, celiac disease, fibromyalgia, h/o  delivery, h/o ectopic pregnancy with previous pregnancy, tobacco use in pregnancy,h/o STI's, and pre-pregnancy BMI 36 -Patient to see OB educator/ RN today and/or next visit per Dr Palacios./YOLI       BMI 36.0-36.9,adult 2021     Priority: Medium     Pre-pregnancy BMI 36       At risk for hypertension 2021     Priority: Medium     At risk for hypertension/preeclampsia: Pt needs to start ASA 81 mg/d at 12 wks gestation- on/after 10/7/21         due to BMI >30, mom has htn, and h/o  delivery.       Ovarian cyst complicating pregnancy, antepartum 2021     Priority: Medium     Ovarian Cysts 21 [facility]: single IUP, FHR 94 bpm (low normal FHR), nl CUONG, nl rt ovary and left ovary 2.1 cm corpus luteal cyst and couple simple and mildly complex adjacent cysts.  These are prominent and attn on follow-up imaging is recommended to ensure resolution. 21 U/S referral placed to do next ultrasound at 9-10 weeks gestation.  APPT: 21 here.       Immunosuppressed status (H) 2015     Priority: Medium     Chronic pain 2014     Priority: Medium     No indication for narcotics per GI  Due to Crohn's not responsive to other therapies. CPM visit 1&2 complete     CPM #1 completed? Yes, date: 2015  CPM #2 completed? Yes, date: 3/9/2015  Pain contract signed? Yes, date: 3/9/2015  Behavioral health consult completed? Yes, date: 2015  Personal care plan completed? Yes, date: 2015 See patient instructions               Abnormal Pap smear of cervix      Priority: Medium     17 LSIL Pap, pt referred to OBGYN for colp/consult  9/2/15 Colposcopy done at [facility].  7/8/15 ASCUS Pap- superior elkin os ulceration.  Pt referred to OBGYN.  14 COLP APPT: Repeat Pap- LSIL, ECC and cervical os bx- neg dysplasia, Ectocervix bx-  Moderate squamous dysplasia.  Plan: refer to GYN.  DG  4/30/14 LGSIL Pap with + high risk HPV.   Cervical appearance: pink with darker pink lacy areas. Pt needs colposcopy ASAP!  2/8/13 LGSIL Pap with negative HPV.  Pt needs repeat Pap in 1yr.    6/18/09 nl Pap       Health Care Home 11/27/2012     Priority: Medium     Tier Level: 0    DX V65.8 REPLACED WITH [facility]ALTH CARE HOME (04/08/2013)         Patient is an established patient of this clinic.         Review of Systems:   10 point ROS negative other than as specified above.         Physical Exam:   /80   Pulse 100   Temp 98.3  F (36.8  C) (Oral)   Resp 16   Wt 99.7 kg (219 lb 12.8 oz)   LMP 07/15/2021   SpO2 96%   BMI 37.43 kg/m      Vitals were reviewed and were normal     Exam:  Constitutional: healthy, alert, no distress, and cooperative  Head: Normocephalic. No masses, lesions, tenderness or abnormalities  Neck: Neck supple. No adenopathy.  ENT: throat normal without erythema or exudate  Cardiovascular: RRR w/o audible murmur  Respiratory: bilateral clear lungs w/o wheezing, crackles or rhonchi; breathing comfortably on RA  Gastrointestinal: Abdomen soft, non-tender. BS normal.  Vaginal Exam: Whitish, looks like cottage cheese with some smells vagina discharge.  Closed cervix  Musculoskeletal: extremities normal- no gross deformities noted, gait normal, and normal muscle tone  Skin: no suspicious lesions or rashes  Neurologic: grossly normal CN; normal strength, sensation, & tone  Psychiatric: mentation appears normal and affect normal/bright        Results:      Results from this visit  Results for orders placed or performed in visit on 02/07/22   Wet preparation     Status: Abnormal    Specimen: Vagina; Swab   Result Value Ref Range    Trichomonas Absent Absent    Yeast Absent Absent    Clue Cells Absent Absent    WBCs/high power field 1+ (A) None    Narrative    Moderate bacteria; negative odor

## 2022-02-07 NOTE — TELEPHONE ENCOUNTER
Pt states that she scheduled an appt to see Dr James today, 22 at 11:20 AM.  She is 29w4d pregnant. She states that has continued to notice a trickle of yellow vaginal discharge off and on since Saturday.  She is wearing a panty liner.  She states that it does not smell like urine.  She has had some sharp pains on her rt side that radiates to her back but no uterine contractions/cramping.  She denies vaginal bleeding, vaginal itching, and burning with urination.  Baby is active.  She has h/o PPROM and  delivery at 34 wks with her son in .  Told her to call if she develops any other symptoms.    Gave info to Dr James in clinic./YOLI

## 2022-02-11 ENCOUNTER — OFFICE VISIT (OUTPATIENT)
Dept: FAMILY MEDICINE | Facility: CLINIC | Age: 32
End: 2022-02-11
Payer: COMMERCIAL

## 2022-02-11 VITALS
OXYGEN SATURATION: 96 % | BODY MASS INDEX: 37.98 KG/M2 | DIASTOLIC BLOOD PRESSURE: 84 MMHG | TEMPERATURE: 98 F | WEIGHT: 223 LBS | RESPIRATION RATE: 18 BRPM | HEART RATE: 104 BPM | SYSTOLIC BLOOD PRESSURE: 126 MMHG

## 2022-02-11 DIAGNOSIS — G89.29 OTHER CHRONIC PAIN: Primary | ICD-10-CM

## 2022-02-11 DIAGNOSIS — R06.02 SHORTNESS OF BREATH: ICD-10-CM

## 2022-02-11 DIAGNOSIS — R59.1 LYMPHADENOPATHY: ICD-10-CM

## 2022-02-11 DIAGNOSIS — O09.212 HIGH RISK PREGNANCY DUE TO HISTORY OF PRETERM LABOR, SECOND TRIMESTER: ICD-10-CM

## 2022-02-11 DIAGNOSIS — Z23 NEED FOR VACCINATION: ICD-10-CM

## 2022-02-11 DIAGNOSIS — M79.7 FIBROMYALGIA: ICD-10-CM

## 2022-02-11 DIAGNOSIS — K90.0 CELIAC DISEASE: ICD-10-CM

## 2022-02-11 DIAGNOSIS — O09.899 H/O PRETERM DELIVERY, CURRENTLY PREGNANT: ICD-10-CM

## 2022-02-11 DIAGNOSIS — K50.919 CROHN'S DISEASE WITH COMPLICATION, UNSPECIFIED GASTROINTESTINAL TRACT LOCATION (H): ICD-10-CM

## 2022-02-11 DIAGNOSIS — N89.8 VAGINAL DISCHARGE: ICD-10-CM

## 2022-02-11 DIAGNOSIS — Z91.89 AT RISK FOR HYPERTENSION: ICD-10-CM

## 2022-02-11 PROCEDURE — 87591 N.GONORRHOEAE DNA AMP PROB: CPT | Performed by: STUDENT IN AN ORGANIZED HEALTH CARE EDUCATION/TRAINING PROGRAM

## 2022-02-11 PROCEDURE — 90715 TDAP VACCINE 7 YRS/> IM: CPT | Performed by: STUDENT IN AN ORGANIZED HEALTH CARE EDUCATION/TRAINING PROGRAM

## 2022-02-11 PROCEDURE — 90471 IMMUNIZATION ADMIN: CPT | Performed by: STUDENT IN AN ORGANIZED HEALTH CARE EDUCATION/TRAINING PROGRAM

## 2022-02-11 PROCEDURE — 87491 CHLMYD TRACH DNA AMP PROBE: CPT | Performed by: STUDENT IN AN ORGANIZED HEALTH CARE EDUCATION/TRAINING PROGRAM

## 2022-02-11 PROCEDURE — 99207 PR PRENATAL VISIT: CPT | Mod: GC | Performed by: STUDENT IN AN ORGANIZED HEALTH CARE EDUCATION/TRAINING PROGRAM

## 2022-02-11 NOTE — PROGRESS NOTES
Assessment & Plan  31 year old  at 30w1d with LORIE 2022 based on LMP    Kee was seen today for prenatal care.    Diagnoses and all orders for this visit:    Other chronic pain  Fibromyalgia  Crohn's disease with complication, unspecified gastrointestinal tract location (H)  Celiac disease  High risk pregnancy due to history of  labor, second trimester  H/O  delivery, currently pregnant  At risk for hypertension  Shortness of breath  She notes that she recently quit smoking, and I encouraged her to continue with cessation. She also has weekly BPP scheduled beginning  as planned per [facility].  Shortness of breath is likely physiologic with gravid abdomen.  Recommended that she continue to monitor symptoms, recommended that she return to clinic sooner if she is developing shortness of breath especially at rest, chest pain, syncopal episode,  these could be concerning for PE.    Lymphadenopathy  Most likely, this is reactive, especially with recent COVID infection. The erythema noted on R post-auricular area could be secondary to repeated palpation. Could also consider abscess/cellulitis; however, her vital signs are stable, she also does not have marked worsening of symptoms, she also does not have any purulent drainage as well that would be concerning for needing antibiotics at this time.  Recommended diagnostic clarity with getting an ultrasound.  -     US Head Neck Soft Tissue; Future  -     US Neck Lymph Node Mapping; Future    Vaginal discharge  Most likely this is physiologic, especially as her wet prep earlier this week was unremarkable.  Discussed with her, that her that it is possible that she could have chlamydia or gonorrhea; however, she notes that she and her partner are monogamous with each other.  She is open to testing just to be absolutely sure that this vaginal discharge is not related to an infection.  -     Chlamydia trachomatis PCR; Future  -     Neisseria gonorrhoeae  PCR; Future    Need for vaccination  -     TDAP VACCINE (Adacel, Boostrix)  [[id number]]        Weight gain adequate: 4.082 kg (9 lb) to date, out of recommended total of 11-20 lbs (pregravid BMI >30)    Patient Instructions   We will call with results      Return to clinic in 2 weeks.    Stormy Palacios MD PGY2  I precepted today with Chichi Victoria MD.    Review of the result(s) of each unique test - Previous US          Subjective  Concerns: she has noticed that the shortness of breath with walking for prolonged period of time. She is also in the middle of moving, and says that this has also caused her to have some shortness of breath. She says that sitting and laying down have improved her symptoms. We discussed her vaginal symptoms- continues to have vaginal discharge, this has not changed much since her last visit earlier this week. Doesn't have bleeding or odor. No fevers or chills.    She continues to note the lump behind her ear, and she says that it sometimes improves, and sometimes enlarges. She is unsure of redness, though says there has been no drainage. Has some pain in the area as well as lower down the right side of her neck. No changes in hearing or ear pain. Since our last visit, she has also had L sided mass appear in the same spot.     ROS:  No - Headache  No - Changes in vision  No - Chest Pain  YES - Shortness of Breath  No - Nausea   No - Vomiting  No - Abdominal pain   No - Contractions  No - Dysuria   YES - Vaginal Discharge    No - Vaginal bleeding   No - Loss of Fluid   YES - Extremity swelling   Present - Fetal movement       Going to WIC? Yes    Patient Active Problem List   Diagnosis     Health Care Home     Crohns disease     Abnormal Pap smear of cervix     Chronic pain     Adjustment disorder with depressed mood     Celiac disease     Immunosuppressed status (H)     Fibromyalgia     H/O  delivery, currently pregnant     Ovarian cyst complicating pregnancy, antepartum      Tobacco use in pregnancy, antepartum, first trimester     High risk pregnancy due to history of  labor, second trimester     BMI 36.0-36.9,adult     At risk for hypertension     Trichomonal vaginitis during pregnancy in first trimester     Accelerated idioventricular rhythm (H)       Kee Phipps speaks English so an  was not used today.    Guidance:  seatbelt use  fetal growth and movement  signs of  labor    Do you need help getting a car seat? No  Do you need help getting a breast pump? No      Objective  /84   Pulse 104   Temp 98  F (36.7  C) (Oral)   Resp 18   Wt 101.2 kg (223 lb)   LMP 07/15/2021   SpO2 96%   BMI 37.98 kg/m    No distress.  Gravid abdomen.  .  Fundal height 32 cm.  trace edema.    Results  Blood type: O POS  No results found for any visits on 22.

## 2022-02-11 NOTE — PROGRESS NOTES
Preceptor attestation:  Vital signs reviewed: /84   Pulse 104   Temp 98  F (36.7  C) (Oral)   Resp 18   Wt 101.2 kg (223 lb)   LMP 07/15/2021   SpO2 96%   BMI 37.98 kg/m      Patient seen, evaluated, and discussed with the resident.  I have verified the content of the note, which accurately reflects my assessment of the patient and the plan of care.    Supervising physician: Chichi Victoria MD  [facility]

## 2022-02-11 NOTE — NURSING NOTE
To be completed in Nursing note:    Please reference list for forms that require a visit for completion.  Please remind patients that providers are given 3-5 business days to complete and return forms.      Form type: MEDICAL OPINION FORM WITH DR CARLSON     Date form received: 2/11/2022    Date form completed by Physician: 2/11/2022    How was form returned to patient (mailed, faxed, or at  for patient to ):    Fax to [phone] ATTN: ASHKAN JOHN     Date form mailed/faxed/left at  for patient and sent to HIM for scanning:    Fax on 2/11/2022    Once form is left for patient, faxed, or mailed PCS will then close the documentation only encounter.

## 2022-02-11 NOTE — PROGRESS NOTES
To be completed in Nursing note:    Please reference list for forms that require a visit for completion.  Please remind patients that providers are given 3-5 business days to complete and return forms.      Form type: MEDICAL OPINION FORM    Date form received: 2/4/2022    Date form completed by Physician: 2/4/2022    How was form returned to patient (mailed, faxed, or at  for patient to ):    Fax to [phone] ATTN: ASHKAN JOHN     Date form mailed/faxed/left at  for patient and sent to HIM for scanning:    Fax on 2/4/2022    Once form is left for patient, faxed, or mailed PCS will then close the documentation only encounter.

## 2022-02-12 LAB
C TRACH DNA SPEC QL NAA+PROBE: NEGATIVE
N GONORRHOEA DNA SPEC QL NAA+PROBE: NEGATIVE

## 2022-02-24 ENCOUNTER — TELEPHONE (OUTPATIENT)
Dept: FAMILY MEDICINE | Facility: CLINIC | Age: 32
End: 2022-02-24
Payer: COMMERCIAL

## 2022-02-24 DIAGNOSIS — B37.31 YEAST INFECTION OF THE VAGINA: Primary | ICD-10-CM

## 2022-02-24 NOTE — TELEPHONE ENCOUNTER
Pt is 32 wks pregnant and states that she is now having increased vaginal discharge and vaginal itching.  She is wondering if Dr Palacios could prescribe yeast infection treatment to [facility] on [address]?  She has an appt for charanjit with Dr Palacios but is hoping to start treatment today.  She would like a call back once prescription has been sent.  Routed note to Dr Palacios./YOLI

## 2022-02-25 ENCOUNTER — OFFICE VISIT (OUTPATIENT)
Dept: FAMILY MEDICINE | Facility: CLINIC | Age: 32
End: 2022-02-25
Payer: COMMERCIAL

## 2022-02-25 VITALS
SYSTOLIC BLOOD PRESSURE: 127 MMHG | OXYGEN SATURATION: 97 % | TEMPERATURE: 98.7 F | HEART RATE: 85 BPM | BODY MASS INDEX: 38.46 KG/M2 | RESPIRATION RATE: 20 BRPM | DIASTOLIC BLOOD PRESSURE: 87 MMHG | WEIGHT: 225.8 LBS

## 2022-02-25 DIAGNOSIS — D84.9 IMMUNOSUPPRESSED STATUS (H): ICD-10-CM

## 2022-02-25 DIAGNOSIS — B37.31 YEAST INFECTION OF THE VAGINA: Primary | ICD-10-CM

## 2022-02-25 DIAGNOSIS — O09.899 H/O PRETERM DELIVERY, CURRENTLY PREGNANT: ICD-10-CM

## 2022-02-25 DIAGNOSIS — Z91.89 AT RISK FOR HYPERTENSION: ICD-10-CM

## 2022-02-25 DIAGNOSIS — K50.919 CROHN'S DISEASE WITH COMPLICATION, UNSPECIFIED GASTROINTESTINAL TRACT LOCATION (H): ICD-10-CM

## 2022-02-25 DIAGNOSIS — R59.1 LYMPHADENOPATHY: ICD-10-CM

## 2022-02-25 PROBLEM — O09.93 HIGH-RISK PREGNANCY, THIRD TRIMESTER: Status: ACTIVE | Noted: 2021-09-08

## 2022-02-25 PROCEDURE — 99207 PR PRENATAL VISIT: CPT | Mod: GC | Performed by: STUDENT IN AN ORGANIZED HEALTH CARE EDUCATION/TRAINING PROGRAM

## 2022-02-25 NOTE — PROGRESS NOTES
Preceptor Attestation:  Vitals:    02/25/22 0952   BP: 127/87   Pulse: 85   Resp: 20   Temp: 98.7  F (37.1  C)   TempSrc: Oral   SpO2: 97%   Weight: 102.4 kg (225 lb 12.8 oz)          I discussed the patient with the resident and evaluated the patient in person. I have verified the content of the note, which accurately reflects my assessment of the patient and the plan of care.   Supervising Physician:  Mounika Gibson MD

## 2022-02-25 NOTE — PROGRESS NOTES
Assessment & Plan  31 year old  at 32w1d with LORIE 2022 based on LMP    Kee was seen today for prenatal care.    Diagnoses and all orders for this visit:    Yeast infection of the vagina  -     miconazole (MONISTAT 1 DAY OR NIGHT) 1200 & 2 MG & % kit; Please apply one applicator once    Lymphadenopathy  -     US Neck Lymph Node Mapping; Future    Crohn's disease with complication, unspecified gastrointestinal tract location (H)    Immunosuppressed status (H)    H/O  delivery, currently pregnant    BMI 36.0-36.9,adult    At risk for hypertension    Discussed with her the importance of following up with her GI doctor to discuss her Crohn's flare.  She is scheduled to have weekly BPP's going forward secondary to the fact that she is on Stelara for her Crohn's disease at this time.    Her weight gain is adequate given her pregravid BMI.  Discussed with her that we will reschedule the ultrasound of her neck, hopefully this can be done with her OB ultrasound in a few days.    Counseled her on signs and symptoms to monitor for relating to  labor and  labor.    Represcribed Monistat for one-time dose as it appears her insurance did not cover the first time this was prescribed.  Her symptoms are most consistent with yeast, it does not seem to be related to BV or any other infectious source at this time.    Counseled her on management going forward, counseled her on GBS swabbing at 36 to 38 weeks.    Encouraged her to schedule ultrasound for her lymphadenopathy.    Weight gain adequate: 5.352 kg (11 lb 12.8 oz) to date, out of recommended total of 11-20 lbs (pregravid BMI >30)    Patient Instructions   Please go to the emergency department/labor and delivery for assessment of delivery if you start feeling regular cramping, abdominal pain that you have to breathe through, loss of fluid, vaginal bleeding.    Please apply Monistat into the vagina once to help with yeast  infection.      Return to clinic in 2 weeks.    Stormy Palacios MD PGY2  I precepted today with Dr. Gibson    Review of prior external note(s) from - previous office visit  Review of the result(s) of each unique test - OB US  40 minutes spent on the date of the encounter doing chart review, history and exam, documentation and further activities per the note        Subjective  Concerns: discharge is chunky, white, yellow.  She has been having some itchiness that is began in the past couple days.  Unfortunately, the prescription that I sent and yesterday appears to have not been covered, she is unsure why this happened.  Reviewed with her that she could talk to her insurance committee to see if they will cover this medication.    Patient believes that she is having a Crohn's flare currently.  She describes this more as a looser stools, to having some diarrhea; however, she is not having any blood in her stool or any other abdominal pain at this time.    ROS:  YES - Headache, occasional and is alleviated with rest and Tylenol.  No - Changes in vision  No - Chest Pain  No - Shortness of Breath  No - Nausea   No - Vomiting  No - Abdominal pain   No - Contractions  No - Dysuria   YES - Vaginal Discharge as above  No - Vaginal bleeding   No - Loss of Fluid   No - Extremity swelling   Present - Fetal movement       Going to WIC? Yes      Patient Active Problem List   Diagnosis     Health Care Home     Crohns disease     Abnormal Pap smear of cervix     Chronic pain     Adjustment disorder with depressed mood     Celiac disease     Immunosuppressed status (H)     Fibromyalgia     H/O  delivery, currently pregnant     Ovarian cyst complicating pregnancy, antepartum     Tobacco use in pregnancy, antepartum, first trimester     High-risk pregnancy, third trimester     BMI 36.0-36.9,adult     At risk for hypertension     Trichomonal vaginitis during pregnancy in first trimester     Accelerated idioventricular rhythm (H)        Kee Phipps speaks English so an  was not used today.    Guidance:  seatbelt use  fetal growth and movement  signs of  labor    Do you need help getting a car seat? No  Do you need help getting a breast pump? No      Objective  /87   Pulse 85   Temp 98.7  F (37.1  C) (Oral)   Resp 20   Wt 102.4 kg (225 lb 12.8 oz)   LMP 07/15/2021   SpO2 97%   BMI 38.46 kg/m    No distress.  Gravid abdomen.  .  Fundal height 31 cm.  trace edema.    Results  Blood type: O POS  No results found for any visits on 22.

## 2022-02-25 NOTE — PATIENT INSTRUCTIONS
Please go to the emergency department/labor and delivery for assessment of delivery if you start feeling regular cramping, abdominal pain that you have to breathe through, loss of fluid, vaginal bleeding.    Please apply Monistat into the vagina once to help with yeast infection.

## 2022-02-25 NOTE — Clinical Note
Drew Burrows, I found the [place] [employer] formulary online. It should be helpful if you're trying to figure out things like which antifungal is covered. Here's a link: [email or web address]_[email or web address]

## 2022-02-28 ENCOUNTER — ANCILLARY PROCEDURE (OUTPATIENT)
Dept: ULTRASOUND IMAGING | Facility: CLINIC | Age: 32
End: 2022-02-28
Attending: FAMILY MEDICINE
Payer: COMMERCIAL

## 2022-02-28 ENCOUNTER — ALLIED HEALTH/NURSE VISIT (OUTPATIENT)
Dept: FAMILY MEDICINE | Facility: CLINIC | Age: 32
End: 2022-02-28
Payer: COMMERCIAL

## 2022-02-28 ENCOUNTER — ANCILLARY PROCEDURE (OUTPATIENT)
Dept: ULTRASOUND IMAGING | Facility: CLINIC | Age: 32
End: 2022-02-28
Attending: STUDENT IN AN ORGANIZED HEALTH CARE EDUCATION/TRAINING PROGRAM
Payer: COMMERCIAL

## 2022-02-28 DIAGNOSIS — K50.90 CROHN'S DISEASE (H): Primary | ICD-10-CM

## 2022-02-28 DIAGNOSIS — R59.1 LYMPHADENOPATHY: ICD-10-CM

## 2022-02-28 DIAGNOSIS — K50.819 CROHN'S DISEASE OF SMALL AND LARGE INTESTINES WITH COMPLICATION (H): ICD-10-CM

## 2022-02-28 DIAGNOSIS — O09.93 HIGH-RISK PREGNANCY, THIRD TRIMESTER: ICD-10-CM

## 2022-02-28 DIAGNOSIS — O09.93 SUPERVISION OF HIGH RISK PREGNANCY IN THIRD TRIMESTER: ICD-10-CM

## 2022-02-28 PROCEDURE — 76536 US EXAM OF HEAD AND NECK: CPT | Mod: 26 | Performed by: RADIOLOGY

## 2022-02-28 PROCEDURE — 76819 FETAL BIOPHYS PROFIL W/O NST: CPT | Performed by: RADIOLOGY

## 2022-02-28 PROCEDURE — 76816 OB US FOLLOW-UP PER FETUS: CPT | Performed by: RADIOLOGY

## 2022-02-28 PROCEDURE — 59025 FETAL NON-STRESS TEST: CPT | Mod: 59

## 2022-03-03 ENCOUNTER — TELEPHONE (OUTPATIENT)
Dept: FAMILY MEDICINE | Facility: CLINIC | Age: 32
End: 2022-03-03

## 2022-03-03 ENCOUNTER — OFFICE VISIT (OUTPATIENT)
Dept: FAMILY MEDICINE | Facility: CLINIC | Age: 32
End: 2022-03-03
Payer: COMMERCIAL

## 2022-03-03 VITALS
WEIGHT: 226.5 LBS | TEMPERATURE: 98.2 F | BODY MASS INDEX: 38.57 KG/M2 | DIASTOLIC BLOOD PRESSURE: 85 MMHG | SYSTOLIC BLOOD PRESSURE: 126 MMHG | RESPIRATION RATE: 22 BRPM | HEART RATE: 99 BPM | OXYGEN SATURATION: 98 %

## 2022-03-03 DIAGNOSIS — R51.9 HEADACHE IN PREGNANCY, ANTEPARTUM, SECOND TRIMESTER: ICD-10-CM

## 2022-03-03 DIAGNOSIS — G56.03 BILATERAL CARPAL TUNNEL SYNDROME: ICD-10-CM

## 2022-03-03 DIAGNOSIS — O26.892 HEADACHE IN PREGNANCY, ANTEPARTUM, SECOND TRIMESTER: ICD-10-CM

## 2022-03-03 DIAGNOSIS — M65.4 DE QUERVAIN'S DISEASE (TENOSYNOVITIS): ICD-10-CM

## 2022-03-03 DIAGNOSIS — M79.89 LEG SWELLING: Primary | ICD-10-CM

## 2022-03-03 LAB
ALBUMIN MFR UR ELPH: 7.4 MG/DL
ALBUMIN SERPL-MCNC: 2.9 G/DL (ref 3.5–5)
ALP SERPL-CCNC: 107 U/L (ref 45–120)
ALT SERPL W P-5'-P-CCNC: <9 U/L (ref 0–45)
ANION GAP SERPL CALCULATED.3IONS-SCNC: 10 MMOL/L (ref 5–18)
AST SERPL W P-5'-P-CCNC: 12 U/L (ref 0–40)
BILIRUB SERPL-MCNC: 0.4 MG/DL (ref 0–1)
BUN SERPL-MCNC: 5 MG/DL (ref 8–22)
CALCIUM SERPL-MCNC: 9.3 MG/DL (ref 8.5–10.5)
CHLORIDE BLD-SCNC: 108 MMOL/L (ref 98–107)
CO2 SERPL-SCNC: 19 MMOL/L (ref 22–31)
CREAT SERPL-MCNC: 0.58 MG/DL (ref 0.6–1.1)
CREAT UR-MCNC: 51 MG/DL
ERYTHROCYTE [DISTWIDTH] IN BLOOD BY AUTOMATED COUNT: 13.6 % (ref 10–15)
GFR SERPL CREATININE-BSD FRML MDRD: >90 ML/MIN/1.73M2
GLUCOSE BLD-MCNC: 78 MG/DL (ref 70–125)
HCT VFR BLD AUTO: 33.5 % (ref 35–47)
HGB BLD-MCNC: 11.7 G/DL (ref 11.7–15.7)
MCH RBC QN AUTO: 31.2 PG (ref 26.5–33)
MCHC RBC AUTO-ENTMCNC: 34.9 G/DL (ref 31.5–36.5)
MCV RBC AUTO: 89 FL (ref 78–100)
PLATELET # BLD AUTO: 185 10E3/UL (ref 150–450)
POTASSIUM BLD-SCNC: 4.2 MMOL/L (ref 3.5–5)
PROT SERPL-MCNC: 6.2 G/DL (ref 6–8)
PROT/CREAT 24H UR: 0.15 MG/MG CR
RBC # BLD AUTO: 3.75 10E6/UL (ref 3.8–5.2)
SODIUM SERPL-SCNC: 137 MMOL/L (ref 136–145)
WBC # BLD AUTO: 10.1 10E3/UL (ref 4–11)

## 2022-03-03 PROCEDURE — 80053 COMPREHEN METABOLIC PANEL: CPT | Performed by: STUDENT IN AN ORGANIZED HEALTH CARE EDUCATION/TRAINING PROGRAM

## 2022-03-03 PROCEDURE — 84156 ASSAY OF PROTEIN URINE: CPT | Performed by: STUDENT IN AN ORGANIZED HEALTH CARE EDUCATION/TRAINING PROGRAM

## 2022-03-03 PROCEDURE — 85027 COMPLETE CBC AUTOMATED: CPT | Performed by: STUDENT IN AN ORGANIZED HEALTH CARE EDUCATION/TRAINING PROGRAM

## 2022-03-03 PROCEDURE — 36415 COLL VENOUS BLD VENIPUNCTURE: CPT | Performed by: STUDENT IN AN ORGANIZED HEALTH CARE EDUCATION/TRAINING PROGRAM

## 2022-03-03 PROCEDURE — 99214 OFFICE O/P EST MOD 30 MIN: CPT | Mod: 24 | Performed by: STUDENT IN AN ORGANIZED HEALTH CARE EDUCATION/TRAINING PROGRAM

## 2022-03-03 RX ORDER — SENNOSIDES 8.6 MG
650 CAPSULE ORAL EVERY 8 HOURS PRN
Qty: 100 TABLET | Refills: 0 | Status: SHIPPED | OUTPATIENT
Start: 2022-03-03 | End: 2023-12-13

## 2022-03-03 NOTE — PROGRESS NOTES
Assessment & Plan     Leg swelling  Headache in pregnancy, antepartum, second trimester  Her headache is still likely to be tension related, still could be related to being volume down, especially as her swelling has increased.  Recommended preeclampsia labs as she is also having vision changes and significant swelling compared to last visit.  Reassuringly, her blood pressure is normal.  I do think that the leg swelling is physiologic with her pregnancy so far.  Recommended compression stockings for this and elevation of her legs at rest.   - Orthotics, Prosthetics and Custom Compression Order  - CBC with platelets  - Comprehensive metabolic panel  - Protein  random urine  - acetaminophen (TYLENOL) 650 MG CR tablet; Take 1 tablet (650 mg) by mouth every 8 hours as needed for mild pain or fever    Bilateral carpal tunnel syndrome  De Quervain's disease (tenosynovitis)  Her hand symptoms are most consistent with bilateral carpal tunnel syndrome and de Quervain's.  Educated her that this should resolve following delivery and resolution of the physiologic swelling related to pregnancy.  I did recommend wrist braces to see if this will help alleviate symptoms in the meantime.  - Wrist/Arm/Hand Supplies Order          DME (Durable Medical Equipment) Orders and Documentation  Orders Placed This Encounter   Procedures     Orthotics, Prosthetics and Custom Compression Order      The patient was assessed and it was determined the patient is in need of the following listed DME Supplies/Equipment. Please complete supporting documentation below to demonstrate medical necessity.      DME All Other Item(s) Documentation    List reason for need and supporting documentation for medical necessity below for each DME item.     1. Significant bilateral lower leg swelling         DME (Durable Medical Equipment) Orders and Documentation  Orders Placed This Encounter   Procedures     Orthotics, Prosthetics and Custom Compression Order      "Wrist/Arm/Hand Supplies Order      The patient was assessed and it was determined the patient is in need of the following listed DME Supplies/Equipment. Please complete supporting documentation below to demonstrate medical necessity.      Wrist/Arm/Hand Bracing Supplies Documentation  Patient requires the use of the ordered bracing device due to following medical need/condition: carpal tunnel syndrome bilaterally due to pregnancy    DME All Other Item(s) Documentation    List reason for need and supporting documentation for medical necessity below for each DME item.     1. Significant numbness on exam due to carpal tunnel swelling        40 minutes spent on the date of the encounter doing chart review, history and exam, documentation and further activities per the note       BMI:   Estimated body mass index is 38.57 kg/m  as calculated from the following:    Height as of 1/24/22: 1.632 m (5' 4.25\").    Weight as of this encounter: 102.7 kg (226 lb 8 oz).   Patient is currently pregnant.    Stormy Palacios MD PGY2  [facility]  Precepted with Dr. Sagastume    Subjective   Kee Phipps is a 31 year old who presents for the following health issues    HPI     Presents for worsening headache, bilateral leg swelling, and numbness and tingling in both of her hands.  She has also had swelling in both of her hands.  These have significantly worsened since her last visit 1 to 2 weeks ago.  Patient has noticed in the past day that her feet appear to be significantly red to purple on the soles, she is having pain as well.  Denies any bruising, bleeding, development of mass; however, she does mention that she has a history of ganglion cyst on her dorsum of her left foot.  She is unsure if she is having redemonstration of this at this time.  She has not been elevating her feet at home, and does not use compression stockings.    Discussed her hands as well.  She has been noticing tingling and numbness in " all 10 of her fingers, and she is unable to fully make a fist with either of her hands.  She is unsure if symptoms are worse or better at night.  She does not use a brace or any other medications for the hand symptoms.    We also discussed her headache.  Her headache is bilateral, across her forehead, unresolved with Tylenol.  She mentions that her headaches are better in the morning.  She notices that she continues to have blurry vision that takes some time for her vision to resolve.    We followed up on her yeast infection as well.  She mentions that she bought the Monistat over-the-counter, one-time dose, which completely resolved the itching that she was having.  She mentions that the discharge is ongoing; however, has not worsened since taking this over-the-counter treatment.    Review of Systems   Complete ROS normal aside noted in HPI      Objective    /85 (BP Location: Right arm, Patient Position: Sitting, Cuff Size: Adult Large)   Pulse 99   Temp 98.2  F (36.8  C) (Oral)   Resp 22   Wt 102.7 kg (226 lb 8 oz)   LMP 07/15/2021   SpO2 98%   BMI 38.57 kg/m    Body mass index is 38.57 kg/m .    Physical Exam   GENERAL: healthy, alert and no distress  EYES: Eyes grossly normal to inspection, PERRL and conjunctivae and sclerae normal  NECK: no adenopathy, no asymmetry, masses, or scars and thyroid normal to palpation  RESP: lungs clear to auscultation - no rales, rhonchi or wheezes  CV: regular rate and rhythm, normal S1 S2, no S3 or S4, no murmur, click or rub, no peripheral edema and peripheral pulses strong  ABDOMEN: soft, nontender, no hepatosplenomegaly, no masses and bowel sounds normal  MS: no gross musculoskeletal defects noted, bilateral nonpitting edema in lower legs, feet, and hands bilaterally. positive Finkelstein and Phalen tests bilaterally, negative tinel.   SKIN: no suspicious lesions or rashes    Pr:cr, CBC, CMP pending    ----- Service Performed and Documented by Resident or Fellow  ------

## 2022-03-03 NOTE — TELEPHONE ENCOUNTER
Pt is 33 wks pregnant and states that last night her HA and blurry vision were worse and this morning her hands/fingers are very swollen. She states that when she makes a fist it feels like her knuckles get stuck.  She states that her feet are also purplish/reddish and swollen.  She thinks that there is also some swelling in her face.  She denies rt sided epigastric pain.  Pt would like to see Dr Palacios and appt made for this morning at 8:20 AM.  Routed note to Dr Palacios./YOLI

## 2022-03-05 ENCOUNTER — TELEPHONE (OUTPATIENT)
Dept: FAMILY MEDICINE | Facility: CLINIC | Age: 32
End: 2022-03-05
Payer: COMMERCIAL

## 2022-03-05 ENCOUNTER — HOSPITAL ENCOUNTER (OUTPATIENT)
Facility: HOSPITAL | Age: 32
Discharge: HOME | End: 2022-03-06
Attending: FAMILY MEDICINE | Admitting: FAMILY MEDICINE
Payer: COMMERCIAL

## 2022-03-05 DIAGNOSIS — E83.42 HYPOMAGNESEMIA: Primary | ICD-10-CM

## 2022-03-05 PROBLEM — Z36.89 ENCOUNTER FOR TRIAGE IN PREGNANT PATIENT: Status: ACTIVE | Noted: 2022-03-05

## 2022-03-05 LAB
ALBUMIN UR-MCNC: 10 MG/DL
ANION GAP SERPL CALCULATED.3IONS-SCNC: 14 MMOL/L (ref 5–18)
APPEARANCE UR: CLEAR
BASOPHILS # BLD AUTO: 0.1 10E3/UL (ref 0–0.2)
BASOPHILS NFR BLD AUTO: 1 %
BILIRUB UR QL STRIP: NEGATIVE
BUN SERPL-MCNC: 3 MG/DL (ref 8–22)
CALCIUM SERPL-MCNC: 7.7 MG/DL (ref 8.5–10.5)
CHLORIDE BLD-SCNC: 107 MMOL/L (ref 98–107)
CLUE CELLS: ABNORMAL
CO2 SERPL-SCNC: 16 MMOL/L (ref 22–31)
COLOR UR AUTO: ABNORMAL
CREAT SERPL-MCNC: 0.48 MG/DL (ref 0.6–1.1)
EOSINOPHIL # BLD AUTO: 0.1 10E3/UL (ref 0–0.7)
EOSINOPHIL NFR BLD AUTO: 1 %
ERYTHROCYTE [DISTWIDTH] IN BLOOD BY AUTOMATED COUNT: 13.7 % (ref 10–15)
FFN SPECIMEN INTEGRITY: NORMAL
FIBRONECTIN FETAL VAG QL: NEGATIVE
GFR SERPL CREATININE-BSD FRML MDRD: >90 ML/MIN/1.73M2
GLUCOSE BLD-MCNC: 97 MG/DL (ref 70–125)
GLUCOSE UR STRIP-MCNC: NEGATIVE MG/DL
HCT VFR BLD AUTO: 31.4 % (ref 35–47)
HGB BLD-MCNC: 10.8 G/DL (ref 11.7–15.7)
HGB UR QL STRIP: NEGATIVE
IMM GRANULOCYTES # BLD: 0.3 10E3/UL
IMM GRANULOCYTES NFR BLD: 2 %
KETONES UR STRIP-MCNC: NEGATIVE MG/DL
LEUKOCYTE ESTERASE UR QL STRIP: NEGATIVE
LYMPHOCYTES # BLD AUTO: 2.4 10E3/UL (ref 0.8–5.3)
LYMPHOCYTES NFR BLD AUTO: 22 %
MAGNESIUM SERPL-MCNC: 1.3 MG/DL (ref 1.8–2.6)
MCH RBC QN AUTO: 31.3 PG (ref 26.5–33)
MCHC RBC AUTO-ENTMCNC: 34.4 G/DL (ref 31.5–36.5)
MCV RBC AUTO: 91 FL (ref 78–100)
MONOCYTES # BLD AUTO: 1 10E3/UL (ref 0–1.3)
MONOCYTES NFR BLD AUTO: 9 %
MUCOUS THREADS #/AREA URNS LPF: PRESENT /LPF
NEUTROPHILS # BLD AUTO: 7.4 10E3/UL (ref 1.6–8.3)
NEUTROPHILS NFR BLD AUTO: 65 %
NITRATE UR QL: NEGATIVE
NRBC # BLD AUTO: 0 10E3/UL
NRBC BLD AUTO-RTO: 0 /100
PH UR STRIP: 7 [PH] (ref 5–7)
PLATELET # BLD AUTO: 166 10E3/UL (ref 150–450)
POTASSIUM BLD-SCNC: 3.6 MMOL/L (ref 3.5–5)
RBC # BLD AUTO: 3.45 10E6/UL (ref 3.8–5.2)
RBC URINE: <1 /HPF
RUPTURE OF FETAL MEMBRANES BY ROM PLUS: NEGATIVE
SODIUM SERPL-SCNC: 137 MMOL/L (ref 136–145)
SP GR UR STRIP: 1.02 (ref 1–1.03)
SQUAMOUS EPITHELIAL: 7 /HPF
TRANSITIONAL EPI: <1 /HPF
TRICHOMONAS, WET PREP: ABNORMAL
UROBILINOGEN UR STRIP-MCNC: 2 MG/DL
WBC # BLD AUTO: 11 10E3/UL (ref 4–11)
WBC URINE: 2 /HPF
WBC'S/HIGH POWER FIELD, WET PREP: ABNORMAL
YEAST, WET PREP: ABNORMAL

## 2022-03-05 PROCEDURE — 82731 ASSAY OF FETAL FIBRONECTIN: CPT | Performed by: STUDENT IN AN ORGANIZED HEALTH CARE EDUCATION/TRAINING PROGRAM

## 2022-03-05 PROCEDURE — 81001 URINALYSIS AUTO W/SCOPE: CPT | Performed by: STUDENT IN AN ORGANIZED HEALTH CARE EDUCATION/TRAINING PROGRAM

## 2022-03-05 PROCEDURE — 250N000013 HC RX MED GY IP 250 OP 250 PS 637: Performed by: STUDENT IN AN ORGANIZED HEALTH CARE EDUCATION/TRAINING PROGRAM

## 2022-03-05 PROCEDURE — 96372 THER/PROPH/DIAG INJ SC/IM: CPT | Performed by: STUDENT IN AN ORGANIZED HEALTH CARE EDUCATION/TRAINING PROGRAM

## 2022-03-05 PROCEDURE — 85041 AUTOMATED RBC COUNT: CPT | Performed by: STUDENT IN AN ORGANIZED HEALTH CARE EDUCATION/TRAINING PROGRAM

## 2022-03-05 PROCEDURE — 84112 EVAL AMNIOTIC FLUID PROTEIN: CPT | Performed by: STUDENT IN AN ORGANIZED HEALTH CARE EDUCATION/TRAINING PROGRAM

## 2022-03-05 PROCEDURE — 258N000003 HC RX IP 258 OP 636: Performed by: STUDENT IN AN ORGANIZED HEALTH CARE EDUCATION/TRAINING PROGRAM

## 2022-03-05 PROCEDURE — 87653 STREP B DNA AMP PROBE: CPT | Performed by: STUDENT IN AN ORGANIZED HEALTH CARE EDUCATION/TRAINING PROGRAM

## 2022-03-05 PROCEDURE — 36415 COLL VENOUS BLD VENIPUNCTURE: CPT | Performed by: STUDENT IN AN ORGANIZED HEALTH CARE EDUCATION/TRAINING PROGRAM

## 2022-03-05 PROCEDURE — 83735 ASSAY OF MAGNESIUM: CPT | Performed by: STUDENT IN AN ORGANIZED HEALTH CARE EDUCATION/TRAINING PROGRAM

## 2022-03-05 PROCEDURE — 250N000011 HC RX IP 250 OP 636: Performed by: STUDENT IN AN ORGANIZED HEALTH CARE EDUCATION/TRAINING PROGRAM

## 2022-03-05 PROCEDURE — 87210 SMEAR WET MOUNT SALINE/INK: CPT | Performed by: STUDENT IN AN ORGANIZED HEALTH CARE EDUCATION/TRAINING PROGRAM

## 2022-03-05 PROCEDURE — 80048 BASIC METABOLIC PNL TOTAL CA: CPT | Performed by: STUDENT IN AN ORGANIZED HEALTH CARE EDUCATION/TRAINING PROGRAM

## 2022-03-05 RX ORDER — CALCIUM CARBONATE 500 MG/1
500 TABLET, CHEWABLE ORAL DAILY PRN
Status: DISCONTINUED | OUTPATIENT
Start: 2022-03-05 | End: 2022-03-05

## 2022-03-05 RX ORDER — LIDOCAINE 40 MG/G
CREAM TOPICAL
Status: DISCONTINUED | OUTPATIENT
Start: 2022-03-05 | End: 2022-03-06 | Stop reason: HOSPADM

## 2022-03-05 RX ORDER — ACETAMINOPHEN 325 MG/1
975 TABLET ORAL ONCE
Status: COMPLETED | OUTPATIENT
Start: 2022-03-05 | End: 2022-03-05

## 2022-03-05 RX ORDER — CALCIUM CARBONATE 500 MG/1
500 TABLET, CHEWABLE ORAL EVERY 4 HOURS PRN
Status: DISCONTINUED | OUTPATIENT
Start: 2022-03-05 | End: 2022-03-06 | Stop reason: HOSPADM

## 2022-03-05 RX ORDER — BETAMETHASONE SODIUM PHOSPHATE AND BETAMETHASONE ACETATE 3; 3 MG/ML; MG/ML
12 INJECTION, SUSPENSION INTRA-ARTICULAR; INTRALESIONAL; INTRAMUSCULAR; SOFT TISSUE EVERY 24 HOURS
Status: COMPLETED | OUTPATIENT
Start: 2022-03-05 | End: 2022-03-06

## 2022-03-05 RX ORDER — PRENATAL VIT/IRON FUM/FOLIC AC 27MG-0.8MG
1 TABLET ORAL DAILY
Status: DISCONTINUED | OUTPATIENT
Start: 2022-03-05 | End: 2022-03-06 | Stop reason: HOSPADM

## 2022-03-05 RX ADMIN — PRENATAL VIT W/ FE FUMARATE-FA TAB 27-0.8 MG 1 TABLET: 27-0.8 TAB at 11:48

## 2022-03-05 RX ADMIN — CALCIUM CARBONATE (ANTACID) CHEW TAB 500 MG 500 MG: 500 CHEW TAB at 11:48

## 2022-03-05 RX ADMIN — SODIUM CHLORIDE, POTASSIUM CHLORIDE, SODIUM LACTATE AND CALCIUM CHLORIDE 1000 ML: 600; 310; 30; 20 INJECTION, SOLUTION INTRAVENOUS at 06:29

## 2022-03-05 RX ADMIN — SODIUM CHLORIDE, POTASSIUM CHLORIDE, SODIUM LACTATE AND CALCIUM CHLORIDE 1000 ML: 600; 310; 30; 20 INJECTION, SOLUTION INTRAVENOUS at 07:26

## 2022-03-05 RX ADMIN — BETAMETHASONE SODIUM PHOSPHATE AND BETAMETHASONE ACETATE 12 MG: 3; 3 INJECTION, SUSPENSION INTRA-ARTICULAR; INTRALESIONAL; INTRAMUSCULAR at 09:33

## 2022-03-05 RX ADMIN — CALCIUM CARBONATE (ANTACID) CHEW TAB 500 MG 500 MG: 500 CHEW TAB at 15:58

## 2022-03-05 RX ADMIN — ACETAMINOPHEN 975 MG: 325 TABLET ORAL at 05:48

## 2022-03-05 ASSESSMENT — ACTIVITIES OF DAILY LIVING (ADL)
DIFFICULTY_EATING/SWALLOWING: NO
CHANGE_IN_FUNCTIONAL_STATUS_SINCE_ONSET_OF_CURRENT_ILLNESS/INJURY: NO
DRESSING/BATHING_DIFFICULTY: NO
FALL_HISTORY_WITHIN_LAST_SIX_MONTHS: NO
WALKING_OR_CLIMBING_STAIRS_DIFFICULTY: NO
TOILETING_ISSUES: NO
WEAR_GLASSES_OR_BLIND: NO
CONCENTRATING,_REMEMBERING_OR_MAKING_DECISIONS_DIFFICULTY: NO
DOING_ERRANDS_INDEPENDENTLY_DIFFICULTY: NO

## 2022-03-05 NOTE — PROGRESS NOTES
Pt presents to [facility] having contractions 5-7 minutes apart, lasting 30-50 seconds per patient report.  States contractions began around 2300.  Denies vaginal bleeding, SROM.  Baby active.  Dr. Li-resident paged regarding pt arrival to come evaluate in person.  Swabs for GBS, Fetal fibronectin, wet prep, ROM +.  On cervical exam pt found to be 2-3cm/70/-2.  IV started, 1,000ml fluid bolus infusing.  Pt states she is currently having a Crohn's flare up.  Per patient, she delivered her son at 34 weeks due to a flare up in her Crohn's disease.

## 2022-03-05 NOTE — CONSULTS
CONSULTATION - OB -  LABOR    NAME:Kee Phipps  : 1990  MRN: [id number]  GESTATIONAL AGE: 33w2d     [facility]    ADMISSION DATE: 3/5/2022    PCP:  Stormy Palacios     CHIEF COMPLAINT: Contractions     HPI:   30 yo  at 33+2 weeks presents with contractions since 11 pm last night. The patient currently does not feel anymore contractions and is resting while on the phone. She denies leakage of fluid. She denies any bleeding. She was sexually active last night.     Per patient her last pregnancy she was admitted into the hospital at 34 weeks and then delivered at 37 weeks.     DIAGNOSIS COMPLICATING PREGNANCY:    Crohns Disease, h/o small bowel resection  Former smoker, quit 2021    OBSTETRICAL HISTORY:   Full-term delivery with her last pregnancy    PAST MEDICAL HISTORY:  Past Medical History:   Diagnosis Date     Abnormal Pap smear of cervix      Accelerated idioventricular rhythm (H) 2022     Anemia      Anxiety      Arthritis      Bilateral plantar fasciitis      Celiac disease 2014     Celiac disease      Crohn disease (H)      Crohn's disease (H) 2012     Depression      Depressive disorder      Fibromyalgia      Fibromyalgia      H/O miscarriage, currently pregnant     miscarriage .     HPV (human papilloma virus) infection      Migraine      Plantar fasciitis     bilateral      delivery 2013    34 weeks. Early rupture of membranes       PAST SURGICAL HISTORY:  Past Surgical History:   Procedure Laterality Date     APPENDECTOMY       BOWEL RESECTION      illium      CERVIX LESION DESTRUCTION       DILATION AND CURETTAGE SUCTION, TREAT MISSED , 1ST TRIMESTER      miscarriage at 8 wks gest     DILATION AND CURETTAGE SUCTION, TREAT MISSED , 1ST TRIMESTER  2019     FASCIOTOMY LOWER EXTREMITY Left 2018    Left foot     FOOT SURGERY Left      LAPAROSCOPY DIAGNOSTIC (GYN) N/A 2019    Procedure: LAPAROSCOPY, DIAGNOSTIC;   Surgeon: Anni Velasquez MD;  Location: [facility]     PLANTAR FASCIA RELEASE Left 4/10/2018    Procedure:  PLANTAR FASCIOTOMY LEFT FOOT ;  Surgeon: Yuri Arreola DPM;  Location: [facility];  Service:      SALPINGECTOMY Right 05/15/2021    rt sided ruptured ectopic     SMALL BOWEL RESECTION       TONSILLECTOMY         SOCIAL HISTORY:  Social History     Socioeconomic History     Marital status: Single     Spouse name: Not on file     Number of children: 1     Years of education: 12     Highest education level: GED or equivalent   Occupational History     Occupation: student   Tobacco Use     Smoking status: Former Smoker     Packs/day: 0.03     Quit date: 2021     Years since quittin.2     Smokeless tobacco: Never Used     Tobacco comment: Vaping daily since 2021   Vaping Use     Vaping Use: Never used   Substance and Sexual Activity     Alcohol use: Not Currently     Comment: occ     Drug use: No     Sexual activity: Yes     Partners: Male   Other Topics Concern     Not on file   Social History Narrative     Not on file     Social Determinants of Health     Financial Resource Strain: Not on file   Food Insecurity: Not on file   Transportation Needs: Not on file   Physical Activity: Not on file   Stress: Not on file   Social Connections: Not on file   Intimate Partner Violence: Not on file   Housing Stability: Not on file       MEDICATIONS:  Current Facility-Administered Medications   Medication     betamethasone acet & sod phos (CELESTONE) injection 12 mg     calcium carbonate (TUMS) chewable tablet 500 mg     lidocaine (LMX4) cream     lidocaine 1 % 0.1-1 mL     prenatal multivitamin w/iron per tablet 1 tablet     sodium chloride (PF) 0.9% PF flush 3 mL     sodium chloride (PF) 0.9% PF flush 3 mL       ALLERGIES:  Allergies   Allergen Reactions     Gluten Meal      Humira [Humira]      Caused huge lumps at injection site.      Ibuprofen      Patient has Crohns disease and is  "unable to take this medication.     Latex Itching     Remicade [Infliximab] Rash       REVIEW OF SYSTEMS:  Negative except what is stated in the HPI    PHYSICAL EXAM :  /79 (BP Location: Right arm, Patient Position: Semi-Robertson's)   Pulse 106   Temp 98.5  F (36.9  C) (Oral)   Resp 18   Ht 1.626 m (5' 4\")   Wt 102.1 kg (225 lb)   LMP 07/15/2021   BMI 38.62 kg/m     General Appearance: Alert, appropriate appearance for age. No acute distress. Does not appear to be in pain  TOCO: No contractions noted  Cervix: Checked by RN twice over 3 hours apart, no change 3cm/70/-2  EFM: Category I  Psychiatric: Alert and oriented, appropriate affect.    IMPRESSION:  33w2d  contractions with dilatation of cervix, but no changes over 3 hours   FFN negative, all other labs normal except low magneisum  US 2022 -- Estimated Fetal Weight: 1937 grams, 26.9 percent, vertex, BPP 8/8    RECOMMENDATIONS:  I recommend continued observation overnight. Will continue to reassess and if  labor ensues, will determine plan at that point.Currently, there is no evidence of PTL.   GBS pending  Betamethsone dose 1 given, next dose in 24 hours    Thank you for allowing us to participate in the care of this patient.  Please contact us with any questions/concerns.      Pablo Landers MD         "

## 2022-03-05 NOTE — PROGRESS NOTES
0910: Pt observed sleeping. Dr. Eldridge updated in department. Order for Beta and OB consult.     RN updated OB Dr. Landers. Order to reassess cervix at this time and admin beta.

## 2022-03-05 NOTE — PROGRESS NOTES
OBSTETRICS TRIAGE ASSESSMENT NOTE  Kee Phipps is a 31 year old  at 33w2d gestation based on LMP and dating ultrasound who has presented to maternity care for further evaluation of contractions. No bleeding, leakage of fluids. Baby is moving normally.     CC: Contractions.    HPI: Patient is a 31-year-old female with history of Crohn's disease currently in a flare as well as multiple abortions, 1 miscarriage, 1 ectopic pregnancy, and a prior pregnancy with premature rupture of membranes at 34 weeks and delivery at 37 weeks about 8 years ago.  She presents with contractions.  She noticed them about 2 days ago in the evening for the first time and states they spontaneously resolved after a few hours, but returned yesterday evening and have persisted since that time and increased in frequency from about every 10 minutes to now every 5 minutes.  She feels like they have started to last longer too and she is concerned that they are similar to when she went into labor last time.  She is feeling normal fetal movements and has not had any bleeding or leakage of fluids.  She has been having diarrhea for the last 2 weeks, multiple stools daily but nonbloody, with some nausea but no vomiting.    PRENATAL CARE  Seen by Dr. Palacios at [facility].         PAST MEDICAL HISTORY  Past Medical History:   Diagnosis Date     Abnormal Pap smear of cervix      Accelerated idioventricular rhythm (H) 2022     Anemia      Anxiety      Arthritis      Bilateral plantar fasciitis      Celiac disease 2014     Celiac disease      Crohn disease (H)      Crohn's disease (H) 2012     Depression      Depressive disorder      Fibromyalgia      Fibromyalgia      H/O miscarriage, currently pregnant     miscarriage .     HPV (human papilloma virus) infection      Migraine      Plantar fasciitis     bilateral      delivery 2013    34 weeks. Early rupture of membranes       PAST SURGICAL HISTORY   Past Surgical  History:   Procedure Laterality Date     APPENDECTOMY       BOWEL RESECTION      illium      CERVIX LESION DESTRUCTION       DILATION AND CURETTAGE SUCTION, TREAT MISSED , 1ST TRIMESTER      miscarriage at 8 wks gest     DILATION AND CURETTAGE SUCTION, TREAT MISSED , 1ST TRIMESTER  2019     FASCIOTOMY LOWER EXTREMITY Left 2018    Left foot     FOOT SURGERY Left      LAPAROSCOPY DIAGNOSTIC (GYN) N/A 2019    Procedure: LAPAROSCOPY, DIAGNOSTIC;  Surgeon: Anni Velasquez MD;  Location: [facility]     PLANTAR FASCIA RELEASE Left 4/10/2018    Procedure:  PLANTAR FASCIOTOMY LEFT FOOT ;  Surgeon: Yuri Arreola DPM;  Location: [facility];  Service:      SALPINGECTOMY Right 05/15/2021    rt sided ruptured ectopic     SMALL BOWEL RESECTION       TONSILLECTOMY         MEDICATIONS    Current Facility-Administered Medications:      betamethasone acet & sod phos (CELESTONE) injection 12 mg, 12 mg, Intramuscular, Q24H, Marek Rush MD, 12 mg at 22 0933     lidocaine (LMX4) cream, , Topical, Q1H PRN, Aaron Li MD     lidocaine 1 % 0.1-1 mL, 0.1-1 mL, Other, Q1H PRN, Aaron Li MD     sodium chloride (PF) 0.9% PF flush 3 mL, 3 mL, Intracatheter, Q8H, Aaron Li MD     sodium chloride (PF) 0.9% PF flush 3 mL, 3 mL, Intracatheter, q1 min prn, Aaron Li MD    ALLERGIES:    SOCIAL HISTORY:   Social History     Socioeconomic History     Marital status: Single     Spouse name: Not on file     Number of children: 1     Years of education: 12     Highest education level: GED or equivalent   Occupational History     Occupation: student   Tobacco Use     Smoking status: Former Smoker     Packs/day: 0.03     Quit date: 2021     Years since quittin.2     Smokeless tobacco: Never Used     Tobacco comment: Vaping daily since 2021   Vaping Use     Vaping Use: Never  "used   Substance and Sexual Activity     Alcohol use: Not Currently     Comment: occ     Drug use: No     Sexual activity: Yes     Partners: Male   Other Topics Concern     Not on file   Social History Narrative     Not on file     Social Determinants of Health     Financial Resource Strain: Not on file   Food Insecurity: Not on file   Transportation Needs: Not on file   Physical Activity: Not on file   Stress: Not on file   Social Connections: Not on file   Intimate Partner Violence: Not on file   Housing Stability: Not on file       FAMILY HISTORY:    PHYSICAL EXAMINATION   /79 (BP Location: Right arm, Patient Position: Semi-Robertson's)   Pulse 106   Temp 98.5  F (36.9  C) (Oral)   Resp 18   Ht 1.626 m (5' 4\")   Wt 102.1 kg (225 lb)   LMP 07/15/2021   BMI 38.62 kg/m    Gen: appears comfortable in no acute distress  HEENT: Atraumatic, normocephalic, conjunctiva non-injected, sclera anicteric, moist mucosal membranes  CV: regular rate and rhythm without murmur, gallops, or rubs.  Lungs: clear to ausculation, good air movement throughout, no wheezes, rales, or rhonchi  Abdomen: Gravid, non-tender, fundus appropriate. Bowel sounds present.  Cervix: 3 cm/70%/-2  Extremities: Very mild nonpitting bilateral lower extremity edema    FETAL HEART MONITORING   Category 1 strip    CONTRACTIONS  Patient feeling them every 5 minutes or so, sometimes picked up on the monitor.    LAB RESULTS  Personally reviewed.  Recent Results (from the past 24 hour(s))   Fetal fibronectin    Collection Time: 03/05/22  5:40 AM   Result Value Ref Range    Fetal Fibronectin Negative Negative    FFN Specimen Integrity Satisfactory Specimen    Wet preparation    Collection Time: 03/05/22  5:42 AM    Specimen: Vagina; Swab   Result Value Ref Range    Trichomonas Absent Absent    Yeast Absent Absent    Clue Cells Absent Absent    WBCs/high power field 1+ (A) None   Rupture of Fetal Membranes by ROM Plus    Collection Time: 03/05/22  5:43 " AM   Result Value Ref Range    Rupture of Fetal Membranes by ROM Plus Negative Negative, Invalid, Suggest Repeat   UA reflex to Microscopic and Culture    Collection Time: 22  5:50 AM    Specimen: Urine, Midstream   Result Value Ref Range    Color Urine Light Yellow Colorless, Straw, Light Yellow, Yellow    Appearance Urine Clear Clear    Glucose Urine Negative Negative mg/dL    Bilirubin Urine Negative Negative    Ketones Urine Negative Negative mg/dL    Specific Gravity Urine 1.017 1.001 - 1.030    Blood Urine Negative Negative    pH Urine 7.0 5.0 - 7.0    Protein Albumin Urine 10  (A) Negative mg/dL    Urobilinogen Urine 2.0 (A) <2.0 mg/dL    Nitrite Urine Negative Negative    Leukocyte Esterase Urine Negative Negative    Mucus Urine Present (A) None Seen /LPF    RBC Urine <1 <=2 /HPF    WBC Urine 2 <=5 /HPF    Squamous Epithelials Urine 7 (H) <=1 /HPF    Transitional Epithelials Urine <1 <=1 /HPF       ASSESSMENT/PLAN:   31 year old  at 33w2d gestation presenting to labor & delivery for evaluation of contractions.  There is some concern for  labor given her history, but it is also quite possible that her symptoms are due to dehydration/uterine irritability from her ongoing Crohn's flare.  We will work on rehydration and assess for  labor.  Prakash score 7.  1.  ROM plus, fetal fibronectin, UA, GBS, wet prep.  2.  Tylenol for comfort.  3.  1000 mL LR bolus.    Addendum:  Patient feeling a little bit better after IVF, labs overall returned reassuring.  Unfortunately, she is still feeling some contractions and given her gestational age of 33 weeks 2 days will consult OB and give a dose of betamethasone.  Also obtaining CBC, BMP, magnesium due to history of 2 weeks of diarrhea with multiple stools daily.    Jorge Luis Li MD  [facility] Medicine Residency Program, PGY-3    Precepted patient with  Dr. Valentina Eldridge.

## 2022-03-05 NOTE — TELEPHONE ENCOUNTER
"[facility] Answering Service Pager Note    I received an answering service page at 2:54AM regarding \"contractions every 5 min.\"     I called the patient and we spoke on the phone.     Patient is  at 33w2d with a history of Crohns disease and \"feels like she is going through a flare right now.\" She does report that she was seen in clinic on 3/3 for headache and leg swelling. Normal BP and negative preeclampsia labs at that time. Patient has been having contractions since 11pm on 3/3/22. Today, contractions were more like \"pressure / cramps\". Contractions have intensified throughout the day. They were initially about 10 minutes apart and she could easily do housework through the contractions. She now has to stop and breathe through the contractions and they are approximately 5 min apart. No gush of fluid, no vaginal bleeding.     Patient has history of  delivery and what sounds like  labor. I did verify with our OB floor here at [facility] that patient should go to [facility] (as she is 33w2d).     Patient stated understanding and will go right to [facility] for evaluation.    Akilah Carrillo MD PGY3  [facility] Medicine Residency  Pager: [phone]   "

## 2022-03-05 NOTE — PLAN OF CARE
Problem:  Labor  Goal: Delayed  Delivery  3/5/2022 1619 by Shikha Phipps, RN  Outcome: Ongoing, Progressing  3/5/2022 1619 by Shikha Phipps, RN  Outcome: Ongoing, Progressing   Continuous fetal  and uterine monitoring. Bedrest with bathroom privileges

## 2022-03-06 ENCOUNTER — HOSPITAL ENCOUNTER (OUTPATIENT)
Facility: HOSPITAL | Age: 32
End: 2022-03-06
Admitting: FAMILY MEDICINE
Payer: COMMERCIAL

## 2022-03-06 VITALS
TEMPERATURE: 98.5 F | SYSTOLIC BLOOD PRESSURE: 131 MMHG | BODY MASS INDEX: 38.41 KG/M2 | HEART RATE: 94 BPM | WEIGHT: 225 LBS | HEIGHT: 64 IN | DIASTOLIC BLOOD PRESSURE: 66 MMHG | RESPIRATION RATE: 16 BRPM

## 2022-03-06 PROBLEM — O34.80 OVARIAN CYST COMPLICATING PREGNANCY, ANTEPARTUM: Status: RESOLVED | Noted: 2021-08-26 | Resolved: 2022-03-06

## 2022-03-06 PROBLEM — A59.01 TRICHOMONAL VAGINITIS DURING PREGNANCY IN FIRST TRIMESTER: Status: ACTIVE | Noted: 2021-09-20

## 2022-03-06 PROBLEM — N83.209 OVARIAN CYST COMPLICATING PREGNANCY, ANTEPARTUM: Status: RESOLVED | Noted: 2021-08-26 | Resolved: 2022-03-06

## 2022-03-06 PROBLEM — O23.591 TRICHOMONAL VAGINITIS DURING PREGNANCY IN FIRST TRIMESTER: Status: ACTIVE | Noted: 2021-09-20

## 2022-03-06 LAB — GP B STREP DNA SPEC QL NAA+PROBE: NEGATIVE

## 2022-03-06 PROCEDURE — 250N000011 HC RX IP 250 OP 636: Performed by: STUDENT IN AN ORGANIZED HEALTH CARE EDUCATION/TRAINING PROGRAM

## 2022-03-06 PROCEDURE — 96372 THER/PROPH/DIAG INJ SC/IM: CPT | Performed by: STUDENT IN AN ORGANIZED HEALTH CARE EDUCATION/TRAINING PROGRAM

## 2022-03-06 RX ORDER — MAGNESIUM 30 MG
30 TABLET ORAL DAILY
Qty: 30 TABLET | Refills: 0 | Status: SHIPPED | OUTPATIENT
Start: 2022-03-06 | End: 2022-04-08

## 2022-03-06 RX ADMIN — BETAMETHASONE SODIUM PHOSPHATE AND BETAMETHASONE ACETATE 12 MG: 3; 3 INJECTION, SUSPENSION INTRA-ARTICULAR; INTRALESIONAL; INTRAMUSCULAR at 08:59

## 2022-03-06 NOTE — PLAN OF CARE
Problem:  Labor  Goal: Delayed  Delivery  Outcome: Ongoing, Progressing   Patient is feeling well this morning and inquiring about going home. She denies contractions overnight and is comfortable. Baby is active. MD notified that patient is wanting to go this morning.    no

## 2022-03-06 NOTE — PLAN OF CARE
Problem: Plan of Care - These are the overarching goals to be used throughout the patient stay.    Goal: Optimal Comfort and Wellbeing  Outcome: Ongoing, Progressing   Pt had a quiet night shift. Denies any contractions. Reactive NST obtained. Had a bath and slept well

## 2022-03-06 NOTE — DISCHARGE SUMMARY
OBSTETRICS TRIAGE ASSESSMENT NOTE  Kee Phipps is a 31 year old  at 33w3d gestation based on LMP and dating ultrasound who has presented to maternity care for further evaluation of contractions.     CC: Contractions    SUBJECTIVE:  Patient reports she slept well, no further contractions or abdominal discomfort. She overall feels better and would like to be discharged home. She states she has follow-up on Monday.     PRENATAL CARE  Seen by Dr. Palacios at [facility].         PAST MEDICAL HISTORY  Past Medical History:   Diagnosis Date     Abnormal Pap smear of cervix      Accelerated idioventricular rhythm (H) 2022     Anemia      Anxiety      Arthritis      Bilateral plantar fasciitis      Celiac disease 2014     Celiac disease      Crohn disease (H)      Crohn's disease (H) 2012     Depression      Depressive disorder      Fibromyalgia      Fibromyalgia      H/O miscarriage, currently pregnant     miscarriage .     HPV (human papilloma virus) infection      Migraine      Plantar fasciitis     bilateral      delivery 2013    34 weeks. Early rupture of membranes       PAST SURGICAL HISTORY   Past Surgical History:   Procedure Laterality Date     APPENDECTOMY       BOWEL RESECTION      illium      CERVIX LESION DESTRUCTION       DILATION AND CURETTAGE SUCTION, TREAT MISSED , 1ST TRIMESTER      miscarriage at 8 wks gest     DILATION AND CURETTAGE SUCTION, TREAT MISSED , 1ST TRIMESTER  2019     FASCIOTOMY LOWER EXTREMITY Left 2018    Left foot     FOOT SURGERY Left      LAPAROSCOPY DIAGNOSTIC (GYN) N/A 2019    Procedure: LAPAROSCOPY, DIAGNOSTIC;  Surgeon: Anni Velasquez MD;  Location:  OR     PLANTAR FASCIA RELEASE Left 4/10/2018    Procedure:  PLANTAR FASCIOTOMY LEFT FOOT ;  Surgeon: Yuri Arreola DPM;  Location: [facility];  Service:      SALPINGECTOMY Right 05/15/2021    rt sided ruptured ectopic     SMALL BOWEL  RESECTION       TONSILLECTOMY         MEDICATIONS    Current Facility-Administered Medications:      betamethasone acet & sod phos (CELESTONE) injection 12 mg, 12 mg, Intramuscular, Q24H, Marek Rush MD, 12 mg at 22 0933     calcium carbonate (TUMS) chewable tablet 500 mg, 500 mg, Oral, Q4H PRN, Shikha Ramirez MD, 500 mg at 22 1558     lidocaine (LMX4) cream, , Topical, Q1H PRN, Aaron Li MD     lidocaine 1 % 0.1-1 mL, 0.1-1 mL, Other, Q1H PRN, Aaron Li MD     prenatal multivitamin w/iron per tablet 1 tablet, 1 tablet, Oral, Daily, Shikha Ramirez MD, 1 tablet at 22 1148     sodium chloride (PF) 0.9% PF flush 3 mL, 3 mL, Intracatheter, Q8H, Aaron Li MD, 3 mL at 22 0023     sodium chloride (PF) 0.9% PF flush 3 mL, 3 mL, Intracatheter, q1 min prn, Aaron Li MD    ALLERGIES: None.     SOCIAL HISTORY:   Social History     Socioeconomic History     Marital status: Single     Spouse name: Not on file     Number of children: 1     Years of education: 12     Highest education level: GED or equivalent   Occupational History     Occupation: student   Tobacco Use     Smoking status: Former Smoker     Packs/day: 0.03     Quit date: 2021     Years since quittin.2     Smokeless tobacco: Never Used     Tobacco comment: Vaping daily since 2021   Vaping Use     Vaping Use: Never used   Substance and Sexual Activity     Alcohol use: Not Currently     Comment: occ     Drug use: No     Sexual activity: Yes     Partners: Male   Other Topics Concern     Not on file   Social History Narrative     Not on file     Social Determinants of Health     Financial Resource Strain: Not on file   Food Insecurity: Not on file   Transportation Needs: Not on file   Physical Activity: Not on file   Stress: Not on file   Social Connections: Not on file   Intimate Partner Violence: Not on file   Housing  "Stability: Not on file       FAMILY HISTORY:    PHYSICAL EXAMINATION   /66 (BP Location: Left arm, Patient Position: Supine, Cuff Size: Adult Regular)   Pulse 94   Temp 98.5  F (36.9  C) (Oral)   Resp 16   Ht 1.626 m (5' 4\")   Wt 102.1 kg (225 lb)   LMP 07/15/2021   BMI 38.62 kg/m    Gen: appears comfortable in no acute distress  Extremities: no lower extremity edema    FETAL HEART MONITORING   Category 1 strip    CONTRACTIONS  None.     LAB RESULTS  Personally reviewed.  Recent Results (from the past 24 hour(s))   Basic metabolic panel    Collection Time: 03/05/22 10:42 AM   Result Value Ref Range    Sodium 137 136 - 145 mmol/L    Potassium 3.6 3.5 - 5.0 mmol/L    Chloride 107 98 - 107 mmol/L    Carbon Dioxide (CO2) 16 (L) 22 - 31 mmol/L    Anion Gap 14 5 - 18 mmol/L    Urea Nitrogen 3 (L) 8 - 22 mg/dL    Creatinine 0.48 (L) 0.60 - 1.10 mg/dL    Calcium 7.7 (L) 8.5 - 10.5 mg/dL    Glucose 97 70 - 125 mg/dL    GFR Estimate >90 >60 mL/min/1.73m2   Magnesium    Collection Time: 03/05/22 10:42 AM   Result Value Ref Range    Magnesium 1.3 (L) 1.8 - 2.6 mg/dL   CBC with platelets and differential    Collection Time: 03/05/22 10:42 AM   Result Value Ref Range    WBC Count 11.0 4.0 - 11.0 10e3/uL    RBC Count 3.45 (L) 3.80 - 5.20 10e6/uL    Hemoglobin 10.8 (L) 11.7 - 15.7 g/dL    Hematocrit 31.4 (L) 35.0 - 47.0 %    MCV 91 78 - 100 fL    MCH 31.3 26.5 - 33.0 pg    MCHC 34.4 31.5 - 36.5 g/dL    RDW 13.7 10.0 - 15.0 %    Platelet Count 166 150 - 450 10e3/uL    % Neutrophils 65 %    % Lymphocytes 22 %    % Monocytes 9 %    % Eosinophils 1 %    % Basophils 1 %    % Immature Granulocytes 2 %    NRBCs per 100 WBC 0 <1 /100    Absolute Neutrophils 7.4 1.6 - 8.3 10e3/uL    Absolute Lymphocytes 2.4 0.8 - 5.3 10e3/uL    Absolute Monocytes 1.0 0.0 - 1.3 10e3/uL    Absolute Eosinophils 0.1 0.0 - 0.7 10e3/uL    Absolute Basophils 0.1 0.0 - 0.2 10e3/uL    Absolute Immature Granulocytes 0.3 <=0.4 10e3/uL    Absolute NRBCs " 0.0 10e3/uL       ASSESSMENT/PLAN:   31 year old  at 33w3d gestation presenting to labor & delivery for contractions. Given concern for  labor patient was given a dose of betamethasone and monitored overnight. She did not have any further contractions.   1. Get last dose of betamethasone  2. Follow-up with OB provider early this week   3. Will discharge with oral magnesium     Shikha Ramirez MD  [facility], PGY-2      Precepted patient with  Dr. Valentina Eldridge.

## 2022-03-07 ENCOUNTER — ALLIED HEALTH/NURSE VISIT (OUTPATIENT)
Dept: FAMILY MEDICINE | Facility: CLINIC | Age: 32
End: 2022-03-07
Payer: COMMERCIAL

## 2022-03-07 DIAGNOSIS — K50.90 CROHN'S DISEASE (H): Primary | ICD-10-CM

## 2022-03-07 DIAGNOSIS — O09.93 HIGH-RISK PREGNANCY, THIRD TRIMESTER: ICD-10-CM

## 2022-03-07 PROCEDURE — 59025 FETAL NON-STRESS TEST: CPT

## 2022-03-07 NOTE — PROGRESS NOTES
Non-Reactive but reassuring NST- see scanned documentation for details.  Pt will come back on Thursday to see Dr Palacios for SHAY and NST.  She missed her u/s appt today and next u/s sched 3/21/22.  She had fetal monitoring at [facility] over the weekend./NG

## 2022-03-09 DIAGNOSIS — O09.212 HIGH RISK PREGNANCY DUE TO HISTORY OF PRETERM LABOR IN SECOND TRIMESTER: ICD-10-CM

## 2022-03-09 RX ORDER — PRENATAL VIT/IRON FUM/FOLIC AC 27MG-0.8MG
1 TABLET ORAL DAILY
Qty: 90 TABLET | Refills: 3 | Status: SHIPPED | OUTPATIENT
Start: 2022-03-09 | End: 2022-06-16

## 2022-03-10 ENCOUNTER — OFFICE VISIT (OUTPATIENT)
Dept: FAMILY MEDICINE | Facility: CLINIC | Age: 32
End: 2022-03-10
Payer: COMMERCIAL

## 2022-03-10 VITALS
RESPIRATION RATE: 16 BRPM | HEART RATE: 102 BPM | OXYGEN SATURATION: 95 % | TEMPERATURE: 98 F | SYSTOLIC BLOOD PRESSURE: 135 MMHG | WEIGHT: 225.8 LBS | DIASTOLIC BLOOD PRESSURE: 92 MMHG | BODY MASS INDEX: 38.76 KG/M2

## 2022-03-10 DIAGNOSIS — Z91.89 AT RISK FOR HYPERTENSION: ICD-10-CM

## 2022-03-10 DIAGNOSIS — M65.4 DE QUERVAIN'S DISEASE (TENOSYNOVITIS): ICD-10-CM

## 2022-03-10 DIAGNOSIS — K50.919 CROHN'S DISEASE WITH COMPLICATION, UNSPECIFIED GASTROINTESTINAL TRACT LOCATION (H): Primary | ICD-10-CM

## 2022-03-10 DIAGNOSIS — O09.899 H/O PRETERM DELIVERY, CURRENTLY PREGNANT: ICD-10-CM

## 2022-03-10 DIAGNOSIS — G56.00 CARPAL TUNNEL SYNDROME DURING PREGNANCY: ICD-10-CM

## 2022-03-10 DIAGNOSIS — D84.9 IMMUNOSUPPRESSED STATUS (H): ICD-10-CM

## 2022-03-10 DIAGNOSIS — O09.93 HIGH-RISK PREGNANCY, THIRD TRIMESTER: ICD-10-CM

## 2022-03-10 DIAGNOSIS — O26.899 CARPAL TUNNEL SYNDROME DURING PREGNANCY: ICD-10-CM

## 2022-03-10 PROCEDURE — 99207 PR PRENATAL VISIT: CPT | Mod: GC | Performed by: STUDENT IN AN ORGANIZED HEALTH CARE EDUCATION/TRAINING PROGRAM

## 2022-03-10 ASSESSMENT — PATIENT HEALTH QUESTIONNAIRE - PHQ9: SUM OF ALL RESPONSES TO PHQ QUESTIONS 1-9: 3

## 2022-03-10 NOTE — PROGRESS NOTES
Assessment & Plan  31 year old  at 34w0d with LORIE 2022 based on LMP    Kee was seen today for prenatal care and medication reconciliation.    Diagnoses and all orders for this visit:    Crohn's disease with complication, unspecified gastrointestinal tract location (H)    Immunosuppressed status (H)    H/O  delivery, currently pregnant    High-risk pregnancy, third trimester    At risk for hypertension    Carpal tunnel syndrome during pregnancy    De Quervain's disease (tenosynovitis)      Recommended that she try to get her wrist braces and compression stockings to help with the swelling. Noted that she had a combination of systolic and diastolic elevation on BP checks. Recommended NST today with repeat BP check; however, this was not completed, and patient left. Recommended close follow up for BP recheck and possible NST at next visit. Will consider repeating preeclampsia labs at that time. Unfortunately, it does not appear that there is much that can be done to control her Crohn's at this point given that she is pregnant. Recommended that she follow up closely with her GI provider.     Weight gain adequate: 5.352 kg (11 lb 12.8 oz) to date, out of recommended total of 11-20 lbs (pregravid BMI >30)    There are no Patient Instructions on file for this visit.    Return to clinic in 2 weeks.    Stormy Palacios MD PGY2  I precepted today with Maximino Sagastume MD.    20 minutes spent on the date of the encounter doing chart review, history and exam, documentation and further activities per the note        Subjective  Concerns: continues to have issues with swelling - she was unable to  the wrist braces and compression stockings prescribed at last visit. Discussed her recent L&D visit - she notes that she took her last dose of med for Crohn's dz, and later on in the evening began having contractions that she has to breathe through. She was given medication at [facility], was  observed overnight, and went home the next day. Since then, she has had no issue with ongoing contractions or vaginal changes.    Continues to have symptoms of Crohn's flare, and has seen her GI doctor, who did additional; work up. Unable to access results, though she says that she was told that she is not having a flare, despite her symptoms. She is no longer taking the stelara based on GI recs.     ROS:  No - Headache  No - Changes in vision  No - Chest Pain  No - Shortness of Breath  No - Nausea   No - Vomiting  No - Abdominal pain   No - Contractions  No - Dysuria   No - Vaginal Discharge    No - Vaginal bleeding   No - Loss of Fluid   YES - Extremity swelling   Present - Fetal movement       Going to [facility]? Yes      Patient Active Problem List   Diagnosis     Health Care Home     Crohns disease     Abnormal Pap smear of cervix     Chronic pain     Adjustment disorder with depressed mood     Celiac disease     Immunosuppressed status (H)     Fibromyalgia     H/O  delivery, currently pregnant     Tobacco use in pregnancy, antepartum, first trimester     High-risk pregnancy, third trimester     BMI 36.0-36.9,adult     At risk for hypertension     Trichomonal vaginitis during pregnancy in first trimester     Accelerated idioventricular rhythm (H)     Encounter for triage in pregnant patient     Carpal tunnel syndrome during pregnancy     De Quervain's disease (tenosynovitis)       Kee Phipps speaks English so an  was not used today.    Guidance:  warning signs/PIH    Do you need help getting a car seat? no  Do you need help getting a breast pump? no      Objective  BP (!) 135/92 (BP Location: Right arm, Patient Position: Sitting, Cuff Size: Adult Regular)   Pulse 102   Temp 98  F (36.7  C) (Oral)   Resp 16   Wt 102.4 kg (225 lb 12.8 oz)   LMP 07/15/2021   SpO2 95%   BMI 38.76 kg/m    No distress.  Gravid abdomen.  FHT not assessed.  Fundal height not assessed cm.     Results  Blood type:  O POS  No results found for any visits on 03/10/22.

## 2022-03-10 NOTE — PROGRESS NOTES
Preceptor Attestation:    I discussed the patient with the resident and evaluated the patient in person. I have verified the content of the note, which accurately reflects my assessment of the patient and the plan of care.  As noted above we planned on NST with a recheck of BP following.  However, the patient left the clinic.   Supervising Physician:  Maximino Sagastume MD.

## 2022-03-14 ENCOUNTER — ANCILLARY PROCEDURE (OUTPATIENT)
Dept: ULTRASOUND IMAGING | Facility: CLINIC | Age: 32
End: 2022-03-14
Attending: FAMILY MEDICINE
Payer: COMMERCIAL

## 2022-03-14 ENCOUNTER — ALLIED HEALTH/NURSE VISIT (OUTPATIENT)
Dept: FAMILY MEDICINE | Facility: CLINIC | Age: 32
End: 2022-03-14
Payer: COMMERCIAL

## 2022-03-14 VITALS — SYSTOLIC BLOOD PRESSURE: 100 MMHG | DIASTOLIC BLOOD PRESSURE: 70 MMHG

## 2022-03-14 DIAGNOSIS — K50.819 CROHN'S DISEASE OF SMALL AND LARGE INTESTINES WITH COMPLICATION (H): ICD-10-CM

## 2022-03-14 DIAGNOSIS — O09.93 SUPERVISION OF HIGH RISK PREGNANCY IN THIRD TRIMESTER: ICD-10-CM

## 2022-03-14 DIAGNOSIS — K50.90 CROHN'S DISEASE (H): Primary | ICD-10-CM

## 2022-03-14 PROCEDURE — 59025 FETAL NON-STRESS TEST: CPT

## 2022-03-14 PROCEDURE — 76819 FETAL BIOPHYS PROFIL W/O NST: CPT | Mod: XU | Performed by: RADIOLOGY

## 2022-03-21 ENCOUNTER — ALLIED HEALTH/NURSE VISIT (OUTPATIENT)
Dept: FAMILY MEDICINE | Facility: CLINIC | Age: 32
End: 2022-03-21
Payer: COMMERCIAL

## 2022-03-21 ENCOUNTER — ANCILLARY PROCEDURE (OUTPATIENT)
Dept: ULTRASOUND IMAGING | Facility: CLINIC | Age: 32
End: 2022-03-21
Attending: FAMILY MEDICINE
Payer: COMMERCIAL

## 2022-03-21 DIAGNOSIS — K50.819 CROHN'S DISEASE OF SMALL AND LARGE INTESTINES WITH COMPLICATION (H): ICD-10-CM

## 2022-03-21 DIAGNOSIS — O09.93 SUPERVISION OF HIGH RISK PREGNANCY IN THIRD TRIMESTER: ICD-10-CM

## 2022-03-21 DIAGNOSIS — K50.90 CROHN'S DISEASE (H): Primary | ICD-10-CM

## 2022-03-21 PROCEDURE — 59025 FETAL NON-STRESS TEST: CPT

## 2022-03-21 PROCEDURE — 76819 FETAL BIOPHYS PROFIL W/O NST: CPT | Performed by: RADIOLOGY

## 2022-03-28 ENCOUNTER — OFFICE VISIT (OUTPATIENT)
Dept: FAMILY MEDICINE | Facility: CLINIC | Age: 32
End: 2022-03-28
Payer: COMMERCIAL

## 2022-03-28 ENCOUNTER — TELEPHONE (OUTPATIENT)
Dept: FAMILY MEDICINE | Facility: CLINIC | Age: 32
End: 2022-03-28

## 2022-03-28 ENCOUNTER — ANCILLARY PROCEDURE (OUTPATIENT)
Dept: ULTRASOUND IMAGING | Facility: CLINIC | Age: 32
End: 2022-03-28
Attending: FAMILY MEDICINE
Payer: COMMERCIAL

## 2022-03-28 VITALS
SYSTOLIC BLOOD PRESSURE: 118 MMHG | RESPIRATION RATE: 18 BRPM | TEMPERATURE: 98 F | HEART RATE: 92 BPM | DIASTOLIC BLOOD PRESSURE: 85 MMHG | OXYGEN SATURATION: 98 %

## 2022-03-28 DIAGNOSIS — O09.899 H/O PRETERM DELIVERY, CURRENTLY PREGNANT: ICD-10-CM

## 2022-03-28 DIAGNOSIS — K90.0 CELIAC DISEASE: ICD-10-CM

## 2022-03-28 DIAGNOSIS — O36.5930 POOR FETAL GROWTH AFFECTING MANAGEMENT OF MOTHER IN THIRD TRIMESTER, SINGLE OR UNSPECIFIED FETUS: ICD-10-CM

## 2022-03-28 DIAGNOSIS — O41.03X0 OLIGOHYDRAMNIOS IN THIRD TRIMESTER, SINGLE OR UNSPECIFIED FETUS: ICD-10-CM

## 2022-03-28 DIAGNOSIS — O09.93 SUPERVISION OF HIGH RISK PREGNANCY IN THIRD TRIMESTER: Primary | ICD-10-CM

## 2022-03-28 DIAGNOSIS — K50.819 CROHN'S DISEASE OF SMALL AND LARGE INTESTINES WITH COMPLICATION (H): ICD-10-CM

## 2022-03-28 DIAGNOSIS — O09.93 HIGH-RISK PREGNANCY, THIRD TRIMESTER: ICD-10-CM

## 2022-03-28 DIAGNOSIS — K50.919 CROHN'S DISEASE WITH COMPLICATION, UNSPECIFIED GASTROINTESTINAL TRACT LOCATION (H): ICD-10-CM

## 2022-03-28 DIAGNOSIS — O09.93 SUPERVISION OF HIGH RISK PREGNANCY IN THIRD TRIMESTER: ICD-10-CM

## 2022-03-28 DIAGNOSIS — D84.9 IMMUNOSUPPRESSED STATUS (H): ICD-10-CM

## 2022-03-28 PROBLEM — Z34.90 ENCOUNTER FOR PLANNED INDUCTION OF LABOR: Status: ACTIVE | Noted: 2022-03-28

## 2022-03-28 PROCEDURE — 76819 FETAL BIOPHYS PROFIL W/O NST: CPT | Performed by: RADIOLOGY

## 2022-03-28 PROCEDURE — 76820 UMBILICAL ARTERY ECHO: CPT | Mod: 26 | Performed by: RADIOLOGY

## 2022-03-28 PROCEDURE — 59425 ANTEPARTUM CARE ONLY: CPT | Mod: GC | Performed by: STUDENT IN AN ORGANIZED HEALTH CARE EDUCATION/TRAINING PROGRAM

## 2022-03-28 PROCEDURE — 59025 FETAL NON-STRESS TEST: CPT | Performed by: STUDENT IN AN ORGANIZED HEALTH CARE EDUCATION/TRAINING PROGRAM

## 2022-03-28 PROCEDURE — 87653 STREP B DNA AMP PROBE: CPT | Performed by: STUDENT IN AN ORGANIZED HEALTH CARE EDUCATION/TRAINING PROGRAM

## 2022-03-28 PROCEDURE — 76816 OB US FOLLOW-UP PER FETUS: CPT | Performed by: RADIOLOGY

## 2022-03-28 NOTE — TELEPHONE ENCOUNTER
I can see that Dr Palacios is patient's PCC but Dr Bunch called and asked for you. So, I am leaving it with you.

## 2022-03-28 NOTE — PROGRESS NOTES
Preceptor Attestation:  Vitals:    03/28/22 1551   BP: 118/85   Pulse: 92   Resp: 18   Temp: 98  F (36.7  C)   SpO2: 98%          I discussed the patient with the resident and evaluated the patient in person. I have verified the content of the note, which accurately reflects my assessment of the patient and the plan of care.  I reviewed the NST and am in agreement with its interpretation.   Supervising Physician:  Mounika Gibson MD

## 2022-03-29 PROBLEM — O36.5930 POOR FETAL GROWTH AFFECTING MANAGEMENT OF MOTHER IN THIRD TRIMESTER: Status: ACTIVE | Noted: 2022-03-29

## 2022-03-29 PROBLEM — O99.331 TOBACCO USE IN PREGNANCY, ANTEPARTUM, FIRST TRIMESTER: Status: ACTIVE | Noted: 2021-09-08

## 2022-03-29 PROBLEM — K50.90 CROHN'S DISEASE (H): Status: ACTIVE | Noted: 2021-09-08

## 2022-03-29 LAB — GP B STREP DNA SPEC QL NAA+PROBE: NEGATIVE

## 2022-03-29 NOTE — NURSING NOTE
NON-REACTIVE NST with abnormal umbilical cord dopplers.  Pt admitted to [facility] for induction.  See scanned documentation for details./NG

## 2022-03-29 NOTE — PROGRESS NOTES
Assessment & Plan  31 year old  at 36w4d with LORIE 2022 based on LMP    Kee was seen today for prenatal care.    Diagnoses and all orders for this visit:    Supervision of high risk pregnancy in third trimester  -     Group B strep PCR    Oligohydramnios in third trimester, single or unspecified fetus    Poor fetal growth affecting management of mother in third trimester, single or unspecified fetus    Celiac disease    Crohn's disease with complication, unspecified gastrointestinal tract location (H)    Immunosuppressed status (H)    High-risk pregnancy, third trimester    H/O  delivery, currently pregnant    BPP in clinic notable for EFW <3%, CUONG 4.1cm, . follow up cord doppler was also abnormal. She then had an NST which was unreactive. As such, discussed with patient that we should move toward induction. Called Chel for plan for induction this evening. Cervical exam: 3/50/-3. GBS pending.    Weight gain adequate: 5.352 kg (11 lb 12.8 oz) to date, out of recommended total of 11-20 lbs (pregravid BMI >30)      Stormy Palacios MD PGY2  I precepted today with Dr. Gibson    Review of the result(s) of each unique test - US, NST  30 minutes spent on the date of the encounter doing chart review, history and exam, documentation and further activities per the note        Subjective    Noted that her BPP in clinic was abnormal (see assessment and plan) patient overall feeling fine.     ROS:  No - Headache  No - Changes in vision  No - Chest Pain  No - Shortness of Breath  No - Nausea   No - Vomiting  No - Abdominal pain   No - Contractions  No - Dysuria   No - Vaginal Discharge    No - Vaginal bleeding   No - Loss of Fluid   No - Extremity swelling   Present - Fetal movement       [facility]? [facility]      Patient Active Problem List   Diagnosis     Health Care Home     Crohns disease     Abnormal Pap smear of cervix     Chronic pain     Adjustment disorder with depressed mood     Celiac  disease     Immunosuppressed status (H)     Fibromyalgia     H/O  delivery, currently pregnant     Tobacco use in pregnancy, antepartum, first trimester     High-risk pregnancy, third trimester     BMI 36.0-36.9,adult     At risk for hypertension     Trichomonal vaginitis during pregnancy in first trimester     Accelerated idioventricular rhythm (H)     Encounter for triage in pregnant patient     Carpal tunnel syndrome during pregnancy     De Quervain's disease (tenosynovitis)       Kee Phipps speaks English so an  was not used today.      Do you need help getting a car seat? discussed  Do you need help getting a breast pump? discussed    Objective  /85   Pulse 92   Temp 98  F (36.7  C)   Resp 18   LMP 07/15/2021   SpO2 98%   No distress.  Gravid abdomen. Cervix dilated to 3cm, posterior.    Results  Blood type: O POS  Results for orders placed or performed in visit on 22   US OB Biophys Single Gestation Measure     Status: None    Narrative    EXAM: US OB BIOPHYS SINGLE GESTATION W MEASURE  LOCATION: [facility]  DATE/TIME: 3/28/2022 3:00 PM    INDICATION: BPP and NST  COMPARISON: None.    FINDINGS:  Single living fetus, cephalic presentation.    HEART RATE: 128 bpm.  SDP 2.5 cm. CUONG is 4.1 cm.  PLACENTA: Anterior. No previa.  CERVIX: Not visualized.    CORD DOPPLER: S/D ratio: 3.4-5.7. There is decreased diastolic flow in the umbilical arteries with a higher than normal resistance waveform. Normal SD ratio is less than 4.0.    2/2 fetal breathing  2/2 fetal movements  2/2 fetal tone  2/2 amniotic fluid     Total biophysical profile     BIOMETRY:  Biparietal Diameter: 8.6 cm, 34 weeks 3 days 5th percentile  Head Circumference: 31.1 cm, 34 weeks 5 days less than 3rd percentile  Abdominal Circumference: 30.6 cm, 34 weeks 4 days 15th percentile  Femur Length: 6.3 cm, 32 weeks 5 days less than 3rd percentile    Estimated Fetal Weight: 2337 g g  EFW  Percentile: Less than 3 percent for gestational age of 37 weeks 2 days  Cervical Length: Not visualized    EDC by First US exam: 04/16/2022  EDC by This US exam: 04/21/2022    Composite Age by First US: 37 weeks 2 days   Composite Age by This US: 36 weeks 4 days      Impression    IMPRESSION:  1.  Normal 8/8 biophysical profile.  2.  Probable oligohydramnios with an CUONG of 4.1 cm.  3.  Abnormal peak systolic to end-diastolic ratios of the umbilical artery with a abnormally high resistance waveforms in the umbilical arteries.  4.  Single living intrauterine gestation.  5.  Based on this ultrasound, composite age of 36 weeks 4 days with EDC 04/16/2022.  6.  There has been less than expected growth. From the first ultrasound the composite age expected is 37 weeks 2 days with EDC of 04/16/2022. Estimated fetal weight is less than 3% for gestational age of 37 weeks 2 days and is at the 5 5th percentile for   age of 36 weeks 4 days.  7.  Dr. Victoria has been paged for results.

## 2022-03-30 ENCOUNTER — ANESTHESIA EVENT (OUTPATIENT)
Dept: OBGYN | Facility: CLINIC | Age: 32
End: 2022-03-30
Payer: COMMERCIAL

## 2022-03-30 ENCOUNTER — ANESTHESIA (OUTPATIENT)
Dept: OBGYN | Facility: CLINIC | Age: 32
End: 2022-03-30
Payer: COMMERCIAL

## 2022-03-30 PROCEDURE — 250N000011 HC RX IP 250 OP 636: Performed by: ANESTHESIOLOGY

## 2022-03-30 PROCEDURE — 370N000003 HC ANESTHESIA WARD SERVICE

## 2022-03-30 PROCEDURE — 250N000009 HC RX 250: Performed by: ANESTHESIOLOGY

## 2022-03-30 RX ORDER — LIDOCAINE HYDROCHLORIDE AND EPINEPHRINE 15; 5 MG/ML; UG/ML
INJECTION, SOLUTION EPIDURAL PRN
Status: DISCONTINUED | OUTPATIENT
Start: 2022-03-30 | End: 2022-03-30

## 2022-03-30 RX ORDER — BUPIVACAINE HYDROCHLORIDE 2.5 MG/ML
INJECTION, SOLUTION EPIDURAL; INFILTRATION; INTRACAUDAL
Status: COMPLETED | OUTPATIENT
Start: 2022-03-30 | End: 2022-03-30

## 2022-03-30 RX ADMIN — BUPIVACAINE HYDROCHLORIDE 6 ML: 2.5 INJECTION, SOLUTION EPIDURAL; INFILTRATION; INTRACAUDAL at 15:34

## 2022-03-30 RX ADMIN — LIDOCAINE HYDROCHLORIDE,EPINEPHRINE BITARTRATE 3 ML: 15; .005 INJECTION, SOLUTION EPIDURAL; INFILTRATION; INTRACAUDAL; PERINEURAL at 15:30

## 2022-03-30 NOTE — ANESTHESIA PREPROCEDURE EVALUATION
Anesthesia Pre-Procedure Evaluation    Patient: Kee Phipps   MRN: [id number] : 1990        Procedure :   Cervical Ripening       Past Medical History:   Diagnosis Date     Abnormal Pap smear of cervix      Accelerated idioventricular rhythm (H) 2022     Anemia      Anxiety      Arthritis      Bilateral plantar fasciitis      Celiac disease 2014     Celiac disease      Crohn disease (H)      Crohn's disease (H) 2012     Depression      Depressive disorder      Fibromyalgia      Fibromyalgia      H/O miscarriage, currently pregnant     miscarriage .     HPV (human papilloma virus) infection      Migraine      Plantar fasciitis     bilateral      delivery 2013    34 weeks. Early rupture of membranes      Past Surgical History:   Procedure Laterality Date     APPENDECTOMY       BOWEL RESECTION      illium      CERVIX LESION DESTRUCTION       DILATION AND CURETTAGE SUCTION, TREAT MISSED , 1ST TRIMESTER      miscarriage at 8 wks gest     DILATION AND CURETTAGE SUCTION, TREAT MISSED , 1ST TRIMESTER  2019     FASCIOTOMY LOWER EXTREMITY Left 2018    Left foot     FOOT SURGERY Left      LAPAROSCOPY DIAGNOSTIC (GYN) N/A 2019    Procedure: LAPAROSCOPY, DIAGNOSTIC;  Surgeon: Anni Velasquez MD;  Location:  OR     PLANTAR FASCIA RELEASE Left 4/10/2018    Procedure:  PLANTAR FASCIOTOMY LEFT FOOT ;  Surgeon: Yuri Arreola DPM;  Location: [facility];  Service:      SALPINGECTOMY Right 05/15/2021    rt sided ruptured ectopic     SMALL BOWEL RESECTION       TONSILLECTOMY        Allergies   Allergen Reactions     Gluten Meal      Humira [Humira]      Caused huge lumps at injection site.      Ibuprofen      Patient has Crohns disease and is unable to take this medication.     Latex Itching     Remicade [Infliximab] Rash      Social History     Tobacco Use     Smoking status: Former Smoker     Packs/day: 0.03     Quit date: 2021      Years since quittin.3     Smokeless tobacco: Never Used     Tobacco comment: Vaping daily since 2021   Substance Use Topics     Alcohol use: Not Currently     Comment: occ      Wt Readings from Last 1 Encounters:   03/10/22 102.4 kg (225 lb 12.8 oz)        Anesthesia Evaluation            ROS/MED HX  ENT/Pulmonary:  - neg pulmonary ROS     Neurologic:  - neg neurologic ROS     Cardiovascular:  - neg cardiovascular ROS     METS/Exercise Tolerance:     Hematologic:       Musculoskeletal:       GI/Hepatic:     (+) Inflammatory bowel disease,     Renal/Genitourinary:  - neg Renal ROS     Endo:     (+) Obesity,     Psychiatric/Substance Use:       Infectious Disease:       Malignancy:       Other:      (+) , H/O Chronic Pain,        Physical Exam    Airway  airway exam normal           Respiratory Devices and Support         Dental  no notable dental history         Cardiovascular   cardiovascular exam normal          Pulmonary   pulmonary exam normal                OUTSIDE LABS:  CBC:   Lab Results   Component Value Date    WBC 9.8 2022    WBC 11.0 2022    HGB 11.9 2022    HGB 10.8 (L) 2022    HCT 34.2 (L) 2022    HCT 31.4 (L) 2022     2022     2022     BMP:   Lab Results   Component Value Date     2022     2022    POTASSIUM 3.9 2022    POTASSIUM 3.6 2022    CHLORIDE 108 (H) 2022    CHLORIDE 107 2022    CO2 21 (L) 2022    CO2 16 (L) 2022    BUN 5 (L) 2022    BUN 3 (L) 2022    CR 0.64 2022    CR 0.48 (L) 2022    GLC 72 2022    GLC 97 2022     COAGS:   Lab Results   Component Value Date    PTT 28 2021    INR 0.98 2021     POC:   Lab Results   Component Value Date    HCG Positive (A) 05/15/2021    HCGS Positive (A) 2021     HEPATIC:   Lab Results   Component Value Date    ALBUMIN 2.9 (L) 2022    PROTTOTAL 6.3 2022    ALT 12  03/28/2022    AST 14 03/28/2022    ALKPHOS 142 (H) 03/28/2022    BILITOTAL 0.4 03/28/2022     OTHER:   Lab Results   Component Value Date    LACT 0.6 (L) 11/17/2020    A1C 5.2 10/13/2016    BRENDA 9.0 03/28/2022    MAG 1.3 (L) 03/05/2022    LIPASE 28 08/08/2021    CRP 0.7 01/22/2022    SED 39 (H) 01/22/2022       Anesthesia Plan    ASA Status:  2      Anesthesia Type: Epidural.              Consents    Anesthesia Plan(s) and associated risks, benefits, and realistic alternatives discussed. Questions answered and patient/representative(s) expressed understanding.    - Discussed:     - Discussed with:  Patient         Postoperative Care            Comments:                ROSSI COCHRAN MD

## 2022-03-30 NOTE — ANESTHESIA PROCEDURE NOTES
Epidural catheter Procedure Note    Pre-Procedure   Staff -        Anesthesiologist:  Hasmukh Leggett MD       Performed By: anesthesiologist       Location: OB       Procedure Start/Stop Times: 3/30/2022 3:21 PM and 3/30/2022 3:35 PM       Pre-Anesthestic Checklist: patient identified, IV checked, risks and benefits discussed, informed consent and monitors and equipment checked  Timeout:       Correct Patient: Yes        Correct Procedure: Yes        Correct Site: Yes        Correct Position: Yes   Procedure Documentation  Procedure: epidural catheter       Patient Position: sitting       Skin prep: Chloraprep      Local skin infiltrated with mL of 1% lidocaine.        Insertion Site: L3-4. (midline approach).       Technique: LORT saline        CHADD at 6 cm.       Needle Type: Altaviany needle       Needle Gauge: 18.        Needle Length (Inches): 3.5        Catheter: 20 G.         Catheter threaded easily.         Threaded 10 cm at skin.      Assessment/Narrative         Paresthesias: Resolved.      Test dose of 3 mL lidocaine 1.5% w/ 1:200,000 epinephrine at.         Test dose negative, 3 minutes after injection, for signs of intravascular, subdural, or intrathecal injection.       Insertion/Infusion Method: LORT saline       Aspiration negative for Heme or CSF via Epidural Catheter.    Medication(s) Administered   0.25% Bupivacaine PF (Epidural), 6 mL  Medication Administration Time: 3/30/2022 3:34 PM

## 2022-03-31 DIAGNOSIS — N81.2 CERVICAL PROLAPSE: Primary | ICD-10-CM

## 2022-03-31 NOTE — ANESTHESIA POSTPROCEDURE EVALUATION
Patient: Kee Phipps    Procedure: * No procedures listed *  Cervical Ripening    Anesthesia Type:  Epidural    Note:  Disposition: Inpatient   Postop Pain Control: Uneventful            Sign Out: Well controlled pain   PONV: No   Neuro/Psych: Uneventful            Sign Out: Acceptable/Baseline neuro status   Airway/Respiratory: Uneventful            Sign Out: Acceptable/Baseline resp. status   CV/Hemodynamics: Uneventful            Sign Out: Acceptable CV status; No obvious hypovolemia; No obvious fluid overload   Other NRE: NONE   DID A NON-ROUTINE EVENT OCCUR? No           Last vitals:  Vitals Value Taken Time   BP     Temp     Pulse     Resp     SpO2         Electronically Signed By: ROSSI COCHRAN MD  March 31, 2022  4:25 AM

## 2022-04-01 PROBLEM — O09.93 HIGH-RISK PREGNANCY, THIRD TRIMESTER: Status: RESOLVED | Noted: 2021-09-08 | Resolved: 2022-04-01

## 2022-04-01 PROBLEM — Z91.89 AT RISK FOR HYPERTENSION: Status: RESOLVED | Noted: 2021-09-08 | Resolved: 2022-04-01

## 2022-04-01 PROBLEM — O99.331 TOBACCO USE IN PREGNANCY, ANTEPARTUM, FIRST TRIMESTER: Status: RESOLVED | Noted: 2021-09-08 | Resolved: 2022-04-01

## 2022-04-01 PROBLEM — A59.01 TRICHOMONAL VAGINITIS DURING PREGNANCY IN FIRST TRIMESTER: Status: RESOLVED | Noted: 2021-09-20 | Resolved: 2022-04-01

## 2022-04-01 PROBLEM — O09.899 H/O PRETERM DELIVERY, CURRENTLY PREGNANT: Status: RESOLVED | Noted: 2021-09-08 | Resolved: 2022-04-01

## 2022-04-01 PROBLEM — O36.5930 POOR FETAL GROWTH AFFECTING MANAGEMENT OF MOTHER IN THIRD TRIMESTER: Status: RESOLVED | Noted: 2022-03-29 | Resolved: 2022-04-01

## 2022-04-01 PROBLEM — Z34.90 ENCOUNTER FOR PLANNED INDUCTION OF LABOR: Status: RESOLVED | Noted: 2022-03-28 | Resolved: 2022-04-01

## 2022-04-01 PROBLEM — O23.591 TRICHOMONAL VAGINITIS DURING PREGNANCY IN FIRST TRIMESTER: Status: RESOLVED | Noted: 2021-09-20 | Resolved: 2022-04-01

## 2022-04-02 DIAGNOSIS — Z71.89 OTHER SPECIFIED COUNSELING: ICD-10-CM

## 2022-04-02 RX ORDER — AMLODIPINE BESYLATE 5 MG/1
5 TABLET ORAL DAILY
Qty: 90 TABLET | Refills: 0 | Status: SHIPPED | OUTPATIENT
Start: 2022-04-02 | End: 2022-04-08

## 2022-04-03 ENCOUNTER — PATIENT OUTREACH (OUTPATIENT)
Dept: CARE COORDINATION | Facility: CLINIC | Age: 32
End: 2022-04-03
Payer: COMMERCIAL

## 2022-04-03 NOTE — PROGRESS NOTES
Clinic Care Coordination Contact    Background: Care Coordination referral placed from [facility] discharge report for reason of patient meeting criteria for a TCM outreach call by [facility] [place] team.    Assessment: Upon chart review, [facility] Team member will cancel/close the referral for TCM outreach due to reason below:    Patient isn't interested in speaking with care team today and disconnected call    Plan: Care Coordination referral for TCM outreach canceled.    Virgie Anaya MA  [facility] [place], [facility]

## 2022-04-04 ENCOUNTER — TELEPHONE (OUTPATIENT)
Dept: FAMILY MEDICINE | Facility: CLINIC | Age: 32
End: 2022-04-04

## 2022-04-04 NOTE — TELEPHONE ENCOUNTER
ABOUT BABY:  Vanessa Roque [birth year]  1) How is feeding going? Breast feeding for 10 minutes every 2 hours.  Baby latches well but then stops breastfeeding so then pumps and gives baby up to 2 ozs of pumped breast milk and formula.  Encouraged to continue to offer the breast and try to nurse up to 20 minutes on one side before trying other breast.  [employer] breast pump is only working on one side.  Told her to call [employer] customer service to trouble shoot and if still not working than she should ask for a new pump.    2) Do you have the things you need to take care of your baby? Yes    3) Any change in urination, stooling, or skin color? 4-5 wet and 8 yellow seedy stools.    4) Any other concerns you have about your baby? Mom states that baby has episodes where she appears to hold her breath and eyes will go back and forth.  She states that this happened in the hospital and she was told that this is normal/common with  baby.  Discussed need to stimulate her during this episode by rubbing her back or feet and if face/lips turn blue to call right away.  She states that baby is very alert when awake and wakes up on her own and easily for feedings during the night.  Mom states that baby did vomit x's 1 last night but did not burp well after feeding.  Discussed the need to burp well after bottle feeding and call if baby has further episodes of vomiting.     APPT: 22 Dr Palacios.        ABOUT MOM:  Kee Phipps 89  1) Any concerns about bleeding, stooling, urination, or abdominal pain? Vaginal bleeding has decreased, voiding/stooling without difficulty, no more abd pain. Unable to take Excedrin that was prescribed due to breastfeeding.  Taking Tylenol which is helping. Postpartum htn: Pt states that she started Amlodipine today.  Pt would like a BP cuff.     2) How is your mood and how are you coping? Mood is up and down. States that she is aggravated with her partner but states that mood is more up  than down.  Discussed the need to call if feeling depressed or concerns regarding mood.    3) What is your plan for contraception? Unsure Recommended a visit at 6 weeks to do postpartum visit and discuss contraception options with your physician.    Then we would be able to start, inject or place contraception at timing of 2month check for baby.  Reminded mom that she MUST abstain from intercourse or use condoms until this visit so she would be eligible for contraception at time of 2 month visit for baby.      Pt states that she will schedule 6 week postpartum appointment when she sees Dr Palacios on 4/7/22 for BP check./NG

## 2022-04-07 ENCOUNTER — OFFICE VISIT (OUTPATIENT)
Dept: FAMILY MEDICINE | Facility: CLINIC | Age: 32
End: 2022-04-07
Payer: COMMERCIAL

## 2022-04-07 VITALS
TEMPERATURE: 98.3 F | DIASTOLIC BLOOD PRESSURE: 87 MMHG | BODY MASS INDEX: 33.45 KG/M2 | SYSTOLIC BLOOD PRESSURE: 124 MMHG | HEART RATE: 85 BPM | RESPIRATION RATE: 20 BRPM | OXYGEN SATURATION: 97 % | HEIGHT: 67 IN | WEIGHT: 213.1 LBS

## 2022-04-07 DIAGNOSIS — O13.9 GESTATIONAL HYPERTENSION AFFECTING SECOND PREGNANCY: ICD-10-CM

## 2022-04-07 DIAGNOSIS — N81.2 CERVICAL PROLAPSE: ICD-10-CM

## 2022-04-07 DIAGNOSIS — K21.00 GASTROESOPHAGEAL REFLUX DISEASE WITH ESOPHAGITIS WITHOUT HEMORRHAGE: ICD-10-CM

## 2022-04-07 PROCEDURE — 99207 PR POST PARTUM EXAM: CPT | Mod: GC | Performed by: STUDENT IN AN ORGANIZED HEALTH CARE EDUCATION/TRAINING PROGRAM

## 2022-04-07 RX ORDER — FAMOTIDINE 20 MG/1
20 TABLET, FILM COATED ORAL 2 TIMES DAILY
Qty: 60 TABLET | Refills: 3 | Status: SHIPPED | OUTPATIENT
Start: 2022-04-07 | End: 2022-05-16

## 2022-04-07 NOTE — PROGRESS NOTES
"Assessment & Plan     Routine postpartum follow-up  Overall, it seems as though the patient is having a good postpartum course, given that she has had a high PHQ-9 score today, recommended mental health referral today.  She accepted this. Concerns for possible postpartum depression.  - Adult Mental Health  Referral; Future    Gastroesophageal reflux disease with esophagitis without hemorrhage  Refilled med.  - famotidine (PEPCID) 20 MG tablet; Take 1 tablet (20 mg) by mouth 2 times daily    Gestational hypertension affecting second pregnancy  Her blood pressure looks great today, encouraged her to continue taking the amlodipine until she can be seen for her routine postpartum visit in about 5 weeks now.  If she continues to have elevated blood pressure, she should continue taking amlodipine; however, she has had improvement of her blood pressure such as today, she may be able to stop taking the amlodipine.    Cervical prolapse  We will continue to monitor, recommended continued follow-up with physical therapy.      Review of prior external note(s) from - L&D hospitalization  40 minutes spent on the date of the encounter doing chart review, history and exam, documentation and further activities per the note       BMI:   Estimated body mass index is 33.08 kg/m  as calculated from the following:    Height as of this encounter: 1.709 m (5' 7.3\").    Weight as of this encounter: 96.7 kg (213 lb 1.6 oz).   Patient is recently post partum    Stormy Palacios MD PGY2  [facility]  Precepted with Dr. Ricky Sofia   Kee Phipps is a 31 year old who presents for the following health issues children    HPI     Presents for follow-up since delivery just over a week ago.  Patient says that she has been having a good time at home; however, she says that she has had occasional days where she feels overwhelmed with caring for her 2 children.  She says that she has recently moved, but she is " well adjusted to her new home.  She says that from a mental health standpoint she has been struggling somewhat, though she has not been having issues with inability to take care of herself or her children.    We also discussed her previous diagnosis of cervical prolapse.  She says that she has not observed any changes, no vaginal fullness or lower abdominal/pelvic floor changes.  She says that she is looking forward to going to physical therapy soon to help with strengthening her pelvic floor.    PATIENT HEALTH QUESTIONNAIRE-9 (PHQ - 9)    Over the last 2 weeks, how often have you been bothered by any of the following problems?    1. Little interest or pleasure in doing things -  Nearly every day   2. Feeling down, depressed, or hopeless -  Nearly every day   3. Trouble falling or staying asleep, or sleeping too much - Nearly every day   4. Feeling tired or having little energy -  Nearly every day   5. Poor appetite or overeating -  Nearly every day   6. Feeling bad about yourself - or that you are a failure or have let yourself or your family down -  More than half the days   7. Trouble concentrating on things, such as reading the newspaper or watching television -     8. Moving or speaking so slowly that other people could have noticed? Or the opposite - being so fidgety or restless that you have been moving around a lot more than usual More than half the days   9. Thoughts that you would be better off dead or of hurting  yourself in some way Not at all   Total Score:       If you checked off any problems, how difficult have these problems made it for you to do your work, take care of things at home, or get along with other people? Not difficult at all    Developed by Dory Hester, Yola Pollard, Phillip Gray and colleagues, with an educational vanda from [employer] No permission required to reproduce, translate, display or distribute. permission required to reproduce, translate, display or  "distribute.    Regarding her hypertension, patient mentions that she has been taking the amlodipine as it has been prescribed without issue.    Review of Systems   Complete ROS normal aside noted in HPI      Objective    /87   Pulse 85   Temp 98.3  F (36.8  C) (Oral)   Resp 20   Ht 1.709 m (5' 7.3\")   Wt 96.7 kg (213 lb 1.6 oz)   LMP 07/15/2021   SpO2 97%   BMI 33.08 kg/m    Body mass index is 33.08 kg/m .    Physical Exam   GENERAL: healthy, alert and no distress  RESP: lungs clear to auscultation - no rales, rhonchi or wheezes  CV: regular rate and rhythm, normal S1 S2, no S3 or S4, no murmur, click or rub, no peripheral edema and peripheral pulses strong  ABDOMEN: soft, nontender, no hepatosplenomegaly, no masses and bowel sounds normal  MS: no gross musculoskeletal defects noted, no edema      ----- Service Performed and Documented by Resident or Fellow ------          "

## 2022-04-08 ENCOUNTER — TELEPHONE (OUTPATIENT)
Dept: FAMILY MEDICINE | Facility: CLINIC | Age: 32
End: 2022-04-08
Payer: COMMERCIAL

## 2022-04-08 ENCOUNTER — HOSPITAL ENCOUNTER (EMERGENCY)
Facility: CLINIC | Age: 32
End: 2022-04-08
Payer: COMMERCIAL

## 2022-04-08 ENCOUNTER — HOSPITAL ENCOUNTER (INPATIENT)
Facility: CLINIC | Age: 32
LOS: 3 days | Discharge: HOME | DRG: 776 | End: 2022-04-11
Attending: EMERGENCY MEDICINE | Admitting: FAMILY MEDICINE
Payer: COMMERCIAL

## 2022-04-08 LAB
ALBUMIN MFR UR ELPH: 12.1 MG/DL
ALBUMIN SERPL-MCNC: 3.4 G/DL (ref 3.5–5)
ALBUMIN UR-MCNC: 10 MG/DL
ALP SERPL-CCNC: 135 U/L (ref 45–120)
ALT SERPL W P-5'-P-CCNC: 16 U/L (ref 0–45)
ANION GAP SERPL CALCULATED.3IONS-SCNC: 12 MMOL/L (ref 5–18)
APPEARANCE UR: CLEAR
AST SERPL W P-5'-P-CCNC: 14 U/L (ref 0–40)
BACTERIA #/AREA URNS HPF: ABNORMAL /HPF
BASOPHILS # BLD AUTO: 0 10E3/UL (ref 0–0.2)
BASOPHILS NFR BLD AUTO: 0 %
BILIRUB SERPL-MCNC: 0.4 MG/DL (ref 0–1)
BILIRUB UR QL STRIP: NEGATIVE
BUN SERPL-MCNC: 10 MG/DL (ref 8–22)
CALCIUM SERPL-MCNC: 9.5 MG/DL (ref 8.5–10.5)
CHLORIDE BLD-SCNC: 106 MMOL/L (ref 98–107)
CO2 SERPL-SCNC: 21 MMOL/L (ref 22–31)
COLOR UR AUTO: YELLOW
CREAT SERPL-MCNC: 0.77 MG/DL (ref 0.6–1.1)
CREAT UR-MCNC: 133 MG/DL
EOSINOPHIL # BLD AUTO: 0.1 10E3/UL (ref 0–0.7)
EOSINOPHIL NFR BLD AUTO: 1 %
ERYTHROCYTE [DISTWIDTH] IN BLOOD BY AUTOMATED COUNT: 12.6 % (ref 10–15)
GFR SERPL CREATININE-BSD FRML MDRD: >90 ML/MIN/1.73M2
GLUCOSE BLD-MCNC: 85 MG/DL (ref 70–125)
GLUCOSE UR STRIP-MCNC: NEGATIVE MG/DL
HCT VFR BLD AUTO: 40.9 % (ref 35–47)
HGB BLD-MCNC: 14.2 G/DL (ref 11.7–15.7)
HGB UR QL STRIP: ABNORMAL
IMM GRANULOCYTES # BLD: 0 10E3/UL
IMM GRANULOCYTES NFR BLD: 1 %
KETONES UR STRIP-MCNC: NEGATIVE MG/DL
LEUKOCYTE ESTERASE UR QL STRIP: ABNORMAL
LYMPHOCYTES # BLD AUTO: 2.3 10E3/UL (ref 0.8–5.3)
LYMPHOCYTES NFR BLD AUTO: 42 %
MAGNESIUM SERPL-MCNC: 1.9 MG/DL (ref 1.8–2.6)
MAGNESIUM SERPL-MCNC: 5.3 MG/DL (ref 1.8–2.6)
MCH RBC QN AUTO: 30.4 PG (ref 26.5–33)
MCHC RBC AUTO-ENTMCNC: 34.7 G/DL (ref 31.5–36.5)
MCV RBC AUTO: 88 FL (ref 78–100)
MONOCYTES # BLD AUTO: 0.7 10E3/UL (ref 0–1.3)
MONOCYTES NFR BLD AUTO: 12 %
MUCOUS THREADS #/AREA URNS LPF: PRESENT /LPF
NEUTROPHILS # BLD AUTO: 2.4 10E3/UL (ref 1.6–8.3)
NEUTROPHILS NFR BLD AUTO: 44 %
NITRATE UR QL: NEGATIVE
NRBC # BLD AUTO: 0 10E3/UL
NRBC BLD AUTO-RTO: 0 /100
PH UR STRIP: 7 [PH] (ref 5–7)
PLATELET # BLD AUTO: 276 10E3/UL (ref 150–450)
POTASSIUM BLD-SCNC: 4.2 MMOL/L (ref 3.5–5)
PROT SERPL-MCNC: 7.1 G/DL (ref 6–8)
PROT/CREAT 24H UR: 0.09 MG/MG CR
RBC # BLD AUTO: 4.67 10E6/UL (ref 3.8–5.2)
RBC URINE: 4 /HPF
SARS-COV-2 RNA RESP QL NAA+PROBE: NEGATIVE
SODIUM SERPL-SCNC: 139 MMOL/L (ref 136–145)
SP GR UR STRIP: 1.02 (ref 1–1.03)
SQUAMOUS EPITHELIAL: 3 /HPF
UROBILINOGEN UR STRIP-MCNC: <2 MG/DL
WBC # BLD AUTO: 5.5 10E3/UL (ref 4–11)
WBC URINE: 14 /HPF

## 2022-04-08 PROCEDURE — 80053 COMPREHEN METABOLIC PANEL: CPT | Performed by: EMERGENCY MEDICINE

## 2022-04-08 PROCEDURE — 87086 URINE CULTURE/COLONY COUNT: CPT | Performed by: EMERGENCY MEDICINE

## 2022-04-08 PROCEDURE — 84156 ASSAY OF PROTEIN URINE: CPT

## 2022-04-08 PROCEDURE — 36415 COLL VENOUS BLD VENIPUNCTURE: CPT | Performed by: EMERGENCY MEDICINE

## 2022-04-08 PROCEDURE — 120N000001 HC R&B MED SURG/OB

## 2022-04-08 PROCEDURE — 258N000003 HC RX IP 258 OP 636: Performed by: STUDENT IN AN ORGANIZED HEALTH CARE EDUCATION/TRAINING PROGRAM

## 2022-04-08 PROCEDURE — 250N000011 HC RX IP 250 OP 636: Performed by: EMERGENCY MEDICINE

## 2022-04-08 PROCEDURE — 96365 THER/PROPH/DIAG IV INF INIT: CPT

## 2022-04-08 PROCEDURE — 83735 ASSAY OF MAGNESIUM: CPT | Performed by: EMERGENCY MEDICINE

## 2022-04-08 PROCEDURE — 99285 EMERGENCY DEPT VISIT HI MDM: CPT | Mod: 25

## 2022-04-08 PROCEDURE — 81001 URINALYSIS AUTO W/SCOPE: CPT | Performed by: EMERGENCY MEDICINE

## 2022-04-08 PROCEDURE — C9803 HOPD COVID-19 SPEC COLLECT: HCPCS

## 2022-04-08 PROCEDURE — 83735 ASSAY OF MAGNESIUM: CPT | Performed by: OBSTETRICS & GYNECOLOGY

## 2022-04-08 PROCEDURE — 96375 TX/PRO/DX INJ NEW DRUG ADDON: CPT

## 2022-04-08 PROCEDURE — 36415 COLL VENOUS BLD VENIPUNCTURE: CPT | Performed by: OBSTETRICS & GYNECOLOGY

## 2022-04-08 PROCEDURE — 96376 TX/PRO/DX INJ SAME DRUG ADON: CPT

## 2022-04-08 PROCEDURE — 99222 1ST HOSP IP/OBS MODERATE 55: CPT | Mod: 24

## 2022-04-08 PROCEDURE — 85025 COMPLETE CBC W/AUTO DIFF WBC: CPT | Performed by: EMERGENCY MEDICINE

## 2022-04-08 PROCEDURE — 87635 SARS-COV-2 COVID-19 AMP PRB: CPT | Performed by: EMERGENCY MEDICINE

## 2022-04-08 PROCEDURE — 250N000011 HC RX IP 250 OP 636: Performed by: STUDENT IN AN ORGANIZED HEALTH CARE EDUCATION/TRAINING PROGRAM

## 2022-04-08 PROCEDURE — 96366 THER/PROPH/DIAG IV INF ADDON: CPT

## 2022-04-08 PROCEDURE — 250N000013 HC RX MED GY IP 250 OP 250 PS 637: Performed by: FAMILY MEDICINE

## 2022-04-08 RX ORDER — LABETALOL HYDROCHLORIDE 5 MG/ML
20 INJECTION, SOLUTION INTRAVENOUS
Status: DISCONTINUED | OUTPATIENT
Start: 2022-04-08 | End: 2022-04-11 | Stop reason: HOSPADM

## 2022-04-08 RX ORDER — DIPHENHYDRAMINE HYDROCHLORIDE 50 MG/ML
25 INJECTION INTRAMUSCULAR; INTRAVENOUS ONCE
Status: COMPLETED | OUTPATIENT
Start: 2022-04-08 | End: 2022-04-08

## 2022-04-08 RX ORDER — MAGNESIUM SULFATE HEPTAHYDRATE 40 MG/ML
2 INJECTION, SOLUTION INTRAVENOUS
Status: DISCONTINUED | OUTPATIENT
Start: 2022-04-08 | End: 2022-04-11 | Stop reason: HOSPADM

## 2022-04-08 RX ORDER — HYDRALAZINE HYDROCHLORIDE 20 MG/ML
10 INJECTION INTRAMUSCULAR; INTRAVENOUS
Status: DISCONTINUED | OUTPATIENT
Start: 2022-04-08 | End: 2022-04-11 | Stop reason: HOSPADM

## 2022-04-08 RX ORDER — LABETALOL 100 MG/1
100 TABLET, FILM COATED ORAL EVERY 12 HOURS SCHEDULED
Status: DISCONTINUED | OUTPATIENT
Start: 2022-04-08 | End: 2022-04-08

## 2022-04-08 RX ORDER — LABETALOL HYDROCHLORIDE 5 MG/ML
20-80 INJECTION, SOLUTION INTRAVENOUS EVERY 10 MIN PRN
Status: DISCONTINUED | OUTPATIENT
Start: 2022-04-08 | End: 2022-04-11 | Stop reason: HOSPADM

## 2022-04-08 RX ORDER — MAGNESIUM SULFATE IN WATER 40 MG/ML
1 INJECTION, SOLUTION INTRAVENOUS CONTINUOUS
Status: DISCONTINUED | OUTPATIENT
Start: 2022-04-08 | End: 2022-04-11 | Stop reason: HOSPADM

## 2022-04-08 RX ORDER — MAGNESIUM SULFATE 4 G/50ML
4 INJECTION INTRAVENOUS ONCE
Status: COMPLETED | OUTPATIENT
Start: 2022-04-08 | End: 2022-04-08

## 2022-04-08 RX ORDER — LIDOCAINE 40 MG/G
CREAM TOPICAL
Status: DISCONTINUED | OUTPATIENT
Start: 2022-04-08 | End: 2022-04-11 | Stop reason: HOSPADM

## 2022-04-08 RX ORDER — ACETAMINOPHEN 325 MG/1
650 TABLET ORAL EVERY 6 HOURS PRN
Status: DISCONTINUED | OUTPATIENT
Start: 2022-04-08 | End: 2022-04-11 | Stop reason: HOSPADM

## 2022-04-08 RX ORDER — AMOXICILLIN 250 MG
2 CAPSULE ORAL 2 TIMES DAILY
Status: DISCONTINUED | OUTPATIENT
Start: 2022-04-08 | End: 2022-04-11 | Stop reason: HOSPADM

## 2022-04-08 RX ORDER — LABETALOL 100 MG/1
100 TABLET, FILM COATED ORAL EVERY 12 HOURS SCHEDULED
Status: DISCONTINUED | OUTPATIENT
Start: 2022-04-08 | End: 2022-04-09

## 2022-04-08 RX ORDER — MAGNESIUM SULFATE 4 G/50ML
4 INJECTION INTRAVENOUS
Status: DISCONTINUED | OUTPATIENT
Start: 2022-04-08 | End: 2022-04-11 | Stop reason: HOSPADM

## 2022-04-08 RX ORDER — MULTIVITAMIN WITH IRON
1 TABLET ORAL DAILY
COMMUNITY
End: 2022-06-16

## 2022-04-08 RX ORDER — MAGNESIUM SULFATE HEPTAHYDRATE 500 MG/ML
10 INJECTION, SOLUTION INTRAMUSCULAR; INTRAVENOUS
Status: DISCONTINUED | OUTPATIENT
Start: 2022-04-08 | End: 2022-04-11 | Stop reason: HOSPADM

## 2022-04-08 RX ORDER — CALCIUM CARBONATE 500 MG/1
1000 TABLET, CHEWABLE ORAL 4 TIMES DAILY PRN
Status: DISCONTINUED | OUTPATIENT
Start: 2022-04-08 | End: 2022-04-11 | Stop reason: HOSPADM

## 2022-04-08 RX ORDER — MAGNESIUM SULFATE IN WATER 40 MG/ML
2 INJECTION, SOLUTION INTRAVENOUS CONTINUOUS
Status: DISCONTINUED | OUTPATIENT
Start: 2022-04-08 | End: 2022-04-08

## 2022-04-08 RX ORDER — LABETALOL HYDROCHLORIDE 5 MG/ML
10 INJECTION, SOLUTION INTRAVENOUS ONCE
Status: COMPLETED | OUTPATIENT
Start: 2022-04-08 | End: 2022-04-08

## 2022-04-08 RX ORDER — ONDANSETRON 2 MG/ML
4 INJECTION INTRAMUSCULAR; INTRAVENOUS EVERY 6 HOURS PRN
Status: DISCONTINUED | OUTPATIENT
Start: 2022-04-08 | End: 2022-04-11 | Stop reason: HOSPADM

## 2022-04-08 RX ORDER — SODIUM CHLORIDE, SODIUM LACTATE, POTASSIUM CHLORIDE, CALCIUM CHLORIDE 600; 310; 30; 20 MG/100ML; MG/100ML; MG/100ML; MG/100ML
10-125 INJECTION, SOLUTION INTRAVENOUS CONTINUOUS
Status: DISCONTINUED | OUTPATIENT
Start: 2022-04-08 | End: 2022-04-11 | Stop reason: HOSPADM

## 2022-04-08 RX ORDER — CALCIUM GLUCONATE 94 MG/ML
1 INJECTION, SOLUTION INTRAVENOUS
Status: DISCONTINUED | OUTPATIENT
Start: 2022-04-08 | End: 2022-04-11 | Stop reason: HOSPADM

## 2022-04-08 RX ORDER — FAMOTIDINE 20 MG/1
20 TABLET, FILM COATED ORAL 2 TIMES DAILY
Status: DISCONTINUED | OUTPATIENT
Start: 2022-04-08 | End: 2022-04-11 | Stop reason: HOSPADM

## 2022-04-08 RX ORDER — NIFEDIPINE 10 MG/1
10-20 CAPSULE ORAL
Status: DISCONTINUED | OUTPATIENT
Start: 2022-04-08 | End: 2022-04-08

## 2022-04-08 RX ORDER — ACETAMINOPHEN 650 MG/1
650 SUPPOSITORY RECTAL EVERY 6 HOURS PRN
Status: DISCONTINUED | OUTPATIENT
Start: 2022-04-08 | End: 2022-04-11 | Stop reason: HOSPADM

## 2022-04-08 RX ORDER — LABETALOL HYDROCHLORIDE 5 MG/ML
20 INJECTION, SOLUTION INTRAVENOUS ONCE
Status: COMPLETED | OUTPATIENT
Start: 2022-04-08 | End: 2022-04-08

## 2022-04-08 RX ORDER — AMOXICILLIN 250 MG
1 CAPSULE ORAL 2 TIMES DAILY
Status: DISCONTINUED | OUTPATIENT
Start: 2022-04-08 | End: 2022-04-11 | Stop reason: HOSPADM

## 2022-04-08 RX ORDER — LABETALOL HYDROCHLORIDE 5 MG/ML
20 INJECTION, SOLUTION INTRAVENOUS ONCE
Status: DISCONTINUED | OUTPATIENT
Start: 2022-04-08 | End: 2022-04-08

## 2022-04-08 RX ORDER — ONDANSETRON 4 MG/1
4 TABLET, ORALLY DISINTEGRATING ORAL EVERY 6 HOURS PRN
Status: DISCONTINUED | OUTPATIENT
Start: 2022-04-08 | End: 2022-04-11 | Stop reason: HOSPADM

## 2022-04-08 RX ORDER — LORAZEPAM 2 MG/ML
2 INJECTION INTRAMUSCULAR
Status: DISCONTINUED | OUTPATIENT
Start: 2022-04-08 | End: 2022-04-11 | Stop reason: HOSPADM

## 2022-04-08 RX ADMIN — MAGNESIUM SULFATE IN WATER 2 G/HR: 40 INJECTION, SOLUTION INTRAVENOUS at 16:00

## 2022-04-08 RX ADMIN — LABETALOL HYDROCHLORIDE 10 MG: 5 INJECTION, SOLUTION INTRAVENOUS at 14:10

## 2022-04-08 RX ADMIN — MAGNESIUM SULFATE IN WATER 2 G/HR: 40 INJECTION, SOLUTION INTRAVENOUS at 15:52

## 2022-04-08 RX ADMIN — MAGNESIUM SULFATE HEPTAHYDRATE 4 G: 80 INJECTION, SOLUTION INTRAVENOUS at 12:26

## 2022-04-08 RX ADMIN — LABETALOL HYDROCHLORIDE 100 MG: 100 TABLET, FILM COATED ORAL at 18:27

## 2022-04-08 RX ADMIN — DIPHENHYDRAMINE HYDROCHLORIDE 25 MG: 50 INJECTION, SOLUTION INTRAMUSCULAR; INTRAVENOUS at 14:10

## 2022-04-08 RX ADMIN — SODIUM CHLORIDE, POTASSIUM CHLORIDE, SODIUM LACTATE AND CALCIUM CHLORIDE 125 ML/HR: 600; 310; 30; 20 INJECTION, SOLUTION INTRAVENOUS at 15:26

## 2022-04-08 RX ADMIN — PROCHLORPERAZINE EDISYLATE 10 MG: 5 INJECTION INTRAMUSCULAR; INTRAVENOUS at 14:10

## 2022-04-08 RX ADMIN — LABETALOL HYDROCHLORIDE 20 MG: 5 INJECTION, SOLUTION INTRAVENOUS at 12:26

## 2022-04-08 ASSESSMENT — ACTIVITIES OF DAILY LIVING (ADL)
CHANGE_IN_FUNCTIONAL_STATUS_SINCE_ONSET_OF_CURRENT_ILLNESS/INJURY: NO
DRESSING/BATHING_DIFFICULTY: NO
ADLS_ACUITY_SCORE: 7
TOILETING_ISSUES: NO
ADLS_ACUITY_SCORE: 3
ADLS_ACUITY_SCORE: 7
DIFFICULTY_EATING/SWALLOWING: NO
ADLS_ACUITY_SCORE: 7
ADLS_ACUITY_SCORE: 7
ADLS_ACUITY_SCORE: 3
ADLS_ACUITY_SCORE: 3
ADLS_ACUITY_SCORE: 7

## 2022-04-08 NOTE — TELEPHONE ENCOUNTER
Pt delivered 3/30/22 and had elevated BP in the hospital with negative/normal preeclampsia labs. She was prescribed amlodipine 5 mg daily.  Pt states that all day yesterday her home BP cuff was reading 167/112 and today 171/112 and 168/117.  She notes that she also has a HA and blurry vision.  She denies epigastric pain and denies swelling.  She did take her BP meds yesterday and today.  Told her that she needs to go to [facility] ER for evaluation and may be admitted.  She states that her fiance will bring her now.  Routed note to Dr Palacios./YOLI

## 2022-04-08 NOTE — PHARMACY-ADMISSION MEDICATION HISTORY
Pharmacy Note - Admission Medication History    Pertinent Provider Information:    ______________________________________________________________________    Prior To Admission (PTA) med list completed and updated in EMR.       PTA Med List   Medication Sig Last Dose     acetaminophen (TYLENOL) 650 MG CR tablet Take 1 tablet (650 mg) by mouth every 8 hours as needed for mild pain or fever Past Week at Unknown time     amLODIPine (NORVASC) 5 MG tablet Take 1 tablet (5 mg) by mouth daily 4/8/2022 at Unknown time     aspirin-acetaminophen-caffeine (EXCEDRIN MIGRAINE) 250-250-65 MG tablet Take 2 tablets by mouth every 6 hours as needed for headaches Do NOT take regular tylenol while taking this medicine 4/8/2022 at Unknown time     famotidine (PEPCID) 20 MG tablet Take 1 tablet (20 mg) by mouth 2 times daily Past Week at Unknown time     magnesium 250 MG tablet Take 1 tablet by mouth daily 4/8/2022 at Unknown time     Prenatal Vit-Fe Fumarate-FA (PRENATAL MULTIVITAMIN W/IRON) 27-0.8 MG tablet Take 1 tablet by mouth daily 4/8/2022 at Unknown time       Information source(s): Patient and CareEverywhere[facility]    Method of interview communication: in-person    Patient was asked about OTC/herbal products specifically.  PTA med list reflects this.    Based on the pharmacist's assessment, the PTA med list information appears reliable    Allergies were reviewed, assessed, and updated with the patient.      Patient did not bring any medications to the hospital and can't retrieve from home. No multi-dose medications are available for use during hospital stay.      Thank you for the opportunity to participate in the care of this patient.      Kermit Canchola RPH     4/8/2022     2:04 PM

## 2022-04-08 NOTE — ED PROVIDER NOTES
EMERGENCY DEPARTMENT ENCOUNTER      NAME: Kee Phipps  AGE: 31 year old female  YOB: 1990  MRN: [id number]  EVALUATION DATE & TIME: 2022 11:56 AM    PCP: Stormy Palacios    ED PROVIDER: Francis Milner M.D.      Chief Complaint   Patient presents with     Postpartum Complications     Hypertension       FINAL IMPRESSION:  1. Pre-eclampsia, postpartum        ED COURSE & MEDICAL DECISION MAKIN year old female presents to the Emergency Department for evaluation of headaches and elevated blood pressure.  She has had hypertension in the late stages of pregnancy and delivered 10 days ago.  She is hypertensive here in the emergency department with blood pressures 150-160s systolic and diastolic blood pressure in 110s.  She was roomed promptly.  IV access was obtained and blood testing was performed as below.  She does have mild proteinuria but normal creatinine and liver function testing.  She has an intact neurological exam, headache sound to be rather subacute going on since delivery.  Given her elevated blood pressures to give 2 doses of labetalol here and administer some magnesium.  Discussed case with both OB/GYN and the [place] service here in the hospital.  She will be admitted for ongoing management of postpartum preeclampsia with elevated blood pressure headache and a slight degree of proteinuria.  Patient was in agreement with the treatment plan.    12:18 PM I met with the patient, obtained an initial history, performed an examination and discussed the plan. PPE worn throughout all interactions with the patient, including gloves, surgical mask, N95 mask, safety glasses, and surgical cap.    1:27 PM I spoke to Dr. Wily CRENSHAW about the patent and my plan.   1:47 PM I discussed the case with hospitalist, Dr Deleon, who accepts the patient for admission.     Critical Care  Performed by: Francis Milner MD  Authorized by: Francis Milner MD     Total critical care time: 30  minutes  Critical care time was exclusive of separately billable procedures and treating other patients.  Critical care was necessary to treat or prevent imminent or life-threatening deterioration of the following conditions: Pre-eclampsia  Critical care was time spent personally by me on the following activities: development of treatment plan with patient or surrogate, discussions with consultants, examination of patient, evaluation of patient's response to treatment, obtaining history from patient or surrogate, ordering and performing treatments and interventions, ordering and review of laboratory studies, ordering and review of radiographic studies and re-evaluation of patient's condition, this excludes any separately billable procedures.      MEDICATIONS GIVEN IN THE EMERGENCY:  Medications   lactated ringers infusion (has no administration in time range)   magnesium sulfate infusion (has no administration in time range)   calcium gluconate 10 % injection 1 g (has no administration in time range)   NIFEdipine (PROCARDIA) capsule 10-20 mg (has no administration in time range)   labetalol (NORMODYNE/TRANDATE) injection 20 mg (has no administration in time range)   magnesium sulfate 2 g in water intermittent infusion (has no administration in time range)   magnesium sulfate 4 g in 50 mL sterile water (premade) (has no administration in time range)   magnesium sulfate injection 10 g (has no administration in time range)   LORazepam (ATIVAN) injection 2 mg (has no administration in time range)   lidocaine 1 % 0.1-1 mL (has no administration in time range)   lidocaine (LMX4) cream (has no administration in time range)   sodium chloride (PF) 0.9% PF flush 3 mL (has no administration in time range)   sodium chloride (PF) 0.9% PF flush 3 mL (has no administration in time range)   melatonin tablet 1 mg (has no administration in time range)   acetaminophen (TYLENOL) tablet 650 mg (has no administration in time range)      Or   acetaminophen (TYLENOL) Suppository 650 mg (has no administration in time range)   senna-docusate (SENOKOT-S/PERICOLACE) 8.6-50 MG per tablet 1 tablet (has no administration in time range)     Or   senna-docusate (SENOKOT-S/PERICOLACE) 8.6-50 MG per tablet 2 tablet (has no administration in time range)   ondansetron (ZOFRAN-ODT) ODT tab 4 mg (has no administration in time range)     Or   ondansetron (ZOFRAN) injection 4 mg (has no administration in time range)   calcium carbonate (TUMS) chewable tablet 1,000 mg (has no administration in time range)   labetalol (NORMODYNE/TRANDATE) injection 20 mg (20 mg Intravenous Given 4/8/22 1226)   magnesium sulfate 4 g in 50 mL sterile water (premade) (0 g Intravenous Stopped 4/8/22 1411)   labetalol (NORMODYNE/TRANDATE) injection 10 mg (10 mg Intravenous Given 4/8/22 1410)   prochlorperazine (COMPAZINE) injection 10 mg (10 mg Intravenous Given 4/8/22 1410)   diphenhydrAMINE (BENADRYL) injection 25 mg (25 mg Intravenous Given 4/8/22 1410)       NEW PRESCRIPTIONS STARTED AT TODAY'S ER VISIT  New Prescriptions    No medications on file          =================================================================    HPI    Patient information was obtained from: Patient    Use of : N/A         Kee Phipps is a 31 year old female with a pertinent history of Chron's disease, Celiac disease, postpartum hypertension, and retained placenta who presents to this ED by walk in for evaluation of hypertension.    The patient is currently postpartum after delivering on 3/30. She notes that during pregnancy she would have high blood pressures, but was never diagnosed with preeclampsia. She currently takes amlodipine for management of her blood pressure. Since being discharged from the hospital her blood pressures have been high. She also repots worsening headache and blurry vision since discharge.     She otherwise denies any other complaints at this time.    REVIEW OF SYSTEMS    All systems reviewed and negative except as noted in HPI.    PAST MEDICAL HISTORY:  Past Medical History:   Diagnosis Date     Abnormal Pap smear of cervix      Accelerated idioventricular rhythm (H) 2022     Anemia      Anxiety      Arthritis      Bilateral plantar fasciitis      Celiac disease 2014     Celiac disease      Crohn disease (H)      Crohn's disease (H) 2012     Depression      Depressive disorder      Fibromyalgia      Fibromyalgia      H/O miscarriage, currently pregnant     miscarriage .     HPV (human papilloma virus) infection      Migraine      Plantar fasciitis     bilateral     Postpartum hypertension 2022      delivery 2013    34 weeks. Early rupture of membranes       PAST SURGICAL HISTORY:  Past Surgical History:   Procedure Laterality Date     APPENDECTOMY       BOWEL RESECTION      illium      CERVIX LESION DESTRUCTION       DILATION AND CURETTAGE SUCTION, TREAT MISSED , 1ST TRIMESTER      miscarriage at 8 wks gest     DILATION AND CURETTAGE SUCTION, TREAT MISSED , 1ST TRIMESTER  2019     FASCIOTOMY LOWER EXTREMITY Left 2018    Left foot     FOOT SURGERY Left      LAPAROSCOPY DIAGNOSTIC (GYN) N/A 2019    Procedure: LAPAROSCOPY, DIAGNOSTIC;  Surgeon: Anni Velasquez MD;  Location:  OR     PLANTAR FASCIA RELEASE Left 4/10/2018    Procedure:  PLANTAR FASCIOTOMY LEFT FOOT ;  Surgeon: Yuri Arreola DPM;  Location: [facility];  Service:      SALPINGECTOMY Right 05/15/2021    rt sided ruptured ectopic     SMALL BOWEL RESECTION       TONSILLECTOMY             CURRENT MEDICATIONS:    Current Facility-Administered Medications   Medication     acetaminophen (TYLENOL) tablet 650 mg    Or     acetaminophen (TYLENOL) Suppository 650 mg     calcium carbonate (TUMS) chewable tablet 1,000 mg     calcium gluconate 10 % injection 1 g     labetalol (NORMODYNE/TRANDATE) injection 20 mg     lactated ringers infusion      lidocaine (LMX4) cream     lidocaine 1 % 0.1-1 mL     LORazepam (ATIVAN) injection 2 mg     magnesium sulfate 2 g in water intermittent infusion     magnesium sulfate 4 g in 50 mL sterile water (premade)     magnesium sulfate infusion     magnesium sulfate injection 10 g     melatonin tablet 1 mg     NIFEdipine (PROCARDIA) capsule 10-20 mg     ondansetron (ZOFRAN-ODT) ODT tab 4 mg    Or     ondansetron (ZOFRAN) injection 4 mg     senna-docusate (SENOKOT-S/PERICOLACE) 8.6-50 MG per tablet 1 tablet    Or     senna-docusate (SENOKOT-S/PERICOLACE) 8.6-50 MG per tablet 2 tablet     sodium chloride (PF) 0.9% PF flush 3 mL     sodium chloride (PF) 0.9% PF flush 3 mL     Current Outpatient Medications   Medication     acetaminophen (TYLENOL) 650 MG CR tablet     amLODIPine (NORVASC) 5 MG tablet     aspirin-acetaminophen-caffeine (EXCEDRIN MIGRAINE) 250-250-65 MG tablet     famotidine (PEPCID) 20 MG tablet     magnesium 250 MG tablet     Prenatal Vit-Fe Fumarate-FA (PRENATAL MULTIVITAMIN W/IRON) 27-0.8 MG tablet     ustekinumab (STELARA) 45 MG/0.5ML SOSY         ALLERGIES:  Allergies   Allergen Reactions     Gluten Meal      Humira [Humira]      Caused huge lumps at injection site.      Ibuprofen      Patient has Crohns disease and is unable to take this medication.     Latex Itching     Remicade [Infliximab] Rash       FAMILY HISTORY:  Family History   Problem Relation Age of Onset     Hypertension Mother      Seizure Disorder Mother      Asthma Brother      Asthma Brother      Asthma Brother      Asthma Brother      Cancer Maternal Grandmother         bone cancer     Asthma Son      Crohn's Disease Paternal Uncle      Diabetes No family hx of      Coronary Artery Disease No family hx of      Hyperlipidemia No family hx of      Cerebrovascular Disease No family hx of      Breast Cancer No family hx of      Colon Cancer No family hx of      Prostate Cancer No family hx of      Other Cancer No family hx of      Depression  No family hx of      Anxiety Disorder No family hx of      Mental Illness No family hx of      Substance Abuse No family hx of      Anesthesia Reaction No family hx of      Osteoporosis No family hx of      Genetic Disorder No family hx of      Thyroid Disease No family hx of      Obesity No family hx of      Unknown/Adopted No family hx of      Heart Disease No family hx of        SOCIAL HISTORY:   Social History     Socioeconomic History     Marital status: Single     Spouse name: Not on file     Number of children: 1     Years of education: 12     Highest education level: GED or equivalent   Occupational History     Occupation: student   Tobacco Use     Smoking status: Former Smoker     Packs/day: 0.03     Quit date: 2021     Years since quittin.3     Smokeless tobacco: Never Used     Tobacco comment: Vaping daily since 2021   Vaping Use     Vaping Use: Never used   Substance and Sexual Activity     Alcohol use: Not Currently     Comment: occ     Drug use: No     Sexual activity: Yes     Partners: Male   Other Topics Concern     Not on file   Social History Narrative     Not on file     Social Determinants of Health     Financial Resource Strain: Not on file   Food Insecurity: Not on file   Transportation Needs: Not on file   Physical Activity: Not on file   Stress: Not on file   Social Connections: Not on file   Intimate Partner Violence: Not on file   Housing Stability: Not on file       VITALS:  BP (!) 158/116 (BP Location: Right arm)   Pulse 91   Temp 97.4  F (36.3  C) (Temporal)   Resp 16   Wt 96.6 kg (213 lb)   LMP 07/15/2021   SpO2 97%   BMI 33.06 kg/m      PHYSICAL EXAM    Constitutional: Well developed, Well nourished, NAD.  HENT: Normocephalic, Atraumatic. Neck Supple.  Eyes: EOMI, Conjunctiva normal.  Respiratory: Breathing comfortably on room air. Speaks full sentences easily. Lungs clear to ascultation.  Cardiovascular: Normal heart rate, Regular rhythm. No peripheral  edema.  Abdomen: Soft, nontender  Musculoskeletal: Good range of motion in all major joints. No major deformities noted.  Integument: Warm, Dry.  Neurologic: Fully awake, alert, oriented.  Face is symmetric.  Speech is normal.  Cranial nerves II through XII intact.  Strength is 5 out of 5 throughout bilateral upper and lower extremities.  Sensation to light touch intact x4. Patient is ambulatory.  Psychiatric: Cooperative. Affect appropriate.     LAB:  All pertinent labs reviewed and interpreted.  Labs Ordered and Resulted from Time of ED Arrival to Time of ED Departure   COMPREHENSIVE METABOLIC PANEL - Abnormal       Result Value    Sodium 139      Potassium 4.2      Chloride 106      Carbon Dioxide (CO2) 21 (*)     Anion Gap 12      Urea Nitrogen 10      Creatinine 0.77      Calcium 9.5      Glucose 85      Alkaline Phosphatase 135 (*)     AST 14      ALT 16      Protein Total 7.1      Albumin 3.4 (*)     Bilirubin Total 0.4      GFR Estimate >90     ROUTINE UA WITH MICROSCOPIC REFLEX TO CULTURE - Abnormal    Color Urine Yellow      Appearance Urine Clear      Glucose Urine Negative      Bilirubin Urine Negative      Ketones Urine Negative      Specific Gravity Urine 1.023      Blood Urine 0.5 mg/dL (*)     pH Urine 7.0      Protein Albumin Urine 10  (*)     Urobilinogen Urine <2.0      Nitrite Urine Negative      Leukocyte Esterase Urine 500 Deniz/uL (*)     Bacteria Urine Few (*)     Mucus Urine Present (*)     RBC Urine 4 (*)     WBC Urine 14 (*)     Squamous Epithelials Urine 3 (*)    MAGNESIUM - Normal    Magnesium 1.9     CBC WITH PLATELETS AND DIFFERENTIAL    WBC Count 5.5      RBC Count 4.67      Hemoglobin 14.2      Hematocrit 40.9      MCV 88      MCH 30.4      MCHC 34.7      RDW 12.6      Platelet Count 276      % Neutrophils 44      % Lymphocytes 42      % Monocytes 12      % Eosinophils 1      % Basophils 0      % Immature Granulocytes 1      NRBCs per 100 WBC 0      Absolute Neutrophils 2.4       Absolute Lymphocytes 2.3      Absolute Monocytes 0.7      Absolute Eosinophils 0.1      Absolute Basophils 0.0      Absolute Immature Granulocytes 0.0      Absolute NRBCs 0.0     COVID-19 VIRUS (CORONAVIRUS) BY PCR   URINE CULTURE           I, Farrukh Nickolas, am serving as a scribe to document services personally performed by Dr. Francis Milner, based on my observation and the provider's statements to me. I, Francis Milner MD attest that Farrukh Olmos is acting in a scribe capacity, has observed my performance of the services and has documented them in accordance with my direction.    Francis Milner M.D.  Emergency Medicine  [facility] EMERGENCY ROOM  [address] [facility] [address] [phone]  Dept: [phone]     Francis Milner MD  04/08/22 1420

## 2022-04-08 NOTE — H&P
[facility]    History and Physical - Hospitalist Service       Date of Admission:  2022    Assessment & Plan      Kee Phipps is a 31 year old female  with hx of Crohns, admitted on 2022. She presents with headache, and changes of vision, with elevated blood pressure. She is postpartum, and had a  on 3/30 following IOL at 36&6 2/2 IUGR, oligohydramnios, and elevated S/D ratio with abnormal doppler cord findings. She was admitted on  with postpartum preeclampsia with severe features, currently on Magnesium infusion.     Postpartum Preeclampsia, Severe   Headache  Vision changes  Hypertensive to 160's systolic, 116 diastolic. Started on Amlodipine prior to discharge post partum, had negative Pre-E labs at that time. ED noted normal LFT's, creatine normal, mild proteinuria. Labetolol x 2 and Magnesium 4 G in ED.  - OB/GYN consulted, appreciate recs  - Preeclampsia Protocol, Severe    - Magnesium continuous 2 G/hr   - S/p Mg 4 G loading dose in ED   - Labetalol PRN   - Labetalol 100 mg BID scheduled   - Add on P/C ratio  - CBC, CMP in AM  - Hold PTA Amlodipine   - Routine postpartum cares  - PRN Tylenol    Leukocytes, WBC's on UA  Patient denies urinary symptoms. Afebrile. WBC normal.   - Urine culture pending    Chronic Medical Conditions:  Chrohns Disease: Stelara outpatient   Celiac Disease: Avoid gluten  GERD: Famotidine 20 mg daily       Diet: Combination Diet Regular Diet Adult    DVT Prophylaxis: Ambulate every shift  Molina Catheter: Not present  Fluids: IVF  Central Lines: None  Cardiac Monitoring: None  Code Status: Full Code        Disposition Plan   Expected Discharge: 1-2 days  Anticipated discharge location:  Awaiting care coordination huddle  Delays: 1-2 days pending clinical improvement       The patient's care was discussed with the Attending Physician, Dr. Jorge Luis Ch, Bedside Nurse, Patient and Patient's Family.    Lucy Gaytan MD PGY-1    Hospitalist Service  [facility]      ______________________________________________________________________    Chief Complaint   Headache, Elevated blood pressure reading at home, Vision changes    History is obtained from the patient    History of Present Illness   Kee Phipps is a 31 year old female  admitted on 2022. She presents with headache, and changes of vision, with elevated blood pressure. She is postpartum, and had a  on 3/30 following IOL at 36&6 2/2 IUGR, oligohydramnios, and elevated S/D ratio with abnormal doppler cord findings. Throughout her pregnancy, she had elevated blood pressures intermittently and reports being evaluated for Pre-E on 2 occasions. Following delivery, she was hypertensive, and again labs were check for Pre-E and reassuring at that time. She was discharged to home on amlodipine daily. She reports that shortly after discharge, she began having significant headaches. They have been constant and worsening since that time. She was seen at her PCP clinic yesterday. She was noted to be normotensive at that time and feeling well. She began having vision changes, and blurring of her vision last night, and checked her blood pressure this morning and it was reported in 170's. The vision changes and ongoing headache prompted her to come to the  ED.   On arrival in the ED, she was noted to be hypertensive 140's-160's/'s. She had normal LFT's and creatinine. UA did noted some mild proteinuria, as well as leukocytes >500. Given her severe range blood pressures, requiring treatment, visual changes, IV Labetalol x 2, and Mg Sulfate 4 G was given. OB/GYN was consulted and recommended admission for Pre-E management.   On my assessment, the patient was drowsy, but responsive to my questions. She reported her headache had improved. Denies SOB, CP, abdominal pain, or swelling of extremities. Her neurologic exam was otherwise normal, with no clonus  or hyperreflexia.     Review of Systems    CONSTITUTIONAL: NEGATIVE for fever, chills, change in weight  INTEGUMENTARY/SKIN: NEGATIVE for worrisome rashes, moles or lesions  EYES: blurred vision both  RESP: NEGATIVE for significant cough or SOB  CV: NEGATIVE for chest pain, palpitations or peripheral edema  GI: NEGATIVE for nausea, abdominal pain, heartburn, or change in bowel habits  : NEGATIVE for frequency, dysuria, or hematuria  MUSCULOSKELETAL: NEGATIVE for significant arthralgias or myalgia  NEURO: NEGATIVE for weakness, dizziness or paresthesias  PSYCHIATRIC: NEGATIVE for changes in mood    Past Medical History    I have reviewed this patient's medical history and updated it with pertinent information if needed.   Past Medical History:   Diagnosis Date     Abnormal Pap smear of cervix      Accelerated idioventricular rhythm (H) 2022     Anemia      Anxiety      Arthritis      Bilateral plantar fasciitis      Celiac disease 2014     Celiac disease      Crohn disease (H)      Crohn's disease (H) 2012     Depression      Depressive disorder      Fibromyalgia      Fibromyalgia      H/O miscarriage, currently pregnant     miscarriage .     HPV (human papilloma virus) infection      Migraine      Plantar fasciitis     bilateral     Postpartum hypertension 2022      delivery 2013    34 weeks. Early rupture of membranes       Past Surgical History   I have reviewed this patient's surgical history and updated it with pertinent information if needed.  Past Surgical History:   Procedure Laterality Date     APPENDECTOMY       BOWEL RESECTION      illium      CERVIX LESION DESTRUCTION       DILATION AND CURETTAGE SUCTION, TREAT MISSED , 1ST TRIMESTER      miscarriage at 8 wks gest     DILATION AND CURETTAGE SUCTION, TREAT MISSED , 1ST TRIMESTER  2019     FASCIOTOMY LOWER EXTREMITY Left 2018    Left foot     FOOT SURGERY Left      LAPAROSCOPY DIAGNOSTIC (GYN) N/A  08/16/2019    Procedure: LAPAROSCOPY, DIAGNOSTIC;  Surgeon: Anni Velasquez MD;  Location: [facility] OR     PLANTAR FASCIA RELEASE Left 4/10/2018    Procedure:  PLANTAR FASCIOTOMY LEFT FOOT ;  Surgeon: Yuri Arreola DPM;  Location: [facility];  Service:      SALPINGECTOMY Right 05/15/2021    rt sided ruptured ectopic     SMALL BOWEL RESECTION       TONSILLECTOMY          Social History   I have reviewed this patient's social history and updated it with pertinent information if needed. Kee Phipps  reports that she quit smoking about 4 months ago. She smoked 0.03 packs per day. She has never used smokeless tobacco. She reports previous alcohol use. She reports that she does not use drugs.    Family History   I have reviewed this patient's family history and updated it with pertinent information if needed.  Family History   Problem Relation Age of Onset     Hypertension Mother      Seizure Disorder Mother      Asthma Brother      Asthma Brother      Asthma Brother      Asthma Brother      Cancer Maternal Grandmother         bone cancer     Asthma Son      Crohn's Disease Paternal Uncle      Diabetes No family hx of      Coronary Artery Disease No family hx of      Hyperlipidemia No family hx of      Cerebrovascular Disease No family hx of      Breast Cancer No family hx of      Colon Cancer No family hx of      Prostate Cancer No family hx of      Other Cancer No family hx of      Depression No family hx of      Anxiety Disorder No family hx of      Mental Illness No family hx of      Substance Abuse No family hx of      Anesthesia Reaction No family hx of      Osteoporosis No family hx of      Genetic Disorder No family hx of      Thyroid Disease No family hx of      Obesity No family hx of      Unknown/Adopted No family hx of      Heart Disease No family hx of        Prior to Admission Medications   Prior to Admission Medications   Prescriptions Last Dose Informant Patient Reported?  Taking?   Prenatal Vit-Fe Fumarate-FA (PRENATAL MULTIVITAMIN W/IRON) 27-0.8 MG tablet 4/8/2022 at Unknown time  No Yes   Sig: Take 1 tablet by mouth daily   acetaminophen (TYLENOL) 650 MG CR tablet Past Week at Unknown time  No Yes   Sig: Take 1 tablet (650 mg) by mouth every 8 hours as needed for mild pain or fever   amLODIPine (NORVASC) 5 MG tablet 4/8/2022 at Unknown time  No Yes   Sig: Take 1 tablet (5 mg) by mouth daily   aspirin-acetaminophen-caffeine (EXCEDRIN MIGRAINE) 250-250-65 MG tablet 4/8/2022 at Unknown time  No Yes   Sig: Take 2 tablets by mouth every 6 hours as needed for headaches Do NOT take regular tylenol while taking this medicine   famotidine (PEPCID) 20 MG tablet Past Week at Unknown time  No Yes   Sig: Take 1 tablet (20 mg) by mouth 2 times daily   magnesium 250 MG tablet 4/8/2022 at Unknown time  Yes Yes   Sig: Take 1 tablet by mouth daily   ustekinumab (STELARA) 45 MG/0.5ML SOSY 3/11/2022  Yes No   Sig: Inject 45 mg Subcutaneous once       Facility-Administered Medications: None     Allergies   Allergies   Allergen Reactions     Gluten Meal      Humira [Humira]      Caused huge lumps at injection site.      Ibuprofen      Patient has Crohns disease and is unable to take this medication.     Latex Itching     Remicade [Infliximab] Rash       Physical Exam   Vital Signs: Temp: 97.4  F (36.3  C) Temp src: Temporal BP: (!) 158/116 Pulse: 91   Resp: 16 SpO2: 97 % O2 Device: None (Room air)    Weight: 213 lbs 0 oz    Physical Exam   Constitutional: Drowsy, awakens and is orientated x 3  Eyes: Lids and lashes normal, extra ocular muscles intact, sclera clear, conjunctiva normal.  ENT: Normocephalic, atraumatic   Neck: Supple, symmetrical, trachea midline, no adenopathy  Lungs: No increased work of breathing, good air exchange, clear to auscultation bilaterally, no crackles or wheezing.  Cardiovascular: Regular rate and rhythm, normal S1 and S2  Abdomen:   Musculoskeletal: Strength 5/5  Neurologic:  Drowsy but awakens. CN 2-12 intact. Strength 5/5 symmetric. Reflex +1-2, no clonus.  Neuropsychiatric: Normal affect, mood, orientation, memory and insight.  Skin: No rashes, erythema, pallor, petechia or purpura on exposed skin.       Recent Labs   Lab 04/08/22  1238   WBC 5.5   HGB 14.2   MCV 88         POTASSIUM 4.2   CHLORIDE 106   CO2 21*   BUN 10   CR 0.77   ANIONGAP 12   BRENDA 9.5   GLC 85   ALBUMIN 3.4*   PROTTOTAL 7.1   BILITOTAL 0.4   ALKPHOS 135*   ALT 16   AST 14     No results found for this or any previous visit (from the past 24 hour(s)).

## 2022-04-08 NOTE — LETTER
[facility] [address]  [address] [address]  Phone: [phone]  Fax: [phone]    April 8, 2022        Noriskathy ST[address]ES [address]  [address]  [place] [address]          To whom it may concern:    RE: Partner of Noriskathy DIVYA Phipps, Demetrio Roque    Kee Phipps was seen and treated at our hospital and her partner, Demetrio Roque, needs to miss work to assist with her care.    Please contact me for questions or concerns.      Sincerely,        Gildardo Minor MD

## 2022-04-08 NOTE — ED TRIAGE NOTES
Pt presents to the ED with c/o high blood pressure. Pt is post partum since 3/30. Pt had blood pressure concerns during the end of her pregnancy but was never dx with pre-eclampsia. Pt currently has HA and slightly blurred vision.

## 2022-04-09 LAB
ALBUMIN SERPL-MCNC: 3.2 G/DL (ref 3.5–5)
ALP SERPL-CCNC: 129 U/L (ref 45–120)
ALT SERPL W P-5'-P-CCNC: 14 U/L (ref 0–45)
ANION GAP SERPL CALCULATED.3IONS-SCNC: 12 MMOL/L (ref 5–18)
AST SERPL W P-5'-P-CCNC: 9 U/L (ref 0–40)
BILIRUB SERPL-MCNC: 0.3 MG/DL (ref 0–1)
BUN SERPL-MCNC: 6 MG/DL (ref 8–22)
CALCIUM SERPL-MCNC: 7.8 MG/DL (ref 8.5–10.5)
CHLORIDE BLD-SCNC: 106 MMOL/L (ref 98–107)
CO2 SERPL-SCNC: 22 MMOL/L (ref 22–31)
CREAT SERPL-MCNC: 0.67 MG/DL (ref 0.6–1.1)
ERYTHROCYTE [DISTWIDTH] IN BLOOD BY AUTOMATED COUNT: 12.8 % (ref 10–15)
GFR SERPL CREATININE-BSD FRML MDRD: >90 ML/MIN/1.73M2
GLUCOSE BLD-MCNC: 95 MG/DL (ref 70–125)
HCT VFR BLD AUTO: 40.7 % (ref 35–47)
HGB BLD-MCNC: 14 G/DL (ref 11.7–15.7)
MAGNESIUM SERPL-MCNC: 5.9 MG/DL (ref 1.8–2.6)
MCH RBC QN AUTO: 30.5 PG (ref 26.5–33)
MCHC RBC AUTO-ENTMCNC: 34.4 G/DL (ref 31.5–36.5)
MCV RBC AUTO: 89 FL (ref 78–100)
PLATELET # BLD AUTO: 257 10E3/UL (ref 150–450)
POTASSIUM BLD-SCNC: 3.6 MMOL/L (ref 3.5–5)
PROT SERPL-MCNC: 6.6 G/DL (ref 6–8)
RBC # BLD AUTO: 4.59 10E6/UL (ref 3.8–5.2)
SODIUM SERPL-SCNC: 140 MMOL/L (ref 136–145)
WBC # BLD AUTO: 6.7 10E3/UL (ref 4–11)

## 2022-04-09 PROCEDURE — 36415 COLL VENOUS BLD VENIPUNCTURE: CPT

## 2022-04-09 PROCEDURE — 99232 SBSQ HOSP IP/OBS MODERATE 35: CPT | Mod: 24

## 2022-04-09 PROCEDURE — 120N000001 HC R&B MED SURG/OB

## 2022-04-09 PROCEDURE — 83735 ASSAY OF MAGNESIUM: CPT | Performed by: OBSTETRICS & GYNECOLOGY

## 2022-04-09 PROCEDURE — 80053 COMPREHEN METABOLIC PANEL: CPT

## 2022-04-09 PROCEDURE — 258N000003 HC RX IP 258 OP 636: Performed by: STUDENT IN AN ORGANIZED HEALTH CARE EDUCATION/TRAINING PROGRAM

## 2022-04-09 PROCEDURE — 250N000013 HC RX MED GY IP 250 OP 250 PS 637

## 2022-04-09 PROCEDURE — 250N000013 HC RX MED GY IP 250 OP 250 PS 637: Performed by: FAMILY MEDICINE

## 2022-04-09 PROCEDURE — 85027 COMPLETE CBC AUTOMATED: CPT

## 2022-04-09 RX ORDER — LABETALOL 200 MG/1
200 TABLET, FILM COATED ORAL EVERY 12 HOURS
Qty: 60 TABLET | Refills: 0 | Status: CANCELLED | OUTPATIENT
Start: 2022-04-09

## 2022-04-09 RX ORDER — LABETALOL 100 MG/1
100 TABLET, FILM COATED ORAL ONCE
Status: COMPLETED | OUTPATIENT
Start: 2022-04-09 | End: 2022-04-09

## 2022-04-09 RX ORDER — LABETALOL 200 MG/1
200 TABLET, FILM COATED ORAL EVERY 12 HOURS
Qty: 60 TABLET | Refills: 0 | Status: SHIPPED | OUTPATIENT
Start: 2022-04-09 | End: 2022-04-11

## 2022-04-09 RX ORDER — LABETALOL 100 MG/1
200 TABLET, FILM COATED ORAL EVERY 12 HOURS SCHEDULED
Status: DISCONTINUED | OUTPATIENT
Start: 2022-04-09 | End: 2022-04-10

## 2022-04-09 RX ADMIN — SODIUM CHLORIDE, POTASSIUM CHLORIDE, SODIUM LACTATE AND CALCIUM CHLORIDE 75 ML/HR: 600; 310; 30; 20 INJECTION, SOLUTION INTRAVENOUS at 05:31

## 2022-04-09 RX ADMIN — SENNOSIDES AND DOCUSATE SODIUM 1 TABLET: 50; 8.6 TABLET ORAL at 20:24

## 2022-04-09 RX ADMIN — SENNOSIDES AND DOCUSATE SODIUM 1 TABLET: 50; 8.6 TABLET ORAL at 07:53

## 2022-04-09 RX ADMIN — ACETAMINOPHEN 650 MG: 325 TABLET ORAL at 20:45

## 2022-04-09 RX ADMIN — LABETALOL HYDROCHLORIDE 200 MG: 100 TABLET, FILM COATED ORAL at 20:23

## 2022-04-09 RX ADMIN — LABETALOL HYDROCHLORIDE 100 MG: 100 TABLET, FILM COATED ORAL at 11:54

## 2022-04-09 RX ADMIN — LABETALOL HYDROCHLORIDE 100 MG: 100 TABLET, FILM COATED ORAL at 07:53

## 2022-04-09 ASSESSMENT — ACTIVITIES OF DAILY LIVING (ADL)
ADLS_ACUITY_SCORE: 3

## 2022-04-09 NOTE — PROGRESS NOTES
[facility] PROGRESS NOTE    HD # 1, admitted for delayed PP preeclampsia with severe features     Subjective:  Patient is doing well since admission. States that headache has resolved but she has been having a lot of lethargy with magnesium.  She had some double vission this morning that is improving.    Objective:  BP (!) 144/84   Pulse 83   Temp 97.9  F (36.6  C) (Oral)   Resp 17   Wt 96.6 kg (213 lb)   LMP 07/15/2021   SpO2 99%   BMI 33.06 kg/m    General: no apparent distress   Abdomen:  Soft. Tender to palpation   Ext: No edema or pain.    Labs:  Results for orders placed or performed during the hospital encounter of 04/08/22   Comprehensive metabolic panel     Status: Abnormal   Result Value Ref Range    Sodium 139 136 - 145 mmol/L    Potassium 4.2 3.5 - 5.0 mmol/L    Chloride 106 98 - 107 mmol/L    Carbon Dioxide (CO2) 21 (L) 22 - 31 mmol/L    Anion Gap 12 5 - 18 mmol/L    Urea Nitrogen 10 8 - 22 mg/dL    Creatinine 0.77 0.60 - 1.10 mg/dL    Calcium 9.5 8.5 - 10.5 mg/dL    Glucose 85 70 - 125 mg/dL    Alkaline Phosphatase 135 (H) 45 - 120 U/L    AST 14 0 - 40 U/L    ALT 16 0 - 45 U/L    Protein Total 7.1 6.0 - 8.0 g/dL    Albumin 3.4 (L) 3.5 - 5.0 g/dL    Bilirubin Total 0.4 0.0 - 1.0 mg/dL    GFR Estimate >90 >60 mL/min/1.73m2   UA with Microscopic reflex to Culture     Status: Abnormal    Specimen: Urine, Midstream   Result Value Ref Range    Color Urine Yellow Colorless, Straw, Light Yellow, Yellow    Appearance Urine Clear Clear    Glucose Urine Negative Negative mg/dL    Bilirubin Urine Negative Negative    Ketones Urine Negative Negative mg/dL    Specific Gravity Urine 1.023 1.001 - 1.030    Blood Urine 0.5 mg/dL (A) Negative    pH Urine 7.0 5.0 - 7.0    Protein Albumin Urine 10  (A) Negative mg/dL    Urobilinogen Urine <2.0 <2.0 mg/dL    Nitrite Urine Negative Negative    Leukocyte Esterase Urine 500 Deniz/uL (A) Negative    Bacteria Urine Few (A) None Seen /HPF    Mucus Urine Present (A) None  Seen /LPF    RBC Urine 4 (H) <=2 /HPF    WBC Urine 14 (H) <=5 /HPF    Squamous Epithelials Urine 3 (H) <=1 /HPF    Narrative    Urine Culture ordered based on laboratory criteria   Magnesium     Status: Normal   Result Value Ref Range    Magnesium 1.9 1.8 - 2.6 mg/dL   CBC with platelets and differential     Status: None   Result Value Ref Range    WBC Count 5.5 4.0 - 11.0 10e3/uL    RBC Count 4.67 3.80 - 5.20 10e6/uL    Hemoglobin 14.2 11.7 - 15.7 g/dL    Hematocrit 40.9 35.0 - 47.0 %    MCV 88 78 - 100 fL    MCH 30.4 26.5 - 33.0 pg    MCHC 34.7 31.5 - 36.5 g/dL    RDW 12.6 10.0 - 15.0 %    Platelet Count 276 150 - 450 10e3/uL    % Neutrophils 44 %    % Lymphocytes 42 %    % Monocytes 12 %    % Eosinophils 1 %    % Basophils 0 %    % Immature Granulocytes 1 %    NRBCs per 100 WBC 0 <1 /100    Absolute Neutrophils 2.4 1.6 - 8.3 10e3/uL    Absolute Lymphocytes 2.3 0.8 - 5.3 10e3/uL    Absolute Monocytes 0.7 0.0 - 1.3 10e3/uL    Absolute Eosinophils 0.1 0.0 - 0.7 10e3/uL    Absolute Basophils 0.0 0.0 - 0.2 10e3/uL    Absolute Immature Granulocytes 0.0 <=0.4 10e3/uL    Absolute NRBCs 0.0 10e3/uL   Asymptomatic COVID-19 Virus (Coronavirus) by PCR Nasopharyngeal     Status: Normal    Specimen: Nasopharyngeal; Swab   Result Value Ref Range    SARS CoV2 PCR Negative Negative    Narrative    Testing was performed using the keny  SARS-CoV-2 & Influenza A/B Assay on the keny  Lisa  System.  This test should be ordered for the detection of SARS-COV-2 in individuals who meet SARS-CoV-2 clinical and/or epidemiological criteria. Test performance is unknown in asymptomatic patients.  This test is for in vitro diagnostic use under the FDA EUA for laboratories certified under CLIA to perform moderate and/or high complexity testing. This test has not been FDA cleared or approved.  A negative test does not rule out the presence of PCR inhibitors in the specimen or target RNA in concentration below the limit of detection for the  assay. The possibility of a false negative should be considered if the patient's recent exposure or clinical presentation suggests COVID-19.  [facility] are certified under the Clinical Laboratory Improvement Amendments of 1988 (CLIA-88) as qualified to perform moderate and/or high complexity laboratory testing.   Protein  random urine     Status: None   Result Value Ref Range    Total Protein Urine mg/dL 12.1 mg/dL    Total Protein UR MG/MG CR 0.09 mg/mg Cr    Creatinine Urine mg/dL 133 mg/dL    Narrative    The reference range has not been established for total protein, creatinine and the protein mg/mg creatinine  in random urine samples. The result should be integrated into the clinical context for interpretation.     The reference range has not been established for creatinine and the protein mg/mg creatinine in random urine samples. The result should be integrated into the clinical context for interpretation.     Magnesium     Status: Abnormal   Result Value Ref Range    Magnesium 5.3 (HH) 1.8 - 2.6 mg/dL   Comprehensive metabolic panel     Status: Abnormal   Result Value Ref Range    Sodium 140 136 - 145 mmol/L    Potassium 3.6 3.5 - 5.0 mmol/L    Chloride 106 98 - 107 mmol/L    Carbon Dioxide (CO2) 22 22 - 31 mmol/L    Anion Gap 12 5 - 18 mmol/L    Urea Nitrogen 6 (L) 8 - 22 mg/dL    Creatinine 0.67 0.60 - 1.10 mg/dL    Calcium 7.8 (L) 8.5 - 10.5 mg/dL    Glucose 95 70 - 125 mg/dL    Alkaline Phosphatase 129 (H) 45 - 120 U/L    AST 9 0 - 40 U/L    ALT 14 0 - 45 U/L    Protein Total 6.6 6.0 - 8.0 g/dL    Albumin 3.2 (L) 3.5 - 5.0 g/dL    Bilirubin Total 0.3 0.0 - 1.0 mg/dL    GFR Estimate >90 >60 mL/min/1.73m2   CBC with platelets     Status: Normal   Result Value Ref Range    WBC Count 6.7 4.0 - 11.0 10e3/uL    RBC Count 4.59 3.80 - 5.20 10e6/uL    Hemoglobin 14.0 11.7 - 15.7 g/dL    Hematocrit 40.7 35.0 - 47.0 %    MCV 89 78 - 100 fL    MCH 30.5 26.5 - 33.0 pg    MCHC 34.4 31.5 - 36.5 g/dL     RDW 12.8 10.0 - 15.0 %    Platelet Count 257 150 - 450 10e3/uL   Magnesium     Status: Abnormal   Result Value Ref Range    Magnesium 5.9 (HH) 1.8 - 2.6 mg/dL   Urine Culture     Status: None (Preliminary result)    Specimen: Urine, Midstream   Result Value Ref Range    Culture No growth, less than 1 day    CBC with platelets + differential     Status: None    Narrative    The following orders were created for panel order CBC with platelets + differential.  Procedure                               Abnormality         Status                     ---------                               -----------         ------                     CBC with platelets and d...[[id number]]                      Final result                 Please view results for these tests on the individual orders.       Assement:  32yo PPD10 pre-e with severe features, currently stable    Plan:   Preeclampsia with severe features  - Magnesium started 4/8 at  1230. Now at 1g/h due to symptoms  - Bps severe range upon arrival, now mild range, patient now asymptomatic  - Labetalol increased to 200 BID   - Labs wnl    Plan to stop magnesium at 24 hours and then monitor Bps to assess for discharge home this evening.      Maricruz Hayes MD

## 2022-04-09 NOTE — PLAN OF CARE
Problem: Plan of Care - These are the overarching goals to be used throughout the patient stay.    Goal: Plan of Care Review/Shift Note  Description: The Plan of Care Review/Shift note should be completed every shift.  The Outcome Evaluation is a brief statement about your assessment that the patient is improving, declining, or no change.  This information will be displayed automatically on your shift note.  4/9/2022 0116 by Winsome Akins, RN  Outcome: Ongoing, Progressing  Flowsheets (Taken 4/9/2022 0116)  Plan of Care Reviewed With: patient  Overall Patient Progress: improving  4/8/2022 2014 by Winsome Akins RN  Flowsheets (Taken 4/8/2022 2014)  Plan of Care Reviewed With: patient  Overall Patient Progress: improving     Problem: Hypertension Comorbidity  Goal: Blood Pressure in Desired Range  Outcome: Ongoing, Progressing     Problem: Plan of Care - These are the overarching goals to be used throughout the patient stay.    Goal: Absence of Hospital-Acquired Illness or Injury  Intervention: Identify and Manage Fall Risk  Recent Flowsheet Documentation  Taken 4/8/2022 1930 by Winsome Akins RN  Safety Promotion/Fall Prevention:   clutter free environment maintained   lighting adjusted   nonskid shoes/slippers when out of bed  Intervention: Prevent Skin Injury  Recent Flowsheet Documentation  Taken 4/8/2022 1930 by Winsome Akins RN  Body Position: position changed independently  Intervention: Prevent and Manage VTE (Venous Thromboembolism) Risk  Recent Flowsheet Documentation  Taken 4/8/2022 1930 by Winsome Akins RN  Activity Management: activity adjusted per tolerance  Intervention: Prevent Infection  Recent Flowsheet Documentation  Taken 4/8/2022 1930 by Winsome Akins, RN  Infection Prevention: rest/sleep promoted  Goal: Optimal Comfort and Wellbeing  Intervention: Provide Person-Centered Care  Recent Flowsheet Documentation  Taken 4/8/2022 1930 by Winsome Akins, HOMERO  Trust  Relationship/Rapport:   care explained   choices provided   emotional support provided   questions answered   empathic listening provided   questions encouraged   reassurance provided   thoughts/feelings acknowledged  Goal: Readiness for Transition of Care  Intervention: Mutually Develop Transition Plan  Recent Flowsheet Documentation  Taken 4/8/2022 1151 by Winsome Akins, RN  Equipment Currently Used at Home: none

## 2022-04-09 NOTE — PROGRESS NOTES
[facility]    Progress Note - Hospitalist Service       Date of Admission:  2022    Assessment & Plan          Kee Phipps is a 31 year old female  with hx of Crohns, admitted on 2022. She presents with headache, and changes of vision, with elevated blood pressure. She is postpartum, and had a  on 3/30 following IOL at 36&6 2/2 IUGR, oligohydramnios, and elevated S/D ratio with abnormal doppler cord findings. She was admitted on  with postpartum preeclampsia with severe features, currently on Magnesium infusion and improving.      Postpartum Preeclampsia, Severe   Headache  Vision changes  Hypertensive to 160's systolic, 116 diastolic. Started on Amlodipine prior to discharge post partum, had negative Pre-E labs at that time. ED noted normal LFT's, creatine normal, mild proteinuria. Labetolol x 2 and Magnesium 4 G in ED. Magnesium infusion overnight. BP still elevated, but symptoms improved. P/C ration 0.09.   - OB/GYN consulted, appreciate recs  - Preeclampsia Protocol, Severe               - Magnesium continuous 1 G/hr for 24 hours              - S/p Mg 4 G loading dose in ED              - Labetalol PRN   - Increase Labetalol to 200 mg BID  - CBC, CMP in AM  - Discontinue PTA Amlodipine, now on Labetalol  - Routine postpartum cares  - PRN Tylenol     Leukocytes, WBC's on UAe  Patient denies urinary symptoms. Afebrile. WBC normal.   - Urine culture no growth     Chronic Medical Conditions:  Chrohns Disease: Stelara outpatient   Celiac Disease: Avoid gluten  GERD: Famotidine 20 mg daily     Diet: Combination Diet Regular Diet Adult    DVT Prophylaxis: Ambulate every shift  Molina Catheter: Not present  Fluids: PO  Central Lines: None  Cardiac Monitoring: None  Code Status: Full Code      Disposition Plan   Expected Discharge: 22  Anticipated discharge location:  Awaiting care coordination huddle  Delays: Possibly later today vs tomorrow       The patient's  care was discussed with the Attending Physician, Dr. Jorge Luis Ch, Bedside Nurse, Patient and IHOB Consultant.    Lucy Gaytan MD  Hospitalist Service  [facility]  ______________________________________________________________________    Interval History   Feeling much better. No headache. Vision improved, less blurry. About to take a bath. Hoping to go home later today.     Physical Exam   Vital Signs: Temp: 97.9  F (36.6  C) Temp src: Oral BP: (!) 155/106 Pulse: 93   Resp: 17 SpO2: 99 % O2 Device: None (Room air)    Weight: 213 lbs 0 oz  General Appearance: Alert, well appearing  Respiratory: CTA  Cardiovascular: Regular rate and rhythm  GI: Soft, non tender  Other: Neurologically intact, no clonus, no hyperreflexia +1-2 patellar reflexes     Data   Recent Labs   Lab 04/09/22  0610 04/08/22  1238   WBC 6.7 5.5   HGB 14.0 14.2   MCV 89 88    276    139   POTASSIUM 3.6 4.2   CHLORIDE 106 106   CO2 22 21*   BUN 6* 10   CR 0.67 0.77   ANIONGAP 12 12   BRENDA 7.8* 9.5   GLC 95 85   ALBUMIN 3.2* 3.4*   PROTTOTAL 6.6 7.1   BILITOTAL 0.3 0.4   ALKPHOS 129* 135*   ALT 14 16   AST 9 14     No results found for this or any previous visit (from the past 24 hour(s)).

## 2022-04-09 NOTE — CONSULTS
Consult Date: 2022    HISTORY:  The patient is a 31-year-old  8, para 0-2-6-2 who delivered approximately 8 days ago.  Apparently, she did have some blood pressure events but was not sent home on medication, but was instructed to do her blood pressure.  She came in today because her pressure at home was in the 170s/100s, and she was admitted and started on mag.  The patient states she did have a headache, but that is now gone.  She only gained 15 pounds with the pregnancy, delivered at a gestation of 36 and 6/7 weeks and baby weighed 4 pounds.  She had no other problems with the pregnancy.    PAST MEDICAL HISTORY:    1.  Hospitalizations:   x2, oldest child is 8 years old.  2.  Surgeries:  She had a bowel obstruction due to Crohn's tube also had a ganglion cyst removed from her foot.    MEDICATIONS:  Stelara and she was sent home on a blood pressure medication, does not know which one, and she was taking prenatal vitamins.    HABITS:  Does not smoke or drink.    SOCIAL HISTORY:  Is not presently working.    FAMILY HISTORY:  Mother does have high blood pressure.    ILLNESSES:  Denies history of high blood pressure, other than end of pregnancy, heart disease, lung disease, kidney disease, diabetes, gallstones, migraine headaches, blood clots, liver problems or asthma, but she does have a history of Crohn's.    PHYSICAL EXAMINATION:      VITAL SIGNS:  Blood pressure since admission has been as high as 179 systolic and the highest diastolic 116.  After mag and IV labetalol in the ER and oral labetalol on the floor,  her most recent blood pressure is 131/67.  LUNGS:  Clear.  CARDIOVASCULAR:  Normal S1, S2, without murmur.  Reflexes normal.  +1 pitting edema.    LABORATORY DATA:  Her potassium is 4.2, magnesium prior to admission and prior to mag was 1.9.  Her ALT is 16, AST is 14, hemoglobin 14.2, platelets 276,000.  The patient is O positive.  COVID is negative.  Protein/creatinine ratio is  0.09.    ASSESSMENT:  Postpartum hypertension, normal labs, started on mag. Blood pressure, stable on IV labetalol and now 100 p.o. b.i.d.    PLAN:  Will continue to evaluate blood pressures.  If they elevate, would not hesitate to give her a higher dose of labetalol and also will check a mag level.    Marybeth Lira MD        D: 2022   T: 2022   MT: barbara    Name:     HARSHAL EDMONDS  MRN:      [id number]        Account:      [id number]   :      1990           Consult Date: 2022     Document: [id number]    cc:  Tony Miguel

## 2022-04-09 NOTE — DISCHARGE SUMMARY
[facility]  Discharge Summary - Medicine & Pediatrics       Date of Admission:  2022  Date of Discharge:  2022  Discharging Provider: Mounika Gibson MD/Lucy Gaytan MD PGY-1  Discharge Service: Hospitalist Service    Discharge Diagnoses   Post partum preeclampsia with severe features  Post partum depression    Follow-ups Needed After Discharge   Follow up with PCP within 2-3 days for blood pressure check and mood check.     Unresulted Labs Ordered in the Past 30 Days of this Admission     No orders found from 3/9/2022 to 2022.      These results will be followed up by [facility] and forwarded to PCP.     Discharge Disposition   Discharged to home  Condition at discharge: Stable    Hospital Course     Kee Phipps is a 31 year old female  with hx of Crohns, admitted on 2022. She presented with headache, and changes of vision, with elevated blood pressure. She was postpartum, and had a  on 3/30 following IOL at 36&6 2/2 IUGR, oligohydramnios, and elevated S/D ratio with abnormal doppler cord findings. She was admitted on  with postpartum preeclampsia with severe features. She was initially hypertensive to 160's systolic, 116 diastolic. OB/GYN was consulted in the ED. ED noted normal LFT's, creatine normal, mild proteinuria. Labetolol x 2 and Magnesium 4 G given. P/C ration 0.09. She was admitted for magnesium continuous infusion and blood pressure management. She was started on Labetalol 100 mg BID initially. Symptoms improved overnight with resolution of her headache and improvement in her vision. Blood pressures remained elevated to 140/150's/90's. Labetalol was increased to 200 mg BID. Magnesium was stopped after 24 hours. She had recurrence of symptoms with blurring vision and elevated blood pressure to 190's. Labetalol was increased to 400 mg TID. She then had improvement in blood pressure and symptoms for 24 hours. On day of discharge, her post partum  depression screening was discussed again. She was interested in starting antidepressant medication at this time. We initiated Sertraline 25 mg daily with recommendation to increase to 50 mg daily after 7 days. Patient discharged to home in stable condition. We discussed twice daily blood pressure monitoring and to call the clinic with /100 or greater.   Would recommend initiating Nifedipine 30 mg ER if patient has blood pressures 150's/100's or higher to avoid severe range pressures. Recommend follow up every 1-2 weeks as her blood pressure hopefully normalizes.     Consultations This Hospital Stay   OB GYN IP CONSULT    Code Status   Prior       The patient was discussed with Dr. Mounika Gibson, and Dr. Julien, OB.     Lucy Gaytan MD  [facility]  [address] [address]  Phone: [phone]  Fax: [phone]  ______________________________________________________________________    Physical Exam   Vital Signs: Temp: 98.5  F (36.9  C) Temp src: Oral BP: 126/78 Pulse: 80   Resp: 16 SpO2: 96 % O2 Device: None (Room air)    Weight: 213 lbs 0 oz  General Appearance: Alert, orientated, no headache or visual changes since yesterday morning.   Respiratory: CTA  Cardiovascular: Regular rate and rhythm  GI: Soft, non tender  Skin: No concerns2  Neurologic: Awake, alert, reflexes +1, symmetric, no clonus       Primary Care Physician   Stormy Palacios    Discharge Orders      Reason for your hospital stay    Postpartum preeclampsia with severe features     Activity    Your activity upon discharge: activity as tolerated     Follow-up and recommended labs and tests     Follow up with primary care provider, Stormy Palacios, within 3-4 days to evaluate medication change and for hospital follow- up.  No follow up labs or test are needed. Follow up in 3-4 days for blood pressure check and mood check.     Diet    Follow this diet upon  discharge: Orders Placed This Encounter      Combination Diet Regular Diet Adult       Significant Results and Procedures   Most Recent 3 CBC's:  Recent Labs   Lab Test 04/10/22  0628 04/09/22  0610 04/08/22  1238   WBC 5.5 6.7 5.5   HGB 14.5 14.0 14.2   MCV 92 89 88    257 276     Most Recent 3 BMP's:  Recent Labs   Lab Test 04/10/22  0628 04/09/22  0610 04/08/22  1238    140 139   POTASSIUM 4.4 3.6 4.2   CHLORIDE 108* 106 106   CO2 20* 22 21*   BUN 9 6* 10   CR 0.68 0.67 0.77   ANIONGAP 11 12 12   BRENDA 9.1 7.8* 9.5   GLC 87 95 85     Most Recent 2 LFT's:  Recent Labs   Lab Test 04/10/22  0628 04/09/22  0610   AST 8 9   ALT 15 14   ALKPHOS 133* 129*   BILITOTAL 0.4 0.3     Most Recent Urinalysis:  Recent Labs   Lab Test 04/08/22  1241 10/30/21  2157 10/13/21  1339   COLOR Yellow   < > Yellow   APPEARANCE Clear   < > Clear   URINEGLC Negative   < > Negative   URINEBILI Negative   < > Negative   URINEKETONE Negative   < > Trace*   SG 1.023   < > 1.025   UBLD 0.5 mg/dL*   < > Negative   URINEPH 7.0   < > 6.0   PROTEIN 10 *   < > Negative   UROBILINOGEN  --   --  0.2   NITRITE Negative   < > Negative   LEUKEST 500 Deniz/uL*   < > Negative   RBCU 4*   < >  --    WBCU 14*   < >  --     < > = values in this interval not displayed.   , Urine culture with no growth after 1 day.     Results for orders placed or performed in visit on 03/28/22   US OB Biophys Single Gestation Measure    Narrative    EXAM: US OB BIOPHYS SINGLE GESTATION W MEASURE  LOCATION: [facility]  DATE/TIME: 3/28/2022 3:00 PM    INDICATION: BPP and NST  COMPARISON: None.    FINDINGS:  Single living fetus, cephalic presentation.    HEART RATE: 128 bpm.  SDP 2.5 cm. CUONG is 4.1 cm.  PLACENTA: Anterior. No previa.  CERVIX: Not visualized.    CORD DOPPLER: S/D ratio: 3.4-5.7. There is decreased diastolic flow in the umbilical arteries with a higher than normal resistance waveform. Normal SD ratio is less than 4.0.    2/2 fetal  breathing  2/2 fetal movements  2/2 fetal tone  2/2 amniotic fluid     Total biophysical profile 8/8    BIOMETRY:  Biparietal Diameter: 8.6 cm, 34 weeks 3 days 5th percentile  Head Circumference: 31.1 cm, 34 weeks 5 days less than 3rd percentile  Abdominal Circumference: 30.6 cm, 34 weeks 4 days 15th percentile  Femur Length: 6.3 cm, 32 weeks 5 days less than 3rd percentile    Estimated Fetal Weight: 2337 g g  EFW Percentile: Less than 3 percent for gestational age of 37 weeks 2 days  Cervical Length: Not visualized    EDC by First US exam: 04/16/2022  EDC by This US exam: 04/21/2022    Composite Age by First US: 37 weeks 2 days   Composite Age by This US: 36 weeks 4 days      Impression    IMPRESSION:  1.  Normal 8/8 biophysical profile.  2.  Probable oligohydramnios with an CUONG of 4.1 cm.  3.  Abnormal peak systolic to end-diastolic ratios of the umbilical artery with a abnormally high resistance waveforms in the umbilical arteries.  4.  Single living intrauterine gestation.  5.  Based on this ultrasound, composite age of 36 weeks 4 days with EDC 04/16/2022.  6.  There has been less than expected growth. From the first ultrasound the composite age expected is 37 weeks 2 days with EDC of 04/16/2022. Estimated fetal weight is less than 3% for gestational age of 37 weeks 2 days and is at the 5 5th percentile for   age of 36 weeks 4 days.  7.  Dr. Victoria has been paged for results.   US Umbilical Artery    Narrative    EXAM: US UMBILICAL ARTERY  LOCATION: [facility]  DATE/TIME: 3/28/2022 3:00 PM    INDICATION: Crohn's disease of small and large intestines with complication (H), Supervision of high-risk pregnancy in third trimester.  COMPARISON: 03/21/2022 and priors to 08/23/2021.    FINDINGS: Single living fetus, cephalic presentation.    CORD DOPPLER: S/D ratio: 3.4-5.7. There is decreased diastolic flow in the umbilical arteries with a higher than normal resistance waveform. Normal SD ratio  is less than 4.0.      Impression    IMPRESSION:  1.  Single living intrauterine gestation.  2.  Abnormal peak systolic to end-diastolic ratios of the umbilical artery with a abnormally high resistance waveforms in the umbilical arteries.  3.  Please refer to previously reported same day 03/28/2022 obstetric ultrasound with growth biometry and biophysical profile.       Discharge Medications   Discharge Medication List as of 4/11/2022 10:28 AM      START taking these medications    Details   sertraline (ZOLOFT) 50 MG tablet Take 1 tablet (50 mg) by mouth in the morning. Take 0.5 tablet (25 mg) daily for 7 days and then increase to 1 tablet (50 mg daily)., Disp-30 tablet, R-0, E-Prescribe         CONTINUE these medications which have CHANGED    Details   labetalol (NORMODYNE) 200 MG tablet Take 2 tablets (400 mg) by mouth in the morning and 2 tablets (400 mg) at noon and 2 tablets (400 mg) in the evening., Disp-180 tablet, R-0, E-Prescribe         CONTINUE these medications which have NOT CHANGED    Details   acetaminophen (TYLENOL) 650 MG CR tablet Take 1 tablet (650 mg) by mouth every 8 hours as needed for mild pain or fever, Disp-100 tablet, R-0, E-Prescribe      aspirin-acetaminophen-caffeine (EXCEDRIN MIGRAINE) 250-250-65 MG tablet Take 2 tablets by mouth every 6 hours as needed for headaches Do NOT take regular tylenol while taking this medicine, Disp-120 tablet, R-0, E-Prescribe      famotidine (PEPCID) 20 MG tablet Take 1 tablet (20 mg) by mouth 2 times daily, Disp-60 tablet, R-3, E-Prescribe      magnesium 250 MG tablet Take 1 tablet by mouth daily, Historical      Prenatal Vit-Fe Fumarate-FA (PRENATAL MULTIVITAMIN W/IRON) 27-0.8 MG tablet Take 1 tablet by mouth daily, Disp-90 tablet, R-3, E-Prescribe      ustekinumab (STELARA) 45 MG/0.5ML SOSY Inject 45 mg Subcutaneous once , Historical         STOP taking these medications       amLODIPine (NORVASC) 5 MG tablet Comments:   Reason for Stopping:              Allergies   Allergies   Allergen Reactions     Gluten Meal      Humira [Humira]      Caused huge lumps at injection site.      Ibuprofen      Patient has Crohns disease and is unable to take this medication.     Latex Itching     Remicade [Infliximab] Rash

## 2022-04-09 NOTE — DISCHARGE INSTRUCTIONS
Continue to check your blood pressure at home daily. Call the clinic for systolic blood pressure >160 or diastolic blood pressure >90.  Call for severe headache, not relieved with Tylenol or changes in vision.

## 2022-04-10 LAB
ALBUMIN SERPL-MCNC: 3.1 G/DL (ref 3.5–5)
ALP SERPL-CCNC: 133 U/L (ref 45–120)
ALT SERPL W P-5'-P-CCNC: 15 U/L (ref 0–45)
ANION GAP SERPL CALCULATED.3IONS-SCNC: 11 MMOL/L (ref 5–18)
AST SERPL W P-5'-P-CCNC: 8 U/L (ref 0–40)
BACTERIA UR CULT: NORMAL
BILIRUB SERPL-MCNC: 0.4 MG/DL (ref 0–1)
BUN SERPL-MCNC: 9 MG/DL (ref 8–22)
CALCIUM SERPL-MCNC: 9.1 MG/DL (ref 8.5–10.5)
CHLORIDE BLD-SCNC: 108 MMOL/L (ref 98–107)
CO2 SERPL-SCNC: 20 MMOL/L (ref 22–31)
CREAT SERPL-MCNC: 0.68 MG/DL (ref 0.6–1.1)
ERYTHROCYTE [DISTWIDTH] IN BLOOD BY AUTOMATED COUNT: 12.8 % (ref 10–15)
GFR SERPL CREATININE-BSD FRML MDRD: >90 ML/MIN/1.73M2
GLUCOSE BLD-MCNC: 87 MG/DL (ref 70–125)
HCT VFR BLD AUTO: 43 % (ref 35–47)
HGB BLD-MCNC: 14.5 G/DL (ref 11.7–15.7)
MCH RBC QN AUTO: 30.9 PG (ref 26.5–33)
MCHC RBC AUTO-ENTMCNC: 33.7 G/DL (ref 31.5–36.5)
MCV RBC AUTO: 92 FL (ref 78–100)
PLATELET # BLD AUTO: 242 10E3/UL (ref 150–450)
POTASSIUM BLD-SCNC: 4.4 MMOL/L (ref 3.5–5)
PROT SERPL-MCNC: 6.3 G/DL (ref 6–8)
RBC # BLD AUTO: 4.69 10E6/UL (ref 3.8–5.2)
SODIUM SERPL-SCNC: 139 MMOL/L (ref 136–145)
WBC # BLD AUTO: 5.5 10E3/UL (ref 4–11)

## 2022-04-10 PROCEDURE — 80053 COMPREHEN METABOLIC PANEL: CPT

## 2022-04-10 PROCEDURE — 250N000013 HC RX MED GY IP 250 OP 250 PS 637

## 2022-04-10 PROCEDURE — 250N000013 HC RX MED GY IP 250 OP 250 PS 637: Performed by: STUDENT IN AN ORGANIZED HEALTH CARE EDUCATION/TRAINING PROGRAM

## 2022-04-10 PROCEDURE — 120N000001 HC R&B MED SURG/OB

## 2022-04-10 PROCEDURE — 85027 COMPLETE CBC AUTOMATED: CPT

## 2022-04-10 PROCEDURE — 99232 SBSQ HOSP IP/OBS MODERATE 35: CPT | Mod: 24

## 2022-04-10 PROCEDURE — 36415 COLL VENOUS BLD VENIPUNCTURE: CPT

## 2022-04-10 PROCEDURE — 250N000011 HC RX IP 250 OP 636

## 2022-04-10 RX ORDER — LABETALOL 100 MG/1
200 TABLET, FILM COATED ORAL ONCE
Status: COMPLETED | OUTPATIENT
Start: 2022-04-10 | End: 2022-04-10

## 2022-04-10 RX ORDER — HYDROXYZINE HYDROCHLORIDE 25 MG/1
50 TABLET, FILM COATED ORAL EVERY 6 HOURS PRN
Status: DISCONTINUED | OUTPATIENT
Start: 2022-04-10 | End: 2022-04-11 | Stop reason: HOSPADM

## 2022-04-10 RX ORDER — LABETALOL 100 MG/1
400 TABLET, FILM COATED ORAL EVERY 8 HOURS SCHEDULED
Status: DISCONTINUED | OUTPATIENT
Start: 2022-04-10 | End: 2022-04-11 | Stop reason: HOSPADM

## 2022-04-10 RX ORDER — HYDRALAZINE HYDROCHLORIDE 20 MG/ML
5 INJECTION INTRAMUSCULAR; INTRAVENOUS ONCE
Status: DISCONTINUED | OUTPATIENT
Start: 2022-04-10 | End: 2022-04-10

## 2022-04-10 RX ORDER — HYDRALAZINE HYDROCHLORIDE 20 MG/ML
5-10 INJECTION INTRAMUSCULAR; INTRAVENOUS
Status: DISCONTINUED | OUTPATIENT
Start: 2022-04-10 | End: 2022-04-11 | Stop reason: HOSPADM

## 2022-04-10 RX ORDER — LABETALOL 100 MG/1
400 TABLET, FILM COATED ORAL EVERY 12 HOURS SCHEDULED
Status: DISCONTINUED | OUTPATIENT
Start: 2022-04-10 | End: 2022-04-10

## 2022-04-10 RX ORDER — HYDRALAZINE HYDROCHLORIDE 20 MG/ML
5 INJECTION INTRAMUSCULAR; INTRAVENOUS
Status: DISCONTINUED | OUTPATIENT
Start: 2022-04-10 | End: 2022-04-10

## 2022-04-10 RX ADMIN — ACETAMINOPHEN 650 MG: 325 TABLET ORAL at 17:36

## 2022-04-10 RX ADMIN — ACETAMINOPHEN 650 MG: 325 TABLET ORAL at 09:32

## 2022-04-10 RX ADMIN — FAMOTIDINE 20 MG: 20 TABLET ORAL at 20:16

## 2022-04-10 RX ADMIN — HYDROXYZINE HYDROCHLORIDE 50 MG: 25 TABLET, FILM COATED ORAL at 21:37

## 2022-04-10 RX ADMIN — HYDRALAZINE HYDROCHLORIDE 10 MG: 20 INJECTION INTRAMUSCULAR; INTRAVENOUS at 17:08

## 2022-04-10 RX ADMIN — LABETALOL HYDROCHLORIDE 400 MG: 100 TABLET, FILM COATED ORAL at 22:23

## 2022-04-10 RX ADMIN — LABETALOL HYDROCHLORIDE 400 MG: 100 TABLET, FILM COATED ORAL at 17:38

## 2022-04-10 RX ADMIN — ACETAMINOPHEN 650 MG: 325 TABLET ORAL at 22:22

## 2022-04-10 RX ADMIN — FAMOTIDINE 20 MG: 20 TABLET ORAL at 00:32

## 2022-04-10 RX ADMIN — HYDRALAZINE HYDROCHLORIDE 5 MG: 20 INJECTION INTRAMUSCULAR; INTRAVENOUS at 16:40

## 2022-04-10 RX ADMIN — LABETALOL HYDROCHLORIDE 200 MG: 100 TABLET, FILM COATED ORAL at 08:19

## 2022-04-10 RX ADMIN — LABETALOL HYDROCHLORIDE 200 MG: 100 TABLET, FILM COATED ORAL at 11:30

## 2022-04-10 RX ADMIN — CALCIUM CARBONATE (ANTACID) CHEW TAB 500 MG 1000 MG: 500 CHEW TAB at 00:33

## 2022-04-10 RX ADMIN — FAMOTIDINE 20 MG: 20 TABLET ORAL at 08:19

## 2022-04-10 ASSESSMENT — ACTIVITIES OF DAILY LIVING (ADL)
ADLS_ACUITY_SCORE: 3

## 2022-04-10 NOTE — PROVIDER NOTIFICATION
"MD updated on blood pressure of 149/96 with complaints of a headache, 5/10 in severity and reports of ongoing \"fuzzy\" vision. Plan to cancel discharge for tonight, will continue to closely monitor.    Raeann Garsia RN  "

## 2022-04-10 NOTE — PLAN OF CARE
Problem: Hypertensive Disorders in Pregnancy  Goal: Maternal-Fetal Stabilization  Intervention: Monitor and Manage Symptom Progression  Recent Flowsheet Documentation  Taken 4/10/2022 1606 by Maria C Leroy, RN  Medication Review/Management: medications reviewed  Taken 4/10/2022 0815 by Maria C Leroy, RN  Medication Review/Management: medications reviewed     Patient's hypertensive, finally 154/94 after IV hydralazine 5 mg followed by 10 mg. Patient relieved her blood pressure has normalized, as the spike in blood pressure had caused stress. 6/10 headache started, tylenol administered.  Aromatherapy and reassurance also provided. Scheduled PO labetalol given.

## 2022-04-10 NOTE — PLAN OF CARE
"  Problem: Hypertension Comorbidity  Goal: Blood Pressure in Desired Range  4/10/2022 0534 by Raeann Garsia RN  Outcome: Ongoing, Not Progressing    Problem: Pain Acute  Goal: Acceptable Pain Control and Functional Ability  Outcome: Ongoing, Not Progressing  Intervention: Develop Pain Management Plan    Blood pressures improved since receiving HS scheduled dose of Labetalol, 136/79 and 141/87 overnight. Patient's headache has resolved since receiving one dose of Tylenol last evening. No further complaints of any visual disturbances, denies any \"fuzzy\" vision that she had been experiencing last evening.        "

## 2022-04-10 NOTE — PROVIDER NOTIFICATION
Notified Resident at 1610 regarding elevated blood pressures, no headache or visual changes.      Spoke with: Dr Campuzano    Orders were obtained.    Comments: Utilize PRN IV hydralazine. Increased PO labetalol dose to q8h.

## 2022-04-10 NOTE — PROGRESS NOTES
[facility]    Progress Note - Hospitalist Service       Date of Admission:  2022    Assessment & Plan          Kee Phipps is a 31 year old female  with hx of Crohns, admitted on 2022. She presents with headache, and changes of vision, with elevated blood pressure. She is postpartum, and had a  on 3/30 following IOL at 36&6 2/2 IUGR, oligohydramnios, and elevated S/D ratio with abnormal doppler cord findings. She was admitted on  with postpartum preeclampsia with severe features, completed magnesium infusion, currently on IV hydralazine and labetalol PO with continued elevated pressures including severe range.      Postpartum Preeclampsia, Severe   Headache  Vision changes  Patient is s/p magnesium infusion. Patient had initial improvements to blood pressures overnight though up trended to severe range up to 190/115.  Patient has been asymptomatic.  OB consulted and following.  Discussed uptrend to severe range with OB.  Patient was started on IV hydralazine per severe range protocol.  - OB/GYN consulted, appreciate recs  - Preeclampsia Protocol, Severe               -IV hydralazine 10 mg q. 20 minutes for blood pressures >160/110  - Increase Labetalol to 400 mg 3 times daily from 200 mg BID  - Goal range of <140/100  - If patient continues to be above goal range BP, will add p.o. extended release nifedipine 30 mg  - Routine postpartum cares  - PRN Tylenol     Leukocytes, resolved  UA with WBC, patient denies urinary symptoms. Afebrile. WBC normal.   - Urine culture no growth     Chronic Medical Conditions:  Chrohns Disease: Stelara outpatient   Celiac Disease: Avoid gluten  GERD: Famotidine 20 mg daily     Diet: Combination Diet Regular Diet Adult  Diet    DVT Prophylaxis: Ambulate every shift  Molina Catheter: Not present  Fluids: PO  Central Lines: None  Cardiac Monitoring: None  Code Status: Full Code      Disposition Plan   Expected Discharge: 22 once  <140/100 if symptomatic, <150/110 if asymptomatic with continued BP monitoring outpatient  Anticipated discharge location:  Awaiting care coordination huddle       The patient's care was discussed with the Attending Physician, Dr. Jorge Luis Ch, Bedside Nurse, Patient and IHOB Consultant.    Lolis Deras MD  Hospitalist Service  [facility]  ______________________________________________________________________    Interval History   No acute overnight events, continued elevations of blood pressure.  Patient reported mild headache and received Tylenol, no further headache, reports no blurry vision, no right upper quadrant pain, no swelling.  Feeling much better.  Was hoping to go home today though with elevations in blood pressures to have severe ranges understands importance of staying another night.  Patient is feeling anxious, does not typically take anything for anxiety, this is not treated.  Discussed meditation and other approaches for patient to practice decreasing anxiety on patient.    Physical Exam   Vital Signs: Temp: 98.7  F (37.1  C) Temp src: Oral BP: (!) 215/131 (map 163, just ambulating) Pulse: 75   Resp: 18 SpO2: 96 % O2 Device: None (Room air)    Weight: 213 lbs 0 oz  General Appearance: Alert, well appearing  Respiratory: CTA  Cardiovascular: Regular rate and rhythm  GI: Soft, non tender  Other: Neurologically intact, no clonus, no hyperreflexia +1-2 patellar reflexes     Data   Recent Labs   Lab 04/10/22  0628 04/09/22  0610 04/08/22  1238   WBC 5.5 6.7 5.5   HGB 14.5 14.0 14.2   MCV 92 89 88    257 276    140 139   POTASSIUM 4.4 3.6 4.2   CHLORIDE 108* 106 106   CO2 20* 22 21*   BUN 9 6* 10   CR 0.68 0.67 0.77   ANIONGAP 11 12 12   BRENDA 9.1 7.8* 9.5   GLC 87 95 85   ALBUMIN 3.1* 3.2* 3.4*   PROTTOTAL 6.3 6.6 7.1   BILITOTAL 0.4 0.3 0.4   ALKPHOS 133* 129* 135*   ALT 15 14 16   AST 8 9 14     No results found for this or any previous visit (from the  past 24 hour(s)).

## 2022-04-10 NOTE — PROGRESS NOTES
Faculty Attestation    I saw and examined the patient.    I discussed the case with the resident physician, Dr. Deras, and I assessed the patient on 4/10/2022.    I agree with the findings, assessment and plan.  BP's remain elevated--severe at times, despite labetelol.  Giving hydralazine and may start CCB tonight.  Not ready for discharge.  Would check CMP and CBC in AM.    Apprec GYN help.    Jorge Luis Ch MD

## 2022-04-11 ENCOUNTER — TELEPHONE (OUTPATIENT)
Dept: FAMILY MEDICINE | Facility: CLINIC | Age: 32
End: 2022-04-11
Payer: COMMERCIAL

## 2022-04-11 ENCOUNTER — HOSPITAL ENCOUNTER (OUTPATIENT)
Facility: CLINIC | Age: 32
Setting detail: OBSERVATION
Discharge: HOME | End: 2022-04-12
Attending: EMERGENCY MEDICINE | Admitting: STUDENT IN AN ORGANIZED HEALTH CARE EDUCATION/TRAINING PROGRAM
Payer: COMMERCIAL

## 2022-04-11 VITALS
BODY MASS INDEX: 33.06 KG/M2 | TEMPERATURE: 98.5 F | SYSTOLIC BLOOD PRESSURE: 126 MMHG | WEIGHT: 213 LBS | RESPIRATION RATE: 16 BRPM | DIASTOLIC BLOOD PRESSURE: 78 MMHG | OXYGEN SATURATION: 96 % | HEART RATE: 80 BPM

## 2022-04-11 DIAGNOSIS — O14.90 PRE-ECLAMPSIA, ANTEPARTUM: ICD-10-CM

## 2022-04-11 LAB
ALBUMIN SERPL-MCNC: 3.5 G/DL (ref 3.5–5)
ALBUMIN UR-MCNC: 10 MG/DL
ALP SERPL-CCNC: 141 U/L (ref 45–120)
ALT SERPL W P-5'-P-CCNC: 18 U/L (ref 0–45)
ANION GAP SERPL CALCULATED.3IONS-SCNC: 13 MMOL/L (ref 5–18)
APPEARANCE UR: ABNORMAL
AST SERPL W P-5'-P-CCNC: 14 U/L (ref 0–40)
BACTERIA #/AREA URNS HPF: ABNORMAL /HPF
BILIRUB SERPL-MCNC: 0.3 MG/DL (ref 0–1)
BILIRUB UR QL STRIP: NEGATIVE
BUN SERPL-MCNC: 16 MG/DL (ref 8–22)
CALCIUM SERPL-MCNC: 9.4 MG/DL (ref 8.5–10.5)
CHLORIDE BLD-SCNC: 107 MMOL/L (ref 98–107)
CO2 SERPL-SCNC: 20 MMOL/L (ref 22–31)
COLOR UR AUTO: ABNORMAL
CREAT SERPL-MCNC: 0.68 MG/DL (ref 0.6–1.1)
ERYTHROCYTE [DISTWIDTH] IN BLOOD BY AUTOMATED COUNT: 13 % (ref 10–15)
GFR SERPL CREATININE-BSD FRML MDRD: >90 ML/MIN/1.73M2
GLUCOSE BLD-MCNC: 79 MG/DL (ref 70–125)
GLUCOSE UR STRIP-MCNC: NEGATIVE MG/DL
HCT VFR BLD AUTO: 40.5 % (ref 35–47)
HGB BLD-MCNC: 13.8 G/DL (ref 11.7–15.7)
HGB UR QL STRIP: ABNORMAL
HOLD SPECIMEN: NORMAL
KETONES UR STRIP-MCNC: NEGATIVE MG/DL
LEUKOCYTE ESTERASE UR QL STRIP: ABNORMAL
MCH RBC QN AUTO: 30.5 PG (ref 26.5–33)
MCHC RBC AUTO-ENTMCNC: 34.1 G/DL (ref 31.5–36.5)
MCV RBC AUTO: 90 FL (ref 78–100)
MUCOUS THREADS #/AREA URNS LPF: PRESENT /LPF
NITRATE UR QL: NEGATIVE
PH UR STRIP: 6.5 [PH] (ref 5–7)
PLATELET # BLD AUTO: 249 10E3/UL (ref 150–450)
POTASSIUM BLD-SCNC: 4.2 MMOL/L (ref 3.5–5)
PROT SERPL-MCNC: 7.1 G/DL (ref 6–8)
RBC # BLD AUTO: 4.52 10E6/UL (ref 3.8–5.2)
RBC URINE: 5 /HPF
SARS-COV-2 RNA RESP QL NAA+PROBE: NEGATIVE
SODIUM SERPL-SCNC: 140 MMOL/L (ref 136–145)
SP GR UR STRIP: 1.02 (ref 1–1.03)
SQUAMOUS EPITHELIAL: 2 /HPF
TRANSITIONAL EPI: <1 /HPF
UROBILINOGEN UR STRIP-MCNC: <2 MG/DL
WBC # BLD AUTO: 7.5 10E3/UL (ref 4–11)
WBC URINE: 98 /HPF

## 2022-04-11 PROCEDURE — 81001 URINALYSIS AUTO W/SCOPE: CPT | Performed by: EMERGENCY MEDICINE

## 2022-04-11 PROCEDURE — G0378 HOSPITAL OBSERVATION PER HR: HCPCS

## 2022-04-11 PROCEDURE — 85014 HEMATOCRIT: CPT | Performed by: EMERGENCY MEDICINE

## 2022-04-11 PROCEDURE — 87086 URINE CULTURE/COLONY COUNT: CPT | Performed by: EMERGENCY MEDICINE

## 2022-04-11 PROCEDURE — 250N000013 HC RX MED GY IP 250 OP 250 PS 637

## 2022-04-11 PROCEDURE — 99285 EMERGENCY DEPT VISIT HI MDM: CPT

## 2022-04-11 PROCEDURE — 36415 COLL VENOUS BLD VENIPUNCTURE: CPT | Performed by: EMERGENCY MEDICINE

## 2022-04-11 PROCEDURE — C9803 HOPD COVID-19 SPEC COLLECT: HCPCS

## 2022-04-11 PROCEDURE — 99238 HOSP IP/OBS DSCHRG MGMT 30/<: CPT | Mod: 24

## 2022-04-11 PROCEDURE — 80053 COMPREHEN METABOLIC PANEL: CPT | Performed by: EMERGENCY MEDICINE

## 2022-04-11 PROCEDURE — 87635 SARS-COV-2 COVID-19 AMP PRB: CPT | Performed by: EMERGENCY MEDICINE

## 2022-04-11 RX ORDER — AMOXICILLIN 250 MG
2 CAPSULE ORAL 2 TIMES DAILY
Status: DISCONTINUED | OUTPATIENT
Start: 2022-04-11 | End: 2022-04-12 | Stop reason: HOSPADM

## 2022-04-11 RX ORDER — NIFEDIPINE 30 MG/1
30 TABLET, EXTENDED RELEASE ORAL EVERY 24 HOURS
Qty: 30 TABLET | Refills: 0 | Status: SHIPPED | OUTPATIENT
Start: 2022-04-11 | End: 2022-06-16 | Stop reason: SINTOL

## 2022-04-11 RX ORDER — LIDOCAINE 40 MG/G
CREAM TOPICAL
Status: DISCONTINUED | OUTPATIENT
Start: 2022-04-11 | End: 2022-04-12 | Stop reason: HOSPADM

## 2022-04-11 RX ORDER — ACETAMINOPHEN 325 MG/1
975 TABLET ORAL EVERY 8 HOURS PRN
Status: DISCONTINUED | OUTPATIENT
Start: 2022-04-11 | End: 2022-04-12 | Stop reason: HOSPADM

## 2022-04-11 RX ORDER — LORAZEPAM 2 MG/ML
2 INJECTION INTRAMUSCULAR
Status: DISCONTINUED | OUTPATIENT
Start: 2022-04-11 | End: 2022-04-12 | Stop reason: HOSPADM

## 2022-04-11 RX ORDER — HYDROXYZINE HYDROCHLORIDE 25 MG/1
50 TABLET, FILM COATED ORAL EVERY 6 HOURS PRN
Status: DISCONTINUED | OUTPATIENT
Start: 2022-04-11 | End: 2022-04-12 | Stop reason: HOSPADM

## 2022-04-11 RX ORDER — NIFEDIPINE 30 MG/1
30 TABLET, EXTENDED RELEASE ORAL DAILY PRN
Status: DISCONTINUED | OUTPATIENT
Start: 2022-04-11 | End: 2022-04-12 | Stop reason: HOSPADM

## 2022-04-11 RX ORDER — HYDRALAZINE HYDROCHLORIDE 20 MG/ML
10 INJECTION INTRAMUSCULAR; INTRAVENOUS
Status: DISCONTINUED | OUTPATIENT
Start: 2022-04-11 | End: 2022-04-12 | Stop reason: HOSPADM

## 2022-04-11 RX ORDER — FAMOTIDINE 20 MG/1
20 TABLET, FILM COATED ORAL 2 TIMES DAILY
Status: DISCONTINUED | OUTPATIENT
Start: 2022-04-11 | End: 2022-04-12 | Stop reason: HOSPADM

## 2022-04-11 RX ORDER — AMOXICILLIN 250 MG
1 CAPSULE ORAL 2 TIMES DAILY
Status: DISCONTINUED | OUTPATIENT
Start: 2022-04-11 | End: 2022-04-12 | Stop reason: HOSPADM

## 2022-04-11 RX ORDER — LABETALOL HYDROCHLORIDE 5 MG/ML
20-40 INJECTION, SOLUTION INTRAVENOUS EVERY 10 MIN PRN
Status: DISCONTINUED | OUTPATIENT
Start: 2022-04-11 | End: 2022-04-11

## 2022-04-11 RX ORDER — SERTRALINE HYDROCHLORIDE 25 MG/1
25 TABLET, FILM COATED ORAL DAILY
Status: DISCONTINUED | OUTPATIENT
Start: 2022-04-12 | End: 2022-04-12 | Stop reason: HOSPADM

## 2022-04-11 RX ORDER — MAGNESIUM SULFATE HEPTAHYDRATE 500 MG/ML
10 INJECTION, SOLUTION INTRAMUSCULAR; INTRAVENOUS
Status: DISCONTINUED | OUTPATIENT
Start: 2022-04-11 | End: 2022-04-12 | Stop reason: HOSPADM

## 2022-04-11 RX ORDER — MAGNESIUM SULFATE 4 G/50ML
4 INJECTION INTRAVENOUS
Status: DISCONTINUED | OUTPATIENT
Start: 2022-04-11 | End: 2022-04-12 | Stop reason: HOSPADM

## 2022-04-11 RX ORDER — LABETALOL 100 MG/1
400 TABLET, FILM COATED ORAL EVERY 8 HOURS
Status: DISCONTINUED | OUTPATIENT
Start: 2022-04-11 | End: 2022-04-12 | Stop reason: HOSPADM

## 2022-04-11 RX ORDER — NIFEDIPINE 30 MG/1
30 TABLET, EXTENDED RELEASE ORAL DAILY
Status: DISCONTINUED | OUTPATIENT
Start: 2022-04-11 | End: 2022-04-11

## 2022-04-11 RX ORDER — PRENATAL VIT/IRON FUM/FOLIC AC 27MG-0.8MG
1 TABLET ORAL DAILY
Status: DISCONTINUED | OUTPATIENT
Start: 2022-04-12 | End: 2022-04-12 | Stop reason: HOSPADM

## 2022-04-11 RX ORDER — MAGNESIUM SULFATE HEPTAHYDRATE 40 MG/ML
2 INJECTION, SOLUTION INTRAVENOUS
Status: DISCONTINUED | OUTPATIENT
Start: 2022-04-11 | End: 2022-04-12 | Stop reason: HOSPADM

## 2022-04-11 RX ORDER — LABETALOL 200 MG/1
400 TABLET, FILM COATED ORAL EVERY 8 HOURS
Qty: 180 TABLET | Refills: 0 | Status: SHIPPED | OUTPATIENT
Start: 2022-04-11 | End: 2022-06-16

## 2022-04-11 RX ADMIN — ACETAMINOPHEN 650 MG: 325 TABLET ORAL at 04:17

## 2022-04-11 RX ADMIN — LABETALOL HYDROCHLORIDE 400 MG: 100 TABLET, FILM COATED ORAL at 06:01

## 2022-04-11 ASSESSMENT — ACTIVITIES OF DAILY LIVING (ADL)
ADLS_ACUITY_SCORE: 3

## 2022-04-11 ASSESSMENT — ENCOUNTER SYMPTOMS: HEADACHES: 1

## 2022-04-11 NOTE — PROGRESS NOTES
[facility] PROGRESS NOTE     HD # , admitted for delayed PP preeclampsia with severe features    Blood pressures doing better today- could dishcarge to home with current antihypertensive medications.  Follow-up within 1-3 days for bp check.         SUBJECTIVE:  Feeling much better. Minimal headache if at all.     OBJECTIVE:  /78 (BP Location: Right arm, Patient Position: Supine)   Pulse 80   Temp 98.5  F (36.9  C) (Oral)   Resp 16   Wt 96.6 kg (213 lb)   LMP 07/15/2021   SpO2 96%   BMI 33.06 kg/m         abd- soft, nontender  Ext- no edema or pain, normal reflexes, no clonus     Marion Wily, MD  [facility]  [phone]

## 2022-04-11 NOTE — TELEPHONE ENCOUNTER
Outside range for seizure risk, but BP needs better control.  Currently on labetalol 400 q8h.  Sent Rx for additional nifedipine XL 30 mg ([place] on [address]).  Check pressures at home this evening, and follow-up in clinic tomorrow.    /AW

## 2022-04-11 NOTE — PROGRESS NOTES
[facility] PROGRESS NOTE     HD # 2, admitted for delayed PP preeclampsia with severe features  Still having labile pressures requiring rescue hydralazine.   Getting labetalol 400 mg TID may need nifedipine xl in addition  Need to continue to monitor bp closely      SUBJECTIVE:  Feeling much better. Minimal headache if at all.    OBJECTIVE:  /84   Pulse 83   Temp 98.7  F (37.1  C) (Oral)   Resp 18   Wt 96.6 kg (213 lb)   LMP 07/15/2021   SpO2 96%   BMI 33.06 kg/m      bp max- 205/119    abd- soft, nontender  Ext- no edema or pain, normal reflexes, no clonus    MD Belia Mckinnon  [phone]

## 2022-04-11 NOTE — PLAN OF CARE
Problem: Hypertension Comorbidity  Goal: Blood Pressure in Desired Range  Intervention: Maintain Blood Pressure Management  Recent Flowsheet Documentation  Taken 4/11/2022 0405 by Elsy Muñoz RN  Medication Review/Management: medications reviewed  Taken 4/10/2022 2000 by Elsy Muñoz RN  Medication Review/Management: medications reviewed     Patient's BP's have been stable. Pt. Had complained of headache pain 8-10/10. No visual changes, no blurriness, or double vision. No dizziness. Spoke with [facility] physician, Dr. Linda Mora at 2145 4/10/22 who consulted with the attending. Administered hydroxyzine (atarax) 50 mg by mouth, and then tylenol (see MAR).   Patient able to sleep overnight. Headache pain now 0-1/10. No visual changes, no blurriness, no double vision.   Pt. Up independently, voiding.   PIV has been flushed, is Saline Locked.   Pt. Eating, and drinking fluids.   Will continue to monitor.     TIARA Muñoz RN

## 2022-04-11 NOTE — TELEPHONE ENCOUNTER
[facility] phone call message- general phone call:    Reason for call: the pt called to ask for Pretty to call her back as soon as she can     Return call needed: Yes    OK to leave a message on voice mail? Yes    Primary language: English      needed? No    Call taken on April 11, 2022 at 3:36 PM by Nahum Lawrence

## 2022-04-11 NOTE — TELEPHONE ENCOUNTER
"Pt states that she was discharged home from the hospital and now her BP is back up at 167/107.  She also has a HA (rates 5/10 and is behind her eyes) and a little blurry vision (states it is a \"sleepy feeling\").  She states that she is taking Labetalol 400 mg three times a day and was told that if her BP was high that she should call the clinic to have more BP medication.      Discussed with Dr Victoria and she will send a rx for Nifedipine to pt's pharmacy to take in addition to Labetalol.  Pt should continue to check her BP and if 160/110 or higher to call the clinic or [facility].  Pt should also call if developing worsening or new symptoms.    Called pt back and gave her above info.  Assisted her with scheduling an appt with Dr Tahir donohue, 4/12/22 at 10:00 AM.  Routed note to Dr Haro and Dr Palacios./NG  "

## 2022-04-11 NOTE — PLAN OF CARE
Goal Outcome Evaluation:    Problem: Hypertensive Disorders in Pregnancy  Goal: Maternal-Fetal Stabilization  Outcome: Met  Intervention: Monitor and Manage Symptom Progression  Recent Flowsheet Documentation  Taken 4/11/2022 0750 by Akosua Phipps RN  Medication Review/Management: medications reviewed       Blood pressure stable. Denies symptoms. Ready for discharge.

## 2022-04-12 VITALS
OXYGEN SATURATION: 97 % | DIASTOLIC BLOOD PRESSURE: 70 MMHG | HEART RATE: 74 BPM | RESPIRATION RATE: 16 BRPM | BODY MASS INDEX: 33.06 KG/M2 | SYSTOLIC BLOOD PRESSURE: 114 MMHG | WEIGHT: 213 LBS | TEMPERATURE: 97.8 F

## 2022-04-12 PROCEDURE — 99219 PR INITIAL OBSERVATION CARE,LEVEL II: CPT | Mod: 24 | Performed by: STUDENT IN AN ORGANIZED HEALTH CARE EDUCATION/TRAINING PROGRAM

## 2022-04-12 PROCEDURE — 250N000013 HC RX MED GY IP 250 OP 250 PS 637: Performed by: STUDENT IN AN ORGANIZED HEALTH CARE EDUCATION/TRAINING PROGRAM

## 2022-04-12 PROCEDURE — G0378 HOSPITAL OBSERVATION PER HR: HCPCS

## 2022-04-12 RX ORDER — NIFEDIPINE 30 MG/1
30 TABLET, EXTENDED RELEASE ORAL DAILY
Status: DISCONTINUED | OUTPATIENT
Start: 2022-04-12 | End: 2022-04-12 | Stop reason: HOSPADM

## 2022-04-12 RX ADMIN — SERTRALINE HYDROCHLORIDE 25 MG: 25 TABLET, FILM COATED ORAL at 08:16

## 2022-04-12 RX ADMIN — ACETAMINOPHEN 975 MG: 325 TABLET ORAL at 08:16

## 2022-04-12 RX ADMIN — FAMOTIDINE 20 MG: 20 TABLET ORAL at 08:17

## 2022-04-12 RX ADMIN — FAMOTIDINE 20 MG: 20 TABLET ORAL at 00:28

## 2022-04-12 RX ADMIN — LABETALOL HYDROCHLORIDE 400 MG: 100 TABLET, FILM COATED ORAL at 08:16

## 2022-04-12 RX ADMIN — LABETALOL HYDROCHLORIDE 400 MG: 100 TABLET, FILM COATED ORAL at 00:27

## 2022-04-12 RX ADMIN — PRENATAL VIT W/ FE FUMARATE-FA TAB 27-0.8 MG 1 TABLET: 27-0.8 TAB at 08:17

## 2022-04-12 ASSESSMENT — ENCOUNTER SYMPTOMS
MYALGIAS: 0
WOUND: 0
SHORTNESS OF BREATH: 0
BRUISES/BLEEDS EASILY: 0
CONFUSION: 0
ABDOMINAL PAIN: 0
FEVER: 0

## 2022-04-12 NOTE — DISCHARGE SUMMARY
[facility]  Discharge Summary - Medicine & Pediatrics       Date of Admission:  2022  Date of Discharge:  2022  Discharging Provider: Dr. Mounika Gibson  Discharge Service: Hospitalist Service    Discharge Diagnoses   Active Problems:    Pre-eclampsia        Follow-ups Needed After Discharge   Follow-up Appointments     Follow-up and recommended labs and tests       Patient to follow-up with PCP in 1-2 days for blood pressure check and to   discuss antihypertensive medications.  Patient was also started on   sertraline and can discuss further management.             Unresulted Labs Ordered in the Past 30 Days of this Admission     Date and Time Order Name Status Description    2022  9:15 PM Urine Culture Preliminary       These results will be followed up by PCP    Discharge Disposition   Discharged to home  Condition at discharge: Stable      Hospital Course   Kee Phipps is a 31 year old  female with  3/30 following IOL at 36&6 2/2 IUGR, oligohydramnios, and elevated S/D ratio with abnormal doppler cord findings and admission 22-22 for postpartum pre-eclampsia with severe features who presented 22 for readmission due to headache and elevated blood pressures.     After previous discharge patient's blood pressures steven again to 167/107 and she developed a headache with blurry vision.  She was prescribed nifedipine XR 30 mg which she took though on recheck her blood pressure continued to be elevated so she presented to the ED where her blood pressures were 132//113.  BMP unremarkable LFTs normal, platelets normal, white blood cell count normal.  During hospitalization blood pressures continue to be within normal range on labetalol 400 mg 3 times daily and patient was asymptomatic.  Patient was watched overnight into the next day with continued normal blood pressures. Given stability of course and patient needing to return to her  at  home, discussed risks and benefits of going home versus a continued in the hospital.  With shared decision making and very specific return to hospital parameters, appropriate to discharge to home at this time (see discharge instructions below).     Consultations This Hospital Stay   OB GYN IP CONSULT    Code Status   Full Code       The patient was discussed with Dr. Mounika Deras MD  [address] [address]  Phone: [phone]  Fax: [phone]  ______________________________________________________________________    Physical Exam   Vital Signs: Temp: 97.8  F (36.6  C) Temp src: Oral BP: 114/70 Pulse: 74   Resp: 16 SpO2: 97 % O2 Device: None (Room air)    Weight: 213 lbs 0 oz  General: alert, appears comfortable, no acute distress  HEENT: atraumatic, conjunctiva clear without erythema, EOM's intact, no nasal discharge, MMM  Neck: supple  Cardiac: normal rate and rhythm with no murmurs or extra sounds  Resp: lungs clear to auscultation bilaterally with no crackles or wheezes, no increased work of breathing  Abdomen: soft, non-tender to palpation, no masses  Extremities: no peripheral edema  Skin: no rashes or suspicious legions on exposed skin  Neuro: CN's grossly intact. Patellar reflexes 1+.  Psych: affect congruent with mood      Primary Care Physician   Stormy Palacios    Discharge Orders      Reason for your hospital stay    High blood pressures after delivery     Follow-up and recommended labs and tests     Patient to follow-up with PCP in 1-2 days for blood pressure check and to discuss antihypertensive medications.  Patient was also started on sertraline and can discuss further management.     Activity    Your activity upon discharge: As normal     Discharge Instructions    Please take labetalol and nifedipine as instructed.  Repeat blood pressure check 2 hours after nifedipine 30 mg.  If blood pressure is >160/110  take a second dose of moped of being 30 mg at this time.  Then a recheck in 2 hours, if blood pressure is still >160/110 or if you have symptoms like headache and blurry vision and blood pressure is still >150/110 please return to hospital for further treatment.     Diet    Follow this diet upon discharge: As normal       Significant Results and Procedures   Most Recent 3 CBC's:Recent Labs   Lab Test 04/11/22  2104 04/10/22  0628 04/09/22  0610   WBC 7.5 5.5 6.7   HGB 13.8 14.5 14.0   MCV 90 92 89    242 257     Most Recent 3 BMP's:Recent Labs   Lab Test 04/11/22  2104 04/10/22  0628 04/09/22  0610    139 140   POTASSIUM 4.2 4.4 3.6   CHLORIDE 107 108* 106   CO2 20* 20* 22   BUN 16 9 6*   CR 0.68 0.68 0.67   ANIONGAP 13 11 12   BRENDA 9.4 9.1 7.8*   GLC 79 87 95     Most Recent 2 LFT's:Recent Labs   Lab Test 04/11/22  2104 04/10/22  0628   AST 14 8   ALT 18 15   ALKPHOS 141* 133*   BILITOTAL 0.3 0.4       Discharge Medications   Current Discharge Medication List      CONTINUE these medications which have NOT CHANGED    Details   acetaminophen (TYLENOL) 650 MG CR tablet Take 1 tablet (650 mg) by mouth every 8 hours as needed for mild pain or fever  Qty: 100 tablet, Refills: 0    Associated Diagnoses: Headache in pregnancy, antepartum, second trimester      aspirin-acetaminophen-caffeine (EXCEDRIN MIGRAINE) 250-250-65 MG tablet Take 2 tablets by mouth every 6 hours as needed for headaches Do NOT take regular tylenol while taking this medicine  Qty: 120 tablet, Refills: 0    Associated Diagnoses: Acute intractable tension-type headache; Intractable migraine with aura without status migrainosus      famotidine (PEPCID) 20 MG tablet Take 1 tablet (20 mg) by mouth 2 times daily  Qty: 60 tablet, Refills: 3    Associated Diagnoses: Gastroesophageal reflux disease with esophagitis without hemorrhage      labetalol (NORMODYNE) 200 MG tablet Take 2 tablets (400 mg) by mouth in the morning and 2 tablets (400 mg) at  noon and 2 tablets (400 mg) in the evening.  Qty: 180 tablet, Refills: 0    Associated Diagnoses: Pre-eclampsia, postpartum      magnesium 250 MG tablet Take 1 tablet by mouth daily      NIFEdipine ER OSMOTIC (PROCARDIA XL) 30 MG 24 hr tablet Take 1 tablet (30 mg) by mouth every 24 hours  Qty: 30 tablet, Refills: 0    Associated Diagnoses: Postpartum hypertension      Prenatal Vit-Fe Fumarate-FA (PRENATAL MULTIVITAMIN W/IRON) 27-0.8 MG tablet Take 1 tablet by mouth daily  Qty: 90 tablet, Refills: 3    Associated Diagnoses: High risk pregnancy due to history of  labor in second trimester      sertraline (ZOLOFT) 50 MG tablet Take 1 tablet (50 mg) by mouth in the morning. Take 0.5 tablet (25 mg) daily for 7 days and then increase to 1 tablet (50 mg daily).  Qty: 30 tablet, Refills: 0    Associated Diagnoses: Post partum depression      ustekinumab (STELARA) 90 MG/ML Inject Subcutaneous every 2 months           Allergies   Allergies   Allergen Reactions     Gluten Meal      Humira [Humira]      Caused huge lumps at injection site.      Ibuprofen      Patient has Crohns disease and is unable to take this medication.     Latex Itching     Remicade [Infliximab] Rash

## 2022-04-12 NOTE — PHARMACY-ADMISSION MEDICATION HISTORY
Pharmacy Note - Admission Medication History    Pertinent Provider Information: n/a     ______________________________________________________________________    Prior To Admission (PTA) med list completed and updated in EMR.       PTA Med List   Medication Sig Note Last Dose     acetaminophen (TYLENOL) 650 MG CR tablet Take 1 tablet (650 mg) by mouth every 8 hours as needed for mild pain or fever  4/10/2022 at Unknown time     aspirin-acetaminophen-caffeine (EXCEDRIN MIGRAINE) 250-250-65 MG tablet Take 2 tablets by mouth every 6 hours as needed for headaches Do NOT take regular tylenol while taking this medicine  4/11/2022 at 1700     famotidine (PEPCID) 20 MG tablet Take 1 tablet (20 mg) by mouth 2 times daily  4/10/2022 at Unknown time     labetalol (NORMODYNE) 200 MG tablet Take 2 tablets (400 mg) by mouth in the morning and 2 tablets (400 mg) at noon and 2 tablets (400 mg) in the evening.  4/11/2022 at 1400     magnesium 250 MG tablet Take 1 tablet by mouth daily  4/8/2022     NIFEdipine ER OSMOTIC (PROCARDIA XL) 30 MG 24 hr tablet Take 1 tablet (30 mg) by mouth every 24 hours  4/11/2022 at 1745     Prenatal Vit-Fe Fumarate-FA (PRENATAL MULTIVITAMIN W/IRON) 27-0.8 MG tablet Take 1 tablet by mouth daily  4/8/2022     sertraline (ZOLOFT) 50 MG tablet Take 1 tablet (50 mg) by mouth in the morning. Take 0.5 tablet (25 mg) daily for 7 days and then increase to 1 tablet (50 mg daily). 4/11/2022: Taking 1/2 tablet. Just started today 4/11/22. 4/11/2022 at Unknown time     ustekinumab (STELARA) 90 MG/ML Inject Subcutaneous every 2 months  3/14/2022       Information source(s): Patient, Hospital records and [facility]  Method of interview communication: in-person    Summary of Changes to PTA Med List  New: none  Discontinued: none  Changed: Richy    Patient was asked about OTC/herbal products specifically.  PTA med list reflects this.    In the past week, patient estimated taking medication this  percent of the time:  greater than 90%.     Patient does not use any multi-dose medications prior to admission.    The information provided in this note is only as accurate as the sources available at the time of the update(s).    Thank you for the opportunity to participate in the care of this patient.    Sharon Elizondo [facility]  4/11/2022 9:47 PM

## 2022-04-12 NOTE — ED PROVIDER NOTES
EMERGENCY DEPARTMENT ENCOUNTER      NAME: Kee Phipps  AGE: 31 year old female  YOB: 1990  MRN: [id number]  EVALUATION DATE & TIME: 2022  8:51 PM    PCP: Stormy Palacios    ED PROVIDER: Albino Salgado MD        Chief Complaint   Patient presents with     Hypertension     Postpartum Complications         FINAL IMPRESSION:  1. Pre-eclampsia, antepartum          ED COURSE & MEDICAL DECISION MAKIN:33 PM I met with the patient to gather history and to perform my initial exam. We discussed plans for the ED course, including diagnostic testing and treatment. I saw the patient wearing a face mask, N95 mask, and gloves.         Pertinent Labs & Imaging studies reviewed. (See chart for details)  31 year old female presents to the Emergency Department for evaluation of headache, blurred vision, elevated blood pressure after being discharged from labor and delivery earlier today in the setting of preeclampsia already received IV magnesium.     Been taking labetalol and nifedipine as prescribed.     Currently symptom-free    Intracranial hemorrhage unlikely, ACS unlikely.  Meningitis unlikely..     Help labs drawn.  COVID-19 negative.  Discussed with on-call OB/GYN who recommends readmission.  Blood pressure in 140s.  Currently symptom-free.  Discussed with resident team who agreed to admit patient      At the conclusion of the encounter I discussed the results of all of the tests and the disposition. The questions were answered. The patient or family acknowledged understanding and was agreeable with the care plan.              MEDICATIONS GIVEN IN THE EMERGENCY:  Medications   lidocaine 1 % 0.1-1 mL (has no administration in time range)   lidocaine (LMX4) cream (has no administration in time range)   sodium chloride (PF) 0.9% PF flush 3 mL (has no administration in time range)   sodium chloride (PF) 0.9% PF flush 3 mL (has no administration in time range)   melatonin tablet 1 mg (has no  "administration in time range)   senna-docusate (SENOKOT-S/PERICOLACE) 8.6-50 MG per tablet 1 tablet (has no administration in time range)     Or   senna-docusate (SENOKOT-S/PERICOLACE) 8.6-50 MG per tablet 2 tablet (has no administration in time range)   acetaminophen (TYLENOL) tablet 975 mg (has no administration in time range)   hydrOXYzine (ATARAX) tablet 50 mg (has no administration in time range)   magnesium sulfate 2 g in water intermittent infusion (has no administration in time range)   magnesium sulfate 4 g in 50 mL sterile water (premade) (has no administration in time range)   magnesium sulfate injection 10 g (has no administration in time range)   LORazepam (ATIVAN) injection 2 mg (has no administration in time range)   hydrALAZINE (APRESOLINE) injection 10 mg (has no administration in time range)   famotidine (PEPCID) tablet 20 mg (has no administration in time range)   labetalol (NORMODYNE) tablet 400 mg (has no administration in time range)   prenatal multivitamin w/iron per tablet 1 tablet (has no administration in time range)   sertraline (ZOLOFT) tablet 25 mg (has no administration in time range)   NIFEdipine ER OSMOTIC (PROCARDIA XL) 24 hr tablet 30 mg (has no administration in time range)       NEW PRESCRIPTIONS STARTED AT TODAY'S ER VISIT  Current Discharge Medication List             =================================================================    HPI    Triage note  \"Patient reports that she is post partum, baby was born on mar 30th, patient was discharge this morning after being here for 3 days to treat post partum hypertension, has been taking meds as prescribed and still HTN and vision is tiring.  Sarai Mccann RN.......4/11/2022 8:48 PM  \"      Patient information was obtained from: patient    Use of : N/A       Kee Phipps is a 31 year old female with a pertinent medical history of Crohn's Disease, ectopic pregnancy, GERD, partial resection of colon, polp of colon, SBO, " ERNESTINA II, chronic vulvovaganitis, hypothyroidism, postpartum HTN who presents to this ED via walk-in for evaluation of headache.     Per Chart Review, patient was admitted to [facility] on 22 until 22. Patient was postpartum, and had a  on 3/30 following IOL at 36&6 2/2 IUGR, oligohydramnios, and elevated S/D ratio with abnormal doppler cord findings. She was admitted with postpartum preeclampsia with severe features. She was initially hypertensive to 160's systolic, 116 diastolic. OB/GYN was consulted in the ED. ED noted normal LFT's, creatine normal, mild proteinuria. Labetolol x 2 and Magnesium 4 G given. P/C ration 0.09. She was admitted for magnesium continuous infusion and blood pressure management. She was started on Labetalol 100 mg BID initially. Symptoms improved overnight with resolution of her headache and improvement in her vision. Blood pressures remained elevated to 140/150's/90's. Labetalol was increased to 200 mg BID. Magnesium was stopped after 24 hours. She had recurrence of symptoms with blurring vision and elevated blood pressure to 190's. Labetalol was increased to 400 mg TID. She then had improvement in blood pressure and symptoms for 24 hours. Sertraline 25 mg daily was initiated with recommendation to increase to 50 mg daily after 7 days. Patient discharged to home in stable condition.     Patient endorses the history above. She states following discharge she arrived home and had 4 hours of headache with associated blurred vision. She mentions her symptoms had since alleviated on their own. She denies any other complications at this time.     REVIEW OF SYSTEMS   Review of Systems   Constitutional: Negative for fever.   Eyes: Positive for visual disturbance (blurry vision).   Respiratory: Negative for shortness of breath.    Cardiovascular: Negative for chest pain.   Gastrointestinal: Negative for abdominal pain.   Musculoskeletal: Negative for  myalgias.   Skin: Negative for wound.   Neurological: Positive for headaches.   Hematological: Does not bruise/bleed easily.   Psychiatric/Behavioral: Negative for confusion.        PAST MEDICAL HISTORY:  Past Medical History:   Diagnosis Date     Abnormal Pap smear of cervix      Accelerated idioventricular rhythm (H) 2022     Anemia      Anxiety      Arthritis      Bilateral plantar fasciitis      Celiac disease 2014     Celiac disease      Crohn disease (H)      Crohn's disease (H) 2012     Depression      Depressive disorder      Fibromyalgia      Fibromyalgia      H/O miscarriage, currently pregnant     miscarriage .     HPV (human papilloma virus) infection      Migraine      Plantar fasciitis     bilateral     Postpartum hypertension 2022      delivery 2013    34 weeks. Early rupture of membranes       PAST SURGICAL HISTORY:  Past Surgical History:   Procedure Laterality Date     APPENDECTOMY       BOWEL RESECTION      illium      CERVIX LESION DESTRUCTION       DILATION AND CURETTAGE SUCTION, TREAT MISSED , 1ST TRIMESTER      miscarriage at 8 wks gest     DILATION AND CURETTAGE SUCTION, TREAT MISSED , 1ST TRIMESTER  2019     FASCIOTOMY LOWER EXTREMITY Left 2018    Left foot     FOOT SURGERY Left      LAPAROSCOPY DIAGNOSTIC (GYN) N/A 2019    Procedure: LAPAROSCOPY, DIAGNOSTIC;  Surgeon: Anni Velasquez MD;  Location:  OR     PLANTAR FASCIA RELEASE Left 4/10/2018    Procedure:  PLANTAR FASCIOTOMY LEFT FOOT ;  Surgeon: Yuri Arreola DPM;  Location: [facility];  Service:      SALPINGECTOMY Right 05/15/2021    rt sided ruptured ectopic     SMALL BOWEL RESECTION       TONSILLECTOMY             CURRENT MEDICATIONS:    No current outpatient medications on file.      ALLERGIES:  Allergies   Allergen Reactions     Gluten Meal      Humira [Humira]      Caused huge lumps at injection site.      Ibuprofen      Patient has  Crohns disease and is unable to take this medication.     Latex Itching     Remicade [Infliximab] Rash       FAMILY HISTORY:  Family History   Problem Relation Age of Onset     Hypertension Mother      Seizure Disorder Mother      Asthma Brother      Asthma Brother      Asthma Brother      Asthma Brother      Cancer Maternal Grandmother         bone cancer     Asthma Son      Crohn's Disease Paternal Uncle      Diabetes No family hx of      Coronary Artery Disease No family hx of      Hyperlipidemia No family hx of      Cerebrovascular Disease No family hx of      Breast Cancer No family hx of      Colon Cancer No family hx of      Prostate Cancer No family hx of      Other Cancer No family hx of      Depression No family hx of      Anxiety Disorder No family hx of      Mental Illness No family hx of      Substance Abuse No family hx of      Anesthesia Reaction No family hx of      Osteoporosis No family hx of      Genetic Disorder No family hx of      Thyroid Disease No family hx of      Obesity No family hx of      Unknown/Adopted No family hx of      Heart Disease No family hx of        SOCIAL HISTORY:   Social History     Socioeconomic History     Marital status: Single     Number of children: 1     Years of education: 12     Highest education level: GED or equivalent   Occupational History     Occupation: student   Tobacco Use     Smoking status: Former Smoker     Packs/day: 0.03     Quit date: 2021     Years since quittin.3     Smokeless tobacco: Never Used     Tobacco comment: Vaping daily since 2021   Vaping Use     Vaping Use: Never used   Substance and Sexual Activity     Alcohol use: Not Currently     Comment: occ     Drug use: No     Sexual activity: Yes     Partners: Male       VITALS:  /77 (BP Location: Right arm, Patient Position: Semi-Robertson's, Cuff Size: Adult Regular)   Pulse 60   Temp 98.4  F (36.9  C) (Oral)   Resp 16   Wt 96.6 kg (213 lb)   LMP 07/15/2021   SpO2 98%    BMI 33.06 kg/m      PHYSICAL EXAM      Vitals: /77 (BP Location: Right arm, Patient Position: Semi-Robertson's, Cuff Size: Adult Regular)   Pulse 60   Temp 98.4  F (36.9  C) (Oral)   Resp 16   Wt 96.6 kg (213 lb)   LMP 07/15/2021   SpO2 98%   BMI 33.06 kg/m      Vitals: /77 (BP Location: Right arm, Patient Position: Semi-Robertson's, Cuff Size: Adult Regular)   Pulse 60   Temp 98.4  F (36.9  C) (Oral)   Resp 16   Wt 96.6 kg (213 lb)   LMP 07/15/2021   SpO2 98%   BMI 33.06 kg/m    General: Appears in no acute distress, awake, alert, interactive.  Eyes: Conjunctivae non-injected. Sclera anicteric.  HENT: Atraumatic.  Neck: Supple.  Respiratory/Chest: Respiration unlabored.  Abdomen: non distended  Musculoskeletal: Normal extremities. No edema or erythema.  Skin: Normal color. No rash or diaphoresis.  Neurologic: Face symmetric, moves all extremities spontaneously. Speech clear.  Psychiatric: Oriented to person, place, and time. Affect appropriate.    LAB:  All pertinent labs reviewed and interpreted.  Results for orders placed or performed during the hospital encounter of 04/11/22   UA with Microscopic reflex to Culture    Specimen: Urine, Clean Catch   Result Value Ref Range    Color Urine Light Yellow Colorless, Straw, Light Yellow, Yellow    Appearance Urine Turbid (A) Clear    Glucose Urine Negative Negative mg/dL    Bilirubin Urine Negative Negative    Ketones Urine Negative Negative mg/dL    Specific Gravity Urine 1.021 1.001 - 1.030    Blood Urine >1.0 mg/dL (A) Negative    pH Urine 6.5 5.0 - 7.0    Protein Albumin Urine 10  (A) Negative mg/dL    Urobilinogen Urine <2.0 <2.0 mg/dL    Nitrite Urine Negative Negative    Leukocyte Esterase Urine 500 Deniz/uL (A) Negative    Bacteria Urine Few (A) None Seen /HPF    Mucus Urine Present (A) None Seen /LPF    RBC Urine 5 (H) <=2 /HPF    WBC Urine 98 (H) <=5 /HPF    Squamous Epithelials Urine 2 (H) <=1 /HPF    Transitional Epithelials Urine <1 <=1  /HPF   Extra Blue Top Tube   Result Value Ref Range    Hold Specimen JIC    Extra Red Top Tube   Result Value Ref Range    Hold Specimen JIC    Extra Green Top (Lithium Heparin) Tube   Result Value Ref Range    Hold Specimen JIC    Extra Purple Top Tube   Result Value Ref Range    Hold Specimen JIC    CBC (+ platelets, no diff)   Result Value Ref Range    WBC Count 7.5 4.0 - 11.0 10e3/uL    RBC Count 4.52 3.80 - 5.20 10e6/uL    Hemoglobin 13.8 11.7 - 15.7 g/dL    Hematocrit 40.5 35.0 - 47.0 %    MCV 90 78 - 100 fL    MCH 30.5 26.5 - 33.0 pg    MCHC 34.1 31.5 - 36.5 g/dL    RDW 13.0 10.0 - 15.0 %    Platelet Count 249 150 - 450 10e3/uL   Comprehensive metabolic panel   Result Value Ref Range    Sodium 140 136 - 145 mmol/L    Potassium 4.2 3.5 - 5.0 mmol/L    Chloride 107 98 - 107 mmol/L    Carbon Dioxide (CO2) 20 (L) 22 - 31 mmol/L    Anion Gap 13 5 - 18 mmol/L    Urea Nitrogen 16 8 - 22 mg/dL    Creatinine 0.68 0.60 - 1.10 mg/dL    Calcium 9.4 8.5 - 10.5 mg/dL    Glucose 79 70 - 125 mg/dL    Alkaline Phosphatase 141 (H) 45 - 120 U/L    AST 14 0 - 40 U/L    ALT 18 0 - 45 U/L    Protein Total 7.1 6.0 - 8.0 g/dL    Albumin 3.5 3.5 - 5.0 g/dL    Bilirubin Total 0.3 0.0 - 1.0 mg/dL    GFR Estimate >90 >60 mL/min/1.73m2   Asymptomatic COVID-19 Virus (Coronavirus) by PCR Nasopharyngeal    Specimen: Nasopharyngeal; Swab   Result Value Ref Range    SARS CoV2 PCR Negative Negative       RADIOLOGY:  Reviewed all pertinent imaging. Please see official radiology report.  No orders to display       EKG:    None.      PROCEDURES:   None.       I, Darci Thrasher, am serving as a scribe to document services personally performed by Choco Salgado MD based on my observation and the provider's statements to me. I, Dr. Choco Salgado, attest that Darci Thrasher is acting in a scribe capacity, has observed my performance of the services and has documented them in accordance with my direction.    Choco Salgado MD  Emergency Medicine  M  [facility] EMERGENCY ROOM  [address] [facility] [address]  [phone]           Choco Salgado MD  04/12/22 0003

## 2022-04-12 NOTE — PROVIDER NOTIFICATION
Patient notified by RN that plan was for her to stay overnight for monitoring. Patient states she has no one to care for baby, she needs to be discharged home within an hour. Per hospital policy, baby is not allowed to stay with her alone, as she is being treated medically.     Dr Deras notified of situation: Ok to discharge since no one to care for baby.

## 2022-04-12 NOTE — PROGRESS NOTES
[facility] DISCHARGE NOTE    Patient discharged to home via wheel chair. Accompanied by significant other and staff. Discharge instructions reviewed with patient, opportunity offered to ask questions. Prescriptions to resume previous dosing. All belongings sent with patient.    Maria C Leroy RN

## 2022-04-12 NOTE — ED TRIAGE NOTES
Patient reports that she is post partum, baby was born on mar 30th, patient was discharge this morning after being here for 3 days to treat post partum hypertension, has been taking meds as prescribed and still HTN and vision is tiring.  Sarai Mccann RN.......4/11/2022 8:48 PM

## 2022-04-12 NOTE — PROGRESS NOTES
Care Management Initial Consult    General Information  Assessment completed with:  ,    Type of CM/SW Visit: Chart Assessment    Primary Care Provider verified and updated as needed:     Readmission within the last 30 days:           Advance Care Planning:            Communication Assessment  Patient's communication style: spoken language (English or Bilingual)    Hearing Difficulty or Deaf: no   Wear Glasses or Blind: no    Cognitive  Cognitive/Neuro/Behavioral: WDL  Level of Consciousness: alert  Arousal Level: opens eyes spontaneously  Orientation: oriented x 4  Mood/Behavior: behavior appropriate to situation, cooperative, calm  Best Language: 0 - No aphasia  Speech: clear, spontaneous, logical    Living Environment:   People in home:       Current living Arrangements:        Able to return to prior arrangements:         Family/Social Support:  Care provided by:    Provides care for:                  Description of Support System:           Current Resources:   Patient receiving home care services:       Community Resources:    Equipment currently used at home:    Supplies currently used at home:      Employment/Financial:  Employment Status:          Financial Concerns:             Lifestyle & Psychosocial Needs:  Social Determinants of Health     Tobacco Use: Medium Risk     Smoking Tobacco Use: Former Smoker     Smokeless Tobacco Use: Never Used   Alcohol Use: Not on file   Financial Resource Strain: Not on file   Food Insecurity: Not on file   Transportation Needs: Not on file   Physical Activity: Not on file   Stress: Not on file   Social Connections: Not on file   Intimate Partner Violence: Not on file   Depression: At risk     PHQ-2 Score: 6   Housing Stability: Not on file   Postpartum Depression: Not on file       Functional Status:  Prior to admission patient needed assistance:              Mental Health Status:          Chemical Dependency Status:                Values/Beliefs:  Spiritual, Cultural  Beliefs, Worship Practices, Values that affect care:                 Additional Information:  9:25 AM  Chart reviewed.  Anticipate discharge home, no needs. Anticipate family transport.      ARUN ColvinSW

## 2022-04-12 NOTE — H&P
[facility]    History and Physical - Hospitalist Service       Date of Admission:  2022    Assessment & Plan      Kee Phipps is a 31 year old  female with  3/30 following IOL at 36&6 2/2 IUGR, oligohydramnios, and elevated S/D ratio with abnormal doppler cord findings and admission 22-22 for postpartum pre-eclampsia with severe features who presents with headache and elevated blood pressures.     Postpartum pre-eclampsia   3/30 following IOL at 36&6 2/2 IUGR, oligohydramnios, and elevated S/D ratio with abnormal doppler cord findings. Admitted - for postpartum pre-eclampsia with severe features of SBPs to 190s, headache, and vision changes. Treated with magnesium infusion. Blood pressure controlled with labetalol 400 mg TID. After discharge, her blood pressure steven again to 167/107 and she developed a headache and blurry vision. She was prescribed nifedipine XR 30 mg, which she took at 1745. She laid down for a nap, and when she got up, her blood pressure was 179/113 and her vision was still tired so she presented to the ED. On admission, BPs 132//113. BMP unremarkable, LFTs normal, platelets normal at 249, WBC normal at 7.5. UA shows 500 LE, 98 WBC, 5 RBC, few bacteria but patient denies dysuria. BP now 127/78 on the floor.  - admit to inpatient  - consult OB/GYN, appreciate recs  - continue labetalol 400 mg TID  - VS q 1 hr for 2 hours, then every 4 hours  - if BP rises, will give additional nifedipine XR 30 mg, but will hold off for right now  - IV hydralazine 10 mg q20min for BP > 160/110  - goal BP < 140/100  - pain control with tylenol and hydroxyzine  - routine postpartum cares     Postpartum depression  Positive postpartum depression screen. Started on sertraline 25 mg daily on 22, with recommendation to increase to 50 mg after 7 days.  - continue sertraline 25 mg daily    Chronic Medical Conditions:  Chrohns Disease: [facility]  "outpatient   Celiac Disease: Avoid gluten  GERD: Famotidine 20 mg daily     Diet:   regular  DVT Prophylaxis: Low Risk/Ambulatory with no VTE prophylaxis indicated  Molina Catheter: Not present  Fluids: PO  Central Lines: None  Cardiac Monitoring: None  Code Status:   full code    Clinically Significant Risk Factors Present on Admission             # Hypoalbuminemia: Albumin = 3.1 g/dL (Ref range: 3.5 - 5.0 g/dL) on admission, will monitor as appropriate    # Platelet Defect: home medication list includes an antiplatelet medication   # Obesity: Estimated body mass index is 33.06 kg/m  as calculated from the following:    Height as of 22: 1.709 m (5' 7.3\").    Weight as of this encounter: 96.6 kg (213 lb).      Disposition Plan   Expected Discharge:  22   Anticipated discharge location:  Awaiting care coordination huddle  Delays:          The patient's care was discussed with the Attending Physician, Dr. Addie Martinez, who agrees with the assessment and plan above. Patient to be seen by Dr. Mounika Gibson in AM.     Linda Mora MD, PGY-2  Hospitalist Service  [facility]  Securely message with the Vocera Web Console (learn more here)  Text page via [employer] Paging/Directory       ______________________________________________________________________    Chief Complaint   Headache and hypertension    History is obtained from the patient    History of Present Illness   Kee Phipps is a 31 year old  female with  3/30 following IOL at 36&6 2/2 IUGR, oligohydramnios, and elevated S/D ratio with abnormal doppler cord findings and admission 22-22 for postpartum pre-eclampsia with severe features who presents with headache and elevated blood pressures.     She was discharged this morning.  She took her labetalol around 2 PM.  Shortly after, she started to get a headache, rated 5 out of 10, behind her eyes.  Her vision also got a little bit blurry.  Her blood pressure was " 160/110. She called [facility], who prescribed nifedipine XR 30 mg and recommended she pick it up and take it.  She was able to take the medication at 5:45 PM.  She also took some Excedrin Migraine at that time.  She laid down for nap for about an hour.  When she woke up, the headache was gone, but she had some tired vision.  No true blurry vision or seeing spots.  Her blood pressure was 179/113 at that time, which worried her, so she presented to the ER.    Now, she is not having any headache or vision changes.  Denies any chest pain or difficulty breathing.  No abdominal pain.  No swelling of hands or feet.  Vaginal bleeding is lighter than menstrual period.  Not feeling too anxious, but wondering if things she is doing at home is increasing her blood pressure.    Not feeling any dysuria.    Review of Systems    10- point ROS negative other than listed above.     Past Medical History    I have reviewed this patient's medical history and updated it with pertinent information if needed.   Past Medical History:   Diagnosis Date     Abnormal Pap smear of cervix      Accelerated idioventricular rhythm (H) 2022     Anemia      Anxiety      Arthritis      Bilateral plantar fasciitis      Celiac disease 2014     Celiac disease      Crohn disease (H)      Crohn's disease (H) 2012     Depression      Depressive disorder      Fibromyalgia      Fibromyalgia      H/O miscarriage, currently pregnant     miscarriage .     HPV (human papilloma virus) infection      Migraine      Plantar fasciitis     bilateral     Postpartum hypertension 2022      delivery 2013    34 weeks. Early rupture of membranes      Past Surgical History   I have reviewed this patient's surgical history and updated it with pertinent information if needed.  Past Surgical History:   Procedure Laterality Date     APPENDECTOMY       BOWEL RESECTION      illium      CERVIX LESION DESTRUCTION       DILATION AND CURETTAGE SUCTION,  TREAT MISSED , 1ST 2010    miscarriage at 8 wks gest     DILATION AND CURETTAGE SUCTION, TREAT MISSED , 1ST TRIMESTER  2019     FASCIOTOMY LOWER EXTREMITY Left 2018    Left foot     FOOT SURGERY Left      LAPAROSCOPY DIAGNOSTIC (GYN) N/A 2019    Procedure: LAPAROSCOPY, DIAGNOSTIC;  Surgeon: Anni Velasquez MD;  Location:  OR     PLANTAR FASCIA RELEASE Left 4/10/2018    Procedure:  PLANTAR FASCIOTOMY LEFT FOOT ;  Surgeon: Yuri Arreola DPM;  Location: [facility];  Service:      SALPINGECTOMY Right 05/15/2021    rt sided ruptured ectopic     SMALL BOWEL RESECTION       TONSILLECTOMY        Social History   I have reviewed this patient's social history and updated it with pertinent information if needed. Kee Phipps  reports that she quit smoking about 4 months ago. She smoked 0.03 packs per day. She has never used smokeless tobacco. She reports previous alcohol use. She reports that she does not use drugs.    Family History   I have reviewed this patient's family history and updated it with pertinent information if needed.  Family History   Problem Relation Age of Onset     Hypertension Mother      Seizure Disorder Mother      Asthma Brother      Asthma Brother      Asthma Brother      Asthma Brother      Cancer Maternal Grandmother         bone cancer     Asthma Son      Crohn's Disease Paternal Uncle      Diabetes No family hx of      Coronary Artery Disease No family hx of      Hyperlipidemia No family hx of      Cerebrovascular Disease No family hx of      Breast Cancer No family hx of      Colon Cancer No family hx of      Prostate Cancer No family hx of      Other Cancer No family hx of      Depression No family hx of      Anxiety Disorder No family hx of      Mental Illness No family hx of      Substance Abuse No family hx of      Anesthesia Reaction No family hx of      Osteoporosis No family hx of      Genetic Disorder No family hx  of      Thyroid Disease No family hx of      Obesity No family hx of      Unknown/Adopted No family hx of      Heart Disease No family hx of        Prior to Admission Medications   Prior to Admission Medications   Prescriptions Last Dose Informant Patient Reported? Taking?   NIFEdipine ER OSMOTIC (PROCARDIA XL) 30 MG 24 hr tablet 4/11/2022 at 1745  No Yes   Sig: Take 1 tablet (30 mg) by mouth every 24 hours   Prenatal Vit-Fe Fumarate-FA (PRENATAL MULTIVITAMIN W/IRON) 27-0.8 MG tablet 4/8/2022  No Yes   Sig: Take 1 tablet by mouth daily   acetaminophen (TYLENOL) 650 MG CR tablet 4/10/2022 at Unknown time  No Yes   Sig: Take 1 tablet (650 mg) by mouth every 8 hours as needed for mild pain or fever   aspirin-acetaminophen-caffeine (EXCEDRIN MIGRAINE) 250-250-65 MG tablet 4/11/2022 at 1700  No Yes   Sig: Take 2 tablets by mouth every 6 hours as needed for headaches Do NOT take regular tylenol while taking this medicine   famotidine (PEPCID) 20 MG tablet 4/10/2022 at Unknown time  No Yes   Sig: Take 1 tablet (20 mg) by mouth 2 times daily   labetalol (NORMODYNE) 200 MG tablet 4/11/2022 at 1400  No Yes   Sig: Take 2 tablets (400 mg) by mouth in the morning and 2 tablets (400 mg) at noon and 2 tablets (400 mg) in the evening.   magnesium 250 MG tablet 4/8/2022  Yes Yes   Sig: Take 1 tablet by mouth daily   sertraline (ZOLOFT) 50 MG tablet 4/11/2022 at Unknown time  No Yes   Sig: Take 1 tablet (50 mg) by mouth in the morning. Take 0.5 tablet (25 mg) daily for 7 days and then increase to 1 tablet (50 mg daily).   ustekinumab (STELARA) 90 MG/ML 3/14/2022  Yes Yes   Sig: Inject Subcutaneous every 2 months      Facility-Administered Medications: None     Allergies   Allergies   Allergen Reactions     Gluten Meal      Humira [Humira]      Caused huge lumps at injection site.      Ibuprofen      Patient has Crohns disease and is unable to take this medication.     Latex Itching     Remicade [Infliximab] Rash       Physical Exam    Vital Signs: Temp: 98.4  F (36.9  C) Temp src: Oral BP: (!) 144/85 Pulse: 71   Resp: 18 SpO2: 97 % O2 Device: None (Room air)    Weight: 213 lbs 0 oz    General: alert, appears comfortable, no acute distress  HEENT: atraumatic, conjunctiva clear without erythema, EOM's intact, no nasal discharge, MMM  Neck: supple  Cardiac: normal rate and rhythm with no murmurs or extra sounds  Resp: lungs clear to auscultation bilaterally with no crackles or wheezes, no increased work of breathing  Abdomen: soft, non-tender to palpation, no masses  Extremities: no peripheral edema  Skin: no rashes or suspicious legions on exposed skin  Neuro: CN's grossly intact. Patellar reflexes 1+.  Psych: affect congruent with mood      Data   Data reviewed today: I reviewed all medications, new labs and imaging results over the last 24 hours. I personally reviewed no images or EKG's today.    Recent Labs   Lab 04/11/22  2104 04/10/22  0628 04/09/22  0610   WBC 7.5 5.5 6.7   HGB 13.8 14.5 14.0   MCV 90 92 89    242 257    139 140   POTASSIUM 4.2 4.4 3.6   CHLORIDE 107 108* 106   CO2 20* 20* 22   BUN 16 9 6*   CR 0.68 0.68 0.67   ANIONGAP 13 11 12   BRENDA 9.4 9.1 7.8*   GLC 79 87 95   ALBUMIN 3.5 3.1* 3.2*   PROTTOTAL 7.1 6.3 6.6   BILITOTAL 0.3 0.4 0.3   ALKPHOS 141* 133* 129*   ALT 18 15 14   AST 14 8 9

## 2022-04-12 NOTE — PLAN OF CARE
Problem: Hypertension Acute  Goal: Blood Pressure Within Desired Range  Intervention: Normalize Blood Pressure  Recent Flowsheet Documentation  Taken 4/12/2022 0816 by Maria C Leroy RN  Medication Review/Management: medications reviewed     Patient's blood pressure has remained stable since admission to unit, administered scheduled labetalol. Moderate headache this morning, patient attributes it to crying earlier since her baby is constipated. Tylenol administered with relief, patient able to rest. Reflexes good, no clonus. Denies vision changes. Hopeful to discharge home to baby today.

## 2022-04-12 NOTE — CONSULTS
See full note by resident.     32yo  PPD 13 from . Was admited  for preeclampsia with severe features.  Received magneisum and discharged home on labetalol 400 TID.  She had elevated blood pressures at home. She called the clinic and was prescribed nifedipine 30XL which she took around 530pm.  She then developed blurry vision and a headache that lasted about an hour and checked her blood pressure at home. BP 170s/110s.  She says that headaches and blurry vision have resolved. With elevated blood pressures and recent hospital admission, I recommended that she stay for observation and medication adjustment.     PMH: Anxiety/depression, crohns, celiac  PSH: Appendectomy, bowel resection, D&C, right salpingectomy, tonsilectomy  All: See allergy list  SH: quit smoking 4mo ago    /65 (BP Location: Right arm, Patient Position: Semi-Robertson's, Cuff Size: Adult Regular)   Pulse 76   Temp 98.4  F (36.9  C) (Oral)   Resp 16   Wt 96.6 kg (213 lb)   LMP 07/15/2021   SpO2 98%   BMI 33.06 kg/m      Gen: resting comfortably  Neuro: AAO x 3    Results for orders placed or performed during the hospital encounter of 22 (from the past 24 hour(s))   [place] Draw *Canceled*    Narrative    The following orders were created for panel order [place] Draw.  Procedure                               Abnormality         Status                     ---------                               -----------         ------                       Please view results for these tests on the individual orders.   UA with Microscopic reflex to Culture    Specimen: Urine, Clean Catch   Result Value Ref Range    Color Urine Light Yellow Colorless, Straw, Light Yellow, Yellow    Appearance Urine Turbid (A) Clear    Glucose Urine Negative Negative mg/dL    Bilirubin Urine Negative Negative    Ketones Urine Negative Negative mg/dL    Specific Gravity Urine 1.021 1.001 - 1.030    Blood Urine >1.0 mg/dL (A) Negative    pH Urine 6.5  5.0 - 7.0    Protein Albumin Urine 10  (A) Negative mg/dL    Urobilinogen Urine <2.0 <2.0 mg/dL    Nitrite Urine Negative Negative    Leukocyte Esterase Urine 500 Deniz/uL (A) Negative    Bacteria Urine Few (A) None Seen /HPF    Mucus Urine Present (A) None Seen /LPF    RBC Urine 5 (H) <=2 /HPF    WBC Urine 98 (H) <=5 /HPF    Squamous Epithelials Urine 2 (H) <=1 /HPF    Transitional Epithelials Urine <1 <=1 /HPF    Narrative    Urine Culture ordered based on laboratory criteria   [place] Draw    Narrative    The following orders were created for panel order [place] Draw.  Procedure                               Abnormality         Status                     ---------                               -----------         ------                     Extra Blue Top Tube[[id number]]                              Final result               Extra Red Top Tube[[id number]]                               Final result               Extra Green Top (Lithium...[[id number]]                      Final result               Extra Purple Top Tube[[id number]]                            Final result                 Please view results for these tests on the individual orders.   Extra Blue Top Tube   Result Value Ref Range    Hold Specimen JIC    Extra Red Top Tube   Result Value Ref Range    Hold Specimen JIC    Extra Green Top (Lithium Heparin) Tube   Result Value Ref Range    Hold Specimen JIC    Extra Purple Top Tube   Result Value Ref Range    Hold Specimen JIC    CBC (+ platelets, no diff)   Result Value Ref Range    WBC Count 7.5 4.0 - 11.0 10e3/uL    RBC Count 4.52 3.80 - 5.20 10e6/uL    Hemoglobin 13.8 11.7 - 15.7 g/dL    Hematocrit 40.5 35.0 - 47.0 %    MCV 90 78 - 100 fL    MCH 30.5 26.5 - 33.0 pg    MCHC 34.1 31.5 - 36.5 g/dL    RDW 13.0 10.0 - 15.0 %    Platelet Count 249 150 - 450 10e3/uL   Comprehensive metabolic panel   Result Value Ref Range    Sodium 140 136 - 145 mmol/L    Potassium 4.2 3.5 - 5.0 mmol/L    Chloride 107 98 - 107  mmol/L    Carbon Dioxide (CO2) 20 (L) 22 - 31 mmol/L    Anion Gap 13 5 - 18 mmol/L    Urea Nitrogen 16 8 - 22 mg/dL    Creatinine 0.68 0.60 - 1.10 mg/dL    Calcium 9.4 8.5 - 10.5 mg/dL    Glucose 79 70 - 125 mg/dL    Alkaline Phosphatase 141 (H) 45 - 120 U/L    AST 14 0 - 40 U/L    ALT 18 0 - 45 U/L    Protein Total 7.1 6.0 - 8.0 g/dL    Albumin 3.5 3.5 - 5.0 g/dL    Bilirubin Total 0.3 0.0 - 1.0 mg/dL    GFR Estimate >90 >60 mL/min/1.73m2   Asymptomatic COVID-19 Virus (Coronavirus) by PCR Nasopharyngeal    Specimen: Nasopharyngeal; Swab   Result Value Ref Range    SARS CoV2 PCR Negative Negative    Narrative    Testing was performed using the keny  SARS-CoV-2 & Influenza A/B Assay on the keny  Lisa  System.  This test should be ordered for the detection of SARS-COV-2 in individuals who meet SARS-CoV-2 clinical and/or epidemiological criteria. Test performance is unknown in asymptomatic patients.  This test is for in vitro diagnostic use under the FDA EUA for laboratories certified under CLIA to perform moderate and/or high complexity testing. This test has not been FDA cleared or approved.  A negative test does not rule out the presence of PCR inhibitors in the specimen or target RNA in concentration below the limit of detection for the assay. The possibility of a false negative should be considered if the patient's recent exposure or clinical presentation suggests COVID-19.  [facility] are certified under the Clinical Laboratory Improvement Amendments of 1988 (CLIA-88) as qualified to perform moderate and/or high complexity laboratory testing.         A/P: 30yo  PPD 13. PP preeclampsia with poorly controlled blood pressures.    Since arrival to the hospital she has received her evening dose of labetalol and blood pressures are now wnl.  Continue labetalol 400 TOD + procardia 30XL and monitor Bps closely  - Labs wnl  - Has previously received magnesium. No need to repeat  Maricruz MENJIVAR  MD Freddy

## 2022-04-12 NOTE — PLAN OF CARE
Problem: Plan of Care - These are the overarching goals to be used throughout the patient stay.    Goal: Plan of Care Review/Shift Note  Outcome: Ongoing, Progressing  Pt declined pain. BP stable overnight. Declines dizziness or vision changes. No clonus, reflexes are normal. UC pending. IVSL. Tolerating diet. Ambulating independently.

## 2022-04-13 ENCOUNTER — PATIENT OUTREACH (OUTPATIENT)
Dept: CARE COORDINATION | Facility: CLINIC | Age: 32
End: 2022-04-13
Payer: COMMERCIAL

## 2022-04-13 DIAGNOSIS — Z71.89 OTHER SPECIFIED COUNSELING: ICD-10-CM

## 2022-04-13 LAB — BACTERIA UR CULT: NORMAL

## 2022-04-13 NOTE — PROGRESS NOTES
Clinic Care Coordination Contact  [facility]: Post-Discharge Note  SITUATION                                                      Admission:    Admission Date: 22   Reason for Admission: Post partum preeclampsia with severe features  Post partum depression  Discharge:   Discharge Date: 22  Discharge Diagnosis: Post partum preeclampsia with severe features  Post partum depression    BACKGROUND                                                      Kee Phipps is a 31 year old  female with  3/30 following IOL at 36&6 2/2 IUGR, oligohydramnios, and elevated S/D ratio with abnormal doppler cord findings and admission 22-22 for postpartum pre-eclampsia with severe features who presents with headache and elevated blood pressures.      She was discharged this morning.  She took her labetalol around 2 PM.  Shortly after, she started to get a headache, rated 5 out of 10, behind her eyes.  Her vision also got a little bit blurry.  Her blood pressure was 160/110. She called [place] [facility], who prescribed nifedipine XR 30 mg and recommended she pick it up and take it.  She was able to take the medication at 5:45 PM.  She also took some Excedrin Migraine at that time.  She laid down for nap for about an hour.  When she woke up, the headache was gone, but she had some tired vision.  No true blurry vision or seeing spots.  Her blood pressure was 179/113 at that time, which worried her, so she presented to the ER.     Now, she is not having any headache or vision changes.  Denies any chest pain or difficulty breathing.  No abdominal pain.  No swelling of hands or feet.  Vaginal bleeding is lighter than menstrual period.  Not feeling too anxious, but wondering if things she is doing at home is increasing her blood pressure.     Not feeling any dysuria.    ASSESSMENT           Discharge Assessment  How are you doing now that you are home?: Patient shares that she is doing much better. Took  blood pressure this morning and it was within a normal range. No concerns or needs at this time. Thanked the SW for the call  How are your symptoms? (Red Flag symptoms escalate to triage hotline per guidelines): Improved  Do you feel your condition is stable enough to be safe at home until your provider visit?: Yes  Does the patient have their discharge instructions? : Yes  Does the patient have questions regarding their discharge instructions? : No  Were you started on any new medications or were there changes to any of your previous medications? : Yes  Does the patient have all of their medications?: Yes  Do you have questions regarding any of your medications? : No  Do you have all of your needed medical supplies or equipment (DME)?  (i.e. oxygen tank, CPAP, cane, etc.): Yes  Discharge follow-up appointment scheduled within 14 calendar days? : No  Is patient agreeable to assistance with scheduling? : No (Will call today to make f/u appt with PCP)    Post-op ([facility]A Only)  If the patient had a surgery or procedure, do they have any questions for a nurse?: No    Post-op (Clinicians Only)  Did the patient have surgery or a procedure: No  Fever: No  Chills: No        PLAN                                                      Outpatient Plan:  Follow up with PCP    Future Appointments   Date Time Provider Department Center   6/7/2022  1:00 PM Caridad Ordoñez, PT [facility]         For any urgent concerns, please contact our 24 hour nurse triage line: [phone] ([phone])         FANTASMA Parry

## 2022-04-15 ENCOUNTER — OFFICE VISIT (OUTPATIENT)
Dept: FAMILY MEDICINE | Facility: CLINIC | Age: 32
End: 2022-04-15
Payer: COMMERCIAL

## 2022-04-15 VITALS
SYSTOLIC BLOOD PRESSURE: 124 MMHG | HEART RATE: 61 BPM | BODY MASS INDEX: 32.75 KG/M2 | DIASTOLIC BLOOD PRESSURE: 87 MMHG | RESPIRATION RATE: 16 BRPM | TEMPERATURE: 97 F | OXYGEN SATURATION: 98 % | WEIGHT: 211 LBS

## 2022-04-15 LAB
ALBUMIN SERPL-MCNC: 3.5 G/DL (ref 3.5–5)
ALP SERPL-CCNC: 121 U/L (ref 45–120)
ALT SERPL W P-5'-P-CCNC: 17 U/L (ref 0–45)
ANION GAP SERPL CALCULATED.3IONS-SCNC: 10 MMOL/L (ref 5–18)
AST SERPL W P-5'-P-CCNC: 11 U/L (ref 0–40)
BILIRUB SERPL-MCNC: 0.6 MG/DL (ref 0–1)
BUN SERPL-MCNC: 14 MG/DL (ref 8–22)
CALCIUM SERPL-MCNC: 9.6 MG/DL (ref 8.5–10.5)
CHLORIDE BLD-SCNC: 104 MMOL/L (ref 98–107)
CO2 SERPL-SCNC: 24 MMOL/L (ref 22–31)
CREAT SERPL-MCNC: 0.77 MG/DL (ref 0.6–1.1)
ERYTHROCYTE [DISTWIDTH] IN BLOOD BY AUTOMATED COUNT: 12.5 % (ref 10–15)
GFR SERPL CREATININE-BSD FRML MDRD: >90 ML/MIN/1.73M2
GLUCOSE BLD-MCNC: 88 MG/DL (ref 70–125)
HCT VFR BLD AUTO: 42 % (ref 35–47)
HGB BLD-MCNC: 14 G/DL (ref 11.7–15.7)
MCH RBC QN AUTO: 30.4 PG (ref 26.5–33)
MCHC RBC AUTO-ENTMCNC: 33.3 G/DL (ref 31.5–36.5)
MCV RBC AUTO: 91 FL (ref 78–100)
PLATELET # BLD AUTO: 254 10E3/UL (ref 150–450)
POTASSIUM BLD-SCNC: 4.4 MMOL/L (ref 3.5–5)
PROT SERPL-MCNC: 7.1 G/DL (ref 6–8)
RBC # BLD AUTO: 4.6 10E6/UL (ref 3.8–5.2)
SODIUM SERPL-SCNC: 138 MMOL/L (ref 136–145)
TSH SERPL DL<=0.005 MIU/L-ACNC: 0.5 UIU/ML (ref 0.3–5)
WBC # BLD AUTO: 6.2 10E3/UL (ref 4–11)

## 2022-04-15 PROCEDURE — 36415 COLL VENOUS BLD VENIPUNCTURE: CPT | Performed by: STUDENT IN AN ORGANIZED HEALTH CARE EDUCATION/TRAINING PROGRAM

## 2022-04-15 PROCEDURE — 85027 COMPLETE CBC AUTOMATED: CPT | Performed by: STUDENT IN AN ORGANIZED HEALTH CARE EDUCATION/TRAINING PROGRAM

## 2022-04-15 PROCEDURE — 80053 COMPREHEN METABOLIC PANEL: CPT | Performed by: STUDENT IN AN ORGANIZED HEALTH CARE EDUCATION/TRAINING PROGRAM

## 2022-04-15 PROCEDURE — 99214 OFFICE O/P EST MOD 30 MIN: CPT | Mod: 24 | Performed by: STUDENT IN AN ORGANIZED HEALTH CARE EDUCATION/TRAINING PROGRAM

## 2022-04-15 PROCEDURE — 84443 ASSAY THYROID STIM HORMONE: CPT | Performed by: STUDENT IN AN ORGANIZED HEALTH CARE EDUCATION/TRAINING PROGRAM

## 2022-04-15 ASSESSMENT — ENCOUNTER SYMPTOMS
ABDOMINAL PAIN: 0
COUGH: 0
FEVER: 0
LIGHT-HEADEDNESS: 1
SEIZURES: 0
PARESTHESIAS: 0
FATIGUE: 1
TREMORS: 0

## 2022-04-15 NOTE — PROGRESS NOTES
Assessment & Plan   1. Pre-eclampsia, postpartum  Due to side effects experienced by the pt, nifedipine will be discontinued and pt will continue taking two tablets of labetalol in the morning, at noon and in the evening. BP parameters as systolic 140-120 and diastolic 100-80 mmHg. She is close to BP goals at home but some readings are higher than goal after only taking 1 tablet of labetalol daily. Follow-up will be scheduled for two weeks and the pt will bring her blood pressure cuff from home to compare with bp readings at clinic. In two weeks, labetalol may also be down-titrated or discontinued depending on blood pressure control.       2. Fatigue   Labs will be drawn to look for other possible causes of fatigue besides being a new mom. Looking to rule out hugo syndrome, hypothyroidism, anemia or electrolyte imbalance.   - CBC with platelets; Future  - Comprehensive metabolic panel; Future  - TSH with free T4 reflex; Future  - TSH with free T4 reflex  - Comprehensive metabolic panel  - CBC with platelets    Bismark Sweet is a 31 year old who presents for the following health issues   HPI   1. Pre-eclampsia, postpartum  Pt was recently started on two blood pressure medications: nifedipine 30 daily and Labetalol 400 mg every 8 hours. She lowered the dose yesterday and has only taken 1 tablet (200 mg) of labetalol once per day for the past two days. Ms. Phipps has been having side effects such as cold sweats, hot flashes, lightheadedness, and a tingling sensation in her back that subsides with eating that she attributes to the nifedipine which she has stopped taking. Blood pressure at home this morning was 149/103 before taking her labetalol and 131/101 afterwards. In clinic today it was 124/87. Pt denies vision changes, lower extremity edema and SOB. Pt affirms BEJARANO. Pt does not need any bp medications filled today.     2. Fatigue   Pt recently gave birth on March 30th and is only getting about 3-4 hours of  sleep at a time. She does have some help with the  at home, but still feels exhausted. She is not drinking coffee or soda regularly but does drink a lot of tea. Pt is not currently working and is concentrated on being a mom full-time.      Review of Systems   Constitutional: Positive for fatigue. Negative for fever.   Eyes: Negative for visual disturbance.   Respiratory: Negative for cough.    Cardiovascular: Negative for chest pain.   Gastrointestinal: Negative for abdominal pain.   Neurological: Positive for light-headedness. Negative for tremors, seizures and paresthesias.          Objective    /87   Pulse 61   Temp 97  F (36.1  C) (Oral)   Resp 16   Wt 95.7 kg (211 lb)   LMP 07/15/2021   SpO2 98%   BMI 32.75 kg/m    Body mass index is 32.75 kg/m .  Physical Exam  Eyes:      Extraocular Movements: Extraocular movements intact.      Conjunctiva/sclera: Conjunctivae normal.      Pupils: Pupils are equal, round, and reactive to light.   Cardiovascular:      Rate and Rhythm: Normal rate and regular rhythm.      Heart sounds: Normal heart sounds.     No friction rub. No gallop.   Pulmonary:      Effort: Pulmonary effort is normal.      Breath sounds: Normal breath sounds. No wheezing.   Abdominal:      General: There is no distension.      Tenderness: There is no abdominal tenderness.   Musculoskeletal:         General: No swelling. Normal range of motion.   Skin:     General: Skin is warm and dry.   Neurological:      General: No focal deficit present.      Mental Status: She is alert.   Psychiatric:         Behavior: Behavior normal.         Thought Content: Thought content normal.        Yolette Joe MS3    Resident/Fellow Attestation   I, Gonzalo Corona, was present with the medical/TOBY student who participated in the service and in the documentation of the note.  I have verified the history and personally performed the physical exam and medical decision making.  I agree with the  assessment and plan of care as documented in the note.      Gonzalo Corona MD  PGY2

## 2022-04-26 DIAGNOSIS — B37.0 ORAL THRUSH: Primary | ICD-10-CM

## 2022-04-26 RX ORDER — NYSTATIN 100000/ML
1 SUSPENSION, ORAL (FINAL DOSE FORM) ORAL 4 TIMES DAILY
Qty: 60 ML | Refills: 0 | Status: SHIPPED | OUTPATIENT
Start: 2022-04-26 | End: 2022-05-03

## 2022-04-26 NOTE — PROGRESS NOTES
Rx for  thrush due to insurance issues  Diagnoses and all orders for this visit:    Oral thrush  -     nystatin (MYCOSTATIN) 454166 UNIT/ML suspension; Take 1 mL (100,000 Units) by mouth 4 times daily for 7 days        ----  Liane Deleon MD  PGY-3  Family Medicine Resident

## 2022-05-02 ENCOUNTER — DOCUMENTATION ONLY (OUTPATIENT)
Dept: FAMILY MEDICINE | Facility: CLINIC | Age: 32
End: 2022-05-02
Payer: COMMERCIAL

## 2022-05-02 NOTE — PROGRESS NOTES
To be completed in Nursing note:    Please reference list for forms that require a visit for completion.  Please remind patients that providers are given 3-5 business days to complete and return forms.      Form type: MTM ~ Level of Need Assessment form     Date form received: 4/26/2022    Date form completed by Physician: 5/2/2022    How was form returned to patient (mailed, faxed, or at  for patient to ):    Fax to [phone]    Date form mailed/faxed/left at  for patient and sent to HIM for scanning:    Fax on 5/2/2022    Once form is left for patient, faxed, or mailed PCS will then close the documentation only encounter.

## 2022-05-16 ENCOUNTER — MEDICAL CORRESPONDENCE (OUTPATIENT)
Dept: HEALTH INFORMATION MANAGEMENT | Facility: CLINIC | Age: 32
End: 2022-05-16

## 2022-05-16 ENCOUNTER — OFFICE VISIT (OUTPATIENT)
Dept: FAMILY MEDICINE | Facility: CLINIC | Age: 32
End: 2022-05-16
Payer: COMMERCIAL

## 2022-05-16 ENCOUNTER — TELEPHONE (OUTPATIENT)
Dept: FAMILY MEDICINE | Facility: CLINIC | Age: 32
End: 2022-05-16
Payer: COMMERCIAL

## 2022-05-16 VITALS
TEMPERATURE: 98 F | HEART RATE: 70 BPM | BODY MASS INDEX: 32.72 KG/M2 | OXYGEN SATURATION: 99 % | WEIGHT: 210.8 LBS | DIASTOLIC BLOOD PRESSURE: 95 MMHG | RESPIRATION RATE: 20 BRPM | SYSTOLIC BLOOD PRESSURE: 134 MMHG

## 2022-05-16 DIAGNOSIS — G89.29 CHRONIC MIDLINE THORACIC BACK PAIN: ICD-10-CM

## 2022-05-16 DIAGNOSIS — K21.00 GASTROESOPHAGEAL REFLUX DISEASE WITH ESOPHAGITIS WITHOUT HEMORRHAGE: ICD-10-CM

## 2022-05-16 DIAGNOSIS — Z30.8 ENCOUNTER FOR TUBAL LIGATION COUNSELING: ICD-10-CM

## 2022-05-16 DIAGNOSIS — R10.84 ABDOMINAL PAIN, GENERALIZED: Primary | ICD-10-CM

## 2022-05-16 DIAGNOSIS — M54.6 CHRONIC MIDLINE THORACIC BACK PAIN: ICD-10-CM

## 2022-05-16 LAB
ALBUMIN SERPL-MCNC: 3.8 G/DL (ref 3.5–5)
ALBUMIN UR-MCNC: NEGATIVE MG/DL
ALP SERPL-CCNC: 97 U/L (ref 45–120)
ALT SERPL W P-5'-P-CCNC: 16 U/L (ref 0–45)
ANION GAP SERPL CALCULATED.3IONS-SCNC: 8 MMOL/L (ref 5–18)
APPEARANCE UR: CLEAR
AST SERPL W P-5'-P-CCNC: 12 U/L (ref 0–40)
BACTERIA #/AREA URNS HPF: ABNORMAL /HPF
BASOPHILS # BLD AUTO: 0 10E3/UL (ref 0–0.2)
BASOPHILS NFR BLD AUTO: 1 %
BILIRUB SERPL-MCNC: 0.6 MG/DL (ref 0–1)
BILIRUB UR QL STRIP: NEGATIVE
BUN SERPL-MCNC: 11 MG/DL (ref 8–22)
CALCIUM SERPL-MCNC: 9.4 MG/DL (ref 8.5–10.5)
CHLORIDE BLD-SCNC: 106 MMOL/L (ref 98–107)
CO2 SERPL-SCNC: 24 MMOL/L (ref 22–31)
COLOR UR AUTO: YELLOW
CREAT SERPL-MCNC: 0.88 MG/DL (ref 0.6–1.1)
EOSINOPHIL # BLD AUTO: 0.2 10E3/UL (ref 0–0.7)
EOSINOPHIL NFR BLD AUTO: 4 %
ERYTHROCYTE [DISTWIDTH] IN BLOOD BY AUTOMATED COUNT: 12.3 % (ref 10–15)
GFR SERPL CREATININE-BSD FRML MDRD: >90 ML/MIN/1.73M2
GLUCOSE BLD-MCNC: 85 MG/DL (ref 70–125)
GLUCOSE UR STRIP-MCNC: NEGATIVE MG/DL
HCG UR QL: NEGATIVE
HCT VFR BLD AUTO: 39.3 % (ref 35–47)
HGB BLD-MCNC: 13.4 G/DL (ref 11.7–15.7)
HGB UR QL STRIP: NEGATIVE
IMM GRANULOCYTES # BLD: 0 10E3/UL
IMM GRANULOCYTES NFR BLD: 0 %
KETONES UR STRIP-MCNC: NEGATIVE MG/DL
LEUKOCYTE ESTERASE UR QL STRIP: ABNORMAL
LIPASE SERPL-CCNC: 28 U/L (ref 0–52)
LYMPHOCYTES # BLD AUTO: 2.9 10E3/UL (ref 0.8–5.3)
LYMPHOCYTES NFR BLD AUTO: 49 %
MCH RBC QN AUTO: 29.6 PG (ref 26.5–33)
MCHC RBC AUTO-ENTMCNC: 34.1 G/DL (ref 31.5–36.5)
MCV RBC AUTO: 87 FL (ref 78–100)
MONOCYTES # BLD AUTO: 0.6 10E3/UL (ref 0–1.3)
MONOCYTES NFR BLD AUTO: 9 %
NEUTROPHILS # BLD AUTO: 2.2 10E3/UL (ref 1.6–8.3)
NEUTROPHILS NFR BLD AUTO: 37 %
NITRATE UR QL: NEGATIVE
PH UR STRIP: 7 [PH] (ref 5–8)
PLATELET # BLD AUTO: 201 10E3/UL (ref 150–450)
POTASSIUM BLD-SCNC: 4.2 MMOL/L (ref 3.5–5)
PROT SERPL-MCNC: 6.8 G/DL (ref 6–8)
RBC # BLD AUTO: 4.52 10E6/UL (ref 3.8–5.2)
RBC #/AREA URNS AUTO: ABNORMAL /HPF
SODIUM SERPL-SCNC: 138 MMOL/L (ref 136–145)
SP GR UR STRIP: 1.02 (ref 1–1.03)
SQUAMOUS #/AREA URNS AUTO: ABNORMAL /LPF
UROBILINOGEN UR STRIP-ACNC: 0.2 E.U./DL
WBC # BLD AUTO: 6 10E3/UL (ref 4–11)
WBC #/AREA URNS AUTO: ABNORMAL /HPF

## 2022-05-16 PROCEDURE — 80053 COMPREHEN METABOLIC PANEL: CPT | Performed by: STUDENT IN AN ORGANIZED HEALTH CARE EDUCATION/TRAINING PROGRAM

## 2022-05-16 PROCEDURE — 81025 URINE PREGNANCY TEST: CPT | Performed by: STUDENT IN AN ORGANIZED HEALTH CARE EDUCATION/TRAINING PROGRAM

## 2022-05-16 PROCEDURE — 87086 URINE CULTURE/COLONY COUNT: CPT | Performed by: STUDENT IN AN ORGANIZED HEALTH CARE EDUCATION/TRAINING PROGRAM

## 2022-05-16 PROCEDURE — 81001 URINALYSIS AUTO W/SCOPE: CPT | Performed by: STUDENT IN AN ORGANIZED HEALTH CARE EDUCATION/TRAINING PROGRAM

## 2022-05-16 PROCEDURE — 83690 ASSAY OF LIPASE: CPT | Performed by: STUDENT IN AN ORGANIZED HEALTH CARE EDUCATION/TRAINING PROGRAM

## 2022-05-16 PROCEDURE — 36415 COLL VENOUS BLD VENIPUNCTURE: CPT | Performed by: STUDENT IN AN ORGANIZED HEALTH CARE EDUCATION/TRAINING PROGRAM

## 2022-05-16 PROCEDURE — 99214 OFFICE O/P EST MOD 30 MIN: CPT | Mod: GC | Performed by: STUDENT IN AN ORGANIZED HEALTH CARE EDUCATION/TRAINING PROGRAM

## 2022-05-16 PROCEDURE — 85025 COMPLETE CBC W/AUTO DIFF WBC: CPT | Performed by: STUDENT IN AN ORGANIZED HEALTH CARE EDUCATION/TRAINING PROGRAM

## 2022-05-16 RX ORDER — FAMOTIDINE 20 MG/1
20 TABLET, FILM COATED ORAL 2 TIMES DAILY
Qty: 60 TABLET | Refills: 3 | Status: SHIPPED | OUTPATIENT
Start: 2022-05-16 | End: 2022-06-16

## 2022-05-16 NOTE — TELEPHONE ENCOUNTER
Pt states that she went to [facility] over the weekend due to throbbing pains in her upper abdomen in the middle.  She states that they told her that her D Dimer was slightly elevated. She states that the pain comes and goes.  She states that her back also hurts to the touch where she had her epidural.   Scheduled her an appt to see Dr Behm today at 10:00 AM./YOLI

## 2022-05-16 NOTE — PROGRESS NOTES
There are no exam notes on file for this visit.    Assessment & Plan      1. Abdominal pain, generalized  Unable to see records from recent ED visit even after Econsent obtained by patient. Appears CT imaging was not completed due to concern for short-interval pregnancy. Patient did require manually placenta removal 3/28/22, unlikely to be endometritis so many weeks after delivering. Will look for liver and biliary tree abnormalities, UA, lipase, check WBC. Not likely related to post-partum pre-E at this time either, BP has been better controlled. She did not take her medication this morning and BP 130s/90s. Will treat GERD.   - HCG qualitative urine  - Lipase  - Comprehensive metabolic panel  - UA reflex to Microscopic  - CBC with Platelets & Differential  - Urine Microscopic Exam  - Urine Culture; Future  - Urine Culture    2. Gastroesophageal reflux disease with esophagitis without hemorrhage  - famotidine (PEPCID) 20 MG tablet; Take 1 tablet (20 mg) by mouth 2 times daily  Dispense: 60 tablet; Refill: 3    3. Chronic midline thoracic back pain  Patient experiences chronic pain and believes some of it is due to large breast, she would like to look into breast reduction.   - Plastic Surgery Referral; Future    4. Encounter for tubal ligation counseling  Patient would like tubal ligation.   - Ob/Gyn Referral    Follow up with PCP for post-partum visit and follow up of abdominal pain    Review of prior external note(s) from - admissions and clinic encounters since 3/28/22  Ordering of each unique test  50 minutes spent on the date of the encounter doing chart review, history and exam, documentation and further activities per the note    No follow-ups on file.    Benita Behm, MD PGY2  [facility] Family Medicine Residency  05/16/22    I precepted today with Dr. Victoria.    Subjective   Kee Phipps is a 31 year old who presents for the following health issues: ER follow    HPI    Patient went to [facility] ER over the  "weekend due to a throbbing pain in her epigastrum that radiates to her back, she states it feels like a pulse. She gets a \"warm sensation\" in her body then it goes away. It will happen about 6 times then go away until hours later. She is feeling flushed with these episodes, too. Nothing notes to make it worse. She trialed not taking her antihypertensive this morning to see if it was a side effect but she still got the sensation. Pain is worse after eating today, but this is the first time she paid attention to this. She did take medications for heartburn while pregnant. She has famotidine at home.     In the ER, her d-dimer was slightly elevated, reports no blood clots in her legs. She didn't get a CT or Xray do to \"risk of radiation.\" She is not breastfeeding. She reports an \"equivocal\" UPT. On May 3rd she had 1 week of vaginal bleeding. States it didn't look like a normal period. She asks to get a UPT to confirm she is not pregnant. She would like referral to discuss tubal ligation for contraception.     She does have Crohns, but states this does not feel like her Crohns. She just had a visit with her GI provider ~2 weeks ago, reports lab work and provider impression was she was not in a flare. Denies n/v, no melena or hematochezia, no BRBPR, no diarrhea.     There was documented concern for post-partum depression emerging. Patient states her mood is much improved now, denies depressive symptoms.     Review of Systems  Constitutional, HEENT, cardiovascular, pulmonary, GI, , musculoskeletal, neuro, skin, endocrine and psych systems are negative, except as otherwise noted.    Objective   LMP 07/15/2021   Physical Exam    Constitutional: Awake, alert, cooperative, no apparent distress  Eyes: Lids and lashes normal, pupils equal, round and reactive to light, extra ocular muscles intact, sclera clear, conjunctiva normal.  ENT: Normocephalic, without obvious abnormality, atraumatic, external ears without " lesions.  Neck: Supple, symmetrical, trachea midline, skin normal.  Lungs: No audible wheezing or stridor, no increased work of breathing, good air exchange, clear to auscultation bilaterally, no crackles or wheezing.  Cardiovascular: Regular rate and rhythm, normal S1 and S2, no S3 or S4, and no murmur noted.  Abdomen: Normal bowel sounds, soft, non-distended, TTP in epigastrum, RUQ, RLQ, and suprapubic region, no masses palpated, no hepatosplenomegally.  Musculoskeletal: Full range of motion noted. Tone is normal.  Neurologic: Awake, alert, oriented to name, place and time.  Cranial nerves II-XII are grossly intact. Gait is normal.  Neuropsychiatric: Normal affect, mood, orientation, memory and insight.  Skin: No visible rashes, erythema, pallor, petechia or purpura.    Results for orders placed or performed in visit on 05/16/22   HCG qualitative urine     Status: Normal   Result Value Ref Range    hCG Urine Qualitative Negative Negative   Lipase     Status: Normal   Result Value Ref Range    Lipase 28 0 - 52 U/L   Comprehensive metabolic panel     Status: Normal   Result Value Ref Range    Sodium 138 136 - 145 mmol/L    Potassium 4.2 3.5 - 5.0 mmol/L    Chloride 106 98 - 107 mmol/L    Carbon Dioxide (CO2) 24 22 - 31 mmol/L    Anion Gap 8 5 - 18 mmol/L    Urea Nitrogen 11 8 - 22 mg/dL    Creatinine 0.88 0.60 - 1.10 mg/dL    Calcium 9.4 8.5 - 10.5 mg/dL    Glucose 85 70 - 125 mg/dL    Alkaline Phosphatase 97 45 - 120 U/L    AST 12 0 - 40 U/L    ALT 16 0 - 45 U/L    Protein Total 6.8 6.0 - 8.0 g/dL    Albumin 3.8 3.5 - 5.0 g/dL    Bilirubin Total 0.6 0.0 - 1.0 mg/dL    GFR Estimate >90 >60 mL/min/1.73m2   UA reflex to Microscopic     Status: Abnormal   Result Value Ref Range    Color Urine Yellow Colorless, Straw, Light Yellow, Yellow    Appearance Urine Clear Clear    Glucose Urine Negative Negative mg/dL    Bilirubin Urine Negative Negative    Ketones Urine Negative Negative mg/dL    Specific Gravity Urine 1.020  1.005 - 1.030    Blood Urine Negative Negative    pH Urine 7.0 5.0 - 8.0    Protein Albumin Urine Negative Negative mg/dL    Urobilinogen Urine 0.2 0.2, 1.0 E.U./dL    Nitrite Urine Negative Negative    Leukocyte Esterase Urine Small (A) Negative   CBC with platelets and differential     Status: None   Result Value Ref Range    WBC Count 6.0 4.0 - 11.0 10e3/uL    RBC Count 4.52 3.80 - 5.20 10e6/uL    Hemoglobin 13.4 11.7 - 15.7 g/dL    Hematocrit 39.3 35.0 - 47.0 %    MCV 87 78 - 100 fL    MCH 29.6 26.5 - 33.0 pg    MCHC 34.1 31.5 - 36.5 g/dL    RDW 12.3 10.0 - 15.0 %    Platelet Count 201 150 - 450 10e3/uL    % Neutrophils 37 %    % Lymphocytes 49 %    % Monocytes 9 %    % Eosinophils 4 %    % Basophils 1 %    % Immature Granulocytes 0 %    Absolute Neutrophils 2.2 1.6 - 8.3 10e3/uL    Absolute Lymphocytes 2.9 0.8 - 5.3 10e3/uL    Absolute Monocytes 0.6 0.0 - 1.3 10e3/uL    Absolute Eosinophils 0.2 0.0 - 0.7 10e3/uL    Absolute Basophils 0.0 0.0 - 0.2 10e3/uL    Absolute Immature Granulocytes 0.0 <=0.4 10e3/uL   Urine Microscopic Exam     Status: Abnormal   Result Value Ref Range    Bacteria Urine Moderate (A) None Seen /HPF    RBC Urine None Seen 0-2 /HPF /HPF    WBC Urine 5-10 (A) 0-5 /HPF /HPF    Squamous Epithelials Urine Many (A) None Seen /LPF   Urine Culture     Status: None (Preliminary result)    Specimen: Urine, NOS   Result Value Ref Range    Culture No growth, less than 1 day    CBC with Platelets & Differential     Status: None    Narrative    The following orders were created for panel order CBC with Platelets & Differential.  Procedure                               Abnormality         Status                     ---------                               -----------         ------                     CBC with platelets and d...[[id number]]                      Final result                 Please view results for these tests on the individual orders.       ----- Service Performed and Documented by Resident  or Fellow ------

## 2022-05-17 NOTE — PATIENT INSTRUCTIONS
05/17/22   OB/GYN REFERRAL  [facility]  Contact: Nazanin   Phone: [phone]   Fax: [phone]    Referral, demographics, labs and office note faxed to [phone].    Daylin Rodrigez       06/30/22   PLASTIC SURGERY REFERRAL- referral placed 5/18, refaxed.  [facility] [address]  [place] [address]  Appointments: [phone]  Fax: [phone]    Demographics, referral and office note faxed to [phone]. They will contact patient to schedule.     Sandi Aguilar

## 2022-05-18 ENCOUNTER — MEDICAL CORRESPONDENCE (OUTPATIENT)
Dept: HEALTH INFORMATION MANAGEMENT | Facility: CLINIC | Age: 32
End: 2022-05-18

## 2022-05-18 LAB — BACTERIA UR CULT: NORMAL

## 2022-05-30 NOTE — TELEPHONE ENCOUNTER
"Spoke with patient and gave her result notes per Dr. Wasserman: \" I hope you are doing well. I wanted to let you know about the results we completed. It shows that you beta HCG (that is an indication of pregnancy that we talked about) has increased and is now at 5700 from 4027 in the ED. This is a good indication that the pregnancy may be progressing well. I would like you to have another visit on Wednesday if you are available to make sure that the number has at least doubled as would be expected in a normal pregnancy. If you have been able to make an appointment with OBGYN this week then that would work as well so that they can follow up on this and also follow up about your cyst.\"    Patient states that she has an appointment tomorrow with women's health clinic.    Pt verbalizes understanding of the directions and information. Gave pt opportunity to ask additional questions or address concerns.     Routed to Dr. Wasserman/DANY Preston RN    " none

## 2022-06-16 ENCOUNTER — MEDICAL CORRESPONDENCE (OUTPATIENT)
Dept: HEALTH INFORMATION MANAGEMENT | Facility: CLINIC | Age: 32
End: 2022-06-16
Payer: COMMERCIAL

## 2022-06-16 ENCOUNTER — OFFICE VISIT (OUTPATIENT)
Dept: PHARMACY | Facility: CLINIC | Age: 32
End: 2022-06-16
Payer: COMMERCIAL

## 2022-06-16 ENCOUNTER — OFFICE VISIT (OUTPATIENT)
Dept: FAMILY MEDICINE | Facility: CLINIC | Age: 32
End: 2022-06-16
Payer: COMMERCIAL

## 2022-06-16 VITALS
OXYGEN SATURATION: 97 % | RESPIRATION RATE: 16 BRPM | HEART RATE: 85 BPM | BODY MASS INDEX: 32.94 KG/M2 | SYSTOLIC BLOOD PRESSURE: 132 MMHG | TEMPERATURE: 98 F | WEIGHT: 212.2 LBS | DIASTOLIC BLOOD PRESSURE: 89 MMHG

## 2022-06-16 DIAGNOSIS — K90.0 CELIAC DISEASE: ICD-10-CM

## 2022-06-16 DIAGNOSIS — Z30.9 ENCOUNTER FOR CONTRACEPTIVE MANAGEMENT, UNSPECIFIED TYPE: ICD-10-CM

## 2022-06-16 DIAGNOSIS — M72.2 PLANTAR FASCIITIS: Primary | ICD-10-CM

## 2022-06-16 DIAGNOSIS — F43.21 ADJUSTMENT DISORDER WITH DEPRESSED MOOD: ICD-10-CM

## 2022-06-16 PROCEDURE — 99605 MTMS BY PHARM NP 15 MIN: CPT | Performed by: PHARMACIST

## 2022-06-16 PROCEDURE — 99213 OFFICE O/P EST LOW 20 MIN: CPT | Mod: GC | Performed by: STUDENT IN AN ORGANIZED HEALTH CARE EDUCATION/TRAINING PROGRAM

## 2022-06-16 PROCEDURE — 99607 MTMS BY PHARM ADDL 15 MIN: CPT | Performed by: PHARMACIST

## 2022-06-16 RX ORDER — AMLODIPINE BESYLATE 5 MG/1
5 TABLET ORAL DAILY
Qty: 90 TABLET | Refills: 1 | Status: CANCELLED | OUTPATIENT
Start: 2022-06-16

## 2022-06-16 NOTE — PROGRESS NOTES
Medication Therapy Management (MTM) Encounter    ASSESSMENT:                            Medication Adherence/Access: No issues identified    HTN: At goal today <140/90 after blood pressure recheck. Not on therapy. Given age could consider lower blood pressure goal <130/80, however needs shared decision making discussion. Given access to home blood pressure cuff, opportunity to monitor blood pressure at home. If requires treatment in future, amlodipine could be good once per day option as tolerated in the past.     Postpartum depression: Uncontrolled, opportunity for counseling.     Rheum: Managed by specialist.     PLAN:                            Restart Sertraline 25 mg/day. Counseling provided on anticipated adverse drug reactions (nausea/vomiting/diarrhea) and timeline to expect improvement in symptoms (2-4 weeks) vs when to seek care for alternative treatment.     Patient to check home blood pressures for next month. Will arrange for tele follow up in 1 month.    Referral coordinator to help set up f/u appt with Rheum    Follow-up: Return in about 4 weeks (around 2022) for Blood pressure follow up, using a phone visit.    SUBJECTIVE/OBJECTIVE:                          Kee Phipps is a 31 year old female coming in for an initial visit. She was referred to me from Dr. Sumner. Today's visit is a co-visit with Dr. Sumner.     Reason for visit: Hypertension follow up.    Allergies/ADRs: Reviewed in chart  Past Medical History: Reviewed in chart  Social History     Tobacco Use     Smoking status: Former Smoker     Packs/day: 0.03     Quit date: 2021     Years since quittin.5     Smokeless tobacco: Never Used     Tobacco comment: Vaping daily since 2021   Vaping Use     Vaping Use: Never used   Substance Use Topics     Alcohol use: Not Currently     Comment: occ     Drug use: No     ^Reviewed today    Medication Adherence/Access: no issues reported    HTN: About three weeks ago self  "discontinued Labetalol (400 mg twice daily) has her home blood pressures had improved greatly. Historically had stopped Nifedipine due to side effects. Also at home has Amlodipine supply available.     The ASCVD Risk score ([place] MELISSA Jr., et al., 2013) failed to calculate for the following reasons:    The 2013 ASCVD risk score is only valid for ages 40 to 79    Postpartum depression: Had stopped Sertraline due to concerns for side effects like nausea. However is interested in restarting this medicine as believes would be helpful for symptoms.     Rheum: Takes Stellara every 2 months. Interested in help getting Rheum follow up appt set up    Today's Vitals:   BP Readings from Last 1 Encounters:   06/16/22 132/89     Pulse Readings from Last 1 Encounters:   06/16/22 85     Wt Readings from Last 1 Encounters:   06/16/22 212 lb 3.2 oz (96.3 kg)     Ht Readings from Last 1 Encounters:   04/07/22 5' 7.3\" (1.709 m)     Estimated body mass index is 32.94 kg/m  as calculated from the following:    Height as of 4/7/22: 5' 7.3\" (1.709 m).    Weight as of an earlier encounter on 6/16/22: 212 lb 3.2 oz (96.3 kg).    Temp Readings from Last 1 Encounters:   06/16/22 98  F (36.7  C) (Oral)     ----------------      I spent 30 minutes with this patient today. Dr. Sumner was provided the recommendations above  in clinic today and Dr. Garíca is the authorizing prescriber for this visit through the pharmacist collaborative practice agreement.. A copy of the visit note was provided to the patient's provider(s).    The patient was given a summary of these recommendations. See Provider note/AVS from today.     Jennifer Mary, Pharm.D., [facility]  [address], [place] [address]  Phone: [phone]  June 16, 2022 at 12:29 PM    For insurance/tracking purposes, MTM Team Documentation:   Medication Therapy Recommendations  Adjustment disorder with depressed mood    Current Medication: sertraline " (ZOLOFT) 50 MG tablet   Rationale: Undesirable effect - Adverse medication event - Safety   Recommendation: Provide Education   Status: Patient Agreed - Adherence/Education

## 2022-06-16 NOTE — PROGRESS NOTES
"  Assessment & Plan     Plantar fasciitis  Podiatry referral placed for recurrence of plantar fasciitis and concern about recurrent ganglion cyst  - Orthopedic  Referral; Future    Encounter for contraceptive management, unspecified type  Consent form for tubal ligation filled out today  - Ob/Gyn Referral; Future    Post partum depression  - sertraline (ZOLOFT) 50 MG tablet; Take 0.5 tablets (25 mg) by mouth daily           BMI:   Estimated body mass index is 32.94 kg/m  as calculated from the following:    Height as of 4/7/22: 1.709 m (5' 7.3\").    Weight as of this encounter: 96.3 kg (212 lb 3.2 oz).     Chi Sumner MD  [facility] PAULETTE Sweet is a 31 year old, presenting for the following health issues:  Referral (Pt would like a referral for podiatry. ) and Form Request (Pt needs a Medical Opinion Form filled out. )      HPI     Saw a podiatrist a few years ago for ganglion cyst and plantar fasciitis. She had surgery on her left foot, and shed like to have the right checked out. She also has new pain on the left. Feels ganglion cysts might be growing back.    She also would like to be referred to OB/GYN for tubal ligation. She has had as many children as she wants and desires permanent . sterilization. She is sexually active since her last baby was delivered She does not desire a short term contraceptive solution.      Review of Systems   Constitutional, HEENT, cardiovascular, pulmonary, gi and gu systems are negative, except as otherwise noted.      Objective    BP (!) 131/92   Pulse 85   Temp 98  F (36.7  C) (Oral)   Resp 16   Wt 96.3 kg (212 lb 3.2 oz)   SpO2 97%   BMI 32.94 kg/m    Body mass index is 32.94 kg/m .  Physical Exam   GENERAL: healthy, alert and no distress  NECK: no adenopathy, no asymmetry, masses, or scars and thyroid normal to palpation  RESP: lungs clear to auscultation - no rales, rhonchi or wheezes  CV: regular rate and rhythm, " normal S1 S2, no S3 or S4, no murmur, click or rub, no peripheral edema and peripheral pulses strong  ABDOMEN: soft, nontender, no hepatosplenomegaly, no masses and bowel sounds normal  MS: no gross musculoskeletal defects noted, no edema              .  ..

## 2022-06-16 NOTE — PATIENT INSTRUCTIONS
[facility] [place] [facility] [place], [address], [address], [place], [place], [address]  [phone]  Kaleigh Monteiro PA-C           06/17/22   OB/GYN REFERRAL  [facility]  Contact: Nazanin   Phone: [phone]   Fax: [phone]    Referral, demographics, labs and office note faxed to [phone].    Sandi Aguilar

## 2022-06-28 ENCOUNTER — PRENATAL OFFICE VISIT (OUTPATIENT)
Dept: FAMILY MEDICINE | Facility: CLINIC | Age: 32
End: 2022-06-28
Payer: COMMERCIAL

## 2022-06-28 VITALS
HEART RATE: 101 BPM | TEMPERATURE: 98.6 F | OXYGEN SATURATION: 98 % | BODY MASS INDEX: 33.06 KG/M2 | SYSTOLIC BLOOD PRESSURE: 132 MMHG | WEIGHT: 213 LBS | DIASTOLIC BLOOD PRESSURE: 89 MMHG | RESPIRATION RATE: 16 BRPM

## 2022-06-28 DIAGNOSIS — M72.2 PLANTAR FASCIITIS: Primary | ICD-10-CM

## 2022-06-28 PROCEDURE — 99207 PR POST PARTUM EXAM: CPT | Performed by: FAMILY MEDICINE

## 2022-06-28 NOTE — PATIENT INSTRUCTIONS
Podiatry - prefer [place]  Breast reduction  Tubal ligation  Mental health    Your doctor has put in a mental health referral for psychotherapy and/or psychiatry treatment. Our behavioral health team will evaluate your referral to see if they have any more recommendations or if they have openings for therapy in-house at [facility]. Our referral team would then contact you within 1-2 weeks with this update. However, if you want to get started for psychotherapy or schedule with a psychiatrist sooner, we recommend you call one or two of the following resources.  Let your doctor know if you do connect with a therapist or psychiatrist.     Community Mental Health Agencies:    [facility]   [address]  [place] [address]  [phone] (for appointments)    [facility]  [address] [address]  Phone:  [phone]    [facility]  [address] [place] [address]  [phone]    [facility]  [facility] [facility]  [address]  [place], [place] [address]  [phone]    [facility] [place]  [address]  [place] [address]  [phone]    Community Psychiatry Agencies:    [facility]  [facility] [facility]  [address], [place] [address]  [phone]    [facility]  [address]   [address]  [place] [address]  [phone] (for appointments)    [facility]  [address]  [place] [address]  Phone:  [phone]    [facility]  [address] [place] [address]  [phone]    [facility]  [address]  [place] [address]  [phone]    [facility] - offers  psychiatry and psychology services across the lifespan  [facility]  [address] [place] [address]  [phone]

## 2022-06-28 NOTE — PROGRESS NOTES
"     HPI-Post Partum Visit -       Kee Phipps is a 31 year old  female  s/p  (3/30/2022) with a significant past medical history of pre-eclampsia, Crohns disease, celiac disease, fibromyalgia, plantar fasciitis, s/p D&C (), s/p salpingectomy (right; ) who presents for a postpartum visit.     Patient reports she had a vaginal delivery of her daughter 3/30/2022. She experienced pre-eclampsia and has had some residual headaches intermittently since then, which she attributes to the pre-eclampsia. She has discontinued blood pressure medication. She breast fed for 2 weeks following the delivery but has been feeding with formula since then. Patient endorses some bleeding after delivery but bleeding stopped 4/15 then started bleeding again  for another 7 days with heavy bleeding, dark in color. She got her menstrual period 5/3 and another period again 6/3. Both periods were normal. Patient waited 6 weeks after delivery to have sexual intercourse. The first few times after delivering she had some lower abdominal discomfort following sexual intercourse but this pain has since resolved and she no longer has pain after sexual intercourse. She has been using condoms for contraception but would like to get a tubal ligation. She has been suffering from post-partum depression and states she has been feeling depressed, anxious, fatigued, and irritable. She tried sertraline but did not like the side effects. Patient would like referral for mental health counseling. No thoughts of self-harm or suicidal ideation. Patient reports some RLQ abdominal pain but is not sure how long this has been going on.     Patient complains of \"ball\"-like sensation in her throat. She states she has had this sensation for years but has noticed it has worsened since her recent pregnancy. She states the discomfort is more noticeable when she is anxious. Patient is worried it could be her thyroid.     Patient reports at her last " clinic visit (6/16) she was referred for a breast reduction, tubal ligation, and podiatry appointment but she has not heard from scheduling and would like to make sure she gets the proper referrals.     She states her Crohns has been managed well with Stelara. She has her next Stelara injection scheduled for 7/26.     Patient has noticed a red rash around her neck and breasts that is itchy and more red when she is sweating. She has also noticed increased acne around her mouth since her last pregnancy.     Patient states that she can only stand on her feet for 20-30 minutes at a time due to plantar fasciitis pain. She has plantar fasciitis bilaterally and reports she cannot work due to the pain and difficultly ambulating. She asks to have a medical opinion form filled out for her plantar fasciitis. She uses an iced water bottle on her feet in the morning that gives some temporary relief but does not work long term. She states she uses extra strength Tylenol daily for pain but asks for stronger medication at this time.     Patient Active Problem List   Diagnosis     Health Care Home     Crohns disease     Abnormal Pap smear of cervix     Chronic pain     Adjustment disorder with depressed mood     Celiac disease     Immunosuppressed status (H)     Fibromyalgia     Accelerated idioventricular rhythm (H)     Carpal tunnel syndrome during pregnancy     De Quervain's disease (tenosynovitis)     Postpartum hypertension     Postpartum state     Retained placenta     Pre-eclampsia, postpartum     Pre-eclampsia       Current Outpatient Medications   Medication Sig Dispense Refill     acetaminophen (TYLENOL) 650 MG CR tablet Take 1 tablet (650 mg) by mouth every 8 hours as needed for mild pain or fever 100 tablet 0     diclofenac (VOLTAREN) 1 % topical gel Apply 2 g topically 4 times daily 100 g 1     ustekinumab (STELARA) 90 MG/ML Inject Subcutaneous every 2 months                        Delivery date: 3/30/2022      Patient  "had a  of viable girl, weight 4 lbs 15 ozs,           with IUGR, oligohydramnios, and postpartum preeclampsia with severe features.          Accompanying Signs & Symptoms:                        Breast feeding: breastmilk and formula--breast fed for two weeks and now she is on formula   Abdominal pain: YES. Patient endorses right lower quadrant pain.                         Bleeding since delivery: YES. Patient endorses some bleeding after delivery but bleeding stopped 4/15 then started bleeding again  for another 7 days with heavy bleeding, dark in color. She got her menstrual period 5/3 and another period again 6/3. Both periods were normal.        Contraception choice: condoms.         Patient has had intercourse since delivery and complains of mild discomfort.  Pt screened for postpartum depression and complaints are: positive for  anxiety and depression, fatigue, not wanting to be around people, hard time getting ready for day, hard time engaging with 8 year old son    Receiving dental care NO - Has not been to dentist since before pregnancy   Calcium intake is inadequate, patient does not consume dairy products and does not endorse eating leafy greens, beans, or calcium supplement.      Allergies   Allergen Reactions     Gluten Meal      Humira [Humira]      Caused huge lumps at injection site.      Ibuprofen      Patient has Crohns disease and is unable to take this medication.     Latex Itching     Nifedipine      Flushing, lightheadedness     Remicade [Infliximab] Rash          Review of Systems:   CONSTITUTIONAL: Positive for fatigue.  SKIN: Positive for rash between breast and under breast skin folds.   EYES: no acute vision problems or changes  ENT: Positive for throat discomforts, patient describes the sensation there is a \"ball\" in her throat, has had it for the past couple of years but worse since recent pregnancy.  CV: No chest pain, palpitations.  GI: Positive for RLQ abdominal pain. " "Positive for \"tough\"/hard stools. No vomiting, diarrhea, bleeding.   female: No discharge, irregular menses, hematuria, urinary frequency, dysuria.   MUSCULOSKELETAL:Positive for knee, ankle and foot pain, but patient attributes this pain to her fibromyalgia and plantar fasciitis.   NEURO: Positive for headaches but patient states she has had these headaches since she had pre-eclampsia during her delivery 3/30. No weakness, dizziness.   ENDOCRINE: Positive for increased diaphoresis, patient attributes this to previous pregnancy as she has had it since then  PSYCHIATRIC: Positive for depressed and anxious mood.             Physical Exam:     Vitals:    22 1309   BP: 132/89   BP Location: Left arm   Patient Position: Sitting   Cuff Size: Adult Regular   Pulse: 101   Resp: 16   Temp: 98.6  F (37  C)   TempSrc: Oral   SpO2: 98%   Weight: 96.6 kg (213 lb)       GENERAL: healthy, alert, well nourished, well hydrated, no distress  HENT: Mouth- no ulcers, no lesions. Posterior oropharynx - no swelling or erythema. Moist mucus membranes.    NECK: no tenderness, no adenopathy, no asymmetry, no masses, no stiffness; thyroid- normal to palpation  RESP: lungs clear to auscultation - no rales, no rhonchi, no wheezes  CV: regular rates and rhythm, normal S1 S2, no S3 or S4 and no murmur, no click or rub -  ABDOMEN: Abdomen with tenderness to palpation of the RLQ and pain to RLQ with palpation to RUQ. Abdomen soft, no  hepatosplenomegaly, no masses, normal bowel sounds.   MS: extremities- no gross deformities noted, no edema. Fluid filled lump on midline dorsum of left foot.   SKIN: Erythematous rash on skin folds of breasts bilaterally.     Assessment and Plan     31 year old  presenting for 6 week post-partum after  at 36w6d on 3/30/22.  Pregnancy was complicated by oligohydramnios, IUGR, and pre-eclampsia with severe features.  Post-partum period notable for depression.  BP appropriate without meds.    Post " partum depression  -Patient discontinued sertraline due to side effects  -Discussed mental health counseling. Patient would like to start counseling/therapy at this time. Patient was given mental health referral for psychotherapy/and or psychiatry treatment with instructions on how to set up an appointment.     Plantar fasciitis  -Podiatry referral placed for plantar fasciitis and concern about recurrent ganglion cyst.    - Orthopedic  referral  -Prescribed Topical diclofenac sodium to be used on bilateral feet to treat inflammation in plantar fasciitis   -Medical opinion form was completed during the office visit to extend patient's leave from work as she is unable to work for more than 20-30 minutes on her feet due to pain from plantar fasciitis.     Encounter for contraceptive management   -Referral placed with OB/GYN for consultation on tubal ligation  -Patient will continue using condoms for contraception in the meantime     Skin rash  -Patient given instructions/information to get topical Lotrimin to treat erythematous rash around skin folds of bilateral breasts    Encounter for breast reduction counseling  -Plastic surgery referral was placed for consultation for breast reduction     Options for treatment and follow-up care were reviewed with the patient and/or guardian. Kee Phipps and/or guardian engaged in the decision making process and verbalized understanding of the options discussed and agreed with the final plan.    Coleen Fisher, MS3  [facility]

## 2022-06-30 ENCOUNTER — DOCUMENTATION ONLY (OUTPATIENT)
Dept: FAMILY MEDICINE | Facility: CLINIC | Age: 32
End: 2022-06-30

## 2022-06-30 NOTE — PROGRESS NOTES
To be completed in Nursing note:    Please reference list for forms that require a visit for completion.  Please remind patients that providers are given 3-5 business days to complete and return forms.      Form type: MTM ~ LEVEL OF NEED    Date form received: 6/13/2022    Date form completed by Physician: 6/28/2022    How was form returned to patient (mailed, faxed, or at  for patient to ):    Fax to [phone] ATTN: LEVEL OF NEED    Date form mailed/faxed/left at  for patient and sent to HIM for scanning:    Fax on 6/30/2022    Once form is left for patient, faxed, or mailed PCS will then close the documentation only encounter.

## 2022-06-30 NOTE — PROGRESS NOTES
To be completed in Nursing note:    Please reference list for forms that require a visit for completion.  Please remind patients that providers are given 3-5 business days to complete and return forms.      Form type: RICO Holl[facility] REQUESTS FOR MEDICAL OPINION     Date form received: 6/28/2022    Date form completed by Physician: 6/28/2022    How was form returned to patient (mailed, faxed, or at  for patient to ):    Fax to [phone]    Date form mailed/faxed/left at  for patient and sent to HIM for scanning:    Fax on 6/30/2022    Once form is left for patient, faxed, or mailed PCS will then close the documentation only encounter.

## 2022-06-30 NOTE — PROGRESS NOTES
I was present with the medical student who participated in the service and in the documentation of this note. I have verified the history and personally performed the physical exam and medical decision making, and have verified the content of the note, which accurately reflects my assessment of the patient and the plan of care.    Pregnancy complicated by pre-eclampsia w/ severe features.  -- BP good today (132/89).  Not on anti-hypertensives.    Post-partum depression.  No SI or psychosis.  Not responding well to sertraline; only at 50 mg, but wants to discontinue this and pursue psychotherapy instead.  -- Discontinue sertraline.  -- Mental health referral for psychotherapy.  Declined therapy/support services tailored to post-partum, as she sees potential ongoing need.    Sent message to referral coordinator about other pending referrals.  Completed forms.     Chichi Victoria MD

## 2022-07-12 ENCOUNTER — OFFICE VISIT (OUTPATIENT)
Dept: PODIATRY | Facility: CLINIC | Age: 32
End: 2022-07-12
Attending: STUDENT IN AN ORGANIZED HEALTH CARE EDUCATION/TRAINING PROGRAM

## 2022-07-12 ENCOUNTER — OFFICE VISIT (OUTPATIENT)
Dept: FAMILY MEDICINE | Facility: CLINIC | Age: 32
End: 2022-07-12
Payer: COMMERCIAL

## 2022-07-12 VITALS
WEIGHT: 214.8 LBS | BODY MASS INDEX: 33.34 KG/M2 | TEMPERATURE: 98.2 F | HEART RATE: 83 BPM | OXYGEN SATURATION: 96 % | SYSTOLIC BLOOD PRESSURE: 129 MMHG | DIASTOLIC BLOOD PRESSURE: 89 MMHG | RESPIRATION RATE: 16 BRPM

## 2022-07-12 VITALS — HEIGHT: 64 IN | HEART RATE: 92 BPM | OXYGEN SATURATION: 99 % | WEIGHT: 214 LBS | BODY MASS INDEX: 36.54 KG/M2

## 2022-07-12 DIAGNOSIS — M21.6X2 PRONATION DEFORMITY OF BOTH FEET: Primary | ICD-10-CM

## 2022-07-12 DIAGNOSIS — M72.2 PLANTAR FASCIITIS: ICD-10-CM

## 2022-07-12 DIAGNOSIS — M21.6X1 PRONATION DEFORMITY OF BOTH FEET: Primary | ICD-10-CM

## 2022-07-12 DIAGNOSIS — Z32.00 ENCOUNTER FOR PREGNANCY TEST, RESULT UNKNOWN: Primary | ICD-10-CM

## 2022-07-12 LAB — HCG UR QL: NEGATIVE

## 2022-07-12 PROCEDURE — 99203 OFFICE O/P NEW LOW 30 MIN: CPT | Mod: 25 | Performed by: PODIATRIST

## 2022-07-12 PROCEDURE — 99213 OFFICE O/P EST LOW 20 MIN: CPT | Mod: GC

## 2022-07-12 PROCEDURE — 20550 NJX 1 TENDON SHEATH/LIGAMENT: CPT | Mod: RT | Performed by: PODIATRIST

## 2022-07-12 PROCEDURE — 81025 URINE PREGNANCY TEST: CPT

## 2022-07-12 RX ORDER — DEXAMETHASONE SODIUM PHOSPHATE 4 MG/ML
4 INJECTION, SOLUTION INTRA-ARTICULAR; INTRALESIONAL; INTRAMUSCULAR; INTRAVENOUS; SOFT TISSUE ONCE
Status: COMPLETED | OUTPATIENT
Start: 2022-07-12 | End: 2022-07-12

## 2022-07-12 RX ORDER — LIDOCAINE HYDROCHLORIDE 20 MG/ML
1 INJECTION, SOLUTION INFILTRATION; PERINEURAL ONCE
Status: COMPLETED | OUTPATIENT
Start: 2022-07-12 | End: 2022-07-12

## 2022-07-12 RX ADMIN — DEXAMETHASONE SODIUM PHOSPHATE 4 MG: 4 INJECTION, SOLUTION INTRA-ARTICULAR; INTRALESIONAL; INTRAMUSCULAR; INTRAVENOUS; SOFT TISSUE at 14:53

## 2022-07-12 RX ADMIN — LIDOCAINE HYDROCHLORIDE 1 ML: 20 INJECTION, SOLUTION INFILTRATION; PERINEURAL at 14:51

## 2022-07-12 ASSESSMENT — PAIN SCALES - GENERAL: PAINLEVEL: WORST PAIN (10)

## 2022-07-12 NOTE — LETTER
7/12/2022         RE: Kee Phipps  [address]  [place] [address]        Dear Colleague,    Thank you for referring your patient, Kee Phipps, to the [facility]. Please see a copy of my visit note below.    FOOT AND ANKLE SURGERY/PODIATRY CONSULT NOTE         ASSESSMENT:   Plantar fasciitis bilateral feet  Pronation deformity bilateral feet      TREATMENT:  The patient was given a cortisone injection in the right heel today consisting of 1 cc of dexamethasone sodium phosphate and 1 cc of 2% lidocaine plain.  I have also recommended a new pair of orthotics.  I told the patient I did not believe she needed a cortisone injection in her left heel today.  If her pain persist I would recommend a second cortisone injection in the right heel.  If her pain continues I would recommend x-rays of both feet.        HPI: I was asked to see Kee Phipps today complaining of heel pain involving both heels.  The patient is 3 years S/P plantar fasciotomy of the left foot.  The patient stated she continues to have pain on the bottom of the left heel.  She stated that the symptoms can be quite bad at times.  The right heel is more painful than the right at this time.  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  She has not had any trauma to her feet.  She stated that her feet began to give her problems once again during her pregnancy.  Her pain is relieved with nonweightbearing.  She describes it as a throbbing type pain.  She has not had any associated redness or swelling.      Past Medical History:   Diagnosis Date     Abnormal Pap smear of cervix      Accelerated idioventricular rhythm (H) 1/6/2022     Anemia      Anxiety      Arthritis      Bilateral plantar fasciitis      Celiac disease 9/2014     Celiac disease      Crohn disease (H)      Crohn's disease (H) 9/2012     Depression      Depressive disorder      Fibromyalgia      Fibromyalgia      H/O miscarriage, currently pregnant      miscarriage .     HPV (human papilloma virus) infection      Migraine      Plantar fasciitis     bilateral     Postpartum hypertension 2022      delivery 2013    34 weeks. Early rupture of membranes       Social History     Socioeconomic History     Marital status: Single     Spouse name: Not on file     Number of children: 1     Years of education: 12     Highest education level: GED or equivalent   Occupational History     Occupation: student   Tobacco Use     Smoking status: Former Smoker     Packs/day: 0.03     Quit date: 2021     Years since quittin.5     Smokeless tobacco: Never Used     Tobacco comment: Vaping daily since 2021   Vaping Use     Vaping Use: Never used   Substance and Sexual Activity     Alcohol use: Not Currently     Comment: occa     Drug use: No     Sexual activity: Yes     Partners: Male   Other Topics Concern     Not on file   Social History Narrative     Not on file     Social Determinants of Health     Financial Resource Strain: Not on file   Food Insecurity: Not on file   Transportation Needs: Not on file   Physical Activity: Not on file   Stress: Not on file   Social Connections: Not on file   Intimate Partner Violence: Not on file   Housing Stability: Not on file          Allergies   Allergen Reactions     Barley Grass Other (See Comments)     CELIAC  CELIAC     Gluten Meal      Gramineae Pollens Other (See Comments)     CELIAC     Humira [Humira]      Caused huge lumps at injection site.      Ibuprofen      Patient has Crohns disease and is unable to take this medication.     Latex Itching     Nifedipine      Flushing, lightheadedness     Wheat Extract Other (See Comments)     Remicade [Infliximab] Rash          Current Outpatient Medications:      acetaminophen (TYLENOL) 650 MG CR tablet, Take 1 tablet (650 mg) by mouth every 8 hours as needed for mild pain or fever, Disp: 100 tablet, Rfl: 0     ustekinumab (STELARA) 90 MG/ML, Inject Subcutaneous  every 2 months, Disp: , Rfl:      Family History   Problem Relation Age of Onset     Hypertension Mother      Seizure Disorder Mother      Asthma Brother      Asthma Brother      Asthma Brother      Asthma Brother      Cancer Maternal Grandmother         bone cancer     Asthma Son      Crohn's Disease Paternal Uncle      Diabetes No family hx of      Coronary Artery Disease No family hx of      Hyperlipidemia No family hx of      Cerebrovascular Disease No family hx of      Breast Cancer No family hx of      Colon Cancer No family hx of      Prostate Cancer No family hx of      Other Cancer No family hx of      Depression No family hx of      Anxiety Disorder No family hx of      Mental Illness No family hx of      Substance Abuse No family hx of      Anesthesia Reaction No family hx of      Osteoporosis No family hx of      Genetic Disorder No family hx of      Thyroid Disease No family hx of      Obesity No family hx of      Unknown/Adopted No family hx of      Heart Disease No family hx of         Social History     Socioeconomic History     Marital status: Single     Spouse name: Not on file     Number of children: 1     Years of education: 12     Highest education level: GED or equivalent   Occupational History     Occupation: student   Tobacco Use     Smoking status: Former Smoker     Packs/day: 0.03     Quit date: 2021     Years since quittin.5     Smokeless tobacco: Never Used     Tobacco comment: Vaping daily since 2021   Vaping Use     Vaping Use: Never used   Substance and Sexual Activity     Alcohol use: Not Currently     Comment: occa     Drug use: No     Sexual activity: Yes     Partners: Male   Other Topics Concern     Not on file   Social History Narrative     Not on file     Social Determinants of Health     Financial Resource Strain: Not on file   Food Insecurity: Not on file   Transportation Needs: Not on file   Physical Activity: Not on file   Stress: Not on file   Social Connections:  "Not on file   Intimate Partner Violence: Not on file   Housing Stability: Not on file        Review of Systems - Patient denies fever, chills, rash, wound, stiffness, limping, numbness, weakness, heart burn, blood in stool, chest pain with activity, calf pain when walking, shortness of breath with activity, chronic cough, easy bleeding/bruising, swelling of ankles, excessive thirst, fatigue, depression, anxiety.  Patient admits to bilateral heel pain.      OBJECTIVE:  Appearance: alert, well appearing, and in no distress.    Pulse 92   Ht 1.626 m (5' 4\")   Wt 97.1 kg (214 lb)   LMP 06/03/2022 (Exact Date)   SpO2 99%   BMI 36.73 kg/m       Body mass index is 36.73 kg/m .     General appearance: Patient is alert and fully cooperative with history & exam.  No sign of distress is noted during the visit.  Psychiatric: Affect is pleasant & appropriate.  Patient appears motivated to improve health.  Respiratory: Breathing is regular & unlabored while sitting.  HEENT: Hearing is intact to spoken word.  Speech is clear.  No gross evidence of visual impairment that would impact ambulation.    Vascular: Dorsalis pedis and posterior tibial pulses are palpable. There is pedal hair growth bilaterally.  CFT < 3 sec from anterior tibial surface to distal digits bilaterally. There is no appreciable edema noted.  Dermatologic: Turgor and texture are within normal limits. No coloration or temperature changes. No primary or secondary lesions noted.  Neurologic: All epicritic and proprioceptive sensations are grossly intact bilaterally.  Musculoskeletal: All active and passive ankle, subtalar, midtarsal, and 1st MPJ range of motion are grossly intact without pain or crepitus, with the exception of none. Manual muscle strength is within normal limits bilaterally. All dorsiflexors, plantarflexors, invertors, evertors are intact bilaterally. Tenderness present to the plantar medial aspect of the right heel only on palpation.  No " tenderness on palpation of the plantar medial aspect of the left heel.  No tenderness to bilateral feet or ankles with range of motion.  There is flattening of the medial longitudinal arch noted bilaterally.  Calf is soft/non-tender without warmth/induration    Imaging:       No images are attached to the encounter or orders placed in the encounter.     No results found.   No results found.       Yuri Flores DPM  [facility] Foot [facility]le Surgery/Podiatry         Again, thank you for allowing me to participate in the care of your patient.        Sincerely,        Yuri Arreola DPM

## 2022-07-12 NOTE — PROGRESS NOTES
Preceptor Attestation:    I discussed the patient with the resident and evaluated the patient in person. I have verified the content of the note, which accurately reflects my assessment of the patient and the plan of care.   Supervising Physician:  Misael Montano MD.

## 2022-07-12 NOTE — PATIENT INSTRUCTIONS
What are Prescription Custom Orthotics?  Custom orthotics are specially-made devices designed to support and comfort your feet. Prescription orthotics are crafted for you and no one else. They match the contours of your feet precisely and are designed for the way you move. Orthotics are only manufactured after a podiatrist has conducted a complete evaluation of your feet, ankles, and legs, so the orthotic can accommodate your unique foot structure and pathology.  Prescription orthotics are divided into two categories:  Functional orthotics are designed to control abnormal motion. They may be used to treat foot pain caused by abnormal motion; they can also be used to treat injuries such as shin splints or tendinitis. Functional orthotics are usually crafted of a semi-rigid material such as plastic or graphite.  Accommodative orthotics are softer and meant to provide additional cushioning and support. They can be used to treat diabetic foot ulcers, painful calluses on the bottom of the foot, and other uncomfortable conditions.  Podiatrists use orthotics to treat foot problems such as plantar fasciitis, bursitis, tendinitis, diabetic foot ulcers, and foot, ankle, and heel pain. Clinical research studies have shown that podiatrist-prescribed foot orthotics decrease foot pain and improve function.  Orthotics typically cost more than shoe inserts purchased in a retail store, but the additional cost is usually well worth it. Unlike shoe inserts, orthotics are molded to fit each individual foot, so you can be sure that your orthotics fit and do what they're supposed to do. Prescription orthotics are also made of top-notch materials and last many years when cared for properly. Insurance often helps pay for prescription orthotics.  What are Shoe Inserts?   You've seen them at the grocery store and at the mall. You've probably even seen them on TV and online. Shoe inserts are any kind of non-prescription foot support designed  to be worn inside a shoe. Pre-packaged, mass produced, arch supports are shoe inserts. So are the  custom-made  insoles and foot supports that you can order online or at retail stores. Unless the device has been prescribed by a doctor and crafted for your specific foot, it's a shoe insert, not a custom orthotic device--despite what the ads might say.  Shoe inserts can be very helpful for a variety of foot ailments, including flat arches and foot and leg pain. They can cushion your feet, provide comfort, and support your arches, but they can't correct biomechanical foot problems or cure long-standing foot issues.  The most common types of shoe inserts are:  Arch supports: Some people have high arches. Others have low arches or flat feet. Arch supports generally have a  bumped-up  appearance and are designed to support the foot's natural arch.   Insoles: Insoles slip into your shoe to provide extra cushioning and support. Insoles are often made of gel, foam, or plastic.   Heel liners: Heel liners, sometimes called heel pads or heel cups, provide extra cushioning in the heel region. They may be especially useful for patients who have foot pain caused by age-related thinning of the heels' natural fat pads.   Foot cushions: Do your shoes rub against your heel or your toes? Foot cushions come in many different shapes and sizes and can be used as a barrier between you and your shoe.  Choosing an Over-the-Counter Shoe Insert  Selecting a shoe insert from the wide variety of devices on the market can be overwhelming. Here are some podiatrist-tested tips to help you find the insert that best meets your needs:  Consider your health. Do you have diabetes? Problems with circulation? An over-the-counter insert may not be your best bet. Diabetes and poor circulation increase your risk of foot ulcers and infections, so schedule an appointment with a podiatrist. He or she can help you select a solution that won't cause additional  health problems.   Think about the purpose. Are you planning to run a marathon, or do you just need a little arch support in your work shoes? Look for a product that fits your planned level of activity.   Bring your shoes. For the insert to be effective, it has to fit into your shoes. So bring your sneakers, dress shoes, or work boots--whatever you plan to wear with your insert. Look for an insert that will fit the contours of your shoe.   Try them on. If all possible, slip the insert into your shoe and try it out. Walk around a little. How does it feel? Don't assume that feelings of pressure will go away with continued wear. (If you can't try the inserts at the store, ask about the store's return policy and hold on to your receipt.)    Please call one of the [place] locations below to schedule an appointment. If you received a prescription please bring it with you to your appointment. Some locations are limited to what they carry.    Office Locations    [facility]  [address] [place] [address]  Home Medical Equipment, [address]   Phone: [phone]   Orthotics and Prosthetics, [address]   Phone: [phone]    [facility]  [address]  [address]   [place] [address]   Phone: [phone]    [facility]  [address]  [place] [address]  Phone: [phone]    [facility]   [address]  [place] [address]  Phone: [phone]    [facility]  [address]  [place] [address]  Phone: [phone]    [facility]  [address]  [place] [address]  Phone: [phone]    [place]   [address] [place] [address]   Phone: [phone]    WEARING YOUR CUSTOM FOOT ORTHOTICS   Most  insurance plans cover one pair of orthotics per year. You must check with your   insurance plan to see what your payment responsibility will be. Please call your   insurance company by calling the number on the back of your insurance card.   Orthotic's are non-refundable and non-returnable.   Orthotics are made of various designs. Some orthotics are covered with material that extends beyond your toes. If your orthotic is of this design, you will likely need to trim the toe end to get a proper fit. The insole from your shoe can be used as a template. Simply overlay the shoe insert on top of the custom orthotic. Align the heel end while tracing the length of the insert onto the custom orthotic. Use a large scissor to trim the toe end until you get a proper fit in the shoe.   The orthotic needs to be pushed as far back in the shoe as possible. The heel portion should not ride forward so as not to irritate your heel.   Orthotics are designed to work with socks. Excessive perspiration will shorten the life span of the orthotics. Remove the orthotic from the shoe frequently for proper drying.   The break-in period lasts for weeks. People new to orthotics will likely experience new aches and pains. The orthotic is forcing your foot into a new position. Arch, foot and leg muscle aches and fatigue are common during these weeks. Minor discomfort can be considered normal break in phenomenon. Start wearing your orthotic around your home your first day. Limited activity for one to two hours is recommended. You can increase one or two additional hours each day provided the aches and pains are subsiding. The degree of discomfort, fatigue and problems will dictate the speed of break in. You may require multiple weeks to work up to full time use.   Do not continue wearing your orthotics if they are creating problems such as blisters or sores. Do not hesitate to call the clinic to speak with a nurse regarding orthotic   break in,  fit, trimming, etc. You may also need to see the doctor if the orthotics are   simply not working out. Adjustments are sometimes made to improve orthotic   function.     Orthotics will only work in certain styles and types of shoes. Orthotics rarely work in dress shoes. Slip-ons, clogs, sandals and heels are particularly troublesome. Specially designed orthotics may be necessary for these types of shoes. Your custom orthotic was designed for activities that require appropriate walking or running shoes. Lace up athletic shoes, walking shoes or work boots should work appropriately. You may need a wider or longer shoe. Shoes with a removable  or insert work best. In general, you want to remove an insert from the shoe before placing the orthotic into the shoe. Shoes without a removable liner may not work as well.     When purchasing new shoes, bring your orthotics along to get a proper fit. Shop at stores that are familiar with orthotics.   Frequent washing of the orthotic may shorten the life span of the top cover. The top cover can be replaced but will generally last one to five years depending on use and foot perspiration.

## 2022-07-12 NOTE — PROGRESS NOTES
"  Assessment & Plan     1. Encounter for pregnancy test, result unknown  Patient presents for UPT, LMP 6/3/22 and on June 30th had 1 day of bleeding that filled on pad and then nothing further. Some nausea in the car which was typical of past pregnancies.  She has taken 3 home pregnancy test which were all negative.  Patient does not wish to have further pregnancies at this time, does not use contraception, inconsistently uses condoms.  Patient is in a long-term monogamous relationship. She is planning for tubal ligation counseling appointment later today 7/12/2022.  - HCG qualitative urine, was negative, called patient with negative result  -Provided education on contraceptive options  -Offered condoms, patient declined at this time    No follow-ups on file.    Lolis Deras MD  [facility] PAULETTE Sweet is a 31 year old presenting for the following health issues:  RECHECK (Had baby three months ago have regular period for two month lasted for 7 days and on June 30th I have period, but only one day dark brown and have camping per pt /Tested at home and was negative/ concerns about my ovaries and if it pregnancy I hope it is not the ectopic per pt  )    LMP 6/3/22 and on June 30th had 1 day of bleeding that filled on pad and then nothing further. Some nausea in the car which was typical of past pregnancies, no other nausea. Some fatigue. Has some pain in lower abdomen \"but I can tolerate it\" intermittently cramping. No dysuria. No change in vaginal discharge. Tested at home and 3 negatives. Plans to get a tubal ligation with counseling today.     Review of Systems   As above       Objective    /89   Pulse 83   Temp 98.2  F (36.8  C) (Oral)   Resp 16   Wt 97.4 kg (214 lb 12.8 oz)   LMP 06/03/2022 (Exact Date)   SpO2 96%   BMI 33.34 kg/m    Body mass index is 33.34 kg/m .  Physical Exam  Constitutional:       General: She is not in acute distress.     Appearance: Normal " appearance. She is not ill-appearing or diaphoretic.   HENT:      Nose: Nose normal.   Eyes:      Extraocular Movements: Extraocular movements intact.      Conjunctiva/sclera: Conjunctivae normal.   Pulmonary:      Effort: Pulmonary effort is normal. No respiratory distress.   Abdominal:      General: There is no distension.      Palpations: Abdomen is soft.   Musculoskeletal:         General: Normal range of motion.   Skin:     General: Skin is warm and dry.   Neurological:      General: No focal deficit present.      Mental Status: She is alert. Mental status is at baseline.   Psychiatric:         Mood and Affect: Mood normal.         Behavior: Behavior normal.

## 2022-07-12 NOTE — PROGRESS NOTES
FOOT AND ANKLE SURGERY/PODIATRY CONSULT NOTE         ASSESSMENT:   Plantar fasciitis bilateral feet  Pronation deformity bilateral feet      TREATMENT:  The patient was given a cortisone injection in the right heel today consisting of 1 cc of dexamethasone sodium phosphate and 1 cc of 2% lidocaine plain.  I have also recommended a new pair of orthotics.  I told the patient I did not believe she needed a cortisone injection in her left heel today.  If her pain persist I would recommend a second cortisone injection in the right heel.  If her pain continues I would recommend x-rays of both feet.        HPI: I was asked to see Kee Phipps today complaining of heel pain involving both heels.  The patient is 3 years S/P plantar fasciotomy of the left foot.  The patient stated she continues to have pain on the bottom of the left heel.  She stated that the symptoms can be quite bad at times.  The right heel is more painful than the right at this time.  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  She has not had any trauma to her feet.  She stated that her feet began to give her problems once again during her pregnancy.  Her pain is relieved with nonweightbearing.  She describes it as a throbbing type pain.  She has not had any associated redness or swelling.      Past Medical History:   Diagnosis Date     Abnormal Pap smear of cervix      Accelerated idioventricular rhythm (H) 2022     Anemia      Anxiety      Arthritis      Bilateral plantar fasciitis      Celiac disease 2014     Celiac disease      Crohn disease (H)      Crohn's disease (H) 2012     Depression      Depressive disorder      Fibromyalgia      Fibromyalgia      H/O miscarriage, currently pregnant     miscarriage .     HPV (human papilloma virus) infection      Migraine      Plantar fasciitis     bilateral     Postpartum hypertension 2022      delivery 2013    34 weeks. Early rupture of membranes       Social History      Socioeconomic History     Marital status: Single     Spouse name: Not on file     Number of children: 1     Years of education: 12     Highest education level: GED or equivalent   Occupational History     Occupation: student   Tobacco Use     Smoking status: Former Smoker     Packs/day: 0.03     Quit date: 2021     Years since quittin.5     Smokeless tobacco: Never Used     Tobacco comment: Vaping daily since 2021   Vaping Use     Vaping Use: Never used   Substance and Sexual Activity     Alcohol use: Not Currently     Comment: occa     Drug use: No     Sexual activity: Yes     Partners: Male   Other Topics Concern     Not on file   Social History Narrative     Not on file     Social Determinants of Health     Financial Resource Strain: Not on file   Food Insecurity: Not on file   Transportation Needs: Not on file   Physical Activity: Not on file   Stress: Not on file   Social Connections: Not on file   Intimate Partner Violence: Not on file   Housing Stability: Not on file          Allergies   Allergen Reactions     Barley Grass Other (See Comments)     CELIAC  CELIAC     Gluten Meal      Gramineae Pollens Other (See Comments)     CELIAC     Humira [Humira]      Caused huge lumps at injection site.      Ibuprofen      Patient has Crohns disease and is unable to take this medication.     Latex Itching     Nifedipine      Flushing, lightheadedness     Wheat Extract Other (See Comments)     Remicade [Infliximab] Rash          Current Outpatient Medications:      acetaminophen (TYLENOL) 650 MG CR tablet, Take 1 tablet (650 mg) by mouth every 8 hours as needed for mild pain or fever, Disp: 100 tablet, Rfl: 0     ustekinumab (STELARA) 90 MG/ML, Inject Subcutaneous every 2 months, Disp: , Rfl:      Family History   Problem Relation Age of Onset     Hypertension Mother      Seizure Disorder Mother      Asthma Brother      Asthma Brother      Asthma Brother      Asthma Brother      Cancer Maternal  Grandmother         bone cancer     Asthma Son      Crohn's Disease Paternal Uncle      Diabetes No family hx of      Coronary Artery Disease No family hx of      Hyperlipidemia No family hx of      Cerebrovascular Disease No family hx of      Breast Cancer No family hx of      Colon Cancer No family hx of      Prostate Cancer No family hx of      Other Cancer No family hx of      Depression No family hx of      Anxiety Disorder No family hx of      Mental Illness No family hx of      Substance Abuse No family hx of      Anesthesia Reaction No family hx of      Osteoporosis No family hx of      Genetic Disorder No family hx of      Thyroid Disease No family hx of      Obesity No family hx of      Unknown/Adopted No family hx of      Heart Disease No family hx of         Social History     Socioeconomic History     Marital status: Single     Spouse name: Not on file     Number of children: 1     Years of education: 12     Highest education level: GED or equivalent   Occupational History     Occupation: student   Tobacco Use     Smoking status: Former Smoker     Packs/day: 0.03     Quit date: 2021     Years since quittin.5     Smokeless tobacco: Never Used     Tobacco comment: Vaping daily since 2021   Vaping Use     Vaping Use: Never used   Substance and Sexual Activity     Alcohol use: Not Currently     Comment: occa     Drug use: No     Sexual activity: Yes     Partners: Male   Other Topics Concern     Not on file   Social History Narrative     Not on file     Social Determinants of Health     Financial Resource Strain: Not on file   Food Insecurity: Not on file   Transportation Needs: Not on file   Physical Activity: Not on file   Stress: Not on file   Social Connections: Not on file   Intimate Partner Violence: Not on file   Housing Stability: Not on file        Review of Systems - Patient denies fever, chills, rash, wound, stiffness, limping, numbness, weakness, heart burn, blood in stool, chest pain  "with activity, calf pain when walking, shortness of breath with activity, chronic cough, easy bleeding/bruising, swelling of ankles, excessive thirst, fatigue, depression, anxiety.  Patient admits to bilateral heel pain.      OBJECTIVE:  Appearance: alert, well appearing, and in no distress.    Pulse 92   Ht 1.626 m (5' 4\")   Wt 97.1 kg (214 lb)   LMP 06/03/2022 (Exact Date)   SpO2 99%   BMI 36.73 kg/m       Body mass index is 36.73 kg/m .     General appearance: Patient is alert and fully cooperative with history & exam.  No sign of distress is noted during the visit.  Psychiatric: Affect is pleasant & appropriate.  Patient appears motivated to improve health.  Respiratory: Breathing is regular & unlabored while sitting.  HEENT: Hearing is intact to spoken word.  Speech is clear.  No gross evidence of visual impairment that would impact ambulation.    Vascular: Dorsalis pedis and posterior tibial pulses are palpable. There is pedal hair growth bilaterally.  CFT < 3 sec from anterior tibial surface to distal digits bilaterally. There is no appreciable edema noted.  Dermatologic: Turgor and texture are within normal limits. No coloration or temperature changes. No primary or secondary lesions noted.  Neurologic: All epicritic and proprioceptive sensations are grossly intact bilaterally.  Musculoskeletal: All active and passive ankle, subtalar, midtarsal, and 1st MPJ range of motion are grossly intact without pain or crepitus, with the exception of none. Manual muscle strength is within normal limits bilaterally. All dorsiflexors, plantarflexors, invertors, evertors are intact bilaterally. Tenderness present to the plantar medial aspect of the right heel only on palpation.  No tenderness on palpation of the plantar medial aspect of the left heel.  No tenderness to bilateral feet or ankles with range of motion.  There is flattening of the medial longitudinal arch noted bilaterally.  Calf is soft/non-tender without " warmth/induration    Imaging:       No images are attached to the encounter or orders placed in the encounter.     No results found.   No results found.       Yuri Flores DPM  [facility] Foot & Ankle Surgery/Podiatry

## 2022-07-14 ENCOUNTER — TELEPHONE (OUTPATIENT)
Dept: FAMILY MEDICINE | Facility: CLINIC | Age: 32
End: 2022-07-14

## 2022-07-14 NOTE — TELEPHONE ENCOUNTER
[facility] phone call message- general phone call:    Reason for call: Th medical opinion form was not filled out fully by pt's PCP. Specifically questions 7 and 9.    Return call needed: Yes    OK to leave a message on voice mail? Yes    Primary language: English      needed? No    Call taken on July 14, 2022 at 9:44 AM by Cindy Ryan

## 2022-07-14 NOTE — TELEPHONE ENCOUNTER
Radha states that Dr Palacios needs to answer #7 stating whether pt can work a nonphysical job or not?      For question #9 need to answer if pt can't work than can pt do education or job search?/NG

## 2022-07-21 ENCOUNTER — HOSPITAL ENCOUNTER (OUTPATIENT)
Facility: AMBULATORY SURGERY CENTER | Age: 32
End: 2022-07-21
Attending: OBSTETRICS & GYNECOLOGY
Payer: COMMERCIAL

## 2022-07-21 NOTE — TELEPHONE ENCOUNTER
Pt called to check on the form.  She states that Radha at [employer] needs this as soon as possible.  Told pt that nurse will send a reminder to Dr Victoria to complete./YOLI

## 2022-07-21 NOTE — TELEPHONE ENCOUNTER
I think you're correct that I filled this out.  I can't find the scanned form in the media.  Can they fax it over, so I can fill in what's missing?    /AW

## 2022-07-25 ENCOUNTER — TELEPHONE (OUTPATIENT)
Dept: FAMILY MEDICINE | Facility: CLINIC | Age: 32
End: 2022-07-25

## 2022-07-25 NOTE — TELEPHONE ENCOUNTER
Pt states that since her rheumatoid arthritis is flaring she has decided to cancel the pre-op and tubal ligation.  She states that she would like to discuss birth control options with Dr Palacios.  She is wondering if Dr Palacios thinks that Paraguard IUD would be okay as she does not want hormonal birth control?  Appt scheduled for Monday, 8/1/22 at 2:30 PM. Routed note to Dr Palacios./YOLI

## 2022-07-25 NOTE — TELEPHONE ENCOUNTER
[facility] phone call message- general phone call:    Reason for call: she would like to talk to cody romero.    Action desired: call back she canceled her appointment for today.    Return call needed: Yes    OK to leave a message on voice mail? Yes    Advised patient to response may take up to 2 business days: Yes    Primary language: English      needed? No    Call taken on July 25, 2022 at 12:56 PM by Kenzie Adhikari

## 2022-08-15 ENCOUNTER — DOCUMENTATION ONLY (OUTPATIENT)
Dept: FAMILY MEDICINE | Facility: CLINIC | Age: 32
End: 2022-08-15

## 2022-08-15 ENCOUNTER — OFFICE VISIT (OUTPATIENT)
Dept: FAMILY MEDICINE | Facility: CLINIC | Age: 32
End: 2022-08-15
Payer: COMMERCIAL

## 2022-08-15 VITALS
SYSTOLIC BLOOD PRESSURE: 136 MMHG | OXYGEN SATURATION: 97 % | DIASTOLIC BLOOD PRESSURE: 101 MMHG | HEART RATE: 81 BPM | TEMPERATURE: 98.9 F | RESPIRATION RATE: 16 BRPM

## 2022-08-15 DIAGNOSIS — G43.009 MIGRAINE WITHOUT AURA AND WITHOUT STATUS MIGRAINOSUS, NOT INTRACTABLE: ICD-10-CM

## 2022-08-15 DIAGNOSIS — Z30.09 BIRTH CONTROL COUNSELING: Primary | ICD-10-CM

## 2022-08-15 DIAGNOSIS — N64.4 BREAST PAIN: ICD-10-CM

## 2022-08-15 PROCEDURE — 99214 OFFICE O/P EST MOD 30 MIN: CPT | Mod: GC | Performed by: STUDENT IN AN ORGANIZED HEALTH CARE EDUCATION/TRAINING PROGRAM

## 2022-08-15 ASSESSMENT — PATIENT HEALTH QUESTIONNAIRE - PHQ9: SUM OF ALL RESPONSES TO PHQ QUESTIONS 1-9: 17

## 2022-08-15 NOTE — PROGRESS NOTES
To be completed in Nursing note:    Please reference list for forms that require a visit for completion.  Please remind patients that providers are given 3-5 business days to complete and return forms.      Form type:[facility] request for medical opinion    Date form received:8/15/22    Date form completed by Physician: 8/15/2022    How was form returned to patient (mailed, faxed, or at  for patient to ): [phone]    Date form mailed/faxed/left at  for patient and sent to HIM for scanning:    Faxed on 8/16/2022    Once form is left for patient, faxed, or mailed PCS will then close the documentation only encounter.

## 2022-08-15 NOTE — PROGRESS NOTES
"Assessment & Plan     Birth control counseling  Discussed with her the different options of birth control, she wanted to think more about copper IUD, in the meantime wanted to continue with barrier method.  Counseled her on following up, as well as efficacy of condoms versus longer term birth control, especially as she has mentioned a few times that she does not want to have anymore children.  - Condoms - Female (FC FEMALE CONDOM) MISC; 1 Package as needed (barrier protection)    Breast pain  Patient accepted referral.  - Plastic Surgery Referral; Future    Migraine without aura and without status migrainosus, not intractable  Could consider that her headaches are migrainous.  I discussed with her different options of management, she wanted to try Excedrin.  I did  her that given the aspirin component of Excedrin, that this could be worsening her GI issues.  In light of this counseling, patient still preferred to start with Excedrin.  As such I counseled her to start with 1 tablet daily and to follow-up if she has no improvement of headaches.  I did offer physical therapy to see if there was a connection between these headaches and neck and shoulder musculature, patient declined at this time.  - aspirin-acetaminophen-caffeine (EXCEDRIN MIGRAINE) 250-250-65 MG tablet; Take 1 tablet by mouth every 6 hours as needed for headaches    Review of prior external note(s) from - previous office visits  40 minutes spent on the date of the encounter doing chart review, history and exam, documentation and further activities per the note       BMI:   Estimated body mass index is 36.73 kg/m  as calculated from the following:    Height as of 7/12/22: 1.626 m (5' 4\").    Weight as of 7/12/22: 97.1 kg (214 lb).   Will discuss at later visit    Depression Screening Follow Up    PHQ 8/15/2022   PHQ-9 Total Score 17   Q9: Thoughts of better off dead/self-harm past 2 weeks Not at all     Last PHQ-9 8/15/2022   1.  Little interest " or pleasure in doing things 2   2.  Feeling down, depressed, or hopeless 2   3.  Trouble falling or staying asleep, or sleeping too much 2   4.  Feeling tired or having little energy 3   5.  Poor appetite or overeating 3   6.  Feeling bad about yourself 2   7.  Trouble concentrating 3   8.  Moving slowly or restless 0   Q9: Thoughts of better off dead/self-harm past 2 weeks 0   PHQ-9 Total Score 17   Difficulty at work, home, or with people Extremely dIfficult       Follow Up Actions Taken  Follow up recommended: follow up with PCP to discuss mental health     Stormy Palacios MD PGY3  [facility]  Precepted with Dr. Iris Sofia   Kee Phipps is a 31 year old who presents for the following health issues  accompanied by her daughter    HPI     Presented to discuss birth control options.  Patient says that she canceled her tubal ligation procedure due to rheumatoid arthritis flare.  She is interested in a nonhormonal birth control.  Discussed copper IUD as a long-term option, as well as side effects, patient did not like the idea of potential heavy menstrual bleeding.  She says that she has had 2 normal periods since delivery, and the past 2 months she has had irregular bleeding, specifically with early menstruation lasting 1 day versus normal 3-days.  She says that she will discuss possible vasectomy with her fiancé; however, does not believe that he would be interested in doing this.    She also wanted to discuss headaches.  She says that she has been having headaches every day; however, she does not believe that this is related to her blood pressure.  Specifically she is not on any blood pressure medications, she was having good control of her blood pressures prior to today.    She is also concerned about ongoing back pain, stating that she has been referred in the past for breast reduction surgery, has not had this done secondary to getting pregnant.  She is interested in a  referral again today.    Review of Systems   Complete ROS normal aside noted in HPI      Objective    BP (!) 136/101 (BP Location: Left arm, Patient Position: Sitting, Cuff Size: Adult Regular)   Pulse 81   Temp 98.9  F (37.2  C) (Oral)   Resp 16   SpO2 97%   There is no height or weight on file to calculate BMI.    Physical Exam   GENERAL: healthy, alert and no distress  MS: no gross musculoskeletal defects noted, no edema  PSYCH: mentation appears normal, affect normal/bright      ----- Service Performed and Documented by Resident or Fellow ------

## 2022-10-07 ENCOUNTER — OFFICE VISIT (OUTPATIENT)
Dept: PLASTIC SURGERY | Facility: AMBULATORY SURGERY CENTER | Age: 32
End: 2022-10-07
Attending: FAMILY MEDICINE
Payer: COMMERCIAL

## 2022-10-07 VITALS — HEIGHT: 63 IN | BODY MASS INDEX: 38.29 KG/M2 | WEIGHT: 216.1 LBS

## 2022-10-07 DIAGNOSIS — N62 MACROMASTIA: Primary | ICD-10-CM

## 2022-10-07 DIAGNOSIS — N64.4 BREAST PAIN: ICD-10-CM

## 2022-10-07 PROCEDURE — 99204 OFFICE O/P NEW MOD 45 MIN: CPT | Performed by: PLASTIC SURGERY

## 2022-10-07 NOTE — PROGRESS NOTES
Chief complaint:  Bilateral macromastia     History of present illness:  This is a 32 year old year old who complains of symptomatic bilateral macromastia.  Essentially this patient wears a 42 DDD size bra and she is complaining of neck pain, upper back pain, significant shoulder grooving as well as feeling pressure on her chest secondary to the weight of her breasts upon her chest. She complains of having difficulties exercising secondary to the size of her breasts.  For the pain she has been taking Tylenol. She is having difficulty sleeping as well secondary to her macromastia.  In the summertime she also presents with intertrigo at the level of bilateral inframammary folds. She treats this condition with antifungal lotion.  She has seen both a chiropractor and a physical therapist for her neck and back pain but her symptoms persist. Patient feels that her quality of life is affected by her macromastia and she is referred to me for evaluation of possible bilateral reduction mammoplasty.     Past medical history:  Crohn's disease, rheumatoid arthritis, celiac disease, fibromyalgia and plantar fasciitis     Past surgical history:  Exploratory laparotomy with small bowel resection, salpingectomy secondary to tubal pregnancy, appendectomy.     Allergies:  Remicade, Humira and ibuprofen     Medications:    Current Outpatient Medications:      acetaminophen (TYLENOL) 650 MG CR tablet, Take 1 tablet (650 mg) by mouth every 8 hours as needed for mild pain or fever, Disp: 100 tablet, Rfl: 0     Condoms - Female (FC FEMALE CONDOM) MISC, 1 Package as needed (barrier protection), Disp: 20 each, Rfl: 3     ustekinumab (STELARA) 90 MG/ML, Inject Subcutaneous every 2 months, Disp: , Rfl:      Family history:  Denies family history of breast cancer     GYN history:  G 7, P 2     Social History:  Denies tobacco, drinks alcohol socially     Review of systems:  General ROS: No complaints or constitutional symptoms  Skin: No  "complaints or symptoms   Hematologic/Lymphatic: No symptoms or complaints  Psychiatric: No symptoms or complaints  Endocrine: No excessive fatigue, no hypermetabolic symptoms reported  Respiratory ROS: No cough, shortness of breath, or wheezing  Cardiovascular ROS: No chest pain or dyspnea on exertion  Breast ROS:  Patient does present with bilateral nipple discharge.  She recently gave birth to a baby girl.  Otherwise denies nipple inversion, denies palpable breast masses, denies changes in the color of the skin of both breasts  Gastrointestinal ROS: No abdominal pain, nausea, diarrhea, or constipation  Musculoskeletal ROS: Complains of neck and upper back pain.  Neurological ROS: No focal neurologic defects reported.       Physical exam:    Ht 1.6 m (5' 3\")   Wt 98 kg (216 lb 1.6 oz)   BMI 38.28 kg/m    General: Alert, cooperative, appears stated age   Skin: Skin color, texture, turgor normal, no rashes or lesions   Lymphatic: No obvious adenopathy, no swelling   Eyes: No scleral icterus, pupils equal  HENT: No traumatic injury to the head or face, no gross abnormalities  Lungs: Normal respiratory effort, breath sounds equal bilaterally  Heart: Regular rate and rhythm  Breasts: Bilateral grade 3 ptosis. There is no nipple inversion or nipple discharge.  There is no peau d'orange.  There are no palpable breast masses.  There are no palpable axillary lymphadenopathies.  The right breast presents with sternal notch to nipple distance of 40 cm, nipple to inframammary fold 16 cm, and breast diameter 20 cm.  On the left breast sternal notch to nipple distance is 39 cm, nipple to inframammary fold is 15 cm and breast diameter is 18 cm.  Abdomen: Soft, non-distended and non-tender to palpation  Neurologic: Grossly intact                                  Assessment:     32 year old years old female with symptomatic bilateral macromastia.     According to the Schnur scale based upon her body surface area which is 2.01 " which is given by her height 160 cm and her weight which is 98 kg, this patient should have at least 600 g removed per breast.         PLAN:   This patient is a good surgical candidate for bilateral reduction mammoplasty.  I will utilize Richey pattern reduction with inferior pedicle.     Risks were explained to the patient and they include but are not limited to scarring, keloid, infection, bleeding, temporary versus permanent altered sensation of the nipple areolar complexes as well of the skin of both breasts, necrosis of the nipple areolar complex requiring potential tattooing, inability to breast-feed, need for further surgeries.  I have also told the patient that she will not have any upper pole fullness.  For upper pole fullness patient would have need breast implant.  Patient has acknowledged all these risks and has agreed to proceed.    However, due to the fact that this patient is currently having galactorrhea, this will need to be treated prior to any surgical intervention.  Galactorrhea increases chances of complication after breast reduction surgery.    I am going to refer the patient back to her primary care physician so that the doctor can prescribe medications to treat this galactorrhea.    I will see the patient again in 3 months for reevaluation and if at that time the galactorrhea has subsided, then I will send case request to program for surgery.  Patient has agreed with this plan.    Time spent with the patient 45 minutes.     Miguelangel Feldman MD , FACS   Diplomate American Board of Plastic Surgery  Diplomate American Board of Surgery  Adj. Assistant Professor of Surgery  Division of Plastic & Reconstructive Surgery   [facility]  Office: [phone]   10/7/2022 at 1:03 PM

## 2022-10-07 NOTE — LETTER
10/7/2022         RE: Kee Phipps  [address]  [place] [address]        Dear Colleague,    Thank you for referring your patient, Kee Phipps, to the [facility]. Please see a copy of my visit note below.    Chief complaint:  Bilateral macromastia     History of present illness:  This is a 32 year old year old who complains of symptomatic bilateral macromastia.  Essentially this patient wears a 42 DDD size bra and she is complaining of neck pain, upper back pain, significant shoulder grooving as well as feeling pressure on her chest secondary to the weight of her breasts upon her chest. She complains of having difficulties exercising secondary to the size of her breasts.  For the pain she has been taking Tylenol. She is having difficulty sleeping as well secondary to her macromastia.  In the summertime she also presents with intertrigo at the level of bilateral inframammary folds. She treats this condition with antifungal lotion.  She has seen both a chiropractor and a physical therapist for her neck and back pain but her symptoms persist. Patient feels that her quality of life is affected by her macromastia and she is referred to me for evaluation of possible bilateral reduction mammoplasty.     Past medical history:  Crohn's disease, rheumatoid arthritis, celiac disease, fibromyalgia and plantar fasciitis     Past surgical history:  Exploratory laparotomy with small bowel resection, salpingectomy secondary to tubal pregnancy, appendectomy.     Allergies:  Remicade, Humira and ibuprofen     Medications:    Current Outpatient Medications:      acetaminophen (TYLENOL) 650 MG CR tablet, Take 1 tablet (650 mg) by mouth every 8 hours as needed for mild pain or fever, Disp: 100 tablet, Rfl: 0     Condoms - Female (FC FEMALE CONDOM) MISC, 1 Package as needed (barrier protection), Disp: 20 each, Rfl: 3     ustekinumab (STELARA) 90 MG/ML, Inject Subcutaneous every 2 months,  "Disp: , Rfl:      Family history:  Denies family history of breast cancer     GYN history:  G 7, P 2     Social History:  Denies tobacco, drinks alcohol socially     Review of systems:  General ROS: No complaints or constitutional symptoms  Skin: No complaints or symptoms   Hematologic/Lymphatic: No symptoms or complaints  Psychiatric: No symptoms or complaints  Endocrine: No excessive fatigue, no hypermetabolic symptoms reported  Respiratory ROS: No cough, shortness of breath, or wheezing  Cardiovascular ROS: No chest pain or dyspnea on exertion  Breast ROS:  Patient does present with bilateral nipple discharge.  She recently gave birth to a baby girl.  Otherwise denies nipple inversion, denies palpable breast masses, denies changes in the color of the skin of both breasts  Gastrointestinal ROS: No abdominal pain, nausea, diarrhea, or constipation  Musculoskeletal ROS: Complains of neck and upper back pain.  Neurological ROS: No focal neurologic defects reported.       Physical exam:    Ht 1.6 m (5' 3\")   Wt 98 kg (216 lb 1.6 oz)   BMI 38.28 kg/m    General: Alert, cooperative, appears stated age   Skin: Skin color, texture, turgor normal, no rashes or lesions   Lymphatic: No obvious adenopathy, no swelling   Eyes: No scleral icterus, pupils equal  HENT: No traumatic injury to the head or face, no gross abnormalities  Lungs: Normal respiratory effort, breath sounds equal bilaterally  Heart: Regular rate and rhythm  Breasts: Bilateral grade 3 ptosis. There is no nipple inversion or nipple discharge.  There is no peau d'orange.  There are no palpable breast masses.  There are no palpable axillary lymphadenopathies.  The right breast presents with sternal notch to nipple distance of 40 cm, nipple to inframammary fold 16 cm, and breast diameter 20 cm.  On the left breast sternal notch to nipple distance is 39 cm, nipple to inframammary fold is 15 cm and breast diameter is 18 cm.  Abdomen: Soft, non-distended and " non-tender to palpation  Neurologic: Grossly intact                                  Assessment:     32 year old years old female with symptomatic bilateral macromastia.     According to the Schnur scale based upon her body surface area which is 2.01 which is given by her height 160 cm and her weight which is 98 kg, this patient should have at least 600 g removed per breast.         PLAN:   This patient is a good surgical candidate for bilateral reduction mammoplasty.  I will utilize Richey pattern reduction with inferior pedicle.     Risks were explained to the patient and they include but are not limited to scarring, keloid, infection, bleeding, temporary versus permanent altered sensation of the nipple areolar complexes as well of the skin of both breasts, necrosis of the nipple areolar complex requiring potential tattooing, inability to breast-feed, need for further surgeries.  I have also told the patient that she will not have any upper pole fullness.  For upper pole fullness patient would have need breast implant.  Patient has acknowledged all these risks and has agreed to proceed.    However, due to the fact that this patient is currently having galactorrhea, this will need to be treated prior to any surgical intervention.  Galactorrhea increases chances of complication after breast reduction surgery.    I am going to refer the patient back to her primary care physician so that the doctor can prescribe medications to treat this galactorrhea.    I will see the patient again in 3 months for reevaluation and if at that time the galactorrhea has subsided, then I will send case request to program for surgery.  Patient has agreed with this plan.    Time spent with the patient 45 minutes.     Miguelangel Feldman MD , FACS   Diplomate American Board of Plastic Surgery  Diplomate American Board of Surgery  Adj. Assistant Professor of Surgery  Division of Plastic & Reconstructive Surgery   [facility]  Office: [phone]   10/7/2022 at 1:03 PM         Again, thank you for allowing me to participate in the care of your patient.        Sincerely,        Miguelangel Feldman MD

## 2022-10-12 ENCOUNTER — OFFICE VISIT (OUTPATIENT)
Dept: FAMILY MEDICINE | Facility: CLINIC | Age: 32
End: 2022-10-12
Payer: COMMERCIAL

## 2022-10-12 VITALS
WEIGHT: 218.2 LBS | HEART RATE: 73 BPM | HEIGHT: 63 IN | DIASTOLIC BLOOD PRESSURE: 98 MMHG | BODY MASS INDEX: 38.66 KG/M2 | SYSTOLIC BLOOD PRESSURE: 137 MMHG | TEMPERATURE: 98.9 F | OXYGEN SATURATION: 98 % | RESPIRATION RATE: 12 BRPM

## 2022-10-12 DIAGNOSIS — F43.21 ADJUSTMENT DISORDER WITH DEPRESSED MOOD: ICD-10-CM

## 2022-10-12 DIAGNOSIS — N64.3 GALACTORRHEA: Primary | ICD-10-CM

## 2022-10-12 PROCEDURE — 36415 COLL VENOUS BLD VENIPUNCTURE: CPT | Performed by: STUDENT IN AN ORGANIZED HEALTH CARE EDUCATION/TRAINING PROGRAM

## 2022-10-12 PROCEDURE — 84146 ASSAY OF PROLACTIN: CPT | Performed by: STUDENT IN AN ORGANIZED HEALTH CARE EDUCATION/TRAINING PROGRAM

## 2022-10-12 PROCEDURE — 99214 OFFICE O/P EST MOD 30 MIN: CPT | Mod: GC | Performed by: STUDENT IN AN ORGANIZED HEALTH CARE EDUCATION/TRAINING PROGRAM

## 2022-10-12 ASSESSMENT — PATIENT HEALTH QUESTIONNAIRE - PHQ9
SUM OF ALL RESPONSES TO PHQ QUESTIONS 1-9: 17
5. POOR APPETITE OR OVEREATING: NEARLY EVERY DAY

## 2022-10-12 ASSESSMENT — ANXIETY QUESTIONNAIRES
1. FEELING NERVOUS, ANXIOUS, OR ON EDGE: NEARLY EVERY DAY
6. BECOMING EASILY ANNOYED OR IRRITABLE: NEARLY EVERY DAY
3. WORRYING TOO MUCH ABOUT DIFFERENT THINGS: NEARLY EVERY DAY
5. BEING SO RESTLESS THAT IT IS HARD TO SIT STILL: NEARLY EVERY DAY
7. FEELING AFRAID AS IF SOMETHING AWFUL MIGHT HAPPEN: NEARLY EVERY DAY
IF YOU CHECKED OFF ANY PROBLEMS ON THIS QUESTIONNAIRE, HOW DIFFICULT HAVE THESE PROBLEMS MADE IT FOR YOU TO DO YOUR WORK, TAKE CARE OF THINGS AT HOME, OR GET ALONG WITH OTHER PEOPLE: SOMEWHAT DIFFICULT

## 2022-10-12 NOTE — PROGRESS NOTES
Assessment and Plan      Kee was seen today for breast exam and medication reconciliation.    Diagnoses and all orders for this visit:    Galactorrhea  Patient with bilateral macromastia presents with galactorrhea 6 months postpartum after breast-feeding for the first 2 weeks following delivery.  The galactorrhea appears to be physiological based on recent pregnancy and absence of abnormal breast discharge; confirmed that it is not a known side effect of her Stelara medication.  She was interested in lactation suppression options to ensure no barriers to her medically indicated breast reduction surgery. Prolactin level was checked and was in normal range, so medication suppression options such as cabergoline (preferred; 0.25mg PO q12hr for 4 doses; typically used within 48 hours postpartum, however, can be used later on; she does not have valvular disease based on 4/20/2015 echo; however, does not have history of elevated BPs which would a contraindication) or bromocriptine would be less likely to be effective. Estrogen-based oral contraception may inhibit lactation, however, contraindicated (category 3) if diagnosed with hypertension. Patient elected to wait a month to see if galactorrhea stops by itself as still within the upper normal end of when galactorrhea would seize. She has a follow up with her plastic surgeon in 1/2023.   -     Prolactin    History of nipple mass  Spontaneously and rapidly resolved small left nipple mass that is no longer apparent on exam suggests benign etiology.    Depressed mood  Anxiety  PHQ-9 of 17 and NIKKI-7 of at least 18, both rated as somewhat difficult. Patient declined to discuss and did not want referral for mental health provider.    Review of the result(s) of each unique test - prior TSH and echocardiogram; prolictin  Ordering of each unique test  Prescription drug management    Options for treatment and follow-up care were reviewed with the patient. Patient engaged in  the decision making process and verbalized understanding of the options discussed and agreed with the final plan.    Patient was staffed with attending physician Dr. Simmons.    Napoleon Goncalves MD PGY-3           HPI       Kee Phipps is a 32 year old year old female w/ PMH of   Patient Active Problem List   Diagnosis     Health Care Home     Crohns disease     Abnormal Pap smear of cervix     Chronic pain     Adjustment disorder with depressed mood     Celiac disease     Immunosuppressed status (H)     Fibromyalgia     Accelerated idioventricular rhythm (H)     Carpal tunnel syndrome during pregnancy     De Quervain's disease (tenosynovitis)     Postpartum hypertension     Postpartum state     Retained placenta     Pre-eclampsia, postpartum     Pre-eclampsia    who presents for   Chief Complaint   Patient presents with     Breast Exam     Getting breast reduction, still having milk looks clear and unsure if it is really milk or discharge, no pain, notice bump on nipple area 3mos ago, currently 6mos postpartum     Medication Reconciliation     Med reviewed       She was seen by a plastic surgeon Dr. Miguelangel Feldman on 10/7/2022 for evaluation of bilateral macromastia that is causing her to have neck and back pain.  She was deemed a good surgical candidate for bilateral reduction mammoplasty, however, surgery would need to be postponed until breast milk production had ceased as it is a risk factor for postsurgical complications.  He recommended evaluation for medication to cease her galactorrhea.    She has ongoing breast milk production even though she was only breast-feeding for the first 2 weeks and is now approximately 6 months postpartum.  Notes that breast milk volume is small, but ongoing and clear in appearance than before.  There is no bloody or pustular discharge; nor concerning skin changes to the breast.  Noted that she had a bump on the left nipple several months ago, but that it self resolved quickly.  "           Review of Systems:   8 point ROS negative other than as specified above.         Physical Exam:   BP (!) 137/98 (BP Location: Left arm, Patient Position: Sitting, Cuff Size: Adult Large)   Pulse 73   Temp 98.9  F (37.2  C) (Oral)   Resp 12   Ht 1.6 m (5' 3\")   Wt 99 kg (218 lb 3.2 oz)   SpO2 98%   Breastfeeding No   BMI 38.65 kg/m      Vital signs normal except mildly elevated BP     Exam:  Constitutional: healthy, alert, no distress, and cooperative  Head: normocephalic  Cardiovascular: appears well perfused  Respiratory: breathing comfortably on RA  Musculoskeletal: extremities normal- no gross deformities noted, gait normal  Skin: no suspicious lesions or rashes on breast  Neurologic: grossly normal CN  Psychiatric: mentation appears normal and affect normal      Results:   No testing ordered today    "

## 2022-10-12 NOTE — NURSING NOTE
PHQ 4/7/2022 8/15/2022 10/12/2022   PHQ-9 Total Score - 17 17   Q9: Thoughts of better off dead/self-harm past 2 weeks Not at all Not at all Not at all

## 2022-10-12 NOTE — PATIENT INSTRUCTIONS
Dear Kee Phipps,    1. Today we discussed breast milk.  2. I will call you with your lab results once completed.  3. Discussed bromocriptine    Please call [facility] with any questions or concerns.    Best regard,    Napoleon Goncalves MD      [facility]  Phone: [phone]  Address: [address] [place] [address]      Bromocriptine side effects  Adverse Reactions (Bromocriptine)  The following adverse drug reactions and incidences are derived from product labeling unless otherwise specified. Frequency of adverse effects may vary by dose and/or indication.  >10%:    Gastrointestinal: Constipation (11% to 14%), nausea (18% to 33%)    Nervous system: Dizziness (?13%), headache (?13%)    Neuromuscular & skeletal: Asthenia (13%)    Respiratory: Rhinitis (14%)    1% to 10%:    Cardiovascular: Orthostatic hypotension (6%), Raynaud's disease (<2%), syncope (<2%), vasospasm (digital: 3%)    Endocrine & metabolic: Hypoglycemia (4%)    Gastrointestinal: Abdominal cramps, abdominal distress, anorexia (4% to 5%), diarrhea (9%), dyspepsia (4% to 8%), gastrointestinal hemorrhage (<2%), vomiting (2% to 6%), xerostomia (?4%)    Infection: Infection (6%)    Nervous system: Drowsiness (3%)    Ophthalmic: Amblyopia (8%)    Respiratory: Nasal congestion (?4%), sinusitis (10%)

## 2022-10-13 LAB — PROLACTIN SERPL 3RD IS-MCNC: 12 NG/ML (ref 5–23)

## 2022-11-11 ENCOUNTER — OFFICE VISIT (OUTPATIENT)
Dept: FAMILY MEDICINE | Facility: CLINIC | Age: 32
End: 2022-11-11
Payer: COMMERCIAL

## 2022-11-11 VITALS
BODY MASS INDEX: 39.47 KG/M2 | HEART RATE: 64 BPM | DIASTOLIC BLOOD PRESSURE: 108 MMHG | RESPIRATION RATE: 16 BRPM | SYSTOLIC BLOOD PRESSURE: 146 MMHG | OXYGEN SATURATION: 96 % | WEIGHT: 222.8 LBS | TEMPERATURE: 97.8 F

## 2022-11-11 DIAGNOSIS — Z02.89 ENCOUNTER FOR COMPLETION OF FORM WITH PATIENT: Primary | ICD-10-CM

## 2022-11-11 DIAGNOSIS — N92.6 IRREGULAR MENSES: ICD-10-CM

## 2022-11-11 DIAGNOSIS — R03.0 ELEVATED BLOOD PRESSURE READING WITHOUT DIAGNOSIS OF HYPERTENSION: ICD-10-CM

## 2022-11-11 PROCEDURE — 99214 OFFICE O/P EST MOD 30 MIN: CPT | Mod: GC | Performed by: STUDENT IN AN ORGANIZED HEALTH CARE EDUCATION/TRAINING PROGRAM

## 2022-11-11 NOTE — PROGRESS NOTES
"Assessment & Plan     Encounter for completion of form with patient  Partially completed form with patient, encouraged her to discuss care and form with her surgeon as well.    Irregular menses  Could still be in the realm of normal hormone adjustment postpartum versus pathologic.  Could also consider thyroid issue versus intrauterine process.  As mentioned, patient prefers to continue to monitor symptoms, will follow-up discussion on this in 1 month, if symptoms do not improve, will check labs and order ultrasound to be completed.    Elevated blood pressure reading without diagnosis of hypertension  It is possible that the patient is developing hypertension, though requested more data with at home readings.  Counseled patient to check her pressures in the morning and in the afternoon to see what her numbers are, with close follow-up in 1 month at the time of her preop visit.  Would consider starting her on an antihypertensive if she is having elevated readings at home consistently.      Review of prior external note(s) from - Previous office visits  30 minutes spent on the date of the encounter doing chart review, history and exam, documentation and further activities per the note       BMI:   Estimated body mass index is 39.47 kg/m  as calculated from the following:    Height as of 10/12/22: 1.6 m (5' 3\").    Weight as of this encounter: 101.1 kg (222 lb 12.8 oz).   Will discuss at later visit    Return in about 4 weeks (around 12/9/2022).    Stormy Palacios MD PGY3  [facility]  Precepted with Dr. Rocael Sofia   Kee Phipps is a 32 year old who presents for the following health issues     HPI     Presents for discussion of a couple issues.  First, she desires to have form completed with work restrictions, specifically for upcoming surgery.  She does not have a date set yet for the surgery, this is for breast reduction.    She also wanted to discuss irregular menstrual cycle.  " She says prior to recent pregnancy, she has had regular monthly menstrual cycles, lasting 3 days.  However, in the past 5 or so months, especially since delivery, she has had irregular menses, specifically with short intervals between cycles, heavier bleeding, passage of some clots.  She says she has not had symptoms like this before.  She says that she prefers not to be on oral contraception for management of the symptoms at this time.  She prefers to continue to watch and wait.    Finally, discussed elevated blood pressures.  Of note, patient was diagnosed with postpartum hypertension, was on a course of labetalol and then with improvement of her blood pressures at the time, she was taken off of her medication.  Patient states that she has a blood pressure cuff at home, but has not been checking her pressures.    Review of Systems   Complete ROS normal aside noted in HPI      Objective    BP (!) 146/108   Pulse 64   Temp 97.8  F (36.6  C) (Oral)   Resp 16   Wt 101.1 kg (222 lb 12.8 oz)   LMP 10/28/2022 (Exact Date)   SpO2 96%   BMI 39.47 kg/m    Body mass index is 39.47 kg/m .    Physical Exam   GENERAL: healthy, alert and no distress  RESP: lungs clear to auscultation - no rales, rhonchi or wheezes  CV: regular rate and rhythm, normal S1 S2, no S3 or S4, no murmur, click or rub, no peripheral edema and peripheral pulses strong  MS: no gross musculoskeletal defects noted, no edema      ----- Service Performed and Documented by Resident or Fellow ------

## 2022-11-11 NOTE — PATIENT INSTRUCTIONS
Please monitor your blood pressures at home, I would recommend that we follow-up your blood pressure and discussion of your irregular menstruation in 1 month at your preop visit.  Please schedule your preop visit when you know the date of your surgery.

## 2022-11-20 ENCOUNTER — HEALTH MAINTENANCE LETTER (OUTPATIENT)
Age: 32
End: 2022-11-20

## 2022-12-09 ENCOUNTER — OFFICE VISIT (OUTPATIENT)
Dept: PLASTIC SURGERY | Facility: AMBULATORY SURGERY CENTER | Age: 32
End: 2022-12-09
Payer: COMMERCIAL

## 2022-12-09 DIAGNOSIS — N62 MACROMASTIA: Primary | ICD-10-CM

## 2022-12-09 DIAGNOSIS — I10 HYPERTENSION, UNSPECIFIED TYPE: ICD-10-CM

## 2022-12-09 PROBLEM — N76.1 CHRONIC VULVOVAGINITIS: Status: ACTIVE | Noted: 2017-03-02

## 2022-12-09 PROBLEM — N87.1 CIN II (CERVICAL INTRAEPITHELIAL NEOPLASIA II): Status: ACTIVE | Noted: 2017-11-16

## 2022-12-09 PROCEDURE — 99212 OFFICE O/P EST SF 10 MIN: CPT | Performed by: PLASTIC SURGERY

## 2022-12-09 NOTE — PROGRESS NOTES
Ms. Phipps is a 32 years old lady with history of symptomatic bilateral macromastia.  I had seen this patient in October 2022.  She is a candidate for bilateral breast reduction.  According to the Schnur scale she will need at least 600 g of breast tissue to be removed per breast.    During original evaluation, patient did report bilateral galactorrhea.  I have referred her to the endocrinologist to evaluate for any evidence of prolactinoma.  The prolactin level was within normal limits and now patient is reporting that the galactorrhea is no longer present.  As ptugpo-fr-ggry it was just nipple secretion secondary to recent birth.    Now that the galactorrhea has been ruled out ongoing to send case request for bilateral breast reduction.    However, in recent visit with her primary care physician she has been noticing to be having high blood pressure.    I am going to refer her to preoperative cardiac clearance with the cardiologist to make sure that her blood pressure will be under control prior to her elective breast reduction.  Patient is in agreement with this plan.    Time spent with the patient 10 minutes.    Miguelangel Feldman MD , FACS   Diplomate American Board of Plastic Surgery  Diplomate American Board of Surgery  Adj. Assistant Professor of Surgery  Division of Plastic & Reconstructive Surgery   [facility]  Office: [phone]   12/9/2022 at 11:47 AM

## 2022-12-09 NOTE — LETTER
12/9/2022         RE: Kee Phipps  [address]  [place] [address]        Dear Colleague,    Thank you for referring your patient, Kee Phipps, to the [facility] [place]. Please see a copy of my visit note below.    Ms. Phipps is a 32 years old lady with history of symptomatic bilateral macromastia.  I had seen this patient in October 2022.  She is a candidate for bilateral breast reduction.  According to the Schnur scale she will need at least 600 g of breast tissue to be removed per breast.    During original evaluation, patient did report bilateral galactorrhea.  I have referred her to the endocrinologist to evaluate for any evidence of prolactinoma.  The prolactin level was within normal limits and now patient is reporting that the galactorrhea is no longer present.  As [id number]-kh-tlef it was just nipple secretion secondary to recent birth.    Now that the galactorrhea has been ruled out ongoing to send case request for bilateral breast reduction.    However, in recent visit with her primary care physician she has been noticing to be having high blood pressure.    I am going to refer her to preoperative cardiac clearance with the cardiologist to make sure that her blood pressure will be under control prior to her elective breast reduction.  Patient is in agreement with this plan.    Time spent with the patient 10 minutes.    Miguelangel Feldman MD , FACS   Diplomate American Board of Plastic Surgery  Diplomate American Board of Surgery  Adj. Assistant Professor of Surgery  Division of Plastic & Reconstructive Surgery   [facility]  Office: [phone]   12/9/2022 at 11:47 AM         Again, thank you for allowing me to participate in the care of your patient.        Sincerely,        Miguelangel Feldman MD

## 2022-12-13 ENCOUNTER — OFFICE VISIT (OUTPATIENT)
Dept: CARDIOLOGY | Facility: CLINIC | Age: 32
End: 2022-12-13
Payer: COMMERCIAL

## 2022-12-13 VITALS
HEIGHT: 63 IN | HEART RATE: 83 BPM | BODY MASS INDEX: 39.16 KG/M2 | WEIGHT: 221 LBS | SYSTOLIC BLOOD PRESSURE: 135 MMHG | DIASTOLIC BLOOD PRESSURE: 90 MMHG

## 2022-12-13 DIAGNOSIS — Z01.810 PRE-OPERATIVE CARDIOVASCULAR EXAMINATION: Primary | ICD-10-CM

## 2022-12-13 DIAGNOSIS — E66.01 MORBID OBESITY (H): ICD-10-CM

## 2022-12-13 DIAGNOSIS — I10 ESSENTIAL HYPERTENSION: ICD-10-CM

## 2022-12-13 PROCEDURE — 93000 ELECTROCARDIOGRAM COMPLETE: CPT | Performed by: INTERNAL MEDICINE

## 2022-12-13 PROCEDURE — 99204 OFFICE O/P NEW MOD 45 MIN: CPT | Performed by: INTERNAL MEDICINE

## 2022-12-13 NOTE — LETTER
12/13/2022    Stormy Palacios MD  [address] [place] [id number]    RE: Kee Phipps       Dear Colleague,     I had the pleasure of seeing Kee Phipps in the [facility].  CARDIOLOGY CLINIC CONSULTATION      REASON FOR CONSULT:   Preoperative cardiovascular risk assessed    PRIMARY CARE PHYSICIAN:  Stormy Palacios        History of Present Illness   Kee Phipps is an extremely pleasant 32 year old female here as a new patient for preoperative cardiovascular risk assessment.  Her past medical history is significant for hypertension with preeclampsia in the past, morbid obesity, Crohn's disease, celiac disease, and fibromyalgia.  She has no family history of heart disease.  She quit smoking cigarettes in late 2021, and currently uses e-cigarettes.  She rarely drinks alcohol.  She does not formally exercise, but is active caring for her children and cleaning the house.    Currently, she has no cardiac symptoms.  She is able to climb a flight of stairs with no difficulty including no chest pain or shortness of breath.  She has no other major cardiac symptoms.  She brings in a home blood pressure log of the past 4 days.  Initial blood pressures were in the mid 150s over high 90s.  She she started taking amlodipine 10 mg daily on the first day of her blood pressure log, and currently the most recent blood pressures have been as low as 121/91, but primarily in the low 130s/mid 90s.    Regarding labs, her most recent CBC and BMP in May 2022 were unremarkable.  Her lower extremity duplex ultrasound in May 2022 was negative for DVT.  Her chest x-ray at that time was negative.  Her EKG report describes a normal ECG at that time.  Her most recent echocardiogram was from 2015 and was essentially normal.        Assessment & Plan     1. Preoperative cardiovascular risk assessment for upcoming breast reduction surgery  2. Hypertension with history of preeclampsia, with slightly suboptimal control at  present  3. Morbid obesity  4. Crohn's disease, celiac disease, fibromyalgia  5. Former tobacco abuse, current e-cigarette use      It was a pleasure to meet with Kee and her family in clinic today.  In regards to her preoperative risk, she is able to easily perform greater than 4 METS of exertion with no cardiac symptoms.  Her RCRI is 0.  Accordingly, she is low risk for MACCE with upcoming breast reduction surgery, and no additional testing or intervention would be expected to further reduce her risk.    In regards to her blood pressure, I explained that amlodipine can take up to 5 days to show full effect.  It does appear that she is already started to see some benefit from the amlodipine, and her blood pressures, while above goal, are only mildly above goal at present.  I told her to please keep the blood pressure log going for another week or so, and then she can send these into her primary care physician for further review and decision on whether additional antihypertensive medication is needed.  However, even if her blood pressure is slightly above goal, I explained that this should not preclude her from safely undergoing her upcoming surgery.      - Patient is low risk for MACCE with upcoming breast reduction surgery, and no additional testing or intervention would be expected to further reduce her risk.    -Continue amlodipine 10 mg daily for now  -Continue home BP log x1 week, defer further antihypertensive adjustment to PCP  -Healthy lifestyle interventions including healthy diet and exercise to promote gradual weight loss  -Congratulated on patient's ability to discontinue traditional cigarettes, but also would recommend discontinuing e-cigarette use        No routine cardiology follow-up is required at this time, though we would be happy to see Kee back at any point if future questions or concerns arise.        Melchor Figueroa MD  Interventional Cardiology  December 13, 2022        Medications    Current Outpatient Medications   Medication     acetaminophen (TYLENOL) 650 MG CR tablet     ustekinumab (STELARA) 90 MG/ML     Condoms - Female (FC FEMALE CONDOM) [facility]     No current facility-administered medications for this visit.     Allergies   Allergies   Allergen Reactions     Barley Grass Other (See Comments)     CELIAC  CELIAC     Gluten Meal      Gramineae Pollens Other (See Comments)     CELIAC     Humira [Humira]      Caused huge lumps at injection site.      Ibuprofen      Patient has Crohns disease and is unable to take this medication.     Latex Itching     Nifedipine      Flushing, lightheadedness     Wheat Extract Other (See Comments)     Remicade [Infliximab] Rash         Physical Exam       BP: (!) 135/90 Pulse: 83            Vital Signs with Ranges  Pulse:  [83] 83  BP: (135)/(90) 135/90  221 lbs 0 oz    Constitutional: Well-appearing, no acute distress  Respiratory: Normal respiratory effort, CTAB  Cardiovascular: RRR, no m/r/g.  JVP < 7 cm H2O.  There is no LE edema.  Normal carotid upstrokes, no carotid bruits.    Thank you for allowing me to participate in the care of your patient.      Sincerely,     Melchor Figueroa MD      Heart Care  cc:   Miguelangel Feldman MD  [address] [address]

## 2022-12-13 NOTE — PROGRESS NOTES
CARDIOLOGY CLINIC CONSULTATION      REASON FOR CONSULT:   Preoperative cardiovascular risk assessed    PRIMARY CARE PHYSICIAN:  Stormy Palacios        History of Present Illness   Kee Phipps is an extremely pleasant 32 year old female here as a new patient for preoperative cardiovascular risk assessment.  Her past medical history is significant for hypertension with preeclampsia in the past, morbid obesity, Crohn's disease, celiac disease, and fibromyalgia.  She has no family history of heart disease.  She quit smoking cigarettes in late 2021, and currently uses e-cigarettes.  She rarely drinks alcohol.  She does not formally exercise, but is active caring for her children and cleaning the house.    Currently, she has no cardiac symptoms.  She is able to climb a flight of stairs with no difficulty including no chest pain or shortness of breath.  She has no other major cardiac symptoms.  She brings in a home blood pressure log of the past 4 days.  Initial blood pressures were in the mid 150s over high 90s.  She she started taking amlodipine 10 mg daily on the first day of her blood pressure log, and currently the most recent blood pressures have been as low as 121/91, but primarily in the low 130s/mid 90s.    Regarding labs, her most recent CBC and BMP in May 2022 were unremarkable.  Her lower extremity duplex ultrasound in May 2022 was negative for DVT.  Her chest x-ray at that time was negative.  Her EKG report describes a normal ECG at that time.  Her most recent echocardiogram was from 2015 and was essentially normal.        Assessment & Plan     1. Preoperative cardiovascular risk assessment for upcoming breast reduction surgery  2. Hypertension with history of preeclampsia, with slightly suboptimal control at present  3. Morbid obesity  4. Crohn's disease, celiac disease, fibromyalgia  5. Former tobacco abuse, current e-cigarette use      It was a pleasure to meet with Kee and her family in clinic  today.  In regards to her preoperative risk, she is able to easily perform greater than 4 METS of exertion with no cardiac symptoms.  Her RCRI is 0.  Accordingly, she is low risk for MACCE with upcoming breast reduction surgery, and no additional testing or intervention would be expected to further reduce her risk.    In regards to her blood pressure, I explained that amlodipine can take up to 5 days to show full effect.  It does appear that she is already started to see some benefit from the amlodipine, and her blood pressures, while above goal, are only mildly above goal at present.  I told her to please keep the blood pressure log going for another week or so, and then she can send these into her primary care physician for further review and decision on whether additional antihypertensive medication is needed.  However, even if her blood pressure is slightly above goal, I explained that this should not preclude her from safely undergoing her upcoming surgery.      - Patient is low risk for MACCE with upcoming breast reduction surgery, and no additional testing or intervention would be expected to further reduce her risk.    -Continue amlodipine 10 mg daily for now  -Continue home BP log x1 week, defer further antihypertensive adjustment to PCP  -Healthy lifestyle interventions including healthy diet and exercise to promote gradual weight loss  -Congratulated on patient's ability to discontinue traditional cigarettes, but also would recommend discontinuing e-cigarette use        No routine cardiology follow-up is required at this time, though we would be happy to see Kee back at any point if future questions or concerns arise.        Melchor Figueroa MD  Interventional Cardiology  December 13, 2022        Medications   Current Outpatient Medications   Medication     acetaminophen (TYLENOL) 650 MG CR tablet     ustekinumab (STELARA) 90 MG/ML     Condoms - Female (FC FEMALE CONDOM) [facility]     No current  facility-administered medications for this visit.     Allergies   Allergies   Allergen Reactions     Barley Grass Other (See Comments)     CELIAC  CELIAC     Gluten Meal      Gramineae Pollens Other (See Comments)     CELIAC     Humira [Humira]      Caused huge lumps at injection site.      Ibuprofen      Patient has Crohns disease and is unable to take this medication.     Latex Itching     Nifedipine      Flushing, lightheadedness     Wheat Extract Other (See Comments)     Remicade [Infliximab] Rash         Physical Exam       BP: (!) 135/90 Pulse: 83            Vital Signs with Ranges  Pulse:  [83] 83  BP: (135)/(90) 135/90  221 lbs 0 oz    Constitutional: Well-appearing, no acute distress  Respiratory: Normal respiratory effort, CTAB  Cardiovascular: RRR, no m/r/g.  JVP < 7 cm H2O.  There is no LE edema.  Normal carotid upstrokes, no carotid bruits.

## 2023-01-11 ENCOUNTER — TELEPHONE (OUTPATIENT)
Dept: PLASTIC SURGERY | Facility: AMBULATORY SURGERY CENTER | Age: 33
End: 2023-01-11

## 2023-01-11 NOTE — LETTER
We've received instruction to get you scheduled for surgery with Dr Feldman. We have that arranged as follows:     Pre-op Physical:  Call your primary clinic to schedule.    Surgery Date: 1/30/2023     Location: [facility].  [address] [place] [address]    Approximate Arrival Time: 10:00 am  (Unless instructed differently by the pre-op call nurse)     [facility] op [facility]: [facility] [address], [place] [address].    Prep Tasks and Info:     1. Schedule a pre-op physical with your primary care doctor within 30 days of surgery. This is required by anesthesia and if not done, your surgery will be cancelled. Call them asap to get this scheduled.    2. Review your medications with your primary care or prescribing physician; they will advise you which meds to stop and when, and when you can resume taking.  Certain medications like blood thinners need to be stopped in advance of surgery to proceed safely.    3. Please shower the evening before and morning of surgery with Hibiclens or Exidine soap.  This can be found at your local pharmacy. Follow the links below for detailed instructions for preoperative skin preparation:     Showering Before [id number]       Preparing Your Skin Before Surgery - When you can't take a [id number]     4. Fasting instructions will be provided by the pre-op nurse who will call you 1-3 days before surgery.  Typically we advise normal food up to 8 hours before you arrive for surgery. Clear liquids only from then until 2 hours before you arrive surgery then nothing at all by mouth.  The nurse will review your specific instructions with you at the call.     5. Smoking impacts your body's ability to heal properly.  If you are a smoker, we strongly urge you to stop smoking 4-6 weeks before surgery. Plastic surgery patients are required to be nicotine free for 6-8  weeks before surgery.     6. You will need an adult to drive you home and stay with you 24 hours after surgery. Public transportation or Medical Van Services are not permitted.    7. Visitor restrictions are subject to change, please verify with the pre-op nurse how many people may accompany you when they call.    8. We always encourage you to notify your insurance any time you have medical tests or procedures scheduled including surgery. The number is usually right on the back of your insurance card. Please call [facility] Cost of Care at [phone] if you'd like a surgery quote.      Preparing for Your Surgery [id number]     Call our office if you have any questions! Thank you!

## 2023-01-11 NOTE — TELEPHONE ENCOUNTER
Spoke to patient today to schedule surgery, next available date is 5/29. Patient inquired about doing the case at another facility to get it done at an earlier time.  I put the date of 5/29 at [facility] on hold and told the patient I would inquire with Dr. Feldman upon his return about the ability to move her [place] [facility]. I will call her once I hear from Dr. Feldman.   Pt agreed and will wait for my call.

## 2023-01-16 ENCOUNTER — HOSPITAL ENCOUNTER (OUTPATIENT)
Facility: HOSPITAL | Age: 33
End: 2023-01-16
Attending: PLASTIC SURGERY | Admitting: PLASTIC SURGERY
Payer: COMMERCIAL

## 2023-01-16 NOTE — TELEPHONE ENCOUNTER
Spoke with patient today regarding surgery scheduling     Went over details/instructions.    Surgery Letter sent via [employer]       I did talk to Dr. Feldman who stated it would be okay to go to [facility] for the procedure but we ended up having a cancellation on 1/30. Patient accepted this date.

## 2023-01-27 ENCOUNTER — OFFICE VISIT (OUTPATIENT)
Dept: FAMILY MEDICINE | Facility: CLINIC | Age: 33
End: 2023-01-27
Payer: COMMERCIAL

## 2023-01-27 VITALS
WEIGHT: 228 LBS | OXYGEN SATURATION: 99 % | TEMPERATURE: 98.7 F | HEART RATE: 81 BPM | RESPIRATION RATE: 20 BRPM | BODY MASS INDEX: 40.39 KG/M2 | SYSTOLIC BLOOD PRESSURE: 135 MMHG | DIASTOLIC BLOOD PRESSURE: 97 MMHG

## 2023-01-27 DIAGNOSIS — O20.9 VAGINAL BLEEDING IN PREGNANCY, FIRST TRIMESTER: ICD-10-CM

## 2023-01-27 DIAGNOSIS — Z01.818 PREOP GENERAL PHYSICAL EXAM: Primary | ICD-10-CM

## 2023-01-27 DIAGNOSIS — R21 RASH: ICD-10-CM

## 2023-01-27 PROCEDURE — 84702 CHORIONIC GONADOTROPIN TEST: CPT

## 2023-01-27 PROCEDURE — 36415 COLL VENOUS BLD VENIPUNCTURE: CPT

## 2023-01-27 PROCEDURE — 99214 OFFICE O/P EST MOD 30 MIN: CPT | Mod: GC

## 2023-01-27 RX ORDER — NYSTATIN 100000 [USP'U]/G
POWDER TOPICAL 2 TIMES DAILY PRN
Qty: 30 G | Refills: 0 | Status: SHIPPED | OUTPATIENT
Start: 2023-01-27 | End: 2023-07-13

## 2023-01-27 NOTE — PROGRESS NOTES
[facility] [address] [place] [address]  Phone: [phone]  Fax: [phone]  Primary Provider: Stormy Palacios  Pre-op Performing Provider: JONATHAN PEREZ    PREOPERATIVE EVALUATION:  Today's date: 1/27/2023    Kee Phipps is a 32 year old female who presents for a preoperative evaluation.    Surgical Information:  Surgery/Procedure: Breast Reduction  Surgery Location: [facility]  Surgeon: Dr Feldman  Surgery Date: 1/30/2023  Time of Surgery: 1000  Where patient plans to recover: At home with family  Fax number for surgical facility: Unknown    Type of Anesthesia Anticipated: General    Assessment & Plan     The proposed surgical procedure is considered LOW risk.    Preop general physical exam  Patient has previous diagnosis of accelerated idioventricular rhythm but has not experienced symptoms for many months. No current cardiac concerns, EKG in 12/2022 was NSR. She states she is currently on her period but it is very light and irregular, she is unsure if she could be pregnant. Will perform HCG quant today to evaluate this and inform patient of results.  - hCG Quantitative Pregnancy; Future  - hCG Quantitative Pregnancy    Rash  Patient recently developed intertriginous breast rash that she had been treating with A&D ointment and has improved greatly, and on exam today there is only very mild skin texture changes consistent with resolving/resolved rash, no distinct erythema or swelling or drainage, no evidence of abscess. Provided nystatin powder to use to assist with resolution of the rash. Do not believe this should prevent her from receiving surgery.  - nystatin (MYCOSTATIN) 996950 UNIT/GM external powder; Apply topically 2 times daily as needed for other (intertriginous rash) Apply to breast folds as needed 2-3 times per day for rash.           Risks and Recommendations:  The patient has the following additional risks and recommendations for  perioperative complications:   - Morbid obesity (BMI >40)    Medication Instructions:  Patient is on no chronic medications    RECOMMENDATION:  APPROVAL GIVEN to proceed with proposed procedure, without further diagnostic evaluation.      Subjective     HPI related to upcoming procedure: Patient denies any recent chest pain, shortness of breath, abdominal pain, N/V/D, fevers, or chills. She states she is currently on her period but that it has been very light and irregular, however this has occurred in the past following her most recent pregnancy (delivered 9mo ago).     She does note that she has had an intertriginous rash in her breast folds bilaterally for the past 4 days, using A&D ointment, endorses that's feeling better and almost resolved. She would like to make sure that this doesn't prevent her from receiving her surgery. No systemic symptoms, no drainage, no foul odor, no fevers or chills, rash is no longer irritating her.    Recently got over a cold, no longer having symptoms. Approximately 4 days ago she was experiencing a mild sore throat and rhinorrhea however this has resolved now. Previously had COVID in December.    Multiple surgeries in the past, denies any concerns or issues with general anesthesia.    Preop Questions 1/27/2023   1. Have you ever had a heart attack or stroke? No   2. Have you ever had surgery on your heart or blood vessels, such as a stent placement, a coronary artery bypass, or surgery on an artery in your head, neck, heart, or legs? No   3. Do you have chest pain with activity? No   4. Do you have a history of  heart failure? No   5. Do you currently have a cold, bronchitis or symptoms of other infection? YES - for the past 4 days but feeling better now and much less congested   6. Do you have a cough, shortness of breath, or wheezing? No   7. Do you or anyone in your family have previous history of blood clots? No   8. Do you or does anyone in your family have a serious  bleeding problem such as prolonged bleeding following surgeries or cuts? No   9. Have you ever had problems with anemia or been told to take iron pills? YES - took iron pills 9 years ago, normal hgb in 05/2022   10. Have you had any abnormal blood loss such as black, tarry or bloody stools, or abnormal vaginal bleeding? No, previously with Crohns flares but not right now    11. Have you ever had a blood transfusion? No   12. Are you willing to have a blood transfusion if it is medically needed before, during, or after your surgery? Yes   13. Have you or any of your relatives ever had problems with anesthesia? No   14. Do you have sleep apnea, excessive snoring or daytime drowsiness? No   15. Do you have any artifical heart valves or other implanted medical devices like a pacemaker, defibrillator, or continuous glucose monitor? No   16. Do you have artificial joints? No   17. Are you allergic to latex? YES: Rash   18. Is there any chance that you may be pregnant? No - currently on period       Health Care Directive:  Patient does not have a Health Care Directive or Living Will: Discussed advance care planning with patient; however, patient declined at this time.    Preoperative Review of :   reviewed - no record of controlled substances prescribed.       Review of Systems  CONSTITUTIONAL: NEGATIVE for fever, chills, change in weight  INTEGUMENTARY/SKIN: NEGATIVE for worrisome rashes, moles or lesions  EYES: NEGATIVE for vision changes or irritation  ENT/MOUTH: NEGATIVE for ear, mouth and throat problems  RESP: NEGATIVE for significant cough or SOB  CV: NEGATIVE for chest pain, palpitations or peripheral edema  GI: NEGATIVE for nausea, abdominal pain, heartburn, or change in bowel habits  : NEGATIVE for frequency, dysuria, or hematuria  MUSCULOSKELETAL: NEGATIVE for significant arthralgias or myalgia  NEURO: NEGATIVE for weakness, dizziness or paresthesias  ENDOCRINE: NEGATIVE for temperature intolerance,  skin/hair changes  HEME: NEGATIVE for bleeding problems  PSYCHIATRIC: NEGATIVE for changes in mood or affect    Patient Active Problem List    Diagnosis Date Noted     Pre-eclampsia 04/11/2022     Priority: Medium     Pre-eclampsia, postpartum 04/08/2022     Priority: Medium     Postpartum hypertension 04/02/2022     Priority: Medium     Postpartum state 04/02/2022     Priority: Medium     Retained placenta 04/02/2022     Priority: Medium     Carpal tunnel syndrome during pregnancy 03/10/2022     Priority: Medium     De Quervain's disease (tenosynovitis) 03/10/2022     Priority: Medium     Accelerated idioventricular rhythm (H) 01/06/2022     Priority: Medium     Crohns disease 09/08/2021     Priority: Medium     Crohn's Disease, Celiac Disease, and Fibromyalgia: Follows with [facility]. Received second Stelara injection on 9/1/21 at [facility].       Adjustment disorder with depressed mood 09/08/2021     Priority: Medium     H/O Adjustment disorder with depressed mood and anxiety: 9/8/21 Pt reports that she is feeling good/okay right now and denies need for therapy at this time.  She will let clinic know if she would like to see a therapist in the future.       Celiac disease 09/08/2021     Priority: Medium     Crohn's Disease, Celiac Disease, and Fibromyalgia: Follows with [facility]. Received second Stelara injection on 9/1/21 at [facility].       Fibromyalgia 09/08/2021     Priority: Medium     Crohn's Disease, Celiac Disease, and Fibromyalgia: Follows with [facility]. Received second Stelara injection on 9/1/21 at [facility].       ERNESTINA II (cervical intraepithelial neoplasia II) 11/16/2017     Priority: Medium     Formatting of this note might be different from the original.  Overview:   Overview:   2/6/17 LSIL Pap, pt referred to OBGYN for colp/consult  9/2/15 Colposcopy done at [facility].  7/8/15 ASCUS Pap- superior elkin os ulceration.  Pt referred to OBGYN.  6/17/14 COLP APPT: Repeat Pap- LSIL, ECC and cervical os bx- neg dysplasia,  Ectocervix bx- Moderate squamous dysplasia.  Plan: refer to GYN.  DG  4/30/14 LGSIL Pap with + high risk HPV.   Cervical appearance: pink with darker pink lacy areas. Pt needs colposcopy ASAP!  2/8/13 LGSIL Pap with negative HPV.  Pt needs repeat Pap in 1yr.    6/18/09 Pap: NIL  11/2017 Pap: NIL    Plan: Colposcopy  Formatting of this note might be different from the original.  Overview:   2/6/17 LSIL Pap, pt referred to OBGYN for colp/consult  9/2/15 Colposcopy done at [facility].  7/8/15 ASCUS Pap- superior elkin os ulceration.  Pt referred to OBGYN.  6/17/14 COLP APPT: Repeat Pap- LSIL, ECC and cervical os bx- neg dysplasia, Ectocervix bx- Moderate squamous dysplasia.  Plan: refer to GYN.  DG  4/30/14 LGSIL Pap with + high risk HPV.   Cervical appearance: pink with darker pink lacy areas. Pt needs colposcopy ASAP!  2/8/13 LGSIL Pap with negative HPV.  Pt needs repeat Pap in 1yr.    6/18/09 Pap: NIL  11/2017 Pap: NIL    Plan: Colposcopy       Chronic vulvovaginitis 03/02/2017     Priority: Medium     Immunosuppressed status (H) 07/08/2015     Priority: Medium     Crohn's disease of colon (H) 04/20/2015     Priority: Medium     Formatting of this note might be different from the original.  not taking any medications       Chronic pain 09/30/2014     Priority: Medium     No indication for narcotics per GI  Due to Crohn's not responsive to other therapies. CPM visit 1&2 complete     CPM #1 completed? Yes, date: 2/9/2015  CPM #2 completed? Yes, date: 3/9/2015  Pain contract signed? Yes, date: 3/9/2015  Behavioral health consult completed? Yes, date: 5/12/2015  Personal care plan completed? Yes, date: 5/12/2015 See patient instructions               Abnormal Pap smear of cervix      Priority: Medium     2/6/17 LSIL Pap, pt referred to OBGYN for colp/consult  9/2/15 Colposcopy done at [facility].  7/8/15 ASCUS Pap- superior elkin os ulceration.  Pt referred to OBGYN.  6/17/14 COLP APPT: Repeat Pap- LSIL, ECC and  cervical os bx- neg dysplasia, Ectocervix bx- Moderate squamous dysplasia.  Plan: refer to GYN.  DG  14 LGSIL Pap with + high risk HPV.   Cervical appearance: pink with darker pink lacy areas. Pt needs colposcopy ASAP!  13 LGSIL Pap with negative HPV.  Pt needs repeat Pap in 1yr.    09 nl Pap       Abnormal CT scan, gastrointestinal tract 12/10/2012     Priority: Medium     Health Care Home 2012     Priority: Medium     Tier Level: 0    DX V65.8 REPLACED WITH 33292 [facility]ALTH CARE HOME (2013)       Allergic rhinitis, seasonal 2012     Priority: Medium      Past Medical History:   Diagnosis Date     Abnormal Pap smear of cervix      Accelerated idioventricular rhythm (H) 2022     Anemia      Anxiety      Arthritis      Bilateral plantar fasciitis      Celiac disease 2014     Celiac disease      Crohn disease (H)      Crohn's disease (H) 2012     Crohn's disease of colon (H) 2015     Depression      Depressive disorder      Fibromyalgia      Fibromyalgia      H/O miscarriage, currently pregnant     miscarriage .     HPV (human papilloma virus) infection      Migraine      Plantar fasciitis     bilateral     Postpartum hypertension 2022      delivery 2013    34 weeks. Early rupture of membranes     Past Surgical History:   Procedure Laterality Date     APPENDECTOMY       BOWEL RESECTION      illium      CERVIX LESION DESTRUCTION       DILATION AND CURETTAGE SUCTION, TREAT MISSED , 1ST TRIMESTER      miscarriage at 8 wks gest     DILATION AND CURETTAGE SUCTION, TREAT MISSED , 1ST TRIMESTER  2019     FASCIOTOMY LOWER EXTREMITY Left 2018    Left foot     FOOT SURGERY Left      LAPAROSCOPY DIAGNOSTIC (GYN) N/A 2019    Procedure: LAPAROSCOPY, DIAGNOSTIC;  Surgeon: Anni Velasquez MD;  Location: [facility] OR     PLANTAR FASCIA RELEASE Left 4/10/2018    Procedure:  PLANTAR FASCIOTOMY LEFT FOOT ;  Surgeon: Yuri FARFAN  EDWAR Arreola;  Location: [facility] OR;  Service:      SALPINGECTOMY Right 05/15/2021    rt sided ruptured ectopic     SMALL BOWEL RESECTION       TONSILLECTOMY       Current Outpatient Medications   Medication Sig Dispense Refill     acetaminophen (TYLENOL) 650 MG CR tablet Take 1 tablet (650 mg) by mouth every 8 hours as needed for mild pain or fever 100 tablet 0     Condoms - Female (FC FEMALE CONDOM) MISC 1 Package as needed (barrier protection) (Patient not taking: Reported on 2022) 20 each 3     ustekinumab (STELARA) 90 MG/ML Inject Subcutaneous every 2 months         Allergies   Allergen Reactions     Barley Grass Other (See Comments)     CELIAC  CELIAC     Gluten Meal      Gramineae Pollens Other (See Comments)     CELIAC     Humira [Humira]      Caused huge lumps at injection site.      Ibuprofen      Patient has Crohns disease and is unable to take this medication.     Latex Itching     Nifedipine      Flushing, lightheadedness     Wheat Extract Other (See Comments)     Remicade [Infliximab] Rash        Social History     Tobacco Use     Smoking status: Former     Packs/day: 0.03     Types: Cigarettes     Quit date: 2021     Years since quittin.1     Smokeless tobacco: Never     Tobacco comments:     Vaping daily since 2021   Substance Use Topics     Alcohol use: Not Currently     Comment: occa     Family History   Problem Relation Age of Onset     Hypertension Mother      Seizure Disorder Mother      Asthma Brother      Asthma Brother      Asthma Brother      Asthma Brother      Cancer Maternal Grandmother         bone cancer     Asthma Son      Crohn's Disease Paternal Uncle      Diabetes No family hx of      Coronary Artery Disease No family hx of      Hyperlipidemia No family hx of      Cerebrovascular Disease No family hx of      Breast Cancer No family hx of      Colon Cancer No family hx of      Prostate Cancer No family hx of      Other Cancer No family hx of      Depression  No family hx of      Anxiety Disorder No family hx of      Mental Illness No family hx of      Substance Abuse No family hx of      Anesthesia Reaction No family hx of      Osteoporosis No family hx of      Genetic Disorder No family hx of      Thyroid Disease No family hx of      Obesity No family hx of      Unknown/Adopted No family hx of      Heart Disease No family hx of      History   Drug Use No         Objective     BP (!) 145/92 (BP Location: Left arm, Patient Position: Sitting, Cuff Size: Adult Regular)   Pulse 81   Temp 98.7  F (37.1  C) (Oral)   Resp 20   Wt 103.4 kg (228 lb)   LMP  (LMP Unknown)   SpO2 99%   BMI 40.39 kg/m      Physical Exam    GENERAL APPEARANCE: healthy, alert and no distress     EYES: EOMI, PERRL     HENT: ear canals and TM's normal and nose and mouth without ulcers or lesions     NECK: no adenopathy, no asymmetry, masses, or scars and thyroid normal to palpation     RESP: lungs clear to auscultation - no rales, rhonchi or wheezes     CV: regular rates and rhythm, normal S1 S2, no S3 or S4 and no murmur, click or rub     ABDOMEN:  soft, nontender, no HSM or masses and bowel sounds normal     MS: extremities normal- no gross deformities noted, no evidence of inflammation in joints, FROM in all extremities.     SKIN: Intertriginous breast fold skin texture changes that are non-erythematous, no masses, no drainage or foul odor.      NEURO: Normal strength and tone, sensory exam grossly normal, mentation intact and speech normal     PSYCH: mentation appears normal. and affect normal/bright     LYMPHATICS: No cervical adenopathy    Recent Labs   Lab Test 05/16/22  1053 04/15/22  1431 08/10/21  1036 08/08/21  0926   HGB 13.4 14.0   < > 14.9    254   < > 229   INR  --   --   --  0.98    138   < > 141   POTASSIUM 4.2 4.4   < > 3.7   CR 0.88 0.77   < > 0.78    < > = values in this interval not displayed.        Diagnostics:  Labs pending at this time.  Results will be  reviewed when available.   No EKG required for low risk surgery (cataract, skin procedure, breast biopsy, etc).    Revised Cardiac Risk Index (RCRI):  The patient has the following serious cardiovascular risks for perioperative complications:   - No serious cardiac risks = 0 points     RCRI Interpretation: 0 points: Class I (very low risk - 0.4% complication rate)           Signed Electronically by: Wesley Matias DO  Copy of this evaluation report is provided to requesting physician.

## 2023-01-27 NOTE — PATIENT INSTRUCTIONS
For informational purposes only. Not to replace the advice of your health care provider. Copyright   2003,  [facility]. All rights reserved. Clinically reviewed by Marilu Carolina MD. [employer] [id number] - REV .  Preparing for Your Surgery  Getting started  A nurse will call you to review your health history and instructions. They will give you an arrival time based on your scheduled surgery time. Please be ready to share:    Your doctor's clinic name and phone number    Your medical, surgical, and anesthesia history    A list of allergies and sensitivities    A list of medicines, including herbal treatments and over-the-counter drugs    Whether the patient has a legal guardian (ask how to send us the papers in advance)  Please tell us if you're pregnant--or if there's any chance you might be pregnant. Some surgeries may injure a fetus (unborn baby), so they require a pregnancy test. Surgeries that are safe for a fetus don't always need a test, and you can choose whether to have one.   If you have a child who's having surgery, please ask for a copy of Preparing for Your Child's Surgery.    Preparing for surgery    Within 10 to 30 days of surgery: Have a pre-op exam (sometimes called an H&P, or History and Physical). This can be done at a clinic or pre-operative center.  ? If you're having a , you may not need this exam. Talk to your care team.    At your pre-op exam, talk to your care team about all medicines you take. If you need to stop any medicines before surgery, ask when to start taking them again.  ? We do this for your safety. Many medicines can make you bleed too much during surgery. Some change how well surgery (anesthesia) drugs work.    Call your insurance company to let them know you're having surgery. (If you don't have insurance, call [phone].)    Call your clinic if there's any change in your health. This includes signs of a cold or flu (sore throat, runny nose,  cough, rash, fever). It also includes a scrape or scratch near the surgery site.    If you have questions on the day of surgery, call your hospital or surgery center.  Eating and drinking guidelines  For your safety: Unless your surgeon tells you otherwise, follow the guidelines below.    Eat and drink as usual until 8 hours before you arrive for surgery. After that, no food or milk.    Drink clear liquids until 2 hours before you arrive. These are liquids you can see through, like water, Gatorade, and Propel Water. They also include plain black coffee and tea (no cream or milk), candy, and breath mints. You can spit out gum when you arrive.    If you drink alcohol: Stop drinking it the night before surgery.    If your care team tells you to take medicine on the morning of surgery, it's okay to take it with a sip of water.  Preventing infection    Shower or bathe the night before and morning of your surgery. Follow the instructions your clinic gave you. (If no instructions, use regular soap.)    Don't shave or clip hair near your surgery site. We'll remove the hair if needed.    Don't smoke or vape the morning of surgery. You may chew nicotine gum up to 2 hours before surgery. A nicotine patch is okay.  ? Note: Some surgeries require you to completely quit smoking and nicotine. Check with your surgeon.    Your care team will make every effort to keep you safe from infection. We will:  ? Clean our hands often with soap and water (or an alcohol-based hand rub).  ? Clean the skin at your surgery site with a special soap that kills germs.  ? Give you a special gown to keep you warm. (Cold raises the risk of infection.)  ? Wear special hair covers, masks, gowns and gloves during surgery.  ? Give antibiotic medicine, if prescribed. Not all surgeries need antibiotics.  What to bring on the day of surgery    Photo ID and insurance card    Copy of your health care directive, if you have one    Glasses and hearing aids (bring  cases)  ? You can't wear contacts during surgery    Inhaler and eye drops, if you use them (tell us about these when you arrive)    CPAP machine or breathing device, if you use them    A few personal items, if spending the night    If you have . . .  ? A pacemaker, ICD (cardiac defibrillator) or other implant: Bring the ID card.  ? An implanted stimulator: Bring the remote control.  ? A legal guardian: Bring a copy of the certified (court-stamped) guardianship papers.  Please remove any jewelry, including body piercings. Leave jewelry and other valuables at home.  If you're going home the day of surgery    You must have a responsible adult drive you home. They should stay with you overnight as well.    If you don't have someone to stay with you, and you aren't safe to go home alone, we may keep you overnight. Insurance often won't pay for this.  After surgery  If it's hard to control your pain or you need more pain medicine, please call your surgeon's office.  Questions?   If you have any questions for your care team, list them here: _________________________________________________________________________________________________________________________________________________________________________ ____________________________________ ____________________________________ ____________________________________

## 2023-01-28 LAB — HCG INTACT+B SERPL-ACNC: ABNORMAL MIU/ML

## 2023-01-30 ENCOUNTER — OFFICE VISIT (OUTPATIENT)
Dept: FAMILY MEDICINE | Facility: CLINIC | Age: 33
End: 2023-01-30
Payer: COMMERCIAL

## 2023-01-30 ENCOUNTER — ANCILLARY PROCEDURE (OUTPATIENT)
Dept: ULTRASOUND IMAGING | Facility: CLINIC | Age: 33
End: 2023-01-30
Attending: STUDENT IN AN ORGANIZED HEALTH CARE EDUCATION/TRAINING PROGRAM
Payer: COMMERCIAL

## 2023-01-30 ENCOUNTER — TELEPHONE (OUTPATIENT)
Dept: FAMILY MEDICINE | Facility: CLINIC | Age: 33
End: 2023-01-30

## 2023-01-30 VITALS
HEART RATE: 77 BPM | SYSTOLIC BLOOD PRESSURE: 132 MMHG | BODY MASS INDEX: 38.52 KG/M2 | RESPIRATION RATE: 20 BRPM | DIASTOLIC BLOOD PRESSURE: 91 MMHG | OXYGEN SATURATION: 96 % | WEIGHT: 231.2 LBS | TEMPERATURE: 97.7 F | HEIGHT: 65 IN

## 2023-01-30 DIAGNOSIS — O20.9 VAGINAL BLEEDING IN PREGNANCY, FIRST TRIMESTER: ICD-10-CM

## 2023-01-30 DIAGNOSIS — Z32.01 PREGNANCY TEST POSITIVE: Primary | ICD-10-CM

## 2023-01-30 DIAGNOSIS — K50.819 CROHN'S DISEASE OF SMALL AND LARGE INTESTINES WITH COMPLICATION (H): ICD-10-CM

## 2023-01-30 LAB — HCG INTACT+B SERPL-ACNC: ABNORMAL MIU/ML

## 2023-01-30 PROCEDURE — 76801 OB US < 14 WKS SINGLE FETUS: CPT | Mod: TC | Performed by: RADIOLOGY

## 2023-01-30 PROCEDURE — 36415 COLL VENOUS BLD VENIPUNCTURE: CPT

## 2023-01-30 PROCEDURE — 99214 OFFICE O/P EST MOD 30 MIN: CPT | Mod: GC

## 2023-01-30 PROCEDURE — 76817 TRANSVAGINAL US OBSTETRIC: CPT | Mod: TC | Performed by: RADIOLOGY

## 2023-01-30 PROCEDURE — 84702 CHORIONIC GONADOTROPIN TEST: CPT

## 2023-01-30 NOTE — PROGRESS NOTES
Assessment & Plan     Pregnancy test positive  Vaginal bleeding in pregnancy, first trimester  Crohn's disease of small and large intestines with complication (H)  Based on quantitative hCG level and location of RLQ pain, it is possible that patient may have another ectopic pregnancy.  We will be obtaining OB ultrasound today for further evaluation and repeat quantitative hCG to trend levels.  Of note, per chart review pt has a hx of chronic RLQ abdominal pain that has been attributed to Crohn's colitis in the past; is s/p exploratory laparotomy and ileocecectomy in 4/2015.    US shows single intrauterine gestation at 6w4d with bradycardia, likely due to early gestational age. RLQ abdominal pain may be due to Crohn's colitis; advised pt to try Tylenol for pain. This pregnancy is unplanned and patient is still weighing her options on continuing or terminating the pregnancy. Concern for continued slight vaginal bleeding during first trimester - will need to monitor. Pt will let us know her decision and we will then determine further plan of care.  - hCG Quantitative Pregnancy  - Tylenol for pain      Patient precepted with Dr. Victoria.    Mere Potts MD  [facility]   Kee Phipps is a 32 year old female presenting for the following health issues:    HPI  Pt was seen for preop for breast reduction 3 days ago (1/27), where she was found to be pregnant with hCG quantitative 11,528.  UPT on 12/14/2022 was negative.  Reported history of irregular menses (gave birth to second child in 3/2022), mentioned in clinic visit on 11/11/2022. Pt reports that periods have been longer since her second child was born -  they have been very dark in color and heavy flow the first few days, and then they are light in color and flow and last 1 week total. Had one month (November) where she didn't have her period. Prior to her second pregnancy, periods used to only last 3 days and were normal flow and  "regular. Last month, period only lasted for a few days and was completely black, like tar. Also had severe RLQ cramping after her period ended that she attributes to gas/bloating, since she has Crohn's. This month, her period started on 1/22 and it is very light; continuing to have spotting and blood when she wipes.    Not on contraception. Currently sexually active with 1 male partner - not sure when she last had intercourse but likely in December; infrequently sexually active due to wanting to be careful with avoiding pregnancy after the birth of her second child. This pregnancy is unplanned. Notes increased nipple/breast tenderness for the past few weeks, but no nipple stiffness or level of tenderness like in her past pregnancies. Denies current abdominal cramping, nausea, and dysuria. Has milky-colored vaginal discharge, which is normal for her. Has urinary frequency. Denies headache, chest pain, and dyspnea currently. Reports suprapubic abdominal pressure (pressure above vagina) for the past few months that occurs after she urinates. Has a history of right salpingectomy for ruptured ectopic pregnancy in 2021 per chart review.    Review of Systems   Pertinent positives and negatives as noted in HPI.      Objective    BP (!) 132/91   Pulse 77   Temp 97.7  F (36.5  C) (Oral)   Resp 20   Ht 1.651 m (5' 5\")   Wt 104.9 kg (231 lb 3.2 oz)   LMP  (LMP Unknown)   SpO2 96%   BMI 38.47 kg/m    Body mass index is 38.47 kg/m .  Physical Exam   GENERAL: alert and no distress  RESP: lungs clear to auscultation  CV: regular rate and rhythm, normal S1 S2, no peripheral edema  ABDOMEN: soft, bowel sounds normal, RLQ tenderness to palpation  MS: no gross musculoskeletal defects noted  SKIN: no suspicious lesions or rashes  NEURO: Normal strength and tone, mentation intact and speech normal  PSYCH: mentation appears normal, affect normal      US OB <14 Weeks w Transvaginal Single  Result Date: 1/30/2023  EXAM: US OB <14 " WEEKS WITH TRANSVAGINAL SINGLE LOCATION: [facility] DATE/TIME: 1/30/2023 3:09 PM INDICATION: dating vaginal bleeding COMPARISON: None. TECHNIQUE: Transabdominal scans were performed. Endovaginal ultrasound was performed to better visualize the embryo. FINDINGS: UTERUS: Single normal appearing intrauterine gestation sac. Questionable appearance of the yolk sac. Small uterine fibroid in the posterior body measuring 6 x 6 x 9 mm CRL: Measures 0.7 cm, equals 6 weeks 4 days. FETAL HEART RATE: 94 bpm. AMNIOTIC FLUID: Normal. PLACENTA: Not yet formed. No evidence for sub-chorionic hemorrhage. RIGHT OVARY: Normal. Dominant follicle versus cyst measuring 2.4 x 2.0 x 2.3 cm. LEFT OVARY: Normal. Corpus luteum cyst. Small amount of free fluid identified at the level of the fundus on the left adnexa.     IMPRESSION: 1.  Single living intrauterine gestation at 6 weeks 4 days, EDC 09/21/2023. 2. Bradycardia, most likely due to the early gestational age. 3. Small amount of free fluid identified at the level of the fundus and the left adnexa.     ----- Service Performed and Documented by Resident or Fellow ------

## 2023-01-30 NOTE — PATIENT INSTRUCTIONS
Nice to see you today!    Here's what we talked about:  - I'll call you about the ultrasound results.  - You can take Tylenol for your abdominal pain.  - Take some time to think through your options and make another appointment once you decide what you would like to do.

## 2023-01-30 NOTE — PROGRESS NOTES
"Preceptor attestation:  Vital signs reviewed: BP (!) 132/91   Pulse 77   Temp 97.7  F (36.5  C) (Oral)   Resp 20   Ht 1.651 m (5' 5\")   Wt 104.9 kg (231 lb 3.2 oz)   LMP  (LMP Unknown)   SpO2 96%   BMI 38.47 kg/m      Patient seen, evaluated, and discussed with the resident.  I have verified the content of the note, which accurately reflects my assessment of the patient and the plan of care.    Supervising physician: Chichi Victoria MD  [facility]  "

## 2023-01-30 NOTE — TELEPHONE ENCOUNTER
"Pt states that she was suppose to have breast reduction surgery today but surgery had to be cancelled because she was found to be pregnant when she came for her pre-op on 1/27/23.  Quant was 11,528.  Pt states that her periods in November and December were dark like \"tar\" and only last 3 days.  December period started on 12/18 and she had \"bad\" pains in low rt side of stomach which she thought was gas pain.  She states that she started to have light vaginal bleeding on 1/22/23 that is still happening.  She states that she just notices the blood on the toilet paper after she pees and wipes.  Pt has an appt to see Dr Potts today at 1:10 PM to go over results and scheduled pt for a OB u/s at 1:40 PM.  Pt is worried as she had an ectopic pregnancy in the past.  Routed note to Dr Potts./NG  "

## 2023-01-31 ENCOUNTER — TELEPHONE (OUTPATIENT)
Dept: FAMILY MEDICINE | Facility: CLINIC | Age: 33
End: 2023-01-31
Payer: COMMERCIAL

## 2023-01-31 NOTE — TELEPHONE ENCOUNTER
[facility] phone call message- general phone call:    Reason for call: she is calling nick a call  Back.    Action desired: call back.    Return call needed: Yes    OK to leave a message on voice mail? Yes    Advised patient to response may take up to 2 business days: Yes    Primary language: English      needed? No    Call taken on January 31, 2023 at 3:01 PM by Kenzie Adhikari

## 2023-01-31 NOTE — TELEPHONE ENCOUNTER
Pt states that [facility] cannot get her in until  for the  pill and  for surgical EAB but she does not want to wait that long.    Gave her the web address for [facility] and also the phone number for [facility] [place].  Told her to call back if she is not able to get an appt with one of them soon./Pretty Rios RN BSN

## 2023-02-03 ENCOUNTER — TELEPHONE (OUTPATIENT)
Dept: FAMILY MEDICINE | Facility: CLINIC | Age: 33
End: 2023-02-03
Payer: COMMERCIAL

## 2023-02-03 NOTE — TELEPHONE ENCOUNTER
Pt states that her vaginal spotting has continued and she is now having pain on left side of her abd (was rt side before).   She states that Dr Potts told her that the u/s showed that the baby may not make it and she had free fluid.  Discussed with pt that yolk sac appeared abnormally shaped and fetal heart rate was a little low at 94 which should be above 100 which is concerning.  Discussed that there was a small amt of free fluid which may be blood and that goes along with the spotting she is having.  Discussed that she could have a repeat u/s next week to sudarshan the above and pt agrees.      Scheduled OB u/s at [facility] for Monday, 2/6/23 at 11:40 AM.      Called pt back and gave her info.  Told her to call back if pain becomes severe or if she is having heavy bleeding or other concerns.  Pt verbalized understanding.  Routed note to Dr Potts./Pretty Rios RN BSN

## 2023-02-06 ENCOUNTER — HOSPITAL ENCOUNTER (OUTPATIENT)
Dept: ULTRASOUND IMAGING | Facility: HOSPITAL | Age: 33
Discharge: HOME | End: 2023-02-06
Attending: FAMILY MEDICINE | Admitting: FAMILY MEDICINE
Payer: COMMERCIAL

## 2023-02-06 ENCOUNTER — TELEPHONE (OUTPATIENT)
Dept: FAMILY MEDICINE | Facility: CLINIC | Age: 33
End: 2023-02-06
Payer: COMMERCIAL

## 2023-02-06 DIAGNOSIS — Z32.01 PREGNANCY TEST POSITIVE: ICD-10-CM

## 2023-02-06 DIAGNOSIS — O20.9 VAGINAL BLEEDING IN PREGNANCY, FIRST TRIMESTER: ICD-10-CM

## 2023-02-06 PROCEDURE — 76801 OB US < 14 WKS SINGLE FETUS: CPT

## 2023-02-06 NOTE — TELEPHONE ENCOUNTER
[facility] phone call message- patient requesting results:    Test: Lab and Ultrasound    Date of test: 2/6/2023    Additional Comments:     OK to leave a message on voice mail? Yes    Primary language: English      needed? No    Call taken on February 6, 2023 at 2:28 PM by Nahum Lawrence

## 2023-02-06 NOTE — TELEPHONE ENCOUNTER
Called pt and let her know that the u/s is not back yet.  Told her that nurse will watch for it and will call her once it is available.  Pt verbalized understanding./Pretty Rios RN BSN

## 2023-02-07 ENCOUNTER — TELEPHONE (OUTPATIENT)
Dept: FAMILY MEDICINE | Facility: CLINIC | Age: 33
End: 2023-02-07
Payer: COMMERCIAL

## 2023-02-07 NOTE — TELEPHONE ENCOUNTER
Gave pt her u/s results.  She states that she is at [facility] now and is going to take medication to have an elective .  Told her to call if she has further questions or concerns.  She states that she just spoke with Dr Potts./Pretty Rios RN BSN

## 2023-02-07 NOTE — RESULT ENCOUNTER NOTE
Called patient with results. Discussed that if patient would like to continue with pregnancy, we would follow up with serial quantitative bHCGs to trend pregnancy viability. Otherwise this is not needed if she is pursuing elective termination. Patient informed me that she is pursuing elective termination at [facility].

## 2023-02-09 ENCOUNTER — TELEPHONE (OUTPATIENT)
Dept: PLASTIC SURGERY | Facility: CLINIC | Age: 33
End: 2023-02-09
Payer: COMMERCIAL

## 2023-02-09 ENCOUNTER — TELEPHONE (OUTPATIENT)
Dept: PLASTIC SURGERY | Facility: AMBULATORY SURGERY CENTER | Age: 33
End: 2023-02-09
Payer: COMMERCIAL

## 2023-02-09 NOTE — TELEPHONE ENCOUNTER
Received voicemail from patient on 2/9 regarding rescheduling surgery with Dr. Feldman.    Writer is not the coordinator for Dr. Feldman. Will route to Collette Campos.  __    Stormy Bowling, Senior Perioperative Coordinator, on 2/9/2023  P: [phone]

## 2023-02-09 NOTE — TELEPHONE ENCOUNTER
I called Kee back and left her a message letting her know that per her surgeon we would have to wait at least a year after childbirth to reschedule the surgery.   If she has any questions to give me a call.

## 2023-02-09 NOTE — TELEPHONE ENCOUNTER
----- Message from Miguelangel Feldman MD sent at 1/31/2023  5:17 PM CST -----  Regarding: RE: Rescheduling  Drew Murdock,    Great questions.  We will have to wait at least a year after childbirth.  During that first year they are extraordinary physiological changes on a women's body due to the effect of the hormones.  Therefore, schedule her 1 year AFTER the birth of the child.    Thank so much,    HM.  ----- Message -----  From: Collette Campos  Sent: 1/31/2023   3:54 PM CST  To: Miguelangel Feldman MD  Subject: Rescheduling                                     Dr. Feldman,    It appears that yesterday this patient's surgery was canceled at the last minute because her pregnancy test came back positive. She left me a message today about rescheduling her surgery with you.    Considering she obviously will at least 9 months before even considering surgery I wanted to know a few things.   1. How soon after giving birth can she schedule this procedure?  2. Since it will be probably close to a year since you have seen her at that time will you need to see her again prior to scheduling surgery?    Thank you,  Collette

## 2023-02-21 ENCOUNTER — TELEPHONE (OUTPATIENT)
Dept: PLASTIC SURGERY | Facility: CLINIC | Age: 33
End: 2023-02-21
Payer: COMMERCIAL

## 2023-02-21 NOTE — TELEPHONE ENCOUNTER
Received voicemail from patient on 2/21 regarding scheduling surgery with Dr. Feldman.    Stated they have been calling for a few weeks with no response.     Patient is not calling correct team, will route to Dr. Feldman's coordinator.    __    Stormy Bowling, Senior Perioperative Coordinator, on 2/21/2023  P: [phone]

## 2023-02-22 ENCOUNTER — TELEPHONE (OUTPATIENT)
Dept: PLASTIC SURGERY | Facility: AMBULATORY SURGERY CENTER | Age: 33
End: 2023-02-22
Payer: COMMERCIAL

## 2023-02-22 NOTE — TELEPHONE ENCOUNTER
Returned patients call to schedule surgery and go over the required timing per provider. Left a message to let her know she can call me directly or sent us a [employer] message if online communication is better.

## 2023-03-09 ENCOUNTER — TELEPHONE (OUTPATIENT)
Dept: FAMILY MEDICINE | Facility: CLINIC | Age: 33
End: 2023-03-09

## 2023-03-09 ENCOUNTER — OFFICE VISIT (OUTPATIENT)
Dept: FAMILY MEDICINE | Facility: CLINIC | Age: 33
End: 2023-03-09
Payer: COMMERCIAL

## 2023-03-09 VITALS
DIASTOLIC BLOOD PRESSURE: 81 MMHG | HEART RATE: 83 BPM | WEIGHT: 226 LBS | BODY MASS INDEX: 37.61 KG/M2 | RESPIRATION RATE: 16 BRPM | OXYGEN SATURATION: 98 % | SYSTOLIC BLOOD PRESSURE: 125 MMHG

## 2023-03-09 DIAGNOSIS — K59.00 CONSTIPATION, UNSPECIFIED CONSTIPATION TYPE: Primary | ICD-10-CM

## 2023-03-09 DIAGNOSIS — R10.9 ABDOMINAL CRAMPING: ICD-10-CM

## 2023-03-09 LAB
BASOPHILS # BLD AUTO: 0 10E3/UL (ref 0–0.2)
BASOPHILS NFR BLD AUTO: 1 %
EOSINOPHIL # BLD AUTO: 0.2 10E3/UL (ref 0–0.7)
EOSINOPHIL NFR BLD AUTO: 2 %
ERYTHROCYTE [DISTWIDTH] IN BLOOD BY AUTOMATED COUNT: 12.4 % (ref 10–15)
HCG INTACT+B SERPL-ACNC: 4 MIU/ML
HCT VFR BLD AUTO: 39.8 % (ref 35–47)
HGB BLD-MCNC: 13.5 G/DL (ref 11.7–15.7)
IMM GRANULOCYTES # BLD: 0 10E3/UL
IMM GRANULOCYTES NFR BLD: 0 %
LYMPHOCYTES # BLD AUTO: 3.1 10E3/UL (ref 0.8–5.3)
LYMPHOCYTES NFR BLD AUTO: 43 %
MCH RBC QN AUTO: 29.9 PG (ref 26.5–33)
MCHC RBC AUTO-ENTMCNC: 33.9 G/DL (ref 31.5–36.5)
MCV RBC AUTO: 88 FL (ref 78–100)
MONOCYTES # BLD AUTO: 0.7 10E3/UL (ref 0–1.3)
MONOCYTES NFR BLD AUTO: 9 %
NEUTROPHILS # BLD AUTO: 3.2 10E3/UL (ref 1.6–8.3)
NEUTROPHILS NFR BLD AUTO: 45 %
PLATELET # BLD AUTO: 211 10E3/UL (ref 150–450)
RBC # BLD AUTO: 4.52 10E6/UL (ref 3.8–5.2)
WBC # BLD AUTO: 7.1 10E3/UL (ref 4–11)

## 2023-03-09 PROCEDURE — 84702 CHORIONIC GONADOTROPIN TEST: CPT | Performed by: STUDENT IN AN ORGANIZED HEALTH CARE EDUCATION/TRAINING PROGRAM

## 2023-03-09 PROCEDURE — 99213 OFFICE O/P EST LOW 20 MIN: CPT | Mod: GC | Performed by: STUDENT IN AN ORGANIZED HEALTH CARE EDUCATION/TRAINING PROGRAM

## 2023-03-09 PROCEDURE — 85025 COMPLETE CBC W/AUTO DIFF WBC: CPT | Performed by: STUDENT IN AN ORGANIZED HEALTH CARE EDUCATION/TRAINING PROGRAM

## 2023-03-09 PROCEDURE — 36415 COLL VENOUS BLD VENIPUNCTURE: CPT | Performed by: STUDENT IN AN ORGANIZED HEALTH CARE EDUCATION/TRAINING PROGRAM

## 2023-03-09 RX ORDER — POLYETHYLENE GLYCOL 3350 17 G/17G
1 POWDER, FOR SOLUTION ORAL DAILY
Qty: 507 G | Refills: 3 | Status: SHIPPED | OUTPATIENT
Start: 2023-03-09 | End: 2023-07-13

## 2023-03-09 NOTE — TELEPHONE ENCOUNTER
[facility] phone call message- patient requesting results:    Test: ?    Date of test: ?    Additional Comments: Pt requesting a call back to talk about results.      OK to leave a message on voice mail? Yes    Primary language: English      needed? No    Call taken on March 9, 2023 at 9:27 AM by Chey Segura

## 2023-03-09 NOTE — TELEPHONE ENCOUNTER
"Pt states that she had f/u appt at [facility] and was told that she passed the baby but has a \"popped\" ovarian cyst and has free fluid.  She states that she is having bad cramps and spotting and would like to be seen.  Appt scheduled for today at 11:20 AM with Dr James.  Routed note to Dr James./Pretty Rios RN BSN  "

## 2023-03-09 NOTE — PROGRESS NOTES
"Assessment & Plan   Constipation, unspecified constipation type  - polyethylene glycol (MIRALAX) 17 GM/Dose powder  Dispense: 507 g; Refill: 3    Abdominal cramping  Unclear etiology of cramping, favor dysmenorrhea vs ruptured ovarian cyst. Possible component of IBS given bowel movements improve cramping as well.  Hemodynamically stable and vitally stable, nontoxic, exam with RLQ tenderness. ADDENDUM: CBC showing no anemia, HCG negative, and US pelvic large L ovarian cy st measuring up to 6cm with no significant free fluid. Will repeat US in 6 months. Will plan to manage pain with tylenol and heating pad. Pt to f/u if no improvement or if worsening. Close follow up in 2 wks.   - CBC with Platelets & Differential  - HCG quantitative pregnancy  - US Pelvic Complete with Transvaginal  - CBC with Platelets & Differential  - HCG quantitative pregnancy    Ordering of each unique test     BMI:   Estimated body mass index is 37.61 kg/m  as calculated from the following:    Height as of 1/30/23: 1.651 m (5' 5\").    Weight as of this encounter: 102.5 kg (226 lb).     Patient Instructions   Expect to be contacted regarding lab results and Pelvic US scheduling  Follow up in 1-2 weeks    Follow Up:  Return in about 2 weeks (around 3/23/2023) for abd pain.    Options for treatment and follow-up care were reviewed with the patient who was engaged and actively involved in the decision making process, verbalized understanding of the options discussed, and satisfied with the final plan.    Patient was staffed with supervising physician, Dr. Maximino Sagastume, who agrees with the findings and plan.     Saulo Segura DO, PGY-3  [facility]      Subjective   Kee Phipps is a 32 year old year old female who presents for the following health issues   Past Medical History:   Diagnosis Date     Abnormal Pap smear of cervix      Accelerated idioventricular rhythm (H) 1/6/2022     Anemia      Anxiety      Arthritis      " Bilateral plantar fasciitis      Celiac disease 2014     Celiac disease      Crohn disease (H)      Crohn's disease (H) 2012     Crohn's disease of colon (H) 2015     Depression      Depressive disorder      Fibromyalgia      Fibromyalgia      H/O miscarriage, currently pregnant     miscarriage .     HPV (human papilloma virus) infection      Migraine      Plantar fasciitis     bilateral     Postpartum hypertension 2022      delivery 2013    34 weeks. Early rupture of membranes     Patient Active Problem List   Diagnosis     Health Care Home     Crohns disease     Abnormal Pap smear of cervix     Chronic pain     Adjustment disorder with depressed mood     Celiac disease     Immunosuppressed status (H)     Fibromyalgia     Accelerated idioventricular rhythm (H)     Carpal tunnel syndrome during pregnancy     De Quervain's disease (tenosynovitis)     Postpartum hypertension     Postpartum state     Retained placenta     Pre-eclampsia, postpartum     Pre-eclampsia     Crohn's disease of colon (H)     ERNESTINA II (cervical intraepithelial neoplasia II)     Chronic vulvovaginitis     Allergic rhinitis, seasonal     Abnormal CT scan, gastrointestinal tract     Chief Complaint   Patient presents with     Abdominal Pain     Pelvic Pain     Started off the entire pelvic area- yesterday and today it's just the left side- spotting dark brown in color    was 2023   US showed a cyst- that popped(left side)    Abdominal cramping started 2 days ago. Felt like contraction cramps. Immediately had a bowel movement that helped with the cramps.   Menstrual cycle irregular.      - had preg symptoms 4 days afters with headache, fatigue, urinary frequency. Increased appetite. Tender and sensitive breasts with lactation. Swollen hands and feet with some nausea.   Home UPT 3/4/2023 negative  Some constipation, defecation helped with abd pain 1-2 times.     [facility]    Pelvic transabdominal US with L cyst and no products of conception in uterus.   Urine pregnancy test was negative   STD/STI panel drawn but no results yet.   No blood work per patient report.     No tylenol or ibuprofen, allergic to Ibuprofen.     Vaginal discharge - dark brown with slimy/mucus  Partners with no issues     Denies dysuria, urinary frequency or urgency, fever, chills, n/v/d, chest pain, dyspnea.     Review of Systems:   Constitutional, HEENT, cardiovascular, pulmonary, GI, , musculoskeletal, neuro, skin, endocrine and psych systems are negative, except as otherwise noted.     Objective     /81 (BP Location: Left arm, Patient Position: Sitting, Cuff Size: Adult Large)   Pulse 83   Resp 16   Wt 102.5 kg (226 lb)   LMP  (LMP Unknown)   SpO2 98%   Breastfeeding No   BMI 37.61 kg/m    Body mass index is 37.61 kg/m .    Physical Exam  Constitutional:       General: She is not in acute distress.     Appearance: She is not ill-appearing.   Cardiovascular:      Rate and Rhythm: Normal rate and regular rhythm.      Pulses: Normal pulses.      Heart sounds: No murmur heard.    No friction rub.   Pulmonary:      Effort: Pulmonary effort is normal. No respiratory distress.      Breath sounds: No stridor.   Abdominal:      General: Abdomen is flat. Bowel sounds are normal. There is no distension.      Palpations: Abdomen is soft. There is no mass.      Tenderness: There is abdominal tenderness (RLQ and R adnexal). There is right CVA tenderness (mild). There is no left CVA tenderness.      Hernia: No hernia is present.        ----- Service Performed and Documented by Resident or Fellow ------

## 2023-03-10 NOTE — TELEPHONE ENCOUNTER
Patient asking for reason why the ultrasound has been ordered if her labs are normal? Patient asking for a call to explain rational for the test  Patient states she feels the same no better no worse.    Note routed to Dr Colton Roman RN          .

## 2023-03-13 ENCOUNTER — HOSPITAL ENCOUNTER (OUTPATIENT)
Dept: ULTRASOUND IMAGING | Facility: HOSPITAL | Age: 33
Discharge: HOME | End: 2023-03-13
Attending: FAMILY MEDICINE | Admitting: FAMILY MEDICINE
Payer: COMMERCIAL

## 2023-03-13 DIAGNOSIS — R10.9 ABDOMINAL CRAMPING: ICD-10-CM

## 2023-03-13 PROCEDURE — 76856 US EXAM PELVIC COMPLETE: CPT

## 2023-03-21 ENCOUNTER — OFFICE VISIT (OUTPATIENT)
Dept: FAMILY MEDICINE | Facility: CLINIC | Age: 33
End: 2023-03-21
Payer: COMMERCIAL

## 2023-03-21 VITALS
RESPIRATION RATE: 18 BRPM | HEART RATE: 81 BPM | WEIGHT: 221 LBS | OXYGEN SATURATION: 96 % | SYSTOLIC BLOOD PRESSURE: 132 MMHG | BODY MASS INDEX: 36.78 KG/M2 | DIASTOLIC BLOOD PRESSURE: 93 MMHG

## 2023-03-21 DIAGNOSIS — N83.202 LEFT OVARIAN CYST: Primary | ICD-10-CM

## 2023-03-21 PROCEDURE — 99213 OFFICE O/P EST LOW 20 MIN: CPT | Mod: GC | Performed by: STUDENT IN AN ORGANIZED HEALTH CARE EDUCATION/TRAINING PROGRAM

## 2023-03-21 NOTE — PROGRESS NOTES
Assessment and Plan      Kee was seen today for cyst.    Diagnoses and all orders for this visit:    Left ovarian cyst  Patient with ongoing pelvic pain in setting of left ovarian cyst that was shrinking but still measuring 5.1 x 3.6 x 6.3 cm.  Given symptomatic nature, referred patient to gynecologist for evaluation of potential surgical removal.  Patient also noted that she had concern regarding her endometrium measuring 12 mm; attempted to provide reassurance that not alarming in premenopausal woman in her age group, but she still had concerns. Also due for PAP smear.  -     Ob/Gyn Referral; Future    Options for treatment and follow-up care were reviewed with the patient. Patient engaged in the decision making process and verbalized understanding of the options discussed and agreed with the final plan.    Patient was staffed with attending physician Dr. Cote.    Napoleon Goncalves MD PGY-3           HPI       Kee Phipps is a 32 year old year old female w/ PMH of   Patient Active Problem List   Diagnosis     Health Care Home     Crohns disease     Abnormal Pap smear of cervix     Chronic pain     Adjustment disorder with depressed mood     Celiac disease     Immunosuppressed status (H)     Fibromyalgia     Accelerated idioventricular rhythm (H)     Carpal tunnel syndrome during pregnancy     De Quervain's disease (tenosynovitis)     Postpartum hypertension     Postpartum state     Retained placenta     Pre-eclampsia, postpartum     Pre-eclampsia     Crohn's disease of colon (H)     ERNESTINA II (cervical intraepithelial neoplasia II)     Chronic vulvovaginitis     Allergic rhinitis, seasonal     Abnormal CT scan, gastrointestinal tract     Left ovarian cyst    who presents for   Chief Complaint   Patient presents with     Cyst     Talk about cyst found on ovary.       Patient presents to clinic to discuss ultrasound results from 3/13/2023 that was notable for: large simple left ovarian cyst measuring 5.1 x 3.6  x 6.3 cm and had reduced in size from imaging 2 weeks prior.  Patient notes that she has ongoing abdominal discomfort.  Also mention that she had a prior ovarian cyst that ruptured that was very painful and she would like to preemptively have this cyst removed.         Review of Systems:   8 point ROS negative other than as specified above.         Physical Exam:   BP (!) 132/93   Pulse 81   Resp 18   Wt 100.2 kg (221 lb)   LMP  (LMP Unknown)   SpO2 96%   BMI 36.78 kg/m      Vital signs normal except mildly elevated DBP and BMI.     Exam:  Constitutional: healthy, alert, mildly uncomfortable at rest, and cooperative  Head: normocephalic  Cardiovascular: appears well perfused  Respiratory: breathing comfortably on RA  Musculoskeletal: extremities normal- no gross deformities noted, gait normal  Skin: no suspicious lesions or rashes on exposed skin  Neurologic: grossly normal CN  Psychiatric: mentation appears normal and affect normal       Results:   No testing ordered today

## 2023-03-27 ENCOUNTER — APPOINTMENT (OUTPATIENT)
Dept: CT IMAGING | Facility: HOSPITAL | Age: 33
End: 2023-03-27
Attending: EMERGENCY MEDICINE
Payer: COMMERCIAL

## 2023-03-27 ENCOUNTER — APPOINTMENT (OUTPATIENT)
Dept: ULTRASOUND IMAGING | Facility: HOSPITAL | Age: 33
End: 2023-03-27
Attending: EMERGENCY MEDICINE
Payer: COMMERCIAL

## 2023-03-27 ENCOUNTER — HOSPITAL ENCOUNTER (EMERGENCY)
Facility: HOSPITAL | Age: 33
Discharge: HOME | End: 2023-03-27
Attending: EMERGENCY MEDICINE | Admitting: EMERGENCY MEDICINE
Payer: COMMERCIAL

## 2023-03-27 VITALS
SYSTOLIC BLOOD PRESSURE: 145 MMHG | WEIGHT: 220 LBS | RESPIRATION RATE: 16 BRPM | TEMPERATURE: 98.8 F | BODY MASS INDEX: 36.61 KG/M2 | HEART RATE: 82 BPM | DIASTOLIC BLOOD PRESSURE: 109 MMHG | OXYGEN SATURATION: 99 %

## 2023-03-27 DIAGNOSIS — N83.202 CYST OF LEFT OVARY: ICD-10-CM

## 2023-03-27 DIAGNOSIS — R10.84 ABDOMINAL PAIN, GENERALIZED: ICD-10-CM

## 2023-03-27 LAB
ALBUMIN SERPL BCG-MCNC: 4.4 G/DL (ref 3.5–5.2)
ALBUMIN UR-MCNC: NEGATIVE MG/DL
ALP SERPL-CCNC: 99 U/L (ref 35–104)
ALT SERPL W P-5'-P-CCNC: 13 U/L (ref 10–35)
ANION GAP SERPL CALCULATED.3IONS-SCNC: 10 MMOL/L (ref 7–15)
APPEARANCE UR: CLEAR
AST SERPL W P-5'-P-CCNC: 14 U/L (ref 10–35)
BASOPHILS # BLD AUTO: 0 10E3/UL (ref 0–0.2)
BASOPHILS NFR BLD AUTO: 1 %
BILIRUB SERPL-MCNC: 0.5 MG/DL
BILIRUB UR QL STRIP: NEGATIVE
BUN SERPL-MCNC: 7.2 MG/DL (ref 6–20)
CALCIUM SERPL-MCNC: 9.2 MG/DL (ref 8.6–10)
CHLORIDE SERPL-SCNC: 104 MMOL/L (ref 98–107)
COLOR UR AUTO: ABNORMAL
CREAT SERPL-MCNC: 0.66 MG/DL (ref 0.51–0.95)
CRP SERPL-MCNC: <3 MG/L
DEPRECATED HCO3 PLAS-SCNC: 23 MMOL/L (ref 22–29)
EOSINOPHIL # BLD AUTO: 0.1 10E3/UL (ref 0–0.7)
EOSINOPHIL NFR BLD AUTO: 2 %
ERYTHROCYTE [DISTWIDTH] IN BLOOD BY AUTOMATED COUNT: 12.3 % (ref 10–15)
GFR SERPL CREATININE-BSD FRML MDRD: >90 ML/MIN/1.73M2
GLUCOSE SERPL-MCNC: 88 MG/DL (ref 70–99)
GLUCOSE UR STRIP-MCNC: NEGATIVE MG/DL
HCG SERPL QL: NEGATIVE
HCT VFR BLD AUTO: 42 % (ref 35–47)
HGB BLD-MCNC: 14.5 G/DL (ref 11.7–15.7)
HGB UR QL STRIP: NEGATIVE
IMM GRANULOCYTES # BLD: 0 10E3/UL
IMM GRANULOCYTES NFR BLD: 0 %
KETONES UR STRIP-MCNC: NEGATIVE MG/DL
LEUKOCYTE ESTERASE UR QL STRIP: ABNORMAL
LIPASE SERPL-CCNC: 20 U/L (ref 13–60)
LYMPHOCYTES # BLD AUTO: 3.1 10E3/UL (ref 0.8–5.3)
LYMPHOCYTES NFR BLD AUTO: 48 %
MCH RBC QN AUTO: 30.3 PG (ref 26.5–33)
MCHC RBC AUTO-ENTMCNC: 34.5 G/DL (ref 31.5–36.5)
MCV RBC AUTO: 88 FL (ref 78–100)
MONOCYTES # BLD AUTO: 0.6 10E3/UL (ref 0–1.3)
MONOCYTES NFR BLD AUTO: 9 %
NEUTROPHILS # BLD AUTO: 2.6 10E3/UL (ref 1.6–8.3)
NEUTROPHILS NFR BLD AUTO: 40 %
NITRATE UR QL: NEGATIVE
NRBC # BLD AUTO: 0 10E3/UL
NRBC BLD AUTO-RTO: 0 /100
PH UR STRIP: 6.5 [PH] (ref 5–7)
PLATELET # BLD AUTO: 254 10E3/UL (ref 150–450)
POTASSIUM SERPL-SCNC: 4 MMOL/L (ref 3.4–5.3)
PROT SERPL-MCNC: 7.4 G/DL (ref 6.4–8.3)
RBC # BLD AUTO: 4.79 10E6/UL (ref 3.8–5.2)
RBC URINE: 1 /HPF
SODIUM SERPL-SCNC: 137 MMOL/L (ref 136–145)
SP GR UR STRIP: 1.02 (ref 1–1.03)
SQUAMOUS EPITHELIAL: 4 /HPF
UROBILINOGEN UR STRIP-MCNC: <2 MG/DL
WBC # BLD AUTO: 6.3 10E3/UL (ref 4–11)
WBC URINE: 5 /HPF

## 2023-03-27 PROCEDURE — 80053 COMPREHEN METABOLIC PANEL: CPT | Performed by: EMERGENCY MEDICINE

## 2023-03-27 PROCEDURE — 85025 COMPLETE CBC W/AUTO DIFF WBC: CPT | Performed by: EMERGENCY MEDICINE

## 2023-03-27 PROCEDURE — 84703 CHORIONIC GONADOTROPIN ASSAY: CPT | Performed by: EMERGENCY MEDICINE

## 2023-03-27 PROCEDURE — 99285 EMERGENCY DEPT VISIT HI MDM: CPT | Mod: 25

## 2023-03-27 PROCEDURE — 81001 URINALYSIS AUTO W/SCOPE: CPT | Performed by: EMERGENCY MEDICINE

## 2023-03-27 PROCEDURE — 83690 ASSAY OF LIPASE: CPT | Performed by: EMERGENCY MEDICINE

## 2023-03-27 PROCEDURE — 74177 CT ABD & PELVIS W/CONTRAST: CPT

## 2023-03-27 PROCEDURE — 250N000011 HC RX IP 250 OP 636: Performed by: EMERGENCY MEDICINE

## 2023-03-27 PROCEDURE — 36415 COLL VENOUS BLD VENIPUNCTURE: CPT | Performed by: EMERGENCY MEDICINE

## 2023-03-27 PROCEDURE — 76856 US EXAM PELVIC COMPLETE: CPT

## 2023-03-27 PROCEDURE — 86140 C-REACTIVE PROTEIN: CPT | Performed by: EMERGENCY MEDICINE

## 2023-03-27 RX ORDER — ONDANSETRON 4 MG/1
4 TABLET, ORALLY DISINTEGRATING ORAL EVERY 6 HOURS PRN
Qty: 10 TABLET | Refills: 0 | Status: SHIPPED | OUTPATIENT
Start: 2023-03-27 | End: 2023-03-30

## 2023-03-27 RX ORDER — IOPAMIDOL 755 MG/ML
75 INJECTION, SOLUTION INTRAVASCULAR ONCE
Status: COMPLETED | OUTPATIENT
Start: 2023-03-27 | End: 2023-03-27

## 2023-03-27 RX ORDER — TRAMADOL HYDROCHLORIDE 50 MG/1
50 TABLET ORAL EVERY 6 HOURS PRN
Qty: 10 TABLET | Refills: 0 | Status: SHIPPED | OUTPATIENT
Start: 2023-03-27 | End: 2023-03-30

## 2023-03-27 RX ORDER — KETOROLAC TROMETHAMINE 15 MG/ML
15 INJECTION, SOLUTION INTRAMUSCULAR; INTRAVENOUS ONCE
Status: DISCONTINUED | OUTPATIENT
Start: 2023-03-27 | End: 2023-03-27 | Stop reason: HOSPADM

## 2023-03-27 RX ADMIN — IOPAMIDOL 100 ML: 755 INJECTION, SOLUTION INTRAVENOUS at 14:55

## 2023-03-27 ASSESSMENT — ENCOUNTER SYMPTOMS
BLOOD IN STOOL: 1
CONSTIPATION: 1
NAUSEA: 1
ABDOMINAL PAIN: 1

## 2023-03-27 ASSESSMENT — ACTIVITIES OF DAILY LIVING (ADL): ADLS_ACUITY_SCORE: 35

## 2023-03-27 NOTE — ED PROVIDER NOTES
Emergency Department Encounter      NAME: Kee Phipps  AGE: 32 year old female  YOB: 1990  MRN: [id number]  EVALUATION DATE & TIME: No admission date for patient encounter.    PCP: Stormy Palacios    ED PROVIDER: Samson Albrecht M.D.      Chief Complaint   Patient presents with     Abdominal Pain         FINAL IMPRESSION:  1. Abdominal pain, generalized    2. Cyst of left ovary        MEDICAL DECISION MAKING:    10:45 AM I met with the patient, obtained history, performed an initial exam, and discussed options and plan for diagnostics and treatment here in the ED.     This patient is a 32-year-old female with a history of Crohn's, chronic pain, fibromyalgia and a left ovarian cyst who has abdominal pain.  She says that this pain is sharp and in the lower abdomen.  She says it radiates up her torso and that is worse with walking and eating.  She has noticed a feeling of constipation and has noticed some blood on the toilet paper with her stools.  She says that it has been 4 days since her last bowel movement.  On exam she had some suprapubic tenderness.  Her pain was treated with Toradol in the ER.  The patient had normal lab work including a CBC, comprehensive metabolic profile, UA, hCG, CRP and lipase.  A pelvic ultrasound was done which did show the decrease in the size of the left ovary but she still had a remaining 3.7 cm hemorrhagic cyst seen.  A CT was done of the abdomen pelvis which did not show any cause for her abdominal pain.  I considered admission but the pain had been present for some time and she does have a history of chronic pain as well as Crohn's.  She is going to contact gastroenterologist to discuss her stools and the blood that she is seeing as she may need treatment of her Crohn's disease.  The patient's vital signs were stable in the ER and she had normal hemoglobin as well.    Pertinent Labs & Imaging studies reviewed. (See chart for details)    The importance of  close follow up was discussed. We reviewed warning signs and symptoms, and I instructed Ms. Phipps to return to the emergency department immediately if she develops any new or worsening symptoms. I provided additional verbal discharge instructions. Ms. Phipps expressed understanding and agreement with this plan of care, her questions were answered, and she was discharged in stable condition.     Medical Decision Making    History:    Supplemental history from: Documented in chart, if applicable    External Record(s) reviewed: Documented in chart, if applicable.    Work Up:    Chart documentation includes differential considered and any EKGs or imaging independently interpreted by provider, where specified.    In additional to work up documented, I considered the following work up: Documented in chart, if applicable.    External consultation:    Discussion of management with another provider: Documented in chart, if applicable    Complicating factors:    Care impacted by chronic illness: Chronic Pain, Mental Health and Other: Crohns disease    Care affected by social determinants of health: N/A    Disposition considerations: Discharge. No recommendations on prescription strength medication(s). See documentation for any additional details.          MEDICATIONS GIVEN IN THE EMERGENCY:  Medications   iopamidol (ISOVUE-370) solution 75 mL (100 mLs Intravenous $Given 3/27/23 1704)       NEW PRESCRIPTIONS STARTED AT TODAY'S ER VISIT:  Discharge Medication List as of 3/27/2023  3:30 PM      START taking these medications    Details   ondansetron (ZOFRAN ODT) 4 MG ODT tab Take 1 tablet (4 mg) by mouth every 6 hours as needed for nausea, Disp-10 tablet, R-0, Local Print      traMADol (ULTRAM) 50 MG tablet Take 1 tablet (50 mg) by mouth every 6 hours as needed for severe pain (7-10), Disp-10 tablet, R-0, Local Print                =================================================================    HPI    Patient information was  "obtained from: Patient     Use of : N/A      Kee Phipps is a 32 year old female with a past medical history of Crohn's disease, left ovarian cyst, fibromyalgia, chronic pain and anxiety who presents to the ED via walk in for evaluation of abdominal pain.     Patient reports lower sharp abdominal pain. The pain radiates up to her body and feels like a \"shock\". States that the pain is worse with walking and eating, but better with leaning forward.  Reports that when she has abdominal pain she feels \"the urge\" to have a bowel movement, but nothing comes out. This is similar to her previous episode of Crohn's disease a couple of years ago. She endorses yellow vaginal discharge as well as nausea, constipation and blood in her stool. Her bowel movements have been every 4 days which is abnormal for her. States that her stools are usually loose. Reports that she had an  on 2023, but denies any vaginal bleeding in the past 5 days. Patient has a history of ovarian cysts and has had multiple ultrasounds for it recently. She is unsure if her cysts are the cause of her abdominal pain. Also reports a history of fallopian tube removal 2 years ago and a small bowel resection.       REVIEW OF SYSTEMS   Review of Systems   Gastrointestinal: Positive for abdominal pain, blood in stool, constipation and nausea.   Genitourinary: Positive for vaginal discharge (Yellow). Negative for vaginal bleeding.        PAST MEDICAL HISTORY:  Past Medical History:   Diagnosis Date     Abnormal Pap smear of cervix      Accelerated idioventricular rhythm (H) 2022     Anemia      Anxiety      Arthritis      Bilateral plantar fasciitis      Celiac disease 2014     Celiac disease      Crohn disease (H)      Crohn's disease (H) 2012     Crohn's disease of colon (H) 2015     Depression      Depressive disorder      Fibromyalgia      Fibromyalgia      H/O miscarriage, currently pregnant     miscarriage .     HPV " (human papilloma virus) infection      Migraine      Plantar fasciitis     bilateral     Postpartum hypertension 2022      delivery 2013    34 weeks. Early rupture of membranes       PAST SURGICAL HISTORY:  Past Surgical History:   Procedure Laterality Date     APPENDECTOMY       BOWEL RESECTION      illium      CERVIX LESION DESTRUCTION       DILATION AND CURETTAGE SUCTION, TREAT MISSED , 1ST TRIMESTER      miscarriage at 8 wks gest     DILATION AND CURETTAGE SUCTION, TREAT MISSED , 1ST TRIMESTER  2019     FASCIOTOMY LOWER EXTREMITY Left 2018    Left foot     FOOT SURGERY Left      LAPAROSCOPY DIAGNOSTIC (GYN) N/A 2019    Procedure: LAPAROSCOPY, DIAGNOSTIC;  Surgeon: Anni Velasquez MD;  Location:  OR     PLANTAR FASCIA RELEASE Left 4/10/2018    Procedure:  PLANTAR FASCIOTOMY LEFT FOOT ;  Surgeon: Yuri Arreola DPM;  Location: [facility];  Service:      SALPINGECTOMY Right 05/15/2021    rt sided ruptured ectopic     SMALL BOWEL RESECTION       TONSILLECTOMY         CURRENT MEDICATIONS:    No current facility-administered medications for this encounter.    Current Outpatient Medications:      acetaminophen (TYLENOL) 650 MG CR tablet, Take 1 tablet (650 mg) by mouth every 8 hours as needed for mild pain or fever, Disp: 100 tablet, Rfl: 0     Condoms - Female (FC FEMALE CONDOM) MISC, 1 Package as needed (barrier protection) (Patient not taking: Reported on 2022), Disp: 20 each, Rfl: 3     nystatin (MYCOSTATIN) 916161 UNIT/GM external powder, Apply topically 2 times daily as needed for other (intertriginous rash) Apply to breast folds as needed 2-3 times per day for rash. (Patient not taking: Reported on 2023), Disp: 30 g, Rfl: 0     polyethylene glycol (MIRALAX) 17 GM/Dose powder, Take 17 g (1 capful.) by mouth daily (Patient not taking: Reported on 3/21/2023), Disp: 507 g, Rfl: 3     ustekinumab (STELARA) 90 MG/ML, Inject  Subcutaneous every 2 months, Disp: , Rfl:     ALLERGIES:  Allergies   Allergen Reactions     Barley Grass Other (See Comments)     CELIAC  CELIAC     Gluten Meal      Gramineae Pollens Other (See Comments)     CELIAC     Humira [Humira]      Caused huge lumps at injection site.      Ibuprofen      Patient has Crohns disease and is unable to take this medication.     Latex Itching     Nifedipine      Flushing, lightheadedness     Wheat Extract Other (See Comments)     Remicade [Infliximab] Rash       FAMILY HISTORY:  Family History   Problem Relation Age of Onset     Hypertension Mother      Seizure Disorder Mother      Asthma Brother      Asthma Brother      Asthma Brother      Asthma Brother      Cancer Maternal Grandmother         bone cancer     Asthma Son      Crohn's Disease Paternal Uncle      Diabetes No family hx of      Coronary Artery Disease No family hx of      Hyperlipidemia No family hx of      Cerebrovascular Disease No family hx of      Breast Cancer No family hx of      Colon Cancer No family hx of      Prostate Cancer No family hx of      Other Cancer No family hx of      Depression No family hx of      Anxiety Disorder No family hx of      Mental Illness No family hx of      Substance Abuse No family hx of      Anesthesia Reaction No family hx of      Osteoporosis No family hx of      Genetic Disorder No family hx of      Thyroid Disease No family hx of      Obesity No family hx of      Unknown/Adopted No family hx of      Heart Disease No family hx of        SOCIAL HISTORY:   Social History     Socioeconomic History     Marital status: Single     Number of children: 1     Years of education: 12     Highest education level: GED or equivalent   Occupational History     Occupation: student   Tobacco Use     Smoking status: Former     Packs/day: 0.03     Types: Cigarettes     Quit date: 2021     Years since quittin.3     Smokeless tobacco: Never     Tobacco comments:     Vaping daily since  12/2021   Vaping Use     Vaping Use: Never used   Substance and Sexual Activity     Alcohol use: Not Currently     Comment: occa     Drug use: No     Sexual activity: Yes     Partners: Male       PHYSICAL EXAM:    Vitals: BP (!) 145/109   Pulse 82   Temp 98.8  F (37.1  C) (Oral)   Resp 16   Wt 99.8 kg (220 lb)   LMP  (LMP Unknown)   SpO2 99%   BMI 36.61 kg/m     Constitutional: Well developed, well nourished. Comfortable appearing.  HEAD:Normocephalic, atraumatic,   Eyes: PERRLA, EOM intact, conjunctiva clear, no discharge  ENT: mucous membranes moist, nose normal.   Neck- Supple, gross ROM intact.  No JVD.  No palpable nodes.  Pulmonary: Clear to auscultation bilaterally, no respiratory distress, no wheezing, speaks full sentences easily.  Chest: No chest wall tenderness  Cardiovascular: Normal heart rate, regular rhythm, no murmurs. No lower extremity edema, 2+ DP pulses.   GI: Soft, not distended, no masses.  No hepatosplenomegaly. Tenderness to palpation over suprapubic abdomen   Musculoskeletal: Moving all 4 extremities intentionally and without pain. No obvious deformity.  Back: No CVA tenderness  Skin: Warm, dry, no rash.  Neurologic: Alert & oriented x 3, speech clear, moving all extremities spontaneously   Psychiatric: Affect normal, cooperative.     LAB:  All pertinent labs reviewed and interpreted.  Labs Ordered and Resulted from Time of ED Arrival to Time of ED Departure   ROUTINE UA WITH MICROSCOPIC REFLEX TO CULTURE - Abnormal       Result Value    Color Urine Light Yellow      Appearance Urine Clear      Glucose Urine Negative      Bilirubin Urine Negative      Ketones Urine Negative      Specific Gravity Urine 1.019      Blood Urine Negative      pH Urine 6.5      Protein Albumin Urine Negative      Urobilinogen Urine <2.0      Nitrite Urine Negative      Leukocyte Esterase Urine 75 Deniz/uL (*)     RBC Urine 1      WBC Urine 5      Squamous Epithelials Urine 4 (*)    COMPREHENSIVE METABOLIC  PANEL - Normal    Sodium 137      Potassium 4.0      Chloride 104      Carbon Dioxide (CO2) 23      Anion Gap 10      Urea Nitrogen 7.2      Creatinine 0.66      Calcium 9.2      Glucose 88      Alkaline Phosphatase 99      AST 14      ALT 13      Protein Total 7.4      Albumin 4.4      Bilirubin Total 0.5      GFR Estimate >90     LIPASE - Normal    Lipase 20     CRP INFLAMMATION - Normal    CRP Inflammation <3.00     HCG QUALITATIVE PREGNANCY - Normal    hCG Serum Qualitative Negative     CBC WITH PLATELETS AND DIFFERENTIAL    WBC Count 6.3      RBC Count 4.79      Hemoglobin 14.5      Hematocrit 42.0      MCV 88      MCH 30.3      MCHC 34.5      RDW 12.3      Platelet Count 254      % Neutrophils 40      % Lymphocytes 48      % Monocytes 9      % Eosinophils 2      % Basophils 1      % Immature Granulocytes 0      NRBCs per 100 WBC 0      Absolute Neutrophils 2.6      Absolute Lymphocytes 3.1      Absolute Monocytes 0.6      Absolute Eosinophils 0.1      Absolute Basophils 0.0      Absolute Immature Granulocytes 0.0      Absolute NRBCs 0.0         RADIOLOGY:  CT Abdomen Pelvis w Contrast   Final Result   IMPRESSION:    No acute findings in the abdomen or pelvis or other explanation for symptoms.      US Pelvis Cmplt w Transvag & Doppler LmtPel Duplex Limited   Final Result   IMPRESSION:     1.  In the two-week interval, the large left ovarian simple cyst has decreased in size and there is a residual hemorrhagic 3.7 cm left ovarian cyst adjacent to a small dominant follicle.   2.  No abnormalities are seen to explain pain.   3.  Slight asymmetry in endometrial thickness again noted, more prominent in the left uterine horn relative to the right.   4.  Small amount of simple free fluid in the cul-de-sac, normal for age.                     PROCEDURES:   Procedures       I, Samina Rodrigez, am serving as a scribe to document services personally performed by Dr. Samson Albrecht based on my observation and the  provider's statements to me. I, Samson Albrecht M.D. attest that Samina Rodrigez is acting in a scribe capacity, has observed my performance of the services and has documented them in accordance with my direction.      Samson Albrecht M.D.  Emergency Medicine  [facility] EMERGENCY DEPARTMENT  [address] [place] [id number] [phone]  Dept: [phone]     Samson Albrecht MD  04/14/23 0836

## 2023-03-27 NOTE — ED NOTES
She continues to have abdominal pain. She has refused according to the chart her pain medication of Toradol. The pain is worse for her when she walks. She is waiting for her CT  at this time.

## 2023-03-27 NOTE — ED TRIAGE NOTES
Patient presents here with abdominal pain. She has a history of Chrons and Celiac disease and she also has several ovarian cysts on her left ovary. She denies diarrhea. She notes blood in her stool and is constipated. She notes abdominal bloating and pain with walking. Nausea, no vomiting. Lightheadedness. She has a history of a portion of her small bowel removed due to obstruction.

## 2023-04-15 ENCOUNTER — HEALTH MAINTENANCE LETTER (OUTPATIENT)
Age: 33
End: 2023-04-15

## 2023-07-11 ENCOUNTER — DOCUMENTATION ONLY (OUTPATIENT)
Dept: PODIATRY | Facility: CLINIC | Age: 33
End: 2023-07-11
Payer: COMMERCIAL

## 2023-07-11 ENCOUNTER — PREP FOR PROCEDURE (OUTPATIENT)
Dept: PODIATRY | Facility: CLINIC | Age: 33
End: 2023-07-11
Payer: COMMERCIAL

## 2023-07-11 DIAGNOSIS — M21.6X2 PRONATION DEFORMITY OF BOTH FEET: ICD-10-CM

## 2023-07-11 DIAGNOSIS — M21.6X1 PRONATION DEFORMITY OF BOTH FEET: ICD-10-CM

## 2023-07-11 DIAGNOSIS — M72.2 PLANTAR FASCIITIS: Primary | ICD-10-CM

## 2023-07-12 ENCOUNTER — OFFICE VISIT (OUTPATIENT)
Dept: FAMILY MEDICINE | Facility: CLINIC | Age: 33
End: 2023-07-12
Payer: COMMERCIAL

## 2023-07-12 VITALS
BODY MASS INDEX: 37.28 KG/M2 | HEART RATE: 81 BPM | DIASTOLIC BLOOD PRESSURE: 113 MMHG | OXYGEN SATURATION: 100 % | TEMPERATURE: 97.9 F | WEIGHT: 224 LBS | SYSTOLIC BLOOD PRESSURE: 151 MMHG | RESPIRATION RATE: 18 BRPM

## 2023-07-12 DIAGNOSIS — M54.50 LUMBAR BACK PAIN: ICD-10-CM

## 2023-07-12 DIAGNOSIS — N92.6 MISSED PERIOD: ICD-10-CM

## 2023-07-12 DIAGNOSIS — I10 BENIGN ESSENTIAL HYPERTENSION: Primary | ICD-10-CM

## 2023-07-12 LAB — HCG UR QL: NEGATIVE

## 2023-07-12 PROCEDURE — 99214 OFFICE O/P EST MOD 30 MIN: CPT | Mod: GC

## 2023-07-12 PROCEDURE — 81025 URINE PREGNANCY TEST: CPT

## 2023-07-12 RX ORDER — LABETALOL 100 MG/1
100 TABLET, FILM COATED ORAL 2 TIMES DAILY
Qty: 120 TABLET | Refills: 0 | Status: SHIPPED | OUTPATIENT
Start: 2023-07-12 | End: 2023-08-17 | Stop reason: ALTCHOICE

## 2023-07-12 NOTE — PROGRESS NOTES
Preceptor Attestation:    I discussed the patient with the resident and evaluated the patient in person. I have verified the content of the note, which accurately reflects my assessment of the patient and the plan of care.      BP Readings from Last 3 Encounters:   07/12/23 (!) 151/113   03/27/23 (!) 145/109   03/21/23 (!) 132/93    Wt Readings from Last 3 Encounters:   07/12/23 101.6 kg (224 lb)   03/27/23 99.8 kg (220 lb)   03/21/23 100.2 kg (221 lb)                   Supervising Physician:  Misael Montano MD.

## 2023-07-12 NOTE — PROGRESS NOTES
Assessment & Plan     Benign essential hypertension  History of elevated blood pressure in last pregnancy treated with labetalol. She is not on birth control and is okay if she would become pregnant at this time. Multiple elevated blood pressures in the past. Having symptoms of headache, blurred vision, and feeling off balance at times. Start blood pressure today. May need to increase as needed.  - labetalol (NORMODYNE) 100 MG tablet; Take 1 tablet (100 mg) by mouth 2 times daily for 60 days  - Patient already has ambulatory BP cuff. Take blood pressure daily and write them in journal. Directions for how to take blood pressure described in AVS.    Missed period  Sexually active. Not on birth control. Having increased frequency and nausea. She would be happy if she were pregnant. LMP was June 2-5 but was only spotting. Light bleeding on June 25th for one day, but nothing else.  - HCG qualitative urine - Negative    Lumbar back pain  Chronic. Tylenol and warm baths help some with pain. No sciatica. Unable to take NSAIDs with Chron's diagnosis. Starting physical job on Monday, 7/17 and wondering if it is okay. Strength 5/5 on tested areas (see physical exam). Normal balance.  - Given the okay to start job.   - Continue tylenol and heat as needed  - Physical Therapy Referral; Future      Return in about 4 weeks (around 8/9/2023) for blood pressure check.    Patient was staffed with supervising physician, Dr. Misael Montano.    Maricruz Garcia MD  [facility] PAULETTE Sweet is a 32 year old, presenting for the following health issues:  Pregnancy Test (Pt request to get pregnancy test cause she feels off and period is weird  ), physical  (Pt here to ck to make sure she is healthy enough to go back to work and want to ck for BP), Medication Request (Pt want to get pill for muscle relax), and Headache (Started past few weeks )        7/12/2023     2:55 PM   Additional Questions   Roomed by Ma    Accompanied by self     HPI     More headaches now compared to before. Endorses blurred vision; denies aura. Feels off balance at times. Endorses having high blood pressure in previous pregnancy that was treated with labetalol. This was discontinued once her blood pressures were back into the normal range. She does have blood pressure cuff at home. However, she has not used it since her pregnancy. She has noted elevated pressures in the clinic and at her chiropractor.    Patient endorses spotting occurring on June 2-5 and a small amount of blood on June 25. Endorses frequent urination and nausea with eating. No dysuria. Took two pregnancy tests a couple weeks ago, and they were both negative. She is okay if she were to get pregnant again. She is not on any type of birth control at this time not wishes to be.    Patient endorses lower back pain. Tylenol helps with pain for about 30 minutes. Warm baths also helps. She is starting a new job on Monday and is wondering if she would be okay to start. Her job includes painting the outside of houses, climbing ladders, and carrying heavy paint cans. She completed PT during pregnancy. She would like to have a referral.    Review of Systems   ROS is negative other than what is listed in the HPI.      Objective    BP (!) 151/113 (BP Location: Left arm, Patient Position: Sitting, Cuff Size: Adult Regular)   Pulse 81   Temp 97.9  F (36.6  C) (Oral)   Resp 18   Wt 101.6 kg (224 lb)   LMP 06/25/2023 (Approximate)   SpO2 100%   BMI 37.28 kg/m    Body mass index is 37.28 kg/m .     Physical Exam   General appearance: alert, in no distress, cooperative, appears stated age  Head: normocephalic, without obvious abnormalities  Ears: hearing grossly intact  Nose: nares normal, no drainage  Lung: speaking in full sentences, no cough or wheezing  Musculoskeletal: Full range of motion of lumbar spine, diffuse tenderness to palpation to lumbar spine, negative sciatica, 5/5 strength  with bilateral , hip flexion, knee flexion, knee extension, biceps. Able to stand on each leg individually with good balance  Neurologic: Ambulatory, spontaneously moving lower extremities without pain, Romberg negative  Psychologic: Appropriate mood    ----- Service Performed and Documented by Resident or Fellow ------

## 2023-07-12 NOTE — PATIENT INSTRUCTIONS
Thank you for coming to see me today in clinic!    Today we discussed:  - Back pain: Continue taking Tylenol as needed.  You may also use a heat pad and warm baths to help with the muscle pain.  Our clinic will call you to schedule a physical therapy appointment.  You are okay to start work on Monday.  - High blood pressure and headaches: I believe your headaches are due to your high blood pressure.  We will start a medication today.  Please  labetolol from the pharmacy and start taking today twice a day.  Follow-up in 2 to 4 weeks for recheck of blood pressure.  Use your blood pressure cuff at home to measure blood pressures.  Sit for 5 minutes.  Feet flat on the ground.  Back rested with support.  Blood pressure cuff under close.  Arm at heart level.  Take blood pressure and right level in journal.    If you have any further questions, please call the clinic!    Have a wonderful rest of your day!

## 2023-07-19 ENCOUNTER — OFFICE VISIT (OUTPATIENT)
Dept: FAMILY MEDICINE | Facility: CLINIC | Age: 33
End: 2023-07-19
Payer: COMMERCIAL

## 2023-07-19 VITALS
DIASTOLIC BLOOD PRESSURE: 87 MMHG | BODY MASS INDEX: 36.91 KG/M2 | HEART RATE: 68 BPM | TEMPERATURE: 98.3 F | WEIGHT: 221.8 LBS | SYSTOLIC BLOOD PRESSURE: 119 MMHG | OXYGEN SATURATION: 97 %

## 2023-07-19 DIAGNOSIS — S93.492A HIGH ANKLE SPRAIN OF LEFT LOWER EXTREMITY, INITIAL ENCOUNTER: Primary | ICD-10-CM

## 2023-07-19 DIAGNOSIS — N64.52 NIPPLE DISCHARGE: ICD-10-CM

## 2023-07-19 DIAGNOSIS — N92.6 IRREGULAR PERIODS: ICD-10-CM

## 2023-07-19 PROCEDURE — 99213 OFFICE O/P EST LOW 20 MIN: CPT | Mod: GC

## 2023-07-19 NOTE — PROGRESS NOTES
Preceptor Attestation:    I discussed the patient with the resident and evaluated the patient in person. I have verified the content of the note, which accurately reflects my assessment of the patient and the plan of care.   Supervising Physician:  Anthony Ferris DO.

## 2023-07-19 NOTE — PROGRESS NOTES
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  Seems to be overuse.  Exam fairly unremarkable but likely reveals high ankle sprain.  Recommend physical therapy and rest.  Patient states that she will leave her job and find a new job as this was a temporary.   - Physical Therapy Referral; Future    Irregular periods  Nipple discharge  Reporting irregular periods and nipple discharge bilaterally for several months now.  Breast exam today was unremarkable with no masses or infection or discharge.  Discussed with patient there may be several etiologies regarding the symptoms including possibly PCOS.  Will return next visit for further discussion on lab work-up.    Return in about 1 week (around 7/26/2023) for Follow up.    Henry Gordon DO  [facility]   Kee is a 32 year old, presenting for the following health issues:  Ankle Pain (Both leg and ankle pain with swelling. Left side is worst than right. ) and Forms (Restricted work hours. )        7/19/2023    10:39 AM   Additional Questions   Roomed by renner   Accompanied by self     Forms 7/19/2023   Any forms needing to be completed Yes     HPI   5-year-old female with history of fibromyalgia presenting today with left sided foot pain and swelling.  She works as a  and currently on a construction job where she is needing to climb more than 5 flights of stairs multiple times a day.  She has since had left-sided ankle swelling.  Of note she does have history of surgery on that foot as well as ankle sprains on that left side.  She denies any redness, swelling, fevers.  She reports that rest is made it feel better but continuing to work makes it feels worse.  She describes the pain as lateral and superior the left ankle.  Hurts worse with dorsiflexion.    Of  note she also reports did nipple discharge.  She has a 1-year-old son and has not breast-fed for almost a week year now but reported bilateral nipple discharge several months ago and has endorsed amenorrhea, history of multiple cysts on her ovaries, and hair growth and places that she finds not desirable.  Currently denies any lumps or bumps on breast, no redness, no swelling.    Review of Systems   Constitutional, HEENT, cardiovascular, pulmonary, gi and gu systems are negative, except as otherwise noted.      Objective    /87 (BP Location: Left arm, Patient Position: Sitting, Cuff Size: Adult Large)   Pulse 68   Temp 98.3  F (36.8  C) (Oral)   Wt 100.6 kg (221 lb 12.8 oz)   LMP 06/25/2023 (Approximate)   SpO2 97%   BMI 36.91 kg/m    Body mass index is 36.91 kg/m .  Physical Exam   GENERAL: healthy, alert and no distress  NECK: no adenopathy, no asymmetry, masses, or scars and thyroid normal to palpation  RESP: lungs clear to auscultation - no rales, rhonchi or wheezes  BREAST: normal without masses, tenderness or nipple discharge and no palpable axillary masses or adenopathy  CV: regular rate and rhythm, normal S1 S2, no S3 or S4, no murmur, click or rub, no peripheral edema and peripheral pulses strong  ABDOMEN: soft, nontender, no hepatosplenomegaly, no masses and bowel sounds normal  MS: Normal range of motion of left ankle joint.  No redness, no swelling.  Pain with resisted dorsiflexion to lateral side of left shin.  Otherwise unremarkable exam.    Chaperoned by GUNNAR Hand      ----- Service Performed and Documented by Resident or Fellow ------

## 2023-08-17 ENCOUNTER — OFFICE VISIT (OUTPATIENT)
Dept: FAMILY MEDICINE | Facility: CLINIC | Age: 33
End: 2023-08-17
Payer: COMMERCIAL

## 2023-08-17 VITALS
OXYGEN SATURATION: 99 % | SYSTOLIC BLOOD PRESSURE: 134 MMHG | DIASTOLIC BLOOD PRESSURE: 94 MMHG | TEMPERATURE: 97.3 F | HEART RATE: 75 BPM | WEIGHT: 223.6 LBS | BODY MASS INDEX: 37.21 KG/M2

## 2023-08-17 DIAGNOSIS — N92.6 IRREGULAR PERIODS: ICD-10-CM

## 2023-08-17 DIAGNOSIS — I10 BENIGN ESSENTIAL HYPERTENSION: ICD-10-CM

## 2023-08-17 DIAGNOSIS — R23.3 EASY BRUISING: Primary | ICD-10-CM

## 2023-08-17 LAB
APTT PPP: 27 SECONDS (ref 22–38)
ERYTHROCYTE [DISTWIDTH] IN BLOOD BY AUTOMATED COUNT: 12.8 % (ref 10–15)
HCT VFR BLD AUTO: 39.3 % (ref 35–47)
HGB BLD-MCNC: 13.5 G/DL (ref 11.7–15.7)
INR PPP: 1.04 (ref 0.85–1.15)
MCH RBC QN AUTO: 30.1 PG (ref 26.5–33)
MCHC RBC AUTO-ENTMCNC: 34.4 G/DL (ref 31.5–36.5)
MCV RBC AUTO: 88 FL (ref 78–100)
PLATELET # BLD AUTO: 218 10E3/UL (ref 150–450)
RBC # BLD AUTO: 4.48 10E6/UL (ref 3.8–5.2)
WBC # BLD AUTO: 8.1 10E3/UL (ref 4–11)

## 2023-08-17 PROCEDURE — 99214 OFFICE O/P EST MOD 30 MIN: CPT | Mod: GC

## 2023-08-17 PROCEDURE — 36415 COLL VENOUS BLD VENIPUNCTURE: CPT

## 2023-08-17 PROCEDURE — 85730 THROMBOPLASTIN TIME PARTIAL: CPT

## 2023-08-17 PROCEDURE — 85027 COMPLETE CBC AUTOMATED: CPT

## 2023-08-17 PROCEDURE — 85610 PROTHROMBIN TIME: CPT

## 2023-08-17 RX ORDER — NIFEDIPINE 30 MG
30 TABLET, EXTENDED RELEASE ORAL DAILY
Qty: 30 TABLET | Refills: 0 | Status: SHIPPED | OUTPATIENT
Start: 2023-08-17 | End: 2023-08-22

## 2023-08-17 NOTE — PROGRESS NOTES
Assessment & Plan     Easy bruising  Pt presents with 2 week history of easy bruising with pictures of bruises on her L. Arm, R. Arm, and R. Thigh. Bruises in picture are 3 cm hyperpigmented patch with ecchymoses and a surrounding yellow outline. Pt has not had any trauma, does not report any bleeding, has not had any skin changes, and does not have a history of easy bleeding/bruising. Pt is wondering if this is related to labetalol as she started it 3 weeks ago. PE showed minimal hyperpigmentation in the above areas c/w healed bruises, no petechia/purpura, nose and mouth without ulcers or lesions, no signs of active bleeding. Diagnosis is broad and includes idiopathic bruising (pt says she just bruises easily) vs platelet dysfunction like ITP or vWF disease vs adverse effect of current diagnoses (pt has autoimmune conditions; Celiac and Crohn's disease). CBC w/ platelets and PTT/INR ordered to analyze platelet amount and clotting cascade function. Pt will follow-up in 4 weeks regarding this and other conditions (see below).  - INR  - Partial thromboplastin time  - CBC with platelets    Benign essential hypertension  Pt started labetalol 3 weeks ago and has episodes of feeling sleepy, dizzy, and weak which have had an impact on her life. Pt's BP today is 134/94, slightly increased from 119/87 on 7/19/23 office visit but overall decreased compared to before starting labetalol (previously 150s/90s). Pt is not on birth control and has had one tubal ligation, pt is okay with becoming pregnant at this time. Due to fatigue side effects, labetalol will be discontinued and will start nifedipine. Pt will follow-up in 4 weeks to monitor side effects and measure BP.   - NIFEdipine ER (ADALAT CC) 30 MG 24 hr tablet  Dispense: 30 tablet; Refill: 0    Irregular periods  Hx of ERNESTINA II s/p colposcopy  On chart review patient has had irregular pap smears and has undergone colposcopy in the past. Now having irregular periods and  "complaining of breast discharge at previous visit. Family history of PCOS.   - Ob/Gyn Referral  - If unable to get OB/Gyn referral by the time of our next follow up consider getting basic hormone profile and pelvic US    BMI:   Estimated body mass index is 37.21 kg/m  as calculated from the following:    Height as of 1/30/23: 1.651 m (5' 5\").    Weight as of this encounter: 101.4 kg (223 lb 9.6 oz).     Return in about 4 weeks (around 9/14/2023).    Darci Ballesteros MS3    I have seen and examined the patient.  I have discussed the case with Student Darci Ballesteros MS3.  I agree with the findings, assessment and plan.     Gildardo Minor MD PGY3  [facility]  August 17, 2023     Bismark Sweet is a 32 year old, presenting for the following health issues:  Hypertension (Recheck. ) and Bleeding/Bruising (Random bruises all over body x 2 weeks. )      8/17/2023     1:05 PM   Additional Questions   Roomed by renner   Accompanied by self     HPI   Pt presents with 2 weeks of easy bruising. Pt first noticed a bruise on her right arm 2 weeks ago. The day after, she noticed another bruise on her left arm, and the day after she noticed another bruise on her R. Thigh. Pt states that she did not have any trauma to these areas. They are not painful to touch. They have since disappeared but she had pictures of them on her phone. She has not had any bleeding anywhere in her body, but does endorse that her gums occasionally bleed with brushing. She has not had any periods in the last 2 weeks and says her last period was a few weeks ago and it was normal. She has not had any changes in nutrition.    The only medication change she had was starting labetalol 3 weeks ago for HTN. She has had episodes of being sleepy, dizzy and weak since starting the medication.    Review of Systems   Constitutional, HEENT, cardiovascular, pulmonary, gi and gu systems are negative, except as otherwise noted.      Objective    BP " (!) 134/94 (BP Location: Left arm, Patient Position: Sitting, Cuff Size: Adult Large)   Pulse 75   Temp 97.3  F (36.3  C) (Tympanic)   Wt 101.4 kg (223 lb 9.6 oz)   LMP 06/25/2023 (Approximate)   SpO2 99%   BMI 37.21 kg/m    Body mass index is 37.21 kg/m .  Physical Exam   GENERAL: healthy, alert and no distress  EYES: Eyes grossly normal to inspection, PERRL and conjunctivae and sclerae normal  HENT: ear canals and TM's normal, nose and mouth without ulcers or lesions  NECK: no adenopathy, no asymmetry, masses, or scars and thyroid normal to palpation  RESP: lungs clear to auscultation - no rales, rhonchi or wheezes  CV: regular rate and rhythm, normal S1 S2, no S3 or S4, no murmur, click or rub, no peripheral edema and peripheral pulses strong  MS: no gross musculoskeletal defects noted, no edema  SKIN: no suspicious lesions or rashes. Small, hyperpigmented patch on LUE.   NEURO: Normal strength and tone, mentation intact and speech normal  PSYCH: mentation appears normal, affect normal/bright    ----- Services Performed by a MEDICAL STUDENT in Presence of RESIDENT/FELLOW Physician-------

## 2023-08-17 NOTE — LETTER
August 29, 2023      Kee Phipps  [address]  [place] [address]        Dear ,    We are writing to inform you of your test results.    There are no signs of bleeding disorder based on this lab work.  I think for now we can continue to monitor if this bruising problem continues.     Resulted Orders   INR   Result Value Ref Range    INR 1.04 0.85 - 1.15   Partial thromboplastin time   Result Value Ref Range    aPTT 27 22 - 38 Seconds   CBC with platelets   Result Value Ref Range    WBC Count 8.1 4.0 - 11.0 10e3/uL    RBC Count 4.48 3.80 - 5.20 10e6/uL    Hemoglobin 13.5 11.7 - 15.7 g/dL    Hematocrit 39.3 35.0 - 47.0 %    MCV 88 78 - 100 fL    MCH 30.1 26.5 - 33.0 pg    MCHC 34.4 31.5 - 36.5 g/dL    RDW 12.8 10.0 - 15.0 %    Platelet Count 218 150 - 450 10e3/uL       If you have any questions or concerns, please call the clinic at the number listed above.       Sincerely,      Jorge Luis Ch MD

## 2023-08-20 DIAGNOSIS — I10 BENIGN ESSENTIAL HYPERTENSION: ICD-10-CM

## 2023-08-22 RX ORDER — NIFEDIPINE 30 MG
30 TABLET, EXTENDED RELEASE ORAL DAILY
Qty: 30 TABLET | Refills: 0 | Status: SHIPPED | OUTPATIENT
Start: 2023-08-22 | End: 2023-08-24

## 2023-08-24 DIAGNOSIS — I10 BENIGN ESSENTIAL HYPERTENSION: ICD-10-CM

## 2023-08-24 NOTE — TELEPHONE ENCOUNTER
The prescriber not enrolled in [place] medicaid and need a different provider to send the med. Need to resend RX of NIFEdipine ER (ADALAT CC) to the pharmacy.

## 2023-08-25 RX ORDER — NIFEDIPINE 30 MG
30 TABLET, EXTENDED RELEASE ORAL DAILY
Qty: 30 TABLET | Refills: 0 | Status: SHIPPED | OUTPATIENT
Start: 2023-08-25 | End: 2023-10-05

## 2023-09-12 ENCOUNTER — TRANSFERRED RECORDS (OUTPATIENT)
Dept: HEALTH INFORMATION MANAGEMENT | Facility: CLINIC | Age: 33
End: 2023-09-12
Payer: COMMERCIAL

## 2023-10-05 ENCOUNTER — OFFICE VISIT (OUTPATIENT)
Dept: OBGYN | Facility: CLINIC | Age: 33
End: 2023-10-05
Payer: COMMERCIAL

## 2023-10-05 VITALS
WEIGHT: 220 LBS | SYSTOLIC BLOOD PRESSURE: 142 MMHG | HEART RATE: 91 BPM | DIASTOLIC BLOOD PRESSURE: 92 MMHG | BODY MASS INDEX: 36.61 KG/M2 | OXYGEN SATURATION: 98 %

## 2023-10-05 DIAGNOSIS — N89.8 VAGINAL DISCHARGE: Primary | ICD-10-CM

## 2023-10-05 LAB
CLUE CELLS: PRESENT
TRICHOMONAS, WET PREP: ABNORMAL
WBC'S/HIGH POWER FIELD, WET PREP: ABNORMAL
YEAST, WET PREP: ABNORMAL

## 2023-10-05 PROCEDURE — 87491 CHLMYD TRACH DNA AMP PROBE: CPT | Performed by: OBSTETRICS & GYNECOLOGY

## 2023-10-05 PROCEDURE — 87210 SMEAR WET MOUNT SALINE/INK: CPT | Performed by: OBSTETRICS & GYNECOLOGY

## 2023-10-05 PROCEDURE — 87591 N.GONORRHOEAE DNA AMP PROB: CPT | Performed by: OBSTETRICS & GYNECOLOGY

## 2023-10-05 PROCEDURE — 99203 OFFICE O/P NEW LOW 30 MIN: CPT | Performed by: OBSTETRICS & GYNECOLOGY

## 2023-10-06 LAB
C TRACH DNA SPEC QL NAA+PROBE: NEGATIVE
N GONORRHOEA DNA SPEC QL NAA+PROBE: NEGATIVE

## 2023-10-16 ENCOUNTER — TELEPHONE (OUTPATIENT)
Dept: PLASTIC SURGERY | Facility: AMBULATORY SURGERY CENTER | Age: 33
End: 2023-10-16
Payer: COMMERCIAL

## 2023-10-16 NOTE — TELEPHONE ENCOUNTER
Returned patient's call from Friday regarding surgery scheduling.   Pt was canceled back on Feb due to pregnancy discovered a few days prior to scheduled surgery. Pt is looking to reschedule surgery, stated that the pregnancy was terminated a few days following the discovery.  We were not aware of the termination therefore had her on a call back list for a year out for estimated delivery date.     Patient has already scheduled to see Dr. Feldman again in office 10/27. Once she has seen him again I will get her rescheduled for surgery.

## 2023-10-27 ENCOUNTER — OFFICE VISIT (OUTPATIENT)
Dept: PLASTIC SURGERY | Facility: AMBULATORY SURGERY CENTER | Age: 33
End: 2023-10-27
Payer: COMMERCIAL

## 2023-10-27 VITALS — HEIGHT: 64 IN | HEART RATE: 70 BPM | WEIGHT: 220 LBS | BODY MASS INDEX: 37.56 KG/M2

## 2023-10-27 DIAGNOSIS — N62 MACROMASTIA: Primary | ICD-10-CM

## 2023-10-27 DIAGNOSIS — Z01.818 PRE-OP TESTING: ICD-10-CM

## 2023-10-27 PROCEDURE — 99213 OFFICE O/P EST LOW 20 MIN: CPT | Performed by: PLASTIC SURGERY

## 2023-10-27 NOTE — LETTER
10/27/2023         RE: Kee Phipps  [address]  [place] [address]        Dear Colleague,    Thank you for referring your patient, Kee Phipps, to the [facility] [place]. Please see a copy of my visit note below.    Ms. Phipps is a 33 years old lady with history of bilateral macromastia.  I have seen this patient in the past.  She used to have galactorrhea but that has been resolved.  Then the patient was having episode of high blood pressure she was sent to cardiologist who has prescriber antihypertensive drugs and now the high blood pressure is under control.  Then the patient was ready for surgery, bilateral breast reduction, however she got pregnant prior to surgery and the procedure was canceled.  Patient have a termination of her pregnancy and now she returns for reevaluation.    At the physical exam, there is persistent macromastia.  The nipple to inframammary fold distance is less than 18 cm bilaterally.  On the right is 15 cm and on the left is 16 cm.    Patient is currently vaping with nicotine.  I told her that this is an absolute contraindication for surgery.    Plan: I will send case request today for bilateral breast reduction.  However I will also obtain nicotine metabolites in her urine in 2-month to make sure that there is no more nicotine in her system.  We will proceed with surgery.    Patient desires to be a C cup.  According to the Schnur scale, she should have at least 600 g removed per breast.     Miguelangel Feldman MD , FACS   Diplomate American Board of Plastic Surgery  Diplomate American Board of Surgery  Adj. Assistant Professor of Surgery  Division of Plastic & Reconstructive Surgery   [facility]  Office: [phone]   10/27/2023 at 9:26 AM       Again, thank you for allowing me to participate in the care of your patient.        Sincerely,        Miguelangel Feldman MD

## 2023-10-27 NOTE — NURSING NOTE
Patient here today for a return visit to re-discuss breast reduction. Initial consult on 12/09/2022.    She reports continued back, neck and shoulder pain. She treats with Tylenol and chiropractic care without relief. She also has rashes that she treats with Vaseline and antibiotic ointment.     The patient is also a current everyday nicotine user via vape.     She was cleared by cardiologist as Dr. Feldman requested at initial consult.       Quit Date for Nicotine: 10/27/2023  Test Date: 12/22/2023    Kitty Tyler RN on 10/27/2023 at 8:57 AM

## 2023-10-27 NOTE — PROGRESS NOTES
Ms. Phipps is a 33 years old lady with history of bilateral macromastia.  I have seen this patient in the past.  She used to have galactorrhea but that has been resolved.  Then the patient was having episode of high blood pressure she was sent to cardiologist who has prescriber antihypertensive drugs and now the high blood pressure is under control.  Then the patient was ready for surgery, bilateral breast reduction, however she got pregnant prior to surgery and the procedure was canceled.  Patient have a termination of her pregnancy and now she returns for reevaluation.    At the physical exam, there is persistent macromastia.  The nipple to inframammary fold distance is less than 18 cm bilaterally.  On the right is 15 cm and on the left is 16 cm.    Patient is currently vaping with nicotine.  I told her that this is an absolute contraindication for surgery.    Plan: I will send case request today for bilateral breast reduction.  However I will also obtain nicotine metabolites in her urine in 2-month to make sure that there is no more nicotine in her system.  We will proceed with surgery.    Patient desires to be a C cup.  According to the Schnur scale, she should have at least 600 g removed per breast.     Miguelangel Feldman MD , FACS   Diplomate American Board of Plastic Surgery  Diplomate American Board of Surgery  Adj. Assistant Professor of Surgery  Division of Plastic & Reconstructive Surgery   [facility]  Office: [phone]   10/27/2023 at 9:26 AM

## 2023-11-02 ENCOUNTER — DOCUMENTATION ONLY (OUTPATIENT)
Dept: PLASTIC SURGERY | Facility: AMBULATORY SURGERY CENTER | Age: 33
End: 2023-11-02
Payer: COMMERCIAL

## 2023-11-02 NOTE — LETTER
Pre-op Physical: Schedule a pre-op physical with your primary care doctor.The pre-op physical must be 10-30 days before surgery and since it is required by anesthesia, your surgery will be cancelled if it's not done.      Surgery Date: 2/19/2024     Location: [place] [address]    Approximate Arrival Time: 11:15 am  (Unless instructed differently by the pre-op call nurse)     Post op Appointment: 2/23/2024 at  2:30 pm with  Dr. Gaston [facility]ERRANO [facility], [address] [place] [address] [address] [address].    Pre-Surgical Tasks:     Review all medications with your primary care or prescribing physician; they will advise you which meds to stop and when, and when you can resume taking.  Certain medications like blood thinners and weight loss medications need to be stopped in advance of surgery to proceed safely.      Blood thinners including but not exclusive to drugs like Xarelto, Eliquis, Warfarin and Aspirin, should be stopped five days before surgery, if your prescribing provider agrees. Follow your provider's advice on stopping blood thinners because they know you best.  If you are unsure if your medication is a blood thinner, ask your prescribing provider.    Weight loss medications: There are multiple medications being used for weight management and diabetes today, and the list is growing.  Phentermine, Ozempic, Wegovy, Trulicity, and other similar medications need to be stopped one week before surgery to avoid being cancelled.  Victoza and Saxenda can be continued longer but must be stopped one full day before surgery.  Please ask your prescribing provider for advice.    Diabetic medications: in addition to the medications talked about above that are used for either weight loss or diabetes, some people are on insulin that may require adjustment.  Please discuss managing diabetic medications with your prescribing  doctor as these medications may require modification prior to surgery.     Please shower the evening before and morning of surgery with Hibiclens soap.  Any [facility] can provide this to you at no cost, or it can be found at your local pharmacy.     Fasting instructions will be provided by the pre-op nurse who will call you 1-3 days before surgery.  Typically, we advise normal food up to 8 hours before you arrive for surgery. Clear liquids only from then until 2 hours before you arrive surgery, then nothing at all by mouth.  The nurse will review your specific instructions with you at the call.      Smoking impacts your body's ability to heal properly so we advise patients to quit if possible before surgery.  Plastic Surgery patients are required to be nicotine free for at least 8 weeks before surgery.      You will need an adult to drive you home and stay with you 24 hours after surgery. Public transportation or Medical Van Services are not permitted.    Visitor restrictions are subject to change, please verify with the pre-op nurse when they call how many people are permitted to accompany you.    We always encourage you to notify your insurance any time you have medical tests or procedures scheduled including surgery. The number is usually right on the back of your insurance card. To obtain pricing for surgery, please call [facility] Cost of Care at [phone] or email [email or web address].        Call our office if you have any questions! Thank you!

## 2023-11-02 NOTE — CONFIDENTIAL NOTE
Surgical Prior-Auth (Pre-Scheduling)  DOS: TBD   Facility: [facility]   Insurance: [facility]   Plan Exclusion? No   APPROVAL [id number]   CPT: 49810 Description: Breast Reduction   Clinicals Provided:    Order: no   Visit Notes: yes   Results: no   Photos: yes   Other: no   Submitted Via: [facility] Provider Portal   Date Submitted: 11/2/2023       Follow-up Phone# [phone]   : Collette Campos       Surgeon Name: Dr. Feldman   Surgeons NPI: [id number]

## 2023-11-06 ENCOUNTER — OFFICE VISIT (OUTPATIENT)
Dept: FAMILY MEDICINE | Facility: CLINIC | Age: 33
End: 2023-11-06
Payer: COMMERCIAL

## 2023-11-06 VITALS
WEIGHT: 227.2 LBS | OXYGEN SATURATION: 98 % | HEART RATE: 89 BPM | DIASTOLIC BLOOD PRESSURE: 85 MMHG | HEIGHT: 63 IN | RESPIRATION RATE: 18 BRPM | TEMPERATURE: 97.8 F | BODY MASS INDEX: 40.26 KG/M2 | SYSTOLIC BLOOD PRESSURE: 130 MMHG

## 2023-11-06 DIAGNOSIS — Z12.4 CERVICAL CANCER SCREENING: Primary | ICD-10-CM

## 2023-11-06 PROCEDURE — 87624 HPV HI-RISK TYP POOLED RSLT: CPT

## 2023-11-06 PROCEDURE — 99213 OFFICE O/P EST LOW 20 MIN: CPT | Mod: GC

## 2023-11-06 PROCEDURE — G0145 SCR C/V CYTO,THINLAYER,RESCR: HCPCS

## 2023-11-06 ASSESSMENT — ENCOUNTER SYMPTOMS
FEVER: 0
SHORTNESS OF BREATH: 0
NAUSEA: 0
VOMITING: 0
MYALGIAS: 0
FATIGUE: 1
COUGH: 0
CHILLS: 0
DIFFICULTY URINATING: 0
CONSTIPATION: 0
DYSURIA: 0
DIARRHEA: 0

## 2023-11-06 ASSESSMENT — ANXIETY QUESTIONNAIRES
5. BEING SO RESTLESS THAT IT IS HARD TO SIT STILL: NEARLY EVERY DAY
6. BECOMING EASILY ANNOYED OR IRRITABLE: NEARLY EVERY DAY
IF YOU CHECKED OFF ANY PROBLEMS ON THIS QUESTIONNAIRE, HOW DIFFICULT HAVE THESE PROBLEMS MADE IT FOR YOU TO DO YOUR WORK, TAKE CARE OF THINGS AT HOME, OR GET ALONG WITH OTHER PEOPLE: EXTREMELY DIFFICULT
GAD7 TOTAL SCORE: 13
1. FEELING NERVOUS, ANXIOUS, OR ON EDGE: NOT AT ALL
3. WORRYING TOO MUCH ABOUT DIFFERENT THINGS: MORE THAN HALF THE DAYS
2. NOT BEING ABLE TO STOP OR CONTROL WORRYING: MORE THAN HALF THE DAYS
4. TROUBLE RELAXING: NEARLY EVERY DAY
GAD7 TOTAL SCORE: 13
7. FEELING AFRAID AS IF SOMETHING AWFUL MIGHT HAPPEN: NOT AT ALL

## 2023-11-06 NOTE — PROGRESS NOTES
"Preceptor attestation:  Vital signs reviewed: /85 (BP Location: Right arm, Patient Position: Sitting, Cuff Size: Adult Large)   Pulse 89   Temp 97.8  F (36.6  C) (Oral)   Resp 18   Ht 1.61 m (5' 3.39\")   Wt 103.1 kg (227 lb 3.2 oz)   SpO2 98%   BMI 39.76 kg/m      Patient seen, evaluated, and discussed with the resident.  I verified the content of the note, which accurately reflects my assessment of the patient and the plan of care.    Supervising physician: Chichi Victoria MD  [facility]  "

## 2023-11-06 NOTE — PROGRESS NOTES
Assessment & Plan     Cervical cancer screening  Patient has had abnormal Pap smears in the past with colposcopy September 2015 at [facility].  Has not had any testing since 2017.  - Pap Screen with HPV - recommended age 30 - 65 years    Fatigue  Has noted 1 week of fatigue which does correlate with her quitting nicotine and is also around the time of her period which started 4 days ago and is ending today.  Sleep has been unchanged.  She typically has coffee about 4 days a week but does not have the last 2 days.  Her last TSH was normal 10 months ago.  No thyroid enlargement or nodules palpated on exam.  Given the acuity of this concern that correlates with her nicotine cessation I would like her to follow-up in a few weeks to see if the symptoms have changed further out from her quitting.    PCOS  Patient complaining of symptoms of hirsutism.  BMI is currently 39.76 kg/m .  Would like to discuss diagnosis and treatment of this especially to address the hair growth.  She will follow-up to discuss this in more detail and get further testing.     Nicotine/Tobacco Cessation:  She reports that she has been smoking vaping device. She has never been exposed to tobacco smoke. She has never used smokeless tobacco.  Nicotine/Tobacco Cessation Plan:   Patient is currently vaping and has been weaning down the nicotine concentration and is currently at 0 nicotine.  Based on these efforts and told her to be available if she needs any additional help for quitting.  She is quitting due to hopes of having a breast reduction surgery.        Gildardo Minor MD  [facility]    Bismark Sweet is a 33 year old, presenting for the following health issues:  Gyn Exam (Pap )      11/6/2023     4:33 PM   Additional Questions   Roomed by oli   Accompanied by self       HPI       Fatigue over the last 4 days. Happens to correlate with her period. Quitting vaping with nicotine over the past week. Has coffee 4  "days per week. Has not had coffee the last 2 days. Sleep has been normal. Sleeping 6 hours per night. Daughter wakes her up occasionally at night. Increased appetite after quitting nicotine.     Review of Systems   Constitutional:  Positive for fatigue. Negative for chills and fever.   HENT:  Negative for congestion.    Respiratory:  Negative for cough and shortness of breath.    Cardiovascular:  Positive for chest pain.   Gastrointestinal:  Negative for constipation, diarrhea, nausea and vomiting.   Genitourinary:  Negative for difficulty urinating and dysuria.   Musculoskeletal:  Negative for myalgias.            Objective    /85 (BP Location: Right arm, Patient Position: Sitting, Cuff Size: Adult Large)   Pulse 89   Temp 97.8  F (36.6  C) (Oral)   Resp 18   Ht 1.61 m (5' 3.39\")   Wt 103.1 kg (227 lb 3.2 oz)   SpO2 98%   BMI 39.76 kg/m    Body mass index is 39.76 kg/m .  Physical Exam   GENERAL: healthy, alert and no distress  NECK: no adenopathy, no asymmetry, masses, or scars and thyroid normal to palpation   (female): normal female external genitalia, normal urethral meatus , vaginal mucosa pink, moist, well rugated, and cervical polyp possible, not well visualized.  SKIN: Hirsutism present.    No results found for this or any previous visit (from the past 24 hour(s)).    ----- Service Performed and Documented by Resident or Fellow ------              "

## 2023-11-08 LAB
BKR LAB AP GYN ADEQUACY: NORMAL
BKR LAB AP GYN INTERPRETATION: NORMAL
BKR LAB AP HPV REFLEX: NORMAL
BKR LAB AP PREVIOUS ABNORMAL: NORMAL
PATH REPORT.COMMENTS IMP SPEC: NORMAL
PATH REPORT.COMMENTS IMP SPEC: NORMAL
PATH REPORT.RELEVANT HX SPEC: NORMAL

## 2023-11-10 LAB
HUMAN PAPILLOMA VIRUS 16 DNA: NEGATIVE
HUMAN PAPILLOMA VIRUS 18 DNA: NEGATIVE
HUMAN PAPILLOMA VIRUS FINAL DIAGNOSIS: NORMAL
HUMAN PAPILLOMA VIRUS OTHER HR: NEGATIVE

## 2023-12-04 NOTE — PROGRESS NOTES
Pt called and requested to get on the schedule for surgery.  They will be returning for a nicotine test at the end of December for clearance.    Pt was scheduled at [facility] for 2/19 and notified that if they do not clear their nicotine testing this date my be canceled/moved. Pt acknowledged understanding.    Went over details of confirmation letter and need for a pre op physical prior to surgery. Pt stated she would schedule this. Confirmation letter sent via [employer].

## 2023-12-05 ENCOUNTER — OFFICE VISIT (OUTPATIENT)
Dept: FAMILY MEDICINE | Facility: CLINIC | Age: 33
End: 2023-12-05
Payer: COMMERCIAL

## 2023-12-05 VITALS
OXYGEN SATURATION: 98 % | DIASTOLIC BLOOD PRESSURE: 113 MMHG | WEIGHT: 228.4 LBS | TEMPERATURE: 98.1 F | RESPIRATION RATE: 18 BRPM | HEART RATE: 66 BPM | HEIGHT: 63 IN | BODY MASS INDEX: 40.47 KG/M2 | SYSTOLIC BLOOD PRESSURE: 151 MMHG

## 2023-12-05 DIAGNOSIS — Z02.89 ENCOUNTER FOR COMPLETION OF FORM WITH PATIENT: ICD-10-CM

## 2023-12-05 DIAGNOSIS — G89.29 OTHER CHRONIC PAIN: Primary | ICD-10-CM

## 2023-12-05 DIAGNOSIS — D84.9 IMMUNOSUPPRESSED STATUS (H): ICD-10-CM

## 2023-12-05 DIAGNOSIS — M79.7 FIBROMYALGIA: ICD-10-CM

## 2023-12-05 DIAGNOSIS — M06.9 RHEUMATOID ARTHRITIS, INVOLVING UNSPECIFIED SITE, UNSPECIFIED WHETHER RHEUMATOID FACTOR PRESENT (H): ICD-10-CM

## 2023-12-05 PROCEDURE — 99214 OFFICE O/P EST MOD 30 MIN: CPT | Mod: GC

## 2023-12-05 NOTE — PATIENT INSTRUCTIONS
Thank you for choosing [facility] for your care. A hand/wrist orthopedic doctor should be reaching out to you to schedule an appointment for evaluation of your wrist pain. You should also reach out to [place] [facility] for follow-up of your Stelara prescription. If your symptoms fail to improve, please return for evaluation.  Amaury Garcia, DO

## 2023-12-05 NOTE — PROGRESS NOTES
"  Assessment & Plan     Rheumatoid arthritis, involving unspecified site, unspecified whether rheumatoid factor present (H)  Other chronic pain  Immunosuppressed status (H24)  Fibromyalgia  Patient states that the Monday before last she woke up with increasing wrist pain in her right wrist, associated stiffness lasting 2 hours.  Patient states that she is taking Tylenol with minimal relief, is unable to take NSAIDs due to her other chronic conditions including Crohn's.  - diclofenac (VOLTAREN) 1 % topical gel  Dispense: 150 g; Refill: 0  - Orthopedic  Referral  - Advised patient to schedule subsequent follow-up for Stelara with [facility] as she missed last appt    Encounter for completion of form with patient  Patient requested medical opinion form be filled out to ensure she continues to have insurance coverage.  - Form filled, copied, marked to be scanned into patient's chart.       BMI:   Estimated body mass index is 40.46 kg/m  as calculated from the following:    Height as of this encounter: 1.6 m (5' 3\").    Weight as of this encounter: 103.6 kg (228 lb 6.4 oz).     Return if symptoms worsen or fail to improve, for Follow up.    Amaury Garcia [facility] PAULETTE Sweet is a 33 year old, presenting for the following health issues:  Musculoskeletal Problem (Right hand)      12/5/2023    11:34 AM   Additional Questions   Roomed by oli   Accompanied by self       HPI   Patient with past medical history of rheumatoid arthritis, fibromyalgia, chronic pain presenting for evaluation of right wrist pain that began last Monday when she woke up. States she has been driving and needs her power steering repaired, requiring her to work harder to steer and likely aggravating existing connective tissue conditions.  Patient works as a , is unable to work full work week secondary to rheumatoid arthritis, fibromyalgia, coming to clinic for evaluation of her pain as well as for a " "medical opinion form to be filled to continue her insurance coverage.    Stiffness last Monday morning lasting about 2 hours, slowly improved.     No recent illness, fever, chills, n/v/d, chest pain, shortness of breath.    Missed appointment with ROHIT DOUGLAS for Stelara evaluation.    Review of Systems   Constitutional, HEENT, cardiovascular, pulmonary, gi and gu systems are negative, except as otherwise noted.      Objective    BP (!) 151/113   Pulse 66   Temp 98.1  F (36.7  C) (Oral)   Resp 18   Ht 1.6 m (5' 3\")   Wt 103.6 kg (228 lb 6.4 oz)   LMP 11/27/2023   SpO2 98%   BMI 40.46 kg/m    Body mass index is 40.46 kg/m .  Physical Exam   Constitutional: awake, alert, cooperative, no apparent distress, and appears stated age  Eyes: Lids and lashes normal, pupils equal, sclera clear, conjunctiva normal  ENT: Normocephalic, without obvious abnormality, atraumatic, external ears without lesions, oral pharynx with moist mucous membranes  Respiratory: No increased work of breathing, good air exchange, clear to auscultation bilaterally, no crackles or wheezing  Cardiovascular: Regular rate and rhythm, no murmur noted  Skin: no bruising or bleeding and normal skin color, texture, turgor on exposed skin  Psych: Appropriate mood and affect.  MSK: Patient has significant pain with resisted wrist extension on the right side.  States that there is some radiation, weakness into forearm.  Denies any restriction of gross movement, states she is able to move fingers normally.    ----- Service Performed and Documented by Resident or Fellow ------            Amaury Garcia DO PGY-1  [facility]  12/05/23    Precepted today with Dr. Misael Montano.    "

## 2023-12-06 NOTE — PROGRESS NOTES
Preceptor Attestation:    I discussed the patient with the resident and evaluated the patient in person at time of visit. I have verified the content of the note, which accurately reflects my assessment of the patient and the plan of care.   Supervising Physician:  Misael Montano MD.

## 2023-12-11 NOTE — PROGRESS NOTES
ASSESSMENT & PLAN    Kee was seen today for pain.    Diagnoses and all orders for this visit:    Tendinitis of flexor tendon of right hand  -     Orthopedic  Referral  -     Wrist/Arm Supplies Order Wrist Brace; Right; non-thumb spica  -     Hand Therapy Referral; Future      This issue is acute and Worsening. Kee presents to clinic today to discuss her acute right wrist pain.  Her previous radiographs from the emergency department were reviewed and show no signs of osteoarthritis or other bony abnormalities.  On examination today she has pain with resisted wrist flexion and pronation, and tenderness to palpation over the volar, ulnar aspect of the wrist, most suspicious for tendinitis of the flexor tendons of the wrist.  Reassuringly, she has full strength of wrist and finger flexion, so it is unlikely that she has any large tears.  Limited diagnostic ultrasound was performed today did not show any areas of acute inflammation surrounding any flexor tendons.  We discussed the above findings and there likely association with her lack of power steering in her car.  We discussed the possible treatment options including bracing, hand therapy, corticosteroid injections, and referral to orthopedic surgery.  Given there is no acute inflammatory signs seen on ultrasound today, I do not think a a corticosteroid injection is needed today.  We also discussed that there is no bony source of her pain and that she has no evidence of either osteoarthritis or rheumatoid arthritis on her radiographs today.  We determined the following plan:  -Wrist splint given to patient today, she will wear it continuously for the next week and then as needed afterwards  -Hand therapy referral placed today  -She will continue to use over-the-counter Tylenol, Voltaren gel for pain  -She can follow-up in our clinic in 4 to 6 weeks.  If not improving at that time could consider a diagnostic lidocaine injection with repeat examination  "to assess for pain relief versus proceeding with MRI    DO ZACH Aguayo [facility] CLINIC [facility]    -----  Chief Complaint   Patient presents with    Right Wrist - Pain       SUBJECTIVE  Kee Phipps is a/an 33 year old female who is seen in consultation at the request of  Misael Montano M.D. for evaluation of right wrist pain.     The patient is seen by themselves.  The patient is Left handed    Onset: 2 week(s) ago. Reports insidious onset without acute precipitating event.   Location of Pain: right dorsal aspect of wrist and when wrist is flexed there is shooting pain into her shoulder  Worsened by: supination and pronation, wrist flexion and extension  Better with: nothing  Treatments tried: rest/activity avoidance, ice, xray on 12/12/2023 and topical cream  Associated symptoms: numbness and tingling    Orthopedic/Surgical history: NO  Social History/Occupation: not working      REVIEW OF SYSTEMS:  Review of systems negative unless mentioned in HPI     OBJECTIVE:  Ht 1.6 m (5' 3\")   Wt 105.2 kg (232 lb)   LMP 11/27/2023   BMI 41.10 kg/m     General: healthy, alert and in no distress  Skin: no suspicious lesions or rash.  CV: distal perfusion intact   Resp: normal respiratory effort without conversational dyspnea   Psych: normal mood and affect  Gait: NORMAL  Neuro: Normal light sensory exam of RU extremity     Wrist Exam    Left  Right   Inspection No atrophy, erythema , echymosis, or deformity  No atrophy, erythema , echymosis, or deformity    Palpation         Snuffbox tenderness None None       Tenderness over    No tenderness to palpation of radial styloid, DRUJ, TFCC, scaphoid TTP over ulnar styloid  No tenderness to palpation of radial styloid, DRUJ, TFCC, scaphoid   Range of Motion (wrist)        Flexion @ Wrist  0-60  0-60       Radial Deviation  0-20  0-20       Ulnar Deviation 0-30  0-30       Extension @ Wrist  0-60  0-60    Strength Intact all planes  " Intact all planes   Painful with resisted pronation, wrist flexion   Special Tests      TFCC Grind Negative Negative   Tinel Negative Negative   Finkelstein Negative Negative   CMC Grind Negative Negative   Able to make OK sign (AIN) Negative Negative    Cordero Shift Negative  Negative   Vascular   Intact  Intact    Sensation Intact to median, ulnar, and radial nerve distributions          RADIOLOGY:  Final results and radiologist's interpretation, available in the [facility] health record.  Images were reviewed with the patient in the office today.  My personal interpretation of the performed imaging: Radiographs taken from 12/12/2023 were reviewed normal joint spacing of the hand and wrist.  No acute bony abnormalities.     I personally performed a limited diagnostic ultrasound of the patient's volar wrist in clinic today. This showed normal flexor tendons without any signs of acute tenosynovitis. Permanent images are stored in the patient's record.

## 2023-12-13 ENCOUNTER — TELEPHONE (OUTPATIENT)
Dept: FAMILY MEDICINE | Facility: CLINIC | Age: 33
End: 2023-12-13

## 2023-12-13 ENCOUNTER — OFFICE VISIT (OUTPATIENT)
Dept: FAMILY MEDICINE | Facility: CLINIC | Age: 33
End: 2023-12-13
Payer: COMMERCIAL

## 2023-12-13 ENCOUNTER — OFFICE VISIT (OUTPATIENT)
Dept: ORTHOPEDICS | Facility: CLINIC | Age: 33
End: 2023-12-13
Attending: FAMILY MEDICINE
Payer: COMMERCIAL

## 2023-12-13 VITALS
OXYGEN SATURATION: 96 % | RESPIRATION RATE: 18 BRPM | BODY MASS INDEX: 41.11 KG/M2 | WEIGHT: 232 LBS | HEIGHT: 63 IN | SYSTOLIC BLOOD PRESSURE: 139 MMHG | HEART RATE: 70 BPM | DIASTOLIC BLOOD PRESSURE: 96 MMHG

## 2023-12-13 VITALS — WEIGHT: 232 LBS | HEIGHT: 63 IN | BODY MASS INDEX: 41.11 KG/M2

## 2023-12-13 DIAGNOSIS — I10 BENIGN ESSENTIAL HYPERTENSION: ICD-10-CM

## 2023-12-13 DIAGNOSIS — M77.8 TENDINITIS OF FLEXOR TENDON OF RIGHT HAND: Primary | ICD-10-CM

## 2023-12-13 DIAGNOSIS — G43.109 MIGRAINE WITH AURA AND WITHOUT STATUS MIGRAINOSUS, NOT INTRACTABLE: Primary | ICD-10-CM

## 2023-12-13 PROCEDURE — 99204 OFFICE O/P NEW MOD 45 MIN: CPT | Mod: 25 | Performed by: STUDENT IN AN ORGANIZED HEALTH CARE EDUCATION/TRAINING PROGRAM

## 2023-12-13 PROCEDURE — 99214 OFFICE O/P EST MOD 30 MIN: CPT | Mod: GC

## 2023-12-13 PROCEDURE — 76882 US LMTD JT/FCL EVL NVASC XTR: CPT | Mod: RT | Performed by: STUDENT IN AN ORGANIZED HEALTH CARE EDUCATION/TRAINING PROGRAM

## 2023-12-13 RX ORDER — SUMATRIPTAN 50 MG/1
50 TABLET, FILM COATED ORAL
Qty: 30 TABLET | Refills: 0 | Status: SHIPPED | OUTPATIENT
Start: 2023-12-13 | End: 2024-06-26

## 2023-12-13 RX ORDER — METOPROLOL SUCCINATE 50 MG/1
50 TABLET, EXTENDED RELEASE ORAL DAILY
Qty: 30 TABLET | Refills: 1 | Status: SHIPPED | OUTPATIENT
Start: 2023-12-13 | End: 2023-12-20 | Stop reason: SINTOL

## 2023-12-13 NOTE — LETTER
12/13/2023         RE: Kee Phipps  [address]  [place] [address]        Dear Colleague,    Thank you for referring your patient, Kee Phipps, to the [facility]TS MEDICINE CLINIC [facility]. Please see a copy of my visit note below.    ASSESSMENT & PLAN    Kee was seen today for pain.    Diagnoses and all orders for this visit:    Tendinitis of flexor tendon of right hand  -     Orthopedic  Referral  -     Wrist/Arm Supplies Order Wrist Brace; Right; non-thumb spica  -     Hand Therapy Referral; Future      This issue is acute and Worsening. Kee presents to clinic today to discuss her acute right wrist pain.  Her previous radiographs from the emergency department were reviewed and show no signs of osteoarthritis or other bony abnormalities.  On examination today she has pain with resisted wrist flexion and pronation, and tenderness to palpation over the volar, ulnar aspect of the wrist, most suspicious for tendinitis of the flexor tendons of the wrist.  Reassuringly, she has full strength of wrist and finger flexion, so it is unlikely that she has any large tears.  Limited diagnostic ultrasound was performed today did not show any areas of acute inflammation surrounding any flexor tendons.  We discussed the above findings and there likely association with her lack of power steering in her car.  We discussed the possible treatment options including bracing, hand therapy, corticosteroid injections, and referral to orthopedic surgery.  Given there is no acute inflammatory signs seen on ultrasound today, I do not think a a corticosteroid injection is needed today.  We also discussed that there is no bony source of her pain and that she has no evidence of either osteoarthritis or rheumatoid arthritis on her radiographs today.  We determined the following plan:  -Wrist splint given to patient today, she will wear it continuously for the next week and then as needed  "afterwards  -Hand therapy referral placed today  -She will continue to use over-the-counter Tylenol, Voltaren gel for pain  -She can follow-up in our clinic in 4 to 6 weeks.  If not improving at that time could consider a diagnostic lidocaine injection with repeat examination to assess for pain relief versus proceeding with MRI    DO ZACH Aguayo [facility] SPORTS MEDICINE [facility]    -----  Chief Complaint   Patient presents with     Right Wrist - Pain       SUBJECTIVE  Kee Phipps is a/an 33 year old female who is seen in consultation at the request of  Misael Montano M.D. for evaluation of right wrist pain.     The patient is seen by themselves.  The patient is Left handed    Onset: 2 week(s) ago. Reports insidious onset without acute precipitating event.   Location of Pain: right dorsal aspect of wrist and when wrist is flexed there is shooting pain into her shoulder  Worsened by: supination and pronation, wrist flexion and extension  Better with: nothing  Treatments tried: rest/activity avoidance, ice, xray on 12/12/2023 and topical cream  Associated symptoms: numbness and tingling    Orthopedic/Surgical history: NO  Social History/Occupation: not working      REVIEW OF SYSTEMS:  Review of systems negative unless mentioned in HPI     OBJECTIVE:  Ht 1.6 m (5' 3\")   Wt 105.2 kg (232 lb)   LMP 11/27/2023   BMI 41.10 kg/m     General: healthy, alert and in no distress  Skin: no suspicious lesions or rash.  CV: distal perfusion intact   Resp: normal respiratory effort without conversational dyspnea   Psych: normal mood and affect  Gait: NORMAL  Neuro: Normal light sensory exam of RU extremity     Wrist Exam    Left  Right   Inspection No atrophy, erythema , echymosis, or deformity  No atrophy, erythema , echymosis, or deformity    Palpation         Snuffbox tenderness None None       Tenderness over    No tenderness to palpation of radial styloid, DRUJ, TFCC, scaphoid TTP over " ulnar styloid  No tenderness to palpation of radial styloid, DRUJ, TFCC, scaphoid   Range of Motion (wrist)        Flexion @ Wrist  0-60  0-60       Radial Deviation  0-20  0-20       Ulnar Deviation 0-30  0-30       Extension @ Wrist  0-60  0-60    Strength Intact all planes  Intact all planes   Painful with resisted pronation, wrist flexion   Special Tests      TFCC Grind Negative Negative   Tinel Negative Negative   Finkelstein Negative Negative   CMC Grind Negative Negative   Able to make OK sign (AIN) Negative Negative    Cordero Shift Negative  Negative   Vascular   Intact  Intact    Sensation Intact to median, ulnar, and radial nerve distributions          RADIOLOGY:  Final results and radiologist's interpretation, available in the [facility] health record.  Images were reviewed with the patient in the office today.  My personal interpretation of the performed imaging: Radiographs taken from 12/12/2023 were reviewed normal joint spacing of the hand and wrist.  No acute bony abnormalities.     I personally performed a limited diagnostic ultrasound of the patient's volar wrist in clinic today. This showed normal flexor tendons without any signs of acute tenosynovitis. Permanent images are stored in the patient's record.         Again, thank you for allowing me to participate in the care of your patient.        Sincerely,        Nghia Dotson, DO

## 2023-12-13 NOTE — PATIENT INSTRUCTIONS
Thank you for coming into clinic today!     your prescriptions from the pharmacy  Schedule appointment at  for 4 week for follow up  Call [facility] at [phone] with questions or concerns    Take care,  Eleni Gold MD

## 2023-12-13 NOTE — TELEPHONE ENCOUNTER
[facility] phone call message- general phone call:    Reason for call:     Patient would like Nurse Pretty to return her phone call in regard to her meds.    Return call needed: Yes    OK to leave a message on voice mail? Yes    Primary language: English      needed? No    Call taken on December 13, 2023 at 11:17 AM by Chey Segura

## 2023-12-13 NOTE — PROGRESS NOTES
Assessment & Plan     Migraine with aura and without status migrainosus, not intractable  Benign essential hypertension  33-year-old female with history of Crohn's, preeclampsia in prior pregnancy, rheumatoid arthritis, and migraines among other diagnoses presents for continued migraine headache and elevated blood pressures.  She is recently seen the emergency department 1 day ago for the symptoms with some mild improvement with administration of sumatriptan in the ED and overall reassuring workup there.  Symptoms likely migraine headache with secondary hypertension.  Low suspicion for hypertensive urgency or emergency given degree of hypertension.  Will initiate metoprolol both for some blood pressure control in addition to migraine prophylaxis.  Also prescribe Imitrex as needed for abortive migraine therapy.  Patient to follow-up in 1 month to monitor blood pressure and symptoms related to migraine.  - SUMAtriptan (IMITREX) 50 MG tablet  Dispense: 30 tablet; Refill: 0  - metoprolol succinate ER (TOPROL XL) 50 MG 24 hr tablet  Dispense: 30 tablet; Refill: 1    Return in about 1 week (around 12/20/2023) for Follow up.    Eleni Gold MD  [facility]   Kee is a 33 year old, presenting for the following health issues:  Hospital F/U (Went to er last night due to headache. Pain still slight ly there today)      12/13/2023     4:38 PM   Additional Questions   Roomed by ML   Accompanied by self       HPI   Patient presents for follow-up of hypertension and migraine headache.  She was seen in the ED yesterday for headache in addition to findings of elevated blood pressure.  She has a history of migraine headaches but has been out of her prior medication that she is used to treat these for some time.  In the ED she was treated with 25 mg of sumatriptan with some relief in her pain.  She was noted to be hypertensive to 150-160 systolic.  Her EKG and other workup was  "reassuring.    Patient states her current headache is a 5/10 in severity and she endorses a mild visual aura.  She states her headache pain is worse with changes in light and sound.  She endorses mild nausea without any vomiting.    Patient shares that she has a history of preeclampsia and has previously been on nifedipine and labetalol.  She states with her high blood pressure she is taking labetalol yesterday and today to decrease her blood pressure.    She has a blood pressure cuff at home however she does not usually take her blood pressure.    Reports last menstrual period within the last 2 weeks.  Does not suspect that she is pregnant.      Objective    BP (!) 139/96 (BP Location: Left arm, Patient Position: Sitting, Cuff Size: Adult Large)   Pulse 70   Resp 18   Ht 1.6 m (5' 3\")   Wt 105.2 kg (232 lb)   LMP 11/27/2023   SpO2 96%   BMI 41.10 kg/m    Body mass index is 41.1 kg/m .  Physical Exam   Constitutional: awake, alert, cooperative, no apparent distress  ENT: Normocephalic, without obvious abnormality, atraumatic  Respiratory: No increased work of breathing, good air exchange, clear to auscultation bilaterally, no crackles or wheezing  Cardiovascular: Regular rate and rhythm  Neurologic: Awake, alert, oriented to name, place and time.  Cranial nerves II-XII are grossly intact.    ----- Service Performed and Documented by Resident or Fellow ------        "

## 2023-12-13 NOTE — TELEPHONE ENCOUNTER
Pt states that she went to Urgent Care and ER yesterday and was told her that her BP was high.  She states that she has a migraine that she cannot get rid of and would like a rx for migraine medication.  Discussed with pt that she needs to be seen in clinic to sudarshan BP and discuss treatment for hypertension and migraine.  Pt verbalized understanding and can come after her Ortho appt this afternoon.  Appt scheduled with Dr Gold at 4:30 PM.  Routed note to Dr Gold./Pretty Rios RN BSN

## 2023-12-19 NOTE — PROGRESS NOTES
Assessment & Plan     Current non-smoker  Patient previously was smoking, currently has been cutting down and is no longer using her vape cartridge.  Per request from her surgeon will be performing a breast reduction and tubal ligation coming up February 19, patient requires a nicotine urine lab level to ensure no current nicotine in system prior to surgery.  Lab will be a send out, may take a little while to come back.  - NICOTINE AND COTININE, URINE; Future  - Nicotine and Mets, Urn, Quant; Future  - Nicotine and Mets, Urn, Quant    Benign essential hypertension  Patient previously had been given metoprolol both for blood pressure control and for chronic migraine suppression, however patient had some confusion and was taking the sumatriptan she was prescribed as needed daily which was resulting in an excessive rise in her blood pressure.  We discussed proper use of sumatriptan hand, discussed the excess sedation and tiredness she was feeling with the metoprolol.  Patient does not feel like it was properly preventing many of her headaches and this time span and wants to try something else.  For now, discontinuing metoprolol, starting her on chlorthalidone 25 mg.  Plan to have her check back in in 1 to 2 weeks for potassium level.  At that time, we will talk about starting a different preventative migraine medicine.  - chlorthalidone (HYGROTON) 25 MG tablet; Take 1 tablet (25 mg) by mouth daily  -Discontinue metoprolol  -Use sumatriptan only as needed at the beginning of the headache  -Return in 1 to 2 weeks for BMP  -Consider other suppressive medicine for headache such as tricyclic antidepressant, other beta-blocker, or topiramate    Diagnosis or treatment significantly limited by social determinants of health - upcoming surgery, toddler in the home  Ordering of each unique test  Prescription drug management  20 minutes spent by me on the date of the encounter doing chart review, history and exam, documentation  "and further activities per the note       BMI:   Estimated body mass index is 41.56 kg/m  as calculated from the following:    Height as of this encounter: 1.6 m (5' 3\").    Weight as of this encounter: 106.4 kg (234 lb 9.6 oz).   Weight management plan: Patient was referred to their PCP to discuss a diet and exercise plan.    MEDICATIONS:   Orders Placed This Encounter   Medications    chlorthalidone (HYGROTON) 25 MG tablet     Sig: Take 1 tablet (25 mg) by mouth daily     Dispense:  30 tablet     Refill:  0     Medications Discontinued During This Encounter   Medication Reason    metoprolol succinate ER (TOPROL XL) 50 MG 24 hr tablet Side effects          - Continue other medications without change    No follow-ups on file.    Mounika Stewart MD  [facility] PAULETTE Sweet is a 33 year old, presenting for the following health issues:  LAB REQUEST (For future surgery in [facility] - urine test for nicotine - ) and Medication Problem (Mewd change request - current bp meds make her fall asleep too much and feel very fatigue - thinking it might be because it is a beta blocker - hoping to change meds )        12/20/2023    10:55 AM   Additional Questions   Roomed by E. her MA   Accompanied by Self       HPI   Comes in today for lab work and increasing fatigue.    Patient has an upcoming surgical breast reduction and tubal ligation coming up 2/19/24.  Surgeon told her to come to her PCP and obtain a urine nicotine level.  Per patient report, she has quit vaping and has been tolerating it well.  Does not report any excess coughing, phlegm production, jitteriness, moodiness, does note some increasing fatigue.    Later to that, patient was started on metoprolol and sumatriptan a couple of weeks ago for migraine and blood pressure management and found that she has been experiencing excessive sleepiness.  Wondering if she could have the metoprolol changed or stopped to something else.  Upon " "further discussion, patient was mistakenly taking the sumatriptan daily instead of as needed, and was taking the metoprolol daily as well.  Upon discussion, patient amenable to switching to a different blood pressure management agent, will continue to monitor her migraine levels, and we may consider doing a different preventative agent in the future.      Review of Systems   Constitutional, HEENT, cardiovascular, pulmonary, gi and gu systems are negative, except as otherwise noted.      Objective    BP (!) 157/105   Pulse 57   Temp (!) 32.4  F (0.2  C) (Tympanic)   Resp 16   Ht 1.6 m (5' 3\")   Wt 106.4 kg (234 lb 9.6 oz)   LMP 11/27/2023   SpO2 98%   BMI 41.56 kg/m    Body mass index is 41.56 kg/m .  Physical Exam   GENERAL: healthy, alert and no distress  RESP: lungs clear to auscultation - no rales, rhonchi or wheezes  CV: regular rate and rhythm, normal S1 S2, no S3 or S4, no murmur, click or rub, no peripheral edema and peripheral pulses strong  MS: no gross musculoskeletal defects noted, no edema  SKIN: no suspicious lesions or rashes  PSYCH: mentation appears normal, affect normal/bright    No results found for this or any previous visit (from the past 24 hour(s)).    ----- Service Performed and Documented by Resident or Fellow ------              "

## 2023-12-20 ENCOUNTER — OFFICE VISIT (OUTPATIENT)
Dept: FAMILY MEDICINE | Facility: CLINIC | Age: 33
End: 2023-12-20
Payer: COMMERCIAL

## 2023-12-20 VITALS
OXYGEN SATURATION: 98 % | TEMPERATURE: 32.4 F | HEIGHT: 63 IN | HEART RATE: 57 BPM | DIASTOLIC BLOOD PRESSURE: 105 MMHG | WEIGHT: 234.6 LBS | RESPIRATION RATE: 16 BRPM | SYSTOLIC BLOOD PRESSURE: 157 MMHG | BODY MASS INDEX: 41.57 KG/M2

## 2023-12-20 DIAGNOSIS — Z78.9 CURRENT NON-SMOKER: Primary | ICD-10-CM

## 2023-12-20 DIAGNOSIS — I10 BENIGN ESSENTIAL HYPERTENSION: ICD-10-CM

## 2023-12-20 PROBLEM — K59.00 CONSTIPATION: Status: ACTIVE | Noted: 2023-12-20

## 2023-12-20 PROBLEM — R87.619 ATYPICAL GLANDULAR CELLS ON CERVICAL PAP SMEAR: Status: ACTIVE | Noted: 2023-12-20

## 2023-12-20 PROBLEM — K63.5 POLYP OF COLON: Status: ACTIVE | Noted: 2017-01-16

## 2023-12-20 PROBLEM — K50.00 CROHN'S DISEASE OF ILEUM (H): Status: ACTIVE | Noted: 2017-01-03

## 2023-12-20 PROBLEM — M79.7 FIBROMYALGIA: Status: ACTIVE | Noted: 2017-01-03

## 2023-12-20 PROBLEM — E55.9 VITAMIN D DEFICIENCY: Status: ACTIVE | Noted: 2017-10-13

## 2023-12-20 PROCEDURE — 99000 SPECIMEN HANDLING OFFICE-LAB: CPT

## 2023-12-20 PROCEDURE — G0480 DRUG TEST DEF 1-7 CLASSES: HCPCS | Mod: 90

## 2023-12-20 PROCEDURE — 99214 OFFICE O/P EST MOD 30 MIN: CPT | Mod: GC

## 2023-12-20 RX ORDER — CHLORTHALIDONE 25 MG/1
25 TABLET ORAL DAILY
Qty: 30 TABLET | Refills: 0 | Status: SHIPPED | OUTPATIENT
Start: 2023-12-20 | End: 2024-02-05

## 2023-12-20 NOTE — PROGRESS NOTES
Preceptor Attestation:    I discussed the patient with the resident and evaluated the patient in person. I have verified the content of the note, which accurately reflects my assessment of the patient and the plan of care.   Supervising Physician:  Es Perry MD

## 2023-12-21 ENCOUNTER — TELEPHONE (OUTPATIENT)
Dept: FAMILY MEDICINE | Facility: CLINIC | Age: 33
End: 2023-12-21

## 2023-12-21 NOTE — TELEPHONE ENCOUNTER
[facility] phone call message- general phone call:    Reason for call: the Pt called to ask for Pretty to call her and would like a call back and the medication she was prescribed yesterday was not given to her and the Pharmacy they said they are waiting for the Dr to authorize it     Return call needed: Yes    OK to leave a message on voice mail? Yes    Primary language: English      needed? No    Call taken on December 21, 2023 at 11:33 AM by Nahum Lawrence

## 2023-12-21 NOTE — TELEPHONE ENCOUNTER
Spoke with pt about medication. Pt states she was unable to  her prescription for chlorthalidone because [facility] stated it needed further authorization. Writer called [facility] to inquire and pharmacist states that there was a mistake on their end and the prescription will be ready for  shortly. Informed pt that she can  prescription today. Pt has no other questions.  HOMERO Diaz, BSN

## 2023-12-22 ENCOUNTER — TELEPHONE (OUTPATIENT)
Dept: FAMILY MEDICINE | Facility: CLINIC | Age: 33
End: 2023-12-22
Payer: COMMERCIAL

## 2023-12-22 NOTE — TELEPHONE ENCOUNTER
"Call placed back to patient she states she is unsure as to why her urine test is not back yet. Counseled that often times some tests take more then one day to run and the test is \"In Process\" now. Agreed that writer will send a message to her via [employer] once results are in and/or give an estimated time frame of their return when writer can find that information. Pt agrees to this plan. HOMERO Wilcox     "

## 2023-12-22 NOTE — TELEPHONE ENCOUNTER
[facility] phone call message- patient requesting results:    Test: Lab    Date of test: 12/20/2023    Additional Comments: the paitent called to ask for a nurse to call her back     OK to leave a message on voice mail? Yes    Primary language: English      needed? No    Call taken on December 22, 2023 at 11:11 AM by Nahum Lawrence

## 2023-12-23 LAB
ANABASINE UR-MCNC: <5 NG/ML
COTININE UR-MCNC: <15 NG/ML
NICOTINE UR-MCNC: <15 NG/ML
TRANS-3-OH-COTININE UR-MCNC: <50 NG/ML

## 2023-12-26 ENCOUNTER — TELEPHONE (OUTPATIENT)
Dept: FAMILY MEDICINE | Facility: CLINIC | Age: 33
End: 2023-12-26
Payer: COMMERCIAL

## 2023-12-26 ENCOUNTER — TELEPHONE (OUTPATIENT)
Dept: PLASTIC SURGERY | Facility: CLINIC | Age: 33
End: 2023-12-26
Payer: COMMERCIAL

## 2023-12-26 NOTE — TELEPHONE ENCOUNTER
Pt called wondering if the Nicotine and Mets test was positive?  She states that she tried calling the surgeon to ask if she can have her surgery and no one is calling her back.  Discussed with pt the results were negative.  Pt states that she does not smoke but was exposed to second hand smoke./Pretty Rios RN BSN

## 2023-12-26 NOTE — TELEPHONE ENCOUNTER
[facility] phone call message- general phone call:    Reason for call: the Pt called to talk to Pretty about a test she had done and would like a call back     Return call needed: Yes    OK to leave a message on voice mail? Yes    Primary language: English      needed? No    Call taken on December 26, 2023 at 2:06 PM by Nahum Lawrence

## 2023-12-26 NOTE — TELEPHONE ENCOUNTER
[facility] Call Center    Phone Message    May a detailed message be left on voicemail: yes     Reason for Call: Other: Please call patient back to discuss nicotine urine test from 12.20.23.     Action Taken: Message routed to:  [facility] plas surg    Travel Screening: Not Applicable

## 2023-12-27 NOTE — TELEPHONE ENCOUNTER
Call placed to patient to let her know she passed her urine nicotine test. Patient was very appreciative of call back. Surgery is scheduled for 02/19/2024, patient would like to be added to wait list if sooner surgical date becomes available.     Kitty Tyler RN on 12/27/2023 at 8:06 AM

## 2024-01-10 ENCOUNTER — OFFICE VISIT (OUTPATIENT)
Dept: FAMILY MEDICINE | Facility: CLINIC | Age: 34
End: 2024-01-10
Payer: COMMERCIAL

## 2024-01-10 VITALS
HEIGHT: 63 IN | DIASTOLIC BLOOD PRESSURE: 87 MMHG | BODY MASS INDEX: 40.04 KG/M2 | SYSTOLIC BLOOD PRESSURE: 133 MMHG | OXYGEN SATURATION: 99 % | HEART RATE: 95 BPM | WEIGHT: 226 LBS | TEMPERATURE: 97.8 F | RESPIRATION RATE: 18 BRPM

## 2024-01-10 DIAGNOSIS — M79.10 MYALGIA: ICD-10-CM

## 2024-01-10 DIAGNOSIS — J02.9 SORE THROAT: Primary | ICD-10-CM

## 2024-01-10 LAB
FLUAV RNA SPEC QL NAA+PROBE: NEGATIVE
FLUBV RNA RESP QL NAA+PROBE: NEGATIVE
RSV RNA SPEC NAA+PROBE: NEGATIVE
SARS-COV-2 RNA RESP QL NAA+PROBE: NEGATIVE

## 2024-01-10 PROCEDURE — 99213 OFFICE O/P EST LOW 20 MIN: CPT | Mod: GC

## 2024-01-10 PROCEDURE — 87637 SARSCOV2&INF A&B&RSV AMP PRB: CPT

## 2024-01-10 NOTE — PATIENT INSTRUCTIONS
We will let you know the results of the covid/flu/RSV testing.   If you test positive for influenza, we can prescribe Tamiflu, though this medication can cause GI upset. There is a 2-16% incidence of vomiting with the medication and may only decrease your symptoms by one day.

## 2024-01-10 NOTE — PROGRESS NOTES
Physician Attestation   I, Maximino Ferris MD, saw this patient and agree with the findings and plan of care as documented in the note.      Items personally reviewed/procedural attestation: vitals.    Maximino Ferris MD

## 2024-01-10 NOTE — PROGRESS NOTES
"  Assessment & Plan     Sore throat  Myalgia   Constellation of symptoms including cold sweats, headache, mild cough, myalgias, and sore throat. Hx of tonsillectomy and cough present. Temp 97.8. Low suspicion for strep. Two close sick contacts with influenza. Will test for COVID/flu/influenza and treat as indicated pending results. Continue symptomatic cares.   - Symptomatic Influenza A/B, RSV, & SARS-CoV2 PCR (COVID-19)  - voltaren gel for myalgias       Return if symptoms worsen or fail to improve.    Addie Khalil MD PGY3  [facility] Medicine Residency  01/10/24    I precepted today with Dr. Ferris .    Bismark Sweet is a 33 year old, presenting for the following health issues:  Pharyngitis (Sore throat, headache, neck pain and some cough)      HPI   Mom and daughter were sick. Mom tested positive for influenza two weeks ago and she had been taking care of her. Daughter was sick last week.   Symptoms include cold sweats, headache (mild-moderate, not severe), mild cough, myalgias, and sore throat. No vomiting or diarrhea. Also having left sided neck pain when she touches her neck. Tried tylenol. Sx started 2 days ago.     Review of Systems   Constitutional, HEENT, cardiovascular, pulmonary, gi and gu systems are negative, except as otherwise noted.      Objective    /87 (BP Location: Left arm, Patient Position: Sitting, Cuff Size: Adult Regular)   Pulse 95   Temp 97.8  F (36.6  C) (Tympanic)   Resp 18   Ht 1.6 m (5' 3\")   Wt 102.5 kg (226 lb)   LMP 12/27/2023   SpO2 99%   BMI 40.03 kg/m    Body mass index is 40.03 kg/m .  Physical Exam   GENERAL: alert and no distress  EARS: TM normal appearing bilaterally  NOSE: bilaterally inflamed nasal turbinates   MOUTH: no tonsils, mild posterior  pharynx erythema with cobblestoning  NECK: tender cervical adenopathy on the left, no asymmetry or masses. No meningeal signs   RESP: CTAB  CV: RRR  ABDOMEN: soft, nontender  MS: no gross musculoskeletal " defects noted, no edema

## 2024-01-11 ENCOUNTER — TELEPHONE (OUTPATIENT)
Dept: FAMILY MEDICINE | Facility: CLINIC | Age: 34
End: 2024-01-11
Payer: COMMERCIAL

## 2024-01-11 NOTE — TELEPHONE ENCOUNTER
[facility] phone call message- patient requesting results:    Test: Lab    Date of test: 01/10/2024    Additional Comments:     OK to leave a message on voice mail? Yes    Primary language: English      needed? No    Call taken on January 11, 2024 at 8:32 AM by Nahum Lawrence

## 2024-01-11 NOTE — TELEPHONE ENCOUNTER
Pt states she is concerned that she did not test positive for anything on the panel swab we completed yesterday. Counseled on course of viral illness. Management of tylenol, rest, and hydration discussed for viral illness. Instructed patient to call the clinic back with any changing symptoms of high fever with use of tylenol or SOB. Pt agrees to this and verbalizes understanding. Denies any further questions at this time. HOMERO Wilcox

## 2024-01-15 NOTE — PROGRESS NOTES
CC: Kee Phipps is here secondary to period changes and infections.    HPI: The pt is a 33 year old SBF  who presents with changes to her periods since her periods came back in May of 2022 after her daughter was born in 2022.  The blood appears really dark to black.  Day 1 is heavy, day 2 is normal, and day 3 is light spotting.  Her cycles are every 28 days.  She is using boric acid vaginally as she has been diagnosed with BV and treated with metronidazole.  She's had issues with recurrent BV for 7 years.  Prolactin and TSH were normal on 22.  She is having discharge today and would like it checked.    Past Medical History:   Diagnosis Date    Abnormal Pap smear of cervix     Accelerated idioventricular rhythm (H) 2022    Anemia     Anxiety     Arthritis     Bilateral plantar fasciitis     Celiac disease 2014    Crohn's disease (H) 2012    Crohn's disease of colon (H) 2015    Depression     Depressive disorder     Fibromyalgia     Gastroesophageal reflux disease without esophagitis 2014    H/O miscarriage, currently pregnant     miscarriage .    HPV (human papilloma virus) infection     Hypothyroidism 2015    Immunosuppressed status (H24) 2015    Migraine     Migraine with aura and without status migrainosus, not intractable 2023    Plantar fasciitis     bilateral    Polyp of colon 2017    Postpartum hypertension 2022     delivery 2013    34 weeks. Early rupture of membranes    Rheumatoid arthritis (H) 2023    Rheumatoid arthritis (H) 2023       Past Surgical History:   Procedure Laterality Date    APPENDECTOMY      BOWEL RESECTION      illium     CERVIX LESION DESTRUCTION      DILATION AND CURETTAGE SUCTION, TREAT MISSED , 1ST TRIMESTER      miscarriage at 8 wks gest    DILATION AND CURETTAGE SUCTION, TREAT MISSED , 1ST TRIMESTER  2019    FASCIOTOMY LOWER EXTREMITY Left 2018    Left  foot    FOOT SURGERY Left     LAPAROSCOPY DIAGNOSTIC (GYN) N/A 08/16/2019    Procedure: LAPAROSCOPY, DIAGNOSTIC;  Surgeon: Anni Velasquez MD;  Location: [facility] OR    PLANTAR FASCIA RELEASE Left 4/10/2018    Procedure:  PLANTAR FASCIOTOMY LEFT FOOT ;  Surgeon: Yuri Arreola DPM;  Location: [facility];  Service:     SALPINGECTOMY Right 05/15/2021    rt sided ruptured ectopic    SMALL BOWEL RESECTION      TONSILLECTOMY         Patient's   Family History   Problem Relation Age of Onset    Hypertension Mother     Seizure Disorder Mother     Cancer Maternal Grandmother         bone cancer    Asthma Brother     Asthma Brother     Asthma Brother     Asthma Brother     Asthma Son     No Known Problems Daughter     Crohn's Disease Paternal Uncle     Diabetes No family hx of     Coronary Artery Disease No family hx of     Hyperlipidemia No family hx of     Cerebrovascular Disease No family hx of     Breast Cancer No family hx of     Colon Cancer No family hx of     Prostate Cancer No family hx of     Other Cancer No family hx of     Depression No family hx of     Anxiety Disorder No family hx of     Mental Illness No family hx of     Substance Abuse No family hx of     Anesthesia Reaction No family hx of     Osteoporosis No family hx of     Genetic Disorder No family hx of     Thyroid Disease No family hx of     Obesity No family hx of     Unknown/Adopted No family hx of     Heart Disease No family hx of        Patient   Social History     Socioeconomic History    Marital status: Single     Spouse name: None    Number of children: 1    Years of education: 12    Highest education level: GED or equivalent   Occupational History    Occupation: student   Tobacco Use    Smoking status: Former     Types: Vaping Device     Passive exposure: Never    Smokeless tobacco: Never    Tobacco comments:     Vaping daily since 12/2021, stop smoking cigre 2 yers ago,a dn stop vaping 2-3 weeks ago (12/13/23)   Vaping  Use    Vaping Use: Former    Substances: Nicotine    Devices: Pre-filled or refillable cartridge   Substance and Sexual Activity    Alcohol use: Not Currently     Comment: occa    Drug use: No    Sexual activity: Yes     Partners: Male     Social Determinants of Health     Financial Resource Strain: Low Risk  (1/10/2024)    Financial Resource Strain     Within the past 12 months, have you or your family members you live with been unable to get utilities (heat, electricity) when it was really needed?: No   Food Insecurity: Low Risk  (1/10/2024)    Food Insecurity     Within the past 12 months, did you worry that your food would run out before you got money to buy more?: No     Within the past 12 months, did the food you bought just not last and you didn t have money to get more?: No   Transportation Needs: Low Risk  (1/10/2024)    Transportation Needs     Within the past 12 months, has lack of transportation kept you from medical appointments, getting your medicines, non-medical meetings or appointments, work, or from getting things that you need?: No   Interpersonal Safety: Low Risk  (11/6/2023)    Interpersonal Safety     Do you feel physically and emotionally safe where you currently live?: Yes     Within the past 12 months, have you been hit, slapped, kicked or otherwise physically hurt by someone?: No     Within the past 12 months, have you been humiliated or emotionally abused in other ways by your partner or ex-partner?: No   Housing Stability: Low Risk  (1/10/2024)    Housing Stability     Do you have housing? : Yes     Are you worried about losing your housing?: No       Outpatient Medications Prior to Visit   Medication Sig Dispense Refill    ustekinumab (STELARA) 90 MG/ML Inject Subcutaneous every 2 months      acetaminophen (TYLENOL) 650 MG CR tablet Take 1 tablet (650 mg) by mouth every 8 hours as needed for mild pain or fever 100 tablet 0    NIFEdipine ER (ADALAT CC) 30 MG 24 hr tablet Take 1 tablet (30  mg) by mouth daily (Patient not taking: Reported on 10/5/2023) 30 tablet 0     No facility-administered medications prior to visit.       Patient is allergic to barley grass, gluten meal, gramineae pollens, humira [adalimumab], ibuprofen, latex, nifedipine, wheat extract, and remicade [infliximab].    ROS:  12 part ROS is negative aside from those symptoms in the HPI    PE:  BP (!) 142/92 (BP Location: Right arm, Patient Position: Sitting, Cuff Size: Adult Regular)   Pulse 91   Wt 99.8 kg (220 lb)   LMP 09/14/2023           Body mass index is 36.61 kg/m .    General: obese BF, NAD  Pelvic: EG/BUS no lesions, no skin change   Vagina no lesions, yellow/green discharge present   Cervix no lesions, no cervical motion tenderness   Perineum no lesions   Rectal deferred  Psych: normal mood  Neuro: CN I-XII grossly intact  MS: normal gait    Assessment: 33 year old SBF  with period changes and recurrent BV with discharge today.    Plan: Natural history of period changes over time discussed with the patient.  We discussed that since her periods are regular, the changes aren't something to worry about.  Wet prep, GC, and chlamydia were all done today.  Follow up will be determined once results are back.  Questions were answered to the best of my ability.    34 minutes spent on the date of the encounter doing chart review, review of test results, interpretation of tests, patient visit, and documentation

## 2024-02-05 DIAGNOSIS — I10 BENIGN ESSENTIAL HYPERTENSION: ICD-10-CM

## 2024-02-05 RX ORDER — CHLORTHALIDONE 25 MG/1
25 TABLET ORAL DAILY
Qty: 30 TABLET | Refills: 1 | Status: SHIPPED | OUTPATIENT
Start: 2024-02-05 | End: 2024-04-12

## 2024-02-05 NOTE — TELEPHONE ENCOUNTER
[facility] phone call message- patient requesting a refill:    Full Medication Name: chlorthalidone     Dose: 25 MG    Pharmacy confirmed as       [id number] - [place], [place] - [address] [place] [address]  Phone: [phone] Fax: [phone]  : Yes    Additional Comments: THE PT IS OUT      OK to leave a message on voice mail? Yes    Primary language: English      needed? No    Call taken on February 5, 2024 at 8:40 AM by Nahum Willoughby

## 2024-02-17 ENCOUNTER — ANESTHESIA EVENT (OUTPATIENT)
Dept: SURGERY | Facility: HOSPITAL | Age: 34
End: 2024-02-17
Payer: COMMERCIAL

## 2024-02-19 ENCOUNTER — HOSPITAL ENCOUNTER (OUTPATIENT)
Facility: HOSPITAL | Age: 34
Discharge: HOME | End: 2024-02-19
Attending: PLASTIC SURGERY | Admitting: PLASTIC SURGERY
Payer: COMMERCIAL

## 2024-02-19 ENCOUNTER — ANESTHESIA (OUTPATIENT)
Dept: SURGERY | Facility: HOSPITAL | Age: 34
End: 2024-02-19
Payer: COMMERCIAL

## 2024-02-19 VITALS
BODY MASS INDEX: 37.95 KG/M2 | OXYGEN SATURATION: 96 % | RESPIRATION RATE: 20 BRPM | HEART RATE: 76 BPM | SYSTOLIC BLOOD PRESSURE: 125 MMHG | DIASTOLIC BLOOD PRESSURE: 76 MMHG | TEMPERATURE: 98.4 F | HEIGHT: 64 IN | WEIGHT: 222.3 LBS

## 2024-02-19 DIAGNOSIS — Z98.890 S/P BILATERAL BREAST REDUCTION: Primary | ICD-10-CM

## 2024-02-19 PROCEDURE — 258N000003 HC RX IP 258 OP 636: Performed by: ANESTHESIOLOGY

## 2024-02-19 PROCEDURE — 250N000009 HC RX 250: Performed by: NURSE ANESTHETIST, CERTIFIED REGISTERED

## 2024-02-19 PROCEDURE — 19318 BREAST REDUCTION: CPT | Mod: 50 | Performed by: PLASTIC SURGERY

## 2024-02-19 PROCEDURE — 272N000001 HC OR GENERAL SUPPLY STERILE: Performed by: PLASTIC SURGERY

## 2024-02-19 PROCEDURE — 250N000011 HC RX IP 250 OP 636: Performed by: ANESTHESIOLOGY

## 2024-02-19 PROCEDURE — 250N000009 HC RX 250: Performed by: PLASTIC SURGERY

## 2024-02-19 PROCEDURE — 360N000076 HC SURGERY LEVEL 3, PER MIN: Performed by: PLASTIC SURGERY

## 2024-02-19 PROCEDURE — 370N000017 HC ANESTHESIA TECHNICAL FEE, PER MIN: Performed by: PLASTIC SURGERY

## 2024-02-19 PROCEDURE — 250N000025 HC SEVOFLURANE, PER MIN: Performed by: PLASTIC SURGERY

## 2024-02-19 PROCEDURE — 250N000011 HC RX IP 250 OP 636: Performed by: NURSE ANESTHETIST, CERTIFIED REGISTERED

## 2024-02-19 PROCEDURE — 250N000013 HC RX MED GY IP 250 OP 250 PS 637: Performed by: ANESTHESIOLOGY

## 2024-02-19 PROCEDURE — 258N000003 HC RX IP 258 OP 636: Performed by: PLASTIC SURGERY

## 2024-02-19 PROCEDURE — 258N000003 HC RX IP 258 OP 636: Performed by: NURSE ANESTHETIST, CERTIFIED REGISTERED

## 2024-02-19 PROCEDURE — 250N000011 HC RX IP 250 OP 636: Performed by: PLASTIC SURGERY

## 2024-02-19 PROCEDURE — 710N000012 HC RECOVERY PHASE 2, PER MINUTE: Performed by: PLASTIC SURGERY

## 2024-02-19 PROCEDURE — 88305 TISSUE EXAM BY PATHOLOGIST: CPT | Mod: TC | Performed by: PLASTIC SURGERY

## 2024-02-19 PROCEDURE — 999N000141 HC STATISTIC PRE-PROCEDURE NURSING ASSESSMENT: Performed by: PLASTIC SURGERY

## 2024-02-19 PROCEDURE — 710N000009 HC RECOVERY PHASE 1, LEVEL 1, PER MIN: Performed by: PLASTIC SURGERY

## 2024-02-19 RX ORDER — HYDROMORPHONE HYDROCHLORIDE 1 MG/ML
0.2 INJECTION, SOLUTION INTRAMUSCULAR; INTRAVENOUS; SUBCUTANEOUS EVERY 5 MIN PRN
Status: DISCONTINUED | OUTPATIENT
Start: 2024-02-19 | End: 2024-02-19 | Stop reason: HOSPADM

## 2024-02-19 RX ORDER — ACETAMINOPHEN 325 MG/1
975 TABLET ORAL ONCE
Status: COMPLETED | OUTPATIENT
Start: 2024-02-19 | End: 2024-02-19

## 2024-02-19 RX ORDER — GINSENG 100 MG
CAPSULE ORAL PRN
Status: DISCONTINUED | OUTPATIENT
Start: 2024-02-19 | End: 2024-02-19 | Stop reason: HOSPADM

## 2024-02-19 RX ORDER — SODIUM CHLORIDE, SODIUM LACTATE, POTASSIUM CHLORIDE, CALCIUM CHLORIDE 600; 310; 30; 20 MG/100ML; MG/100ML; MG/100ML; MG/100ML
INJECTION, SOLUTION INTRAVENOUS CONTINUOUS
Status: DISCONTINUED | OUTPATIENT
Start: 2024-02-19 | End: 2024-02-19 | Stop reason: HOSPADM

## 2024-02-19 RX ORDER — FENTANYL CITRATE 50 UG/ML
50 INJECTION, SOLUTION INTRAMUSCULAR; INTRAVENOUS EVERY 5 MIN PRN
Status: DISCONTINUED | OUTPATIENT
Start: 2024-02-19 | End: 2024-02-19 | Stop reason: HOSPADM

## 2024-02-19 RX ORDER — MAGNESIUM HYDROXIDE 1200 MG/15ML
LIQUID ORAL PRN
Status: DISCONTINUED | OUTPATIENT
Start: 2024-02-19 | End: 2024-02-19 | Stop reason: HOSPADM

## 2024-02-19 RX ORDER — CEFAZOLIN SODIUM/WATER 2 G/20 ML
2 SYRINGE (ML) INTRAVENOUS
Status: COMPLETED | OUTPATIENT
Start: 2024-02-19 | End: 2024-02-19

## 2024-02-19 RX ORDER — HYDROCODONE BITARTRATE AND ACETAMINOPHEN 5; 325 MG/1; MG/1
1-2 TABLET ORAL EVERY 4 HOURS PRN
Qty: 21 TABLET | Refills: 0 | Status: SHIPPED | OUTPATIENT
Start: 2024-02-19 | End: 2024-03-28

## 2024-02-19 RX ORDER — CEPHALEXIN 500 MG/1
500 CAPSULE ORAL 3 TIMES DAILY
Qty: 15 CAPSULE | Refills: 0 | Status: SHIPPED | OUTPATIENT
Start: 2024-02-19 | End: 2024-02-24

## 2024-02-19 RX ORDER — ONDANSETRON 2 MG/ML
4 INJECTION INTRAMUSCULAR; INTRAVENOUS EVERY 30 MIN PRN
Status: DISCONTINUED | OUTPATIENT
Start: 2024-02-19 | End: 2024-02-19 | Stop reason: HOSPADM

## 2024-02-19 RX ORDER — OXYCODONE HYDROCHLORIDE 5 MG/1
10 TABLET ORAL
Status: COMPLETED | OUTPATIENT
Start: 2024-02-19 | End: 2024-02-19

## 2024-02-19 RX ORDER — DEXAMETHASONE SODIUM PHOSPHATE 10 MG/ML
INJECTION, SOLUTION INTRAMUSCULAR; INTRAVENOUS PRN
Status: DISCONTINUED | OUTPATIENT
Start: 2024-02-19 | End: 2024-02-19

## 2024-02-19 RX ORDER — OXYCODONE HYDROCHLORIDE 5 MG/1
5 TABLET ORAL
Status: DISCONTINUED | OUTPATIENT
Start: 2024-02-19 | End: 2024-02-19 | Stop reason: HOSPADM

## 2024-02-19 RX ORDER — ONDANSETRON 2 MG/ML
INJECTION INTRAMUSCULAR; INTRAVENOUS PRN
Status: DISCONTINUED | OUTPATIENT
Start: 2024-02-19 | End: 2024-02-19

## 2024-02-19 RX ORDER — MAGNESIUM SULFATE 4 G/50ML
4 INJECTION INTRAVENOUS ONCE
Status: COMPLETED | OUTPATIENT
Start: 2024-02-19 | End: 2024-02-19

## 2024-02-19 RX ORDER — ONDANSETRON 4 MG/1
4 TABLET, ORALLY DISINTEGRATING ORAL EVERY 30 MIN PRN
Status: DISCONTINUED | OUTPATIENT
Start: 2024-02-19 | End: 2024-02-19 | Stop reason: HOSPADM

## 2024-02-19 RX ORDER — LIDOCAINE 40 MG/G
CREAM TOPICAL
Status: DISCONTINUED | OUTPATIENT
Start: 2024-02-19 | End: 2024-02-19 | Stop reason: HOSPADM

## 2024-02-19 RX ORDER — KETAMINE HYDROCHLORIDE 10 MG/ML
INJECTION INTRAMUSCULAR; INTRAVENOUS PRN
Status: DISCONTINUED | OUTPATIENT
Start: 2024-02-19 | End: 2024-02-19

## 2024-02-19 RX ORDER — HYDROMORPHONE HYDROCHLORIDE 1 MG/ML
0.4 INJECTION, SOLUTION INTRAMUSCULAR; INTRAVENOUS; SUBCUTANEOUS EVERY 5 MIN PRN
Status: DISCONTINUED | OUTPATIENT
Start: 2024-02-19 | End: 2024-02-19 | Stop reason: HOSPADM

## 2024-02-19 RX ORDER — LIDOCAINE HYDROCHLORIDE 10 MG/ML
INJECTION, SOLUTION INFILTRATION; PERINEURAL PRN
Status: DISCONTINUED | OUTPATIENT
Start: 2024-02-19 | End: 2024-02-19

## 2024-02-19 RX ORDER — FENTANYL CITRATE 50 UG/ML
INJECTION, SOLUTION INTRAMUSCULAR; INTRAVENOUS PRN
Status: DISCONTINUED | OUTPATIENT
Start: 2024-02-19 | End: 2024-02-19

## 2024-02-19 RX ORDER — AMOXICILLIN 250 MG
1-2 CAPSULE ORAL 2 TIMES DAILY
Qty: 30 TABLET | Refills: 0 | Status: SHIPPED | OUTPATIENT
Start: 2024-02-19 | End: 2024-03-28

## 2024-02-19 RX ORDER — FENTANYL CITRATE 50 UG/ML
25 INJECTION, SOLUTION INTRAMUSCULAR; INTRAVENOUS EVERY 5 MIN PRN
Status: DISCONTINUED | OUTPATIENT
Start: 2024-02-19 | End: 2024-02-19 | Stop reason: HOSPADM

## 2024-02-19 RX ORDER — ONDANSETRON 4 MG/1
4 TABLET, ORALLY DISINTEGRATING ORAL EVERY 6 HOURS PRN
Qty: 16 TABLET | Refills: 0 | Status: SHIPPED | OUTPATIENT
Start: 2024-02-19 | End: 2024-03-28

## 2024-02-19 RX ORDER — PROPOFOL 10 MG/ML
INJECTION, EMULSION INTRAVENOUS CONTINUOUS PRN
Status: DISCONTINUED | OUTPATIENT
Start: 2024-02-19 | End: 2024-02-19

## 2024-02-19 RX ADMIN — Medication 2 G: at 11:30

## 2024-02-19 RX ADMIN — PHENYLEPHRINE HYDROCHLORIDE 100 MCG: 10 INJECTION INTRAVENOUS at 10:36

## 2024-02-19 RX ADMIN — KETAMINE HYDROCHLORIDE 5 MG: 10 INJECTION INTRAMUSCULAR; INTRAVENOUS at 11:31

## 2024-02-19 RX ADMIN — MAGNESIUM SULFATE HEPTAHYDRATE 4 G: 80 INJECTION, SOLUTION INTRAVENOUS at 06:56

## 2024-02-19 RX ADMIN — TRANEXAMIC ACID 1 G: 1 INJECTION, SOLUTION INTRAVENOUS at 08:05

## 2024-02-19 RX ADMIN — Medication 2 G: at 07:30

## 2024-02-19 RX ADMIN — DEXMEDETOMIDINE HYDROCHLORIDE 10 MCG: 100 INJECTION, SOLUTION INTRAVENOUS at 07:37

## 2024-02-19 RX ADMIN — HYDROMORPHONE HYDROCHLORIDE 0.5 MG: 1 INJECTION, SOLUTION INTRAMUSCULAR; INTRAVENOUS; SUBCUTANEOUS at 08:22

## 2024-02-19 RX ADMIN — PROPOFOL 180 MG: 10 INJECTION, EMULSION INTRAVENOUS at 07:39

## 2024-02-19 RX ADMIN — DEXMEDETOMIDINE HYDROCHLORIDE 10 MCG: 100 INJECTION, SOLUTION INTRAVENOUS at 10:49

## 2024-02-19 RX ADMIN — ROCURONIUM BROMIDE 50 MG: 50 INJECTION, SOLUTION INTRAVENOUS at 07:39

## 2024-02-19 RX ADMIN — KETAMINE HYDROCHLORIDE 10 MG: 10 INJECTION INTRAMUSCULAR; INTRAVENOUS at 09:15

## 2024-02-19 RX ADMIN — KETAMINE HYDROCHLORIDE 10 MG: 10 INJECTION INTRAMUSCULAR; INTRAVENOUS at 10:49

## 2024-02-19 RX ADMIN — LIDOCAINE HYDROCHLORIDE 5 ML: 10 INJECTION, SOLUTION INFILTRATION; PERINEURAL at 07:39

## 2024-02-19 RX ADMIN — TRANEXAMIC ACID 1 G: 1 INJECTION, SOLUTION INTRAVENOUS at 10:48

## 2024-02-19 RX ADMIN — ONDANSETRON 4 MG: 2 INJECTION INTRAMUSCULAR; INTRAVENOUS at 08:05

## 2024-02-19 RX ADMIN — PROPOFOL 40 MCG/KG/MIN: 10 INJECTION, EMULSION INTRAVENOUS at 07:40

## 2024-02-19 RX ADMIN — HYDROMORPHONE HYDROCHLORIDE 0.5 MG: 1 INJECTION, SOLUTION INTRAMUSCULAR; INTRAVENOUS; SUBCUTANEOUS at 08:57

## 2024-02-19 RX ADMIN — SODIUM CHLORIDE, POTASSIUM CHLORIDE, SODIUM LACTATE AND CALCIUM CHLORIDE: 600; 310; 30; 20 INJECTION, SOLUTION INTRAVENOUS at 07:00

## 2024-02-19 RX ADMIN — DEXMEDETOMIDINE HYDROCHLORIDE 10 MCG: 100 INJECTION, SOLUTION INTRAVENOUS at 07:30

## 2024-02-19 RX ADMIN — FENTANYL CITRATE 100 MCG: 50 INJECTION INTRAMUSCULAR; INTRAVENOUS at 07:39

## 2024-02-19 RX ADMIN — FENTANYL CITRATE 25 MCG: 50 INJECTION, SOLUTION INTRAMUSCULAR; INTRAVENOUS at 12:49

## 2024-02-19 RX ADMIN — ROCURONIUM BROMIDE 20 MG: 50 INJECTION, SOLUTION INTRAVENOUS at 10:01

## 2024-02-19 RX ADMIN — KETAMINE HYDROCHLORIDE 50 MG: 10 INJECTION INTRAMUSCULAR; INTRAVENOUS at 07:39

## 2024-02-19 RX ADMIN — SUGAMMADEX 200 MG: 100 INJECTION, SOLUTION INTRAVENOUS at 11:54

## 2024-02-19 RX ADMIN — ACETAMINOPHEN 975 MG: 325 TABLET ORAL at 06:55

## 2024-02-19 RX ADMIN — DEXMEDETOMIDINE HYDROCHLORIDE 10 MCG: 100 INJECTION, SOLUTION INTRAVENOUS at 11:31

## 2024-02-19 RX ADMIN — FENTANYL CITRATE 25 MCG: 50 INJECTION, SOLUTION INTRAMUSCULAR; INTRAVENOUS at 13:05

## 2024-02-19 RX ADMIN — DEXMEDETOMIDINE HYDROCHLORIDE 10 MCG: 100 INJECTION, SOLUTION INTRAVENOUS at 08:22

## 2024-02-19 RX ADMIN — ROCURONIUM BROMIDE 30 MG: 50 INJECTION, SOLUTION INTRAVENOUS at 09:12

## 2024-02-19 RX ADMIN — SODIUM CHLORIDE, POTASSIUM CHLORIDE, SODIUM LACTATE AND CALCIUM CHLORIDE: 600; 310; 30; 20 INJECTION, SOLUTION INTRAVENOUS at 12:59

## 2024-02-19 RX ADMIN — SODIUM CHLORIDE, POTASSIUM CHLORIDE, SODIUM LACTATE AND CALCIUM CHLORIDE: 600; 310; 30; 20 INJECTION, SOLUTION INTRAVENOUS at 08:28

## 2024-02-19 RX ADMIN — DEXAMETHASONE SODIUM PHOSPHATE 10 MG: 10 INJECTION, SOLUTION INTRAMUSCULAR; INTRAVENOUS at 08:05

## 2024-02-19 RX ADMIN — MIDAZOLAM 2 MG: 1 INJECTION INTRAMUSCULAR; INTRAVENOUS at 07:30

## 2024-02-19 RX ADMIN — OXYCODONE HYDROCHLORIDE 10 MG: 5 TABLET ORAL at 14:12

## 2024-02-19 RX ADMIN — FENTANYL CITRATE 25 MCG: 50 INJECTION, SOLUTION INTRAMUSCULAR; INTRAVENOUS at 12:54

## 2024-02-19 ASSESSMENT — ACTIVITIES OF DAILY LIVING (ADL)
ADLS_ACUITY_SCORE: 35

## 2024-02-19 NOTE — ANESTHESIA CARE TRANSFER NOTE
Patient: Kee Phipps    Procedure: Procedure(s):  MAMMOPLASTY, REDUCTION       Diagnosis: Macromastia [N62]  Diagnosis Additional Information: No value filed.    Anesthesia Type:   General     Note:    Oropharynx: oral airway in place  Level of Consciousness: drowsy  Oxygen Supplementation: face mask    Independent Airway: airway patency not satisfactory and stable  Dentition: dentition unchanged  Vital Signs Stable: post-procedure vital signs reviewed and stable  Report to RN Given: handoff report given  Patient transferred to: PACU    Handoff Report: Identifed the Patient, Identified the Reponsible Provider, Reviewed the pertinent medical history, Discussed the surgical course, Reviewed Intra-OP anesthesia mangement and issues during anesthesia, Set expectations for post-procedure period and Allowed opportunity for questions and acknowledgement of understanding  Vitals:  Vitals Value Taken Time   /83 02/19/24 1215   Temp 36.9  C (98.5  F) 02/19/24 1213   Pulse 88 02/19/24 1216   Resp 17 02/19/24 1216   SpO2 98 % 02/19/24 1216   Vitals shown include unfiled device data.    Electronically Signed By: PANKAJ Khoury CRNA  February 19, 2024  12:17 PM

## 2024-02-19 NOTE — ANESTHESIA PREPROCEDURE EVALUATION
Anesthesia Pre-Procedure Evaluation    Patient: Kee Phipps   MRN: [id number] : 1990        Procedure : Procedure(s):  MAMMOPLASTY, REDUCTION          Past Medical History:   Diagnosis Date    Abnormal Pap smear of cervix     Accelerated idioventricular rhythm (H) 2022    Anemia     Anxiety     Arthritis     Bilateral plantar fasciitis     Celiac disease 2014    Chronic pain syndrome     Crohn's disease (H) 2012    Crohn's disease of colon (H) 2015    Depression     Depressive disorder     Fibromyalgia     Gastroesophageal reflux disease without esophagitis 2014    H/O miscarriage, currently pregnant     miscarriage .    HPV (human papilloma virus) infection     Hypothyroidism 2015    Immunosuppressed status (H24) 2015    Migraine with aura and without status migrainosus, not intractable 2023    Plantar fasciitis     bilateral    Polyp of colon 2017    Postpartum hypertension 2022     delivery 2013    34 weeks. Early rupture of membranes    Rheumatoid arthritis (H) 2023      Past Surgical History:   Procedure Laterality Date    APPENDECTOMY      BOWEL RESECTION      illium     CERVIX LESION DESTRUCTION      COLPOSCOPY 2014      DILATION AND CURETTAGE SUCTION, TREAT MISSED , 1ST TRIMESTER      miscarriage at 8 wks gest    DILATION AND CURETTAGE SUCTION, TREAT MISSED , 1ST TRIMESTER  2019    ECTOPIC PREGNANCY SURGERY       EXCISION GANGLION LEFT FOOT 2018      FASCIOTOMY LOWER EXTREMITY Left 2018    Left foot    FOOT SURGERY Left     LAPAROSCOPY DIAGNOSTIC (GYN) N/A 2019    Procedure: LAPAROSCOPY, DIAGNOSTIC;  Surgeon: Anni Velasquez MD;  Location:  OR    PLANTAR FASCIA RELEASE Left 04/10/2018    Procedure:  PLANTAR FASCIOTOMY LEFT FOOT ;  Surgeon: Yuri Arreola DPM;  Location: [facility];  Service:     SALPINGECTOMY Right 05/15/2021    rt sided  ruptured ectopic    SMALL BOWEL RESECTION      TONSILLECTOMY        Allergies   Allergen Reactions    Barley Grass Other (See Comments)     CELIAC  CELIAC    Gluten Meal GI Disturbance    Gramineae Pollens Other (See Comments)     CELIAC    Humira [Adalimumab] Other (See Comments)     Caused huge lumps at injection site.     Ibuprofen Other (See Comments)     Patient has Crohns disease and is unable to take this medication.    Latex Itching    Nifedipine Other (See Comments)     Flushing, lightheadedness    Wheat Extract GI Disturbance    Remicade [Infliximab] Rash      Social History     Tobacco Use    Smoking status: Former     Types: Vaping Device     Start date: 2011     Quit date: 2021     Years since quittin.1     Passive exposure: Never    Smokeless tobacco: Never    Tobacco comments:     Vaping daily since 2021, stop smoking cigre 2 yers ago,a dn stop vaping 2-3 weeks ago (23)   Substance Use Topics    Alcohol use: Yes     Alcohol/week: 3.0 standard drinks of alcohol     Types: 3 Standard drinks or equivalent per week     Comment: occa      Wt Readings from Last 1 Encounters:   01/10/24 102.5 kg (226 lb)        Anesthesia Evaluation            ROS/MED HX  ENT/Pulmonary:  - neg pulmonary ROS     Neurologic:     (+)      migraines,                          Cardiovascular:     (+)  hypertension- -   -  - -                                      METS/Exercise Tolerance:     Hematologic:       Musculoskeletal: Comment: RA      GI/Hepatic: Comment: Celiac  chron's    (+) GERD, Asymptomatic on medication,                  Renal/Genitourinary:  - neg Renal ROS     Endo:  - neg endo ROS   (+)           hypothyroidism,    Obesity,       Psychiatric/Substance Use:  - neg psychiatric ROS     Infectious Disease:       Malignancy:   (+) Malignancy (CIN2),     Other:  - neg other ROS    (+)  , H/O Chronic Pain,         Physical Exam    Airway  airway exam normal      Mallampati: II       Respiratory  Devices and Support         Dental  no notable dental history         Cardiovascular   cardiovascular exam normal       Rhythm and rate: regular and normal     Pulmonary   pulmonary exam normal        breath sounds clear to auscultation           OUTSIDE LABS:  CBC:   Lab Results   Component Value Date    WBC 8.1 08/17/2023    WBC 6.3 03/27/2023    HGB 13.5 08/17/2023    HGB 14.5 03/27/2023    HCT 39.3 08/17/2023    HCT 42.0 03/27/2023     08/17/2023     03/27/2023     BMP:   Lab Results   Component Value Date     03/27/2023     05/16/2022    POTASSIUM 4.0 03/27/2023    POTASSIUM 4.2 05/16/2022    CHLORIDE 104 03/27/2023    CHLORIDE 106 05/16/2022    CO2 23 03/27/2023    CO2 24 05/16/2022    BUN 7.2 03/27/2023    BUN 11 05/16/2022    CR 0.66 03/27/2023    CR 0.88 05/16/2022    GLC 88 03/27/2023    GLC 85 05/16/2022     COAGS:   Lab Results   Component Value Date    PTT 27 08/17/2023    INR 1.04 08/17/2023     POC:   Lab Results   Component Value Date    HCG Negative 07/12/2023    HCGS Negative 03/27/2023     HEPATIC:   Lab Results   Component Value Date    ALBUMIN 4.4 03/27/2023    PROTTOTAL 7.4 03/27/2023    ALT 13 03/27/2023    AST 14 03/27/2023    ALKPHOS 99 03/27/2023    BILITOTAL 0.5 03/27/2023     OTHER:   Lab Results   Component Value Date    LACT 0.6 (L) 11/17/2020    A1C 5.2 10/13/2016    BRENDA 9.2 03/27/2023    MAG 5.9 (HH) 04/09/2022    LIPASE 20 03/27/2023    TSH 0.50 04/15/2022    CRP 0.7 01/22/2022    SED 39 (H) 01/22/2022       Anesthesia Plan    ASA Status:  2       Anesthesia Type: General.     - Airway: ETT   Induction: Intravenous, Propofol.   Maintenance: Balanced.   Techniques and Equipment:     - Lines/Monitors: 2nd IV     Consents    Anesthesia Plan(s) and associated risks, benefits, and realistic alternatives discussed. Questions answered and patient/representative(s) expressed understanding.     - Discussed:     - Discussed with:  Patient      - Extended  Intubation/Ventilatory Support Discussed: No.      - Patient is DNR/DNI Status: No     Use of blood products discussed: No .     Postoperative Care    Pain management: IV analgesics, Oral pain medications, Multi-modal analgesia.   PONV prophylaxis: Ondansetron (or other 5HT-3), Dexamethasone or Solumedrol     Comments:    Other Comments: TIMA  Defers scopolamine  Antiemetics  Tylenol po pre op  Toradol at the end of case if okay with surgeon  Consider background propofol  Magnesium elkin op infusion              Nahum García MD    I have reviewed the pertinent notes and labs in the chart from the past 30 days and (re)examined the patient.  Any updates or changes from those notes are reflected in this note.

## 2024-02-19 NOTE — INTERVAL H&P NOTE
"I have reviewed the surgical (or preoperative) H&P that is linked to this encounter, and examined the patient. There are no significant changes    Clinical Conditions Present on Arrival:  Clinically Significant Risk Factors Present on Admission                  # Obesity: Estimated body mass index is 38.16 kg/m  as calculated from the following:    Height as of this encounter: 1.626 m (5' 4\").    Weight as of this encounter: 100.8 kg (222 lb 4.8 oz).     Miguelangel Feldman MD , FACS   Diplomate American Board of Plastic Surgery  Diplomate American Board of Surgery  Adj. Assistant Professor of Surgery  Division of Plastic & Reconstructive Surgery   [facility]  Office: [phone]   2/19/2024 at 7:30 AM   "

## 2024-02-19 NOTE — OP NOTE
February 19, 2024    Kee Phipps      Preoperative diagnosis: Symptomatic bilateral macromastia.    Postoperative diagnosis: Same.    Procedure: Bilateral reduction mammoplasties.    Surgeon: Miguelangel Feldman MD.    Assistant: Francisca Massey CSA (there was no plastic surgery resident available to assist).    Anesthesia: General.    IV fluid: 1800 ml.    Tumescent solution: 1000 mL (from 1000 mL of lactated Dunlap mixed with 1 mg of epinephrine).  500 mL was infiltrated on each breast, for a total of 1000 mL.    EBL: 20 ml.    U/O: 350 ml.    Findings: Bilateral macromastia.    Specimens: Right breast parenchyma, 1694 grams.  Left breast parenchyma, 1702 grams.    Drains: # 15 FR Ben drain per breast.  2 drains total.    Disposition: Patient tolerated procedure well.  She was extubated and transferred to recovery room awake and in stable condition.    Indications:    Ms. Phipps is a 33 year old lady who presented with symptomatic bilateral macromastia characterized by neck pain, upper back pain, bilateral shoulder grooving, occasional intertrigo as well as difficulty to asleep and to exercise secondary to the heaviness of her breasts.  She has failed nonmedical treatment with physical therapy and non steroidal anti-inflammatory medications.  Patient has been been deemed an appropriate surgical candidate for bilateral reduction mammoplasties with a Wise pattern technique and inferior pedicle bilaterally.    Risks has been explained to the patient and they include but are not limited to scarring, keloid formation, infection, wound healing problems, hematoma, temporary versus permanent altered sensation on the skin of the breasts as well as on the nipple areolar complexes, necrosis of the nipple areolar complex (NAC) with possibility of future tattooing of the NAC, hypopigmentation on the NAC, breast asymmetries, NAC asymmetries, inability to breast-feed, need for further surgeries.  Patient has acknowledged all  these risks, has signed informed consent and has agreed to proceed.    Procedure:    Patient was identified in the preoperative holding area and preoperative IV antibiotics were given to her. I then proceeded to draw the Wise pattern bilaterally for her bilateral reduction mammoplasties.     First, I localized the acromioclavicular junction on each side of her shoulders as well as the sternal notch.  Then I measured the distance between each acromioclavicular junction to the sternal notch.  The center of this distance was marked and the breast meridian was drawn on the anterior surface of each breast.  Then I proceeded to localize  Pitanguy's point on the anterior surface of each breast and then marked 2 cm superior to this point. From this point and utilizing a keyhole pattern, I draw a 7 cm line medially and laterally on each breast encompassing the NAC.  These 2 lines will join in the future in order to create the vertical limb of the superior flap of the Wise pattern.  From the inferior end of each of these 7 cm lines, I draw a transverse line both medially and laterally on each breast until they met the medial and the lateral ends of the inframammary fold (IMF).     This was the drawing of the Richey pattern:                   Patient was then brought to the operating room and was placed supine in the operating room table. After general anesthesia was obtained the chest and both breasts were prepped and draped in the sterile surgical fashion.    We then proceeded to design and demarcate the inferior pedicle on both breasts.  First I localized the breast meridian on the IMF. From this point I measured 4 cm laterally and 4 cm medially to the breast meridian and this created a 8 cm width for the inferior pedicle that extended superiorly along the breast and encompassing the NAC.    At this time I made a stab incision with scalpel #15 on the anterior aspect, lower third, of each inferior pedicle and proceeded to  infiltrate the breast base with tumescent solution in order to minimize bleeding and to facilitate breast parenchyma dissection.    Utilizing a 42 mm cookie cutter placed on the center of the NAC, I proceeded to make hash marks, utilizing a #15 blade, on the impression left by the cookie-cutter on the NAC.  Utilizing a #15 blade we proceeded to make hash marks along the Wise pattern markings, bilateral.    We then proceeded to de-epithelialized the inferior pedicles bilaterally.  Then, the superior flap of the Wise pattern on each breast was elevated with electrocautery.  The dissection was carried out until we reached the clavicle bilaterally.  Each flap was elevated from the fascia of the pectoralis major muscle underneath.  The fascia was left undisturbed.      At this time we proceeded to resect the skin and breast parenchyma that was medial and lateral to the inferior pedicle.    After the above-mentioned skin and breast parenchymal resections, on the right breast we removed 1694 grams, and on the left breast we remove 1702 grams.    Then we proceeded to provide irrigation with antibiotic solution and we found that hemostasis was excellent on both breasts.  A #15 Spanish Ben drain was applied to each breast, and the drains were secured to the skin with 2-0 silk stitch.    We also applied a 2-0 silk reference stitch at the 12 o'clock position on each NAC for future reference and to avoid any twisting of the pedicle during the future inset of the NAC on the superior flap.    At this time the lateral and medial opening of the superior flap of the Wise pattern were temporarily closed with staples as a vertical limb on each breast.  The superior flap was also approximated to the IMF with temporary staples as well.  Finally, the NAC was inset on the superior flap at the level of the opening designed by the keyhole pattern with temporary staples as well.    We then proceeded to sit the patient up in order to compare  symmetry on both breasts and we were satisfied with the symmetry achieved.    The patient was then placed back in the supine position and we proceeded to close all the incisions.    Each NAC was permanently inset with 3-0 Monocryl buried interrupted stitches followed by 5-0 Monocryl running subcuticular stitches around each areola.    The vertical limb of the superior flap was then closed in layers with 3-0 Monocryl buried interrupted stitches followed by 4-0 Monocryl running subcuticular stitches.  Each IMF was also closed in layers, first with 3-0 Monocryl buried interrupted stitches followed by 4-0 STRATAFIX running subcuticular stitches.    Bacitracin ointment followed by Xeroform gauze strip were applied along the incisions and the Steri-Strips around each NAC.    At the end of the case capillary refill was less than 2 seconds on each NAC.    Instrument count was reported by nursing personnel as correct.  Patient tolerated procedure well and she was extubated and transferred to recovery room awake and in stable condition.      Miguelangel Feldman MD , FACS   Diplomate American Board of Plastic Surgery  Diplomate American Board of Surgery  Adj. Assistant Professor of Surgery  Division of Plastic & Reconstructive Surgery   [facility]  Office: [phone]   2/19/2024 at 4:57 PM

## 2024-02-19 NOTE — ANESTHESIA PROCEDURE NOTES
Airway       Patient location during procedure: OR       Procedure Start/Stop Times: 2/19/2024 7:41 AM and 2/19/2024 7:43 AM  Staff -        CRNA: Deo Johnson APRN CRNA       Performed By: CRNA  Consent for Airway        Urgency: elective  Indications and Patient Condition       Indications for airway management: elkin-procedural       Induction type:intravenous       Mask difficulty assessment: 1 - vent by mask    Final Airway Details       Final airway type: endotracheal airway       Successful airway: ETT - single  Endotracheal Airway Details        ETT size (mm): 7.0       Cuffed: yes       Successful intubation technique: direct laryngoscopy       DL Blade Type: MAC 3       Grade View of Cords: 1       Adjucts: stylet       Position: Right       Measured from: lips       Secured at (cm): 22       Bite block used: Oral Airway    Post intubation assessment        Placement verified by: capnometry, equal breath sounds and chest rise        Number of attempts at approach: 1       Number of other approaches attempted: 0       Secured with: tape       Ease of procedure: easy       Dentition: Intact and Unchanged    Medication(s) Administered   Medication Administration Time: 2/19/2024 7:41 AM

## 2024-02-19 NOTE — ANESTHESIA POSTPROCEDURE EVALUATION
Patient: Kee Phipps    Procedure: Procedure(s):  MAMMOPLASTY, REDUCTION       Anesthesia Type:  General    Note:  Disposition: Outpatient   Postop Pain Control: Uneventful            Sign Out: Well controlled pain   PONV: No   Neuro/Psych: Uneventful            Sign Out: Acceptable/Baseline neuro status   Airway/Respiratory: Uneventful            Sign Out: Acceptable/Baseline resp. status   CV/Hemodynamics: Uneventful            Sign Out: Acceptable CV status; No obvious hypovolemia; No obvious fluid overload   Other NRE:    DID A NON-ROUTINE EVENT OCCUR?            Last vitals:  Vitals Value Taken Time   /70 02/19/24 1300   Temp 36.8  C (98.2  F) 02/19/24 1305   Pulse 66 02/19/24 1312   Resp 0 02/19/24 1312   SpO2 93 % 02/19/24 1312   Vitals shown include unfiled device data.    Electronically Signed By: Nahum García MD  February 19, 2024  2:03 PM

## 2024-02-20 LAB
PATH REPORT.COMMENTS IMP SPEC: NORMAL
PATH REPORT.COMMENTS IMP SPEC: NORMAL
PATH REPORT.FINAL DX SPEC: NORMAL
PATH REPORT.GROSS SPEC: NORMAL
PATH REPORT.MICROSCOPIC SPEC OTHER STN: NORMAL
PATH REPORT.RELEVANT HX SPEC: NORMAL
PHOTO IMAGE: NORMAL

## 2024-02-20 PROCEDURE — 88305 TISSUE EXAM BY PATHOLOGIST: CPT | Mod: 26 | Performed by: PATHOLOGY

## 2024-02-23 ENCOUNTER — OFFICE VISIT (OUTPATIENT)
Dept: PLASTIC SURGERY | Facility: AMBULATORY SURGERY CENTER | Age: 34
End: 2024-02-23
Payer: COMMERCIAL

## 2024-02-23 DIAGNOSIS — Z98.890 STATUS POST BILATERAL BREAST REDUCTION: Primary | ICD-10-CM

## 2024-02-23 PROCEDURE — 99024 POSTOP FOLLOW-UP VISIT: CPT | Performed by: PLASTIC SURGERY

## 2024-02-23 NOTE — PROGRESS NOTES
Ms. Phipps is a 33 years old lady status post bilateral breast reduction.  She is 4 days out from surgery.  She is doing well.    At the physical exam, incisions are healing well.  No sign of infection or wound dehiscence.  There is no evidence of hematoma or seroma.  Bilateral nipple areolar complexes are viable.  Good shape and symmetry bilaterally.  Bilateral Ben drain has been removed.    Plan: May have regular showers, start applying cocoa butter lotion along the scars, avoid heavy lifting for the next 4 to 6 weeks and follow-up with me in 3 weeks.  Patient is very happy with results.    Miguelangel Feldman MD , FACS   Diplomate American Board of Plastic Surgery  Diplomate American Board of Surgery  Adj. Assistant Professor of Surgery  Division of Plastic & Reconstructive Surgery   [facility]  Office: [phone]   2/23/2024 at 2:47 PM

## 2024-02-23 NOTE — LETTER
2/23/2024         RE: Kee Phipps  [address]  [place] [address]        Dear Colleague,    Thank you for referring your patient, Kee Phipps, to the [facility] [place]. Please see a copy of my visit note below.    Ms. Phipps is a 33 years old lady status post bilateral breast reduction.  She is 4 days out from surgery.  She is doing well.    At the physical exam, incisions are healing well.  No sign of infection or wound dehiscence.  There is no evidence of hematoma or seroma.  Bilateral nipple areolar complexes are viable.  Good shape and symmetry bilaterally.  Bilateral Ben drain has been removed.    Plan: May have regular showers, start applying cocoa butter lotion along the scars, avoid heavy lifting for the next 4 to 6 weeks and follow-up with me in 3 weeks.  Patient is very happy with results.    Miguelangel Feldman MD , FACS   Diplomate American Board of Plastic Surgery  Diplomate American Board of Surgery  Adj. Assistant Professor of Surgery  Division of Plastic & Reconstructive Surgery   [facility]  Office: [phone]   2/23/2024 at 2:47 PM             Again, thank you for allowing me to participate in the care of your patient.        Sincerely,        Miguelangel Feldman MD

## 2024-02-23 NOTE — NURSING NOTE
Patient here today for first post-op after S/P Mammoplasty reduction on 02/19/2024.    Doing well overall, pain is 7/10 at the moment due to walking and moving around.   Kitty Tyler RN on 2/23/2024 at 2:25 PM

## 2024-03-01 ENCOUNTER — MYC MEDICAL ADVICE (OUTPATIENT)
Dept: PLASTIC SURGERY | Facility: AMBULATORY SURGERY CENTER | Age: 34
End: 2024-03-01
Payer: COMMERCIAL

## 2024-03-01 NOTE — TELEPHONE ENCOUNTER
FMLA / NORMA or Short Term Disability Paperwork completed and signed on providers behalf.     Leave start date: 02/19/2024    Leave end date: 04/01/2024    RTW on 04/01/2024 with no restrictions.     Forms e-mailed to [email or web address]    Forms labeled and sent to HIM for scanning.    Kitty Tyler RN on 3/1/2024 at 10:43 AM

## 2024-03-15 ENCOUNTER — MEDICAL CORRESPONDENCE (OUTPATIENT)
Dept: HEALTH INFORMATION MANAGEMENT | Facility: CLINIC | Age: 34
End: 2024-03-15

## 2024-03-28 ENCOUNTER — OFFICE VISIT (OUTPATIENT)
Dept: PLASTIC SURGERY | Facility: AMBULATORY SURGERY CENTER | Age: 34
End: 2024-03-28
Payer: COMMERCIAL

## 2024-03-28 DIAGNOSIS — Z98.890 STATUS POST BILATERAL BREAST REDUCTION: Primary | ICD-10-CM

## 2024-03-28 PROCEDURE — 99024 POSTOP FOLLOW-UP VISIT: CPT | Performed by: PLASTIC SURGERY

## 2024-03-28 NOTE — PROGRESS NOTES
Ms. Phipps is a 33 years old lady status post bilateral breast reduction.  She is 5 weeks out from surgery.    At the physical exam, incisions are healing well.  There is some hypertrophic scarring at the level of the horizontal limb of the Wise pattern bilaterally.  Otherwise patient presents with good shape and symmetry bilaterally.  There is some galactorrhea, especially at the level of her right breast.  This is not new and the patient has been having this in the past.  As a matter fact, I had to postpone her surgery due to previous episode of galactorrhea.                        Plan: Patient is doing well from plastic surgery standpoint.  Patient is pleased with results.  I have advised to apply silicone strips along the scars to prevent hypertrophic scar formation.  As far as the galactorrhea, I have advised her to contact her PCP for consultation with an endocrinologist.    Follow-up with me as needed.    Miguelangel Feldman MD , FACS   Diplomate American Board of Plastic Surgery  Diplomate American Board of Surgery  Adj. Assistant Professor of Surgery  Division of Plastic & Reconstructive Surgery   [facility]  Office: [phone]   3/28/2024 at 2:50 PM

## 2024-03-28 NOTE — LETTER
3/28/2024         RE: Kee Phipps  [address]  [place] [address]        Dear Colleague,    Thank you for referring your patient, Kee Phipps, to the [facility]LAST[facility] [place]. Please see a copy of my visit note below.    Ms. Phipps is a 33 years old lady status post bilateral breast reduction.  She is 5 weeks out from surgery.    At the physical exam, incisions are healing well.  There is some hypertrophic scarring at the level of the horizontal limb of the Wise pattern bilaterally.  Otherwise patient presents with good shape and symmetry bilaterally.  There is some galactorrhea, especially at the level of her right breast.  This is not new and the patient has been having this in the past.  As a matter fact, I had to postpone her surgery due to previous episode of galactorrhea.                        Plan: Patient is doing well from plastic surgery standpoint.  Patient is pleased with results.  I have advised to apply silicone strips along the scars to prevent hypertrophic scar formation.  As far as the galactorrhea, I have advised her to contact her PCP for consultation with an endocrinologist.    Follow-up with me as needed.    Miguelangel Feldman MD , FACS   Diplomate American Board of Plastic Surgery  Diplomate American Board of Surgery  Adj. Assistant Professor of Surgery  Division of Plastic & Reconstructive Surgery   [facility]  Office: [phone]   3/28/2024 at 2:50 PM       Again, thank you for allowing me to participate in the care of your patient.        Sincerely,        Miguelangel Feldman MD

## 2024-04-01 ENCOUNTER — TELEPHONE (OUTPATIENT)
Dept: FAMILY MEDICINE | Facility: CLINIC | Age: 34
End: 2024-04-01
Payer: COMMERCIAL

## 2024-04-01 ENCOUNTER — HOSPITAL ENCOUNTER (EMERGENCY)
Facility: HOSPITAL | Age: 34
Discharge: HOME | End: 2024-04-01
Attending: EMERGENCY MEDICINE | Admitting: EMERGENCY MEDICINE
Payer: COMMERCIAL

## 2024-04-01 VITALS
OXYGEN SATURATION: 98 % | RESPIRATION RATE: 20 BRPM | HEART RATE: 92 BPM | WEIGHT: 215 LBS | SYSTOLIC BLOOD PRESSURE: 143 MMHG | BODY MASS INDEX: 36.9 KG/M2 | TEMPERATURE: 97.2 F | DIASTOLIC BLOOD PRESSURE: 100 MMHG

## 2024-04-01 DIAGNOSIS — Z87.42 HISTORY OF UTERINE PROLAPSE: ICD-10-CM

## 2024-04-01 DIAGNOSIS — B37.31 YEAST INFECTION OF THE VAGINA: ICD-10-CM

## 2024-04-01 LAB
ALBUMIN UR-MCNC: 20 MG/DL
APPEARANCE UR: CLEAR
BACTERIA #/AREA URNS HPF: ABNORMAL /HPF
BILIRUB UR QL STRIP: NEGATIVE
CLUE CELLS: ABNORMAL
COLOR UR AUTO: ABNORMAL
GLUCOSE UR STRIP-MCNC: NEGATIVE MG/DL
HCG UR QL: NEGATIVE
HGB UR QL STRIP: NEGATIVE
KETONES UR STRIP-MCNC: NEGATIVE MG/DL
LEUKOCYTE ESTERASE UR QL STRIP: NEGATIVE
MUCOUS THREADS #/AREA URNS LPF: PRESENT /LPF
NITRATE UR QL: NEGATIVE
PH UR STRIP: 7 [PH] (ref 5–7)
RBC URINE: 1 /HPF
SP GR UR STRIP: 1.03 (ref 1–1.03)
SQUAMOUS EPITHELIAL: 3 /HPF
TRICHOMONAS, WET PREP: ABNORMAL
UROBILINOGEN UR STRIP-MCNC: 3 MG/DL
WBC URINE: 1 /HPF
WBC'S/HIGH POWER FIELD, WET PREP: ABNORMAL
YEAST, WET PREP: PRESENT

## 2024-04-01 PROCEDURE — 87491 CHLMYD TRACH DNA AMP PROBE: CPT | Performed by: STUDENT IN AN ORGANIZED HEALTH CARE EDUCATION/TRAINING PROGRAM

## 2024-04-01 PROCEDURE — 81025 URINE PREGNANCY TEST: CPT | Performed by: EMERGENCY MEDICINE

## 2024-04-01 PROCEDURE — 99283 EMERGENCY DEPT VISIT LOW MDM: CPT

## 2024-04-01 PROCEDURE — 87210 SMEAR WET MOUNT SALINE/INK: CPT | Performed by: STUDENT IN AN ORGANIZED HEALTH CARE EDUCATION/TRAINING PROGRAM

## 2024-04-01 PROCEDURE — 81001 URINALYSIS AUTO W/SCOPE: CPT | Performed by: STUDENT IN AN ORGANIZED HEALTH CARE EDUCATION/TRAINING PROGRAM

## 2024-04-01 PROCEDURE — 81001 URINALYSIS AUTO W/SCOPE: CPT | Performed by: EMERGENCY MEDICINE

## 2024-04-01 PROCEDURE — 81025 URINE PREGNANCY TEST: CPT | Performed by: STUDENT IN AN ORGANIZED HEALTH CARE EDUCATION/TRAINING PROGRAM

## 2024-04-01 RX ORDER — FLUCONAZOLE 150 MG/1
TABLET ORAL
Qty: 1 TABLET | Refills: 0 | Status: SHIPPED | OUTPATIENT
Start: 2024-04-01 | End: 2024-04-04

## 2024-04-01 RX ORDER — FLUCONAZOLE 150 MG/1
150 TABLET ORAL ONCE
Status: DISCONTINUED | OUTPATIENT
Start: 2024-04-01 | End: 2024-04-02 | Stop reason: HOSPADM

## 2024-04-01 ASSESSMENT — COLUMBIA-SUICIDE SEVERITY RATING SCALE - C-SSRS
2. HAVE YOU ACTUALLY HAD ANY THOUGHTS OF KILLING YOURSELF IN THE PAST MONTH?: NO
1. IN THE PAST MONTH, HAVE YOU WISHED YOU WERE DEAD OR WISHED YOU COULD GO TO SLEEP AND NOT WAKE UP?: NO
6. HAVE YOU EVER DONE ANYTHING, STARTED TO DO ANYTHING, OR PREPARED TO DO ANYTHING TO END YOUR LIFE?: NO

## 2024-04-01 ASSESSMENT — ACTIVITIES OF DAILY LIVING (ADL)
ADLS_ACUITY_SCORE: 33

## 2024-04-01 NOTE — TELEPHONE ENCOUNTER
Pt called back and made an appt for pt to come F/U FOR BREAST DISCHARGE on 4/8/24 @ 9:20 AM.     Peace Ochoa MA        ----- Message from Gildardo Minor MD sent at 3/28/2024  5:33 PM CDT -----  Can you call this patient to follow up with me in the next 2-6 weeks for galactorrhea (breast discharge)? Thank you!

## 2024-04-01 NOTE — ED TRIAGE NOTES
Patient here concerned for prolapse states it was an issue after having her last child but then it seemed to resolve some states she now feels the uterus pulling through her vagina and she is concerned states that she recently had a breast reduction in feb and since then has also been having milk leakage states that she is unsure about possibility of pregnancy and wants this checked as well.

## 2024-04-01 NOTE — TELEPHONE ENCOUNTER
LVM for pt to call me back.      Peace Ochoa MA            ----- Message from Gildardo Minor MD sent at 3/28/2024  5:33 PM CDT -----  Can you call this patient to follow up with me in the next 2-6 weeks for galactorrhea (breast discharge)? Thank you!

## 2024-04-02 ENCOUNTER — TELEPHONE (OUTPATIENT)
Dept: UROLOGY | Facility: CLINIC | Age: 34
End: 2024-04-02

## 2024-04-02 LAB
C TRACH DNA SPEC QL PROBE+SIG AMP: NEGATIVE
N GONORRHOEA DNA SPEC QL NAA+PROBE: NEGATIVE

## 2024-04-02 NOTE — DISCHARGE INSTRUCTIONS
"You were seen in the emergency department today for concerns for uterine prolapse.  As discussed, I do not see any evidence of this on your exam today. I suspect the \"lumps\" you noticed on exam may be some swelling or extra skin tissue due to irritation from the yeast infection.  Your swabs came back positive for yeast infection. The treatment for this is a one time dose of antifungal medication called fluconazole. If your symptoms have not improved in 72 hours you can take a second dose of this medication.    Follow-up with your primary care provider tomorrow as scheduled, they have a lot of women's health knowledge and can further evaluate you.  "

## 2024-04-02 NOTE — TELEPHONE ENCOUNTER
[facility] Call Center    Phone Message    May a detailed message be left on voicemail: yes     Reason for Call: Other: Patient being referred for Urgent Appointment (3-5 days) for pelvic prolapse. Patient declined to schedule first available. Sending encounter message for review and follow-up with Pt for scheduling. Thank you!     Action Taken: Message routed to:  [facility] ([facility] URO    Travel Screening: Not Applicable

## 2024-04-02 NOTE — ED PROVIDER NOTES
"EMERGENCY DEPARTMENT MIDLEVEL SUPERVISORY NOTE    Date/Time: 4/1/2024 9:30 PM    I am seeing this patient along with Raeann Chua PA-C.  I have seen and evaluated the patient independently at bedside and agree with the TOBY's history, assessment and plan.  I personally saw the patient and performed a substantive portion of the visit including all aspects of the medical decision making.      BRIEF HPI    The patient reports a history of uterine prolapse after recently giving birth. The patient also reports when she urinates the urine goes \"all different directions\".     ED COURSE  9:31 PM I received the patient report from the TOBY, Raeann Chua PA-C. I agree with their assessment and plan of management, and I will see the patient.  10:58 PM I attempted to meet with the patient, but she was not in her room. Patient left AMA    MEDICAL DECISION MAKING    Pertinent Labs and Imaging reviewed (see chart for details)    Kee Phipps is a 33 year old year old who presents for evaluation of possible uterine prolapse.  Apparently, she has been told she has uterine prolapse after delivery of her last child but has not followed up with OB/GYN.  Additionally, patient reported having some vaginal discharge and was also requesting a pregnancy test.  Patient was initially evaluated by PA who performed pelvic exam and collected swabs.  On exam, there was no evidence of uterine prolapse or other obvious abnormality.  Wet prep was positive for yeast but no other infectious etiologies.  I attempted to meet with the patient to review results and discuss next steps for follow-up and management of symptoms.  Unfortunately, at that time, she was found to have left the department without notifying nursing or any providers.  She does have follow-up with her primary care provider already scheduled for tomorrow so hopefully, they will be able to start her on fluconazole.  She was given a referral to urogynecology as well.    At conclusion " of encounter I discussed results of all of tests and recommendation for disposition. All questions were answered. Patient acknowledged understanding and was agreeable with care plan.     Please see midlevel note for additional details. I personally saw the patient and performed a substantive portion of the visit including all aspects of the medical decision making.     FINAL IMPRESSION       Yeast infection of the vagina  History of uterine prolapse        Eleni Moran MD  Emergency Medicine  4/1/2024 9:30 PM     Eleni Moran MD  04/02/24 0542

## 2024-04-02 NOTE — ED PROVIDER NOTES
"Emergency Department Encounter   NAME: Kee Phipps ; AGE: 33 year old female ; YOB: 1990 ; MRN: [id number] ; PCP: Gildardo Minor   ED PROVIDER: Raeann Chua PA-C    Evaluation Date & Time:   4/1/2024  7:59 PM    CHIEF COMPLAINT:  Uterine Prolapse        Impression and Plan   FINAL IMPRESSION:    ICD-10-CM    1. Yeast infection of the vagina  B37.31 Adult Uro/Gyn  Referral      2. History of uterine prolapse  Z87.42 Adult Uro/Gyn  Referral          MDM:  Kee Phipps is a 33 year old female with PMH of Crohn's disease, chronic vulvovaginitis, migraines, adjustment disorder, rheumatoid arthritis, fibromyalgia, and chronic pain syndrome presenting to the emergency department today for concern for uterine prolapse.  She states being told she had issues with uterine prolapse after having her last child but it seems to have resolved and she never had another follow-up with OB/GYN.  She states today in the shower she felt the skin down in her vaginal region and felt \"2 lumps\".  She then grew very concerned, causing her to present to the emergency department.  She denies any pain or vaginal bleeding. LMP was 3/22/2024 and says that it was longer and heavier than normal.  She endorses increased white/yellow discharge for the past few days, denies malodor.  She has not been sexually active in over a month but is requesting pregnancy test today.  No IUD in place and she is not currently on any birth control.  She states she is \"concerned her vagina is going to fall out\". Her partner was treated for trichomonas about 1 month ago.  She has overall low suspicion for STI today.    Vitals reviewed and unremarkable aside from a BP of 143/100. On exam she is tearful and appears upset, is not toxic appearing. Differential diagnosis includes but not limited to UTI, BV, yeast, trichomonas, yeast, herpes, uterine prolapse, PID, pregnancy.  Urine hCG is negative.  Her urine does not show any evidence " "of blood or urinary tract infection at this time. There is no evidence of uterine prolapse on exam today. Speculum was inserted into the vagina in its entirety and the cervix was visualized. Cervix does not appear edematous or erythematous.  There is a mild amount of watery white discharge around the cervix, no malodor. No cervical motion or adnexal tenderness bilaterally to indicate PID. I reassured patient following exam that I do not see any emergent/concerning findings on her pelvic exam today aside from increased white discharge around the cervix.  Patient is insistent that there are \"lumps\" present in the inside of her vagina. There are no sores or vesicles present on the external genitalia.  I told patient she can have further discussion with OB/Gyn or her primary regarding this as this is the emergency department and I do not see anything emergent on her pelvic exam aside from new white discharge.  Patient then grew upset stating she thought she was going to see an OB/Gyn today as she had told triage she was having pelvic issues. I informed patient that that is not typically how the emergency department works unless there is something emergent going on and she can see OB/Gyn on an outpatient basis. Patient seemed dissatisfied with this answer.  She informed me she has a follow-up visit with her primary care provider scheduled tomorrow. She has overall low suspicion for gonorrhea or chlamydia so we will wait for swabs to come back to treat. Wet prep shows presence of yeast infection.  No evidence of trichomonas or BV. I returned to the patient's room to update her on the results of her wet prep to find patient had left AMA.  She did not receive her dose of fluconazole prior to leaving and I was unable to show discussed return precautions with her.  Her primary care provider can prescribe her fluconazole tomorrow to treat her yeast infection.      Medical Decision Making    History:  Supplemental history from: " Documented in chart  External Record(s) reviewed: Documented in chart    Work Up:  Chart documentation includes differential considered and any EKGs or imaging independently interpreted by provider, where specified.  In additional to work up documented, I considered the following work up: Documented in chart, if applicable.    External consultation:  Discussion of management with another provider: Documented in chart, if applicable    Complicating factors:  Care impacted by chronic illness: N/A  Care affected by social determinants of health: N/A    Disposition considerations: left AMA      ED COURSE:  9:29 PM I met and introduced myself to the patient. I gathered initial history and performed my physical exam. We discussed plan for initial workup.   9:30 PM I have staffed the patient with Dr. Moran, ED MD, who has evaluated the patient and agrees with all aspects of today's care.   10:14 PM I performed a pelvic exam on the patient.   11:07 PM I went to patient's room to update her on wet prep results and found the patient and left AMA without a dose of fluconazole, results, or return precautions      At the conclusion of the encounter I discussed the results of all the tests and the disposition. The questions were answered. The patient or family acknowledged understanding and was agreeable with the care plan.        MEDICATIONS GIVEN IN THE EMERGENCY DEPARTMENT:  Medications   fluconazole (DIFLUCAN) tablet 150 mg (has no administration in time range)         NEW PRESCRIPTIONS STARTED AT TODAY'S ED VISIT:  Discharge Medication List as of 4/1/2024 11:15 PM        START taking these medications    Details   fluconazole (DIFLUCAN) 150 MG tablet Take one tablet now, and one tablet in three days, Disp-1 tablet, R-0, Local Print               HPI   Patient information was obtained from: patient   Use of Intrepreter: N/A     Kee Phipps is a 33 year old female with a pertinent history of Crohn's disease, hypothyroidism,  "depression, and anxiety who presents to the ED by via walk-in for evaluation of a uterine prolapse.     Patient reports that she had a uterine prolapse when she had her daughter but it had resolved at that time.Today, she says that she was taking a shower and felt something in her vagina and so she used her fingers to feel inside and she felt \"two balls\" up there. He has an urge to push. Says her urination stream has been off as it is not a steady stream. She has lots of yellow/clear discharge. Last period was on 3/22/2024 and says that it was longer and heavier than normal. Her periods usually last 3 days but this one lasted 7 days. Has not been sexually active in 1.5 months.     Denies pain and fever. Denies having an IUD.     Medical History     Past Medical History:   Diagnosis Date    Abnormal Pap smear of cervix     Accelerated idioventricular rhythm (H) 2022    Anemia     Anxiety     Arthritis     Bilateral plantar fasciitis     Celiac disease 2014    Chronic pain syndrome     Crohn's disease (H) 2012    Crohn's disease of colon (H) 2015    Depression     Depressive disorder     Fibromyalgia     Gastroesophageal reflux disease without esophagitis 2014    H/O miscarriage, currently pregnant     HPV (human papilloma virus) infection     Hypothyroidism 2015    Immunosuppressed status (H24) 2015    Migraine with aura and without status migrainosus, not intractable 2023    Plantar fasciitis     Polyp of colon 2017    Postpartum hypertension 2022     delivery 2013    Rheumatoid arthritis (H) 2023       Past Surgical History:   Procedure Laterality Date    APPENDECTOMY      BOWEL RESECTION      illium     CERVIX LESION DESTRUCTION      COLPOSCOPY 2014      DILATION AND CURETTAGE SUCTION, TREAT MISSED , 1ST TRIMESTER      miscarriage at 8 wks gest    DILATION AND CURETTAGE SUCTION, TREAT MISSED , 1ST TRIMESTER  " 2019    ECTOPIC PREGNANCY SURGERY       EXCISION GANGLION LEFT FOOT 2018      FASCIOTOMY LOWER EXTREMITY Left 2018    Left foot    FOOT SURGERY Left     LAPAROSCOPY DIAGNOSTIC (GYN) N/A 2019    Procedure: LAPAROSCOPY, DIAGNOSTIC;  Surgeon: Anni Velasquez MD;  Location: [facility] OR    MAMMOPLASTY REDUCTION Bilateral 2024    Procedure: MAMMOPLASTY, REDUCTION BILATERAL;  Surgeon: Miguelangel Feldman MD;  Location: [facility] OR    PLANTAR FASCIA RELEASE Left 04/10/2018    Procedure:  PLANTAR FASCIOTOMY LEFT FOOT ;  Surgeon: Yuri Arreola DPM;  Location: [facility];  Service:     SALPINGECTOMY Right 05/15/2021    rt sided ruptured ectopic    SMALL BOWEL RESECTION      TONSILLECTOMY         Family History   Problem Relation Age of Onset    Hypertension Mother     Seizure Disorder Mother     Cancer Maternal Grandmother         bone cancer    Asthma Brother     Asthma Brother     Asthma Brother     Asthma Brother     Asthma Son     No Known Problems Daughter     Crohn's Disease Paternal Uncle     Diabetes No family hx of     Coronary Artery Disease No family hx of     Hyperlipidemia No family hx of     Cerebrovascular Disease No family hx of     Breast Cancer No family hx of     Colon Cancer No family hx of     Prostate Cancer No family hx of     Other Cancer No family hx of     Depression No family hx of     Anxiety Disorder No family hx of     Mental Illness No family hx of     Substance Abuse No family hx of     Anesthesia Reaction No family hx of     Osteoporosis No family hx of     Genetic Disorder No family hx of     Thyroid Disease No family hx of     Obesity No family hx of     Unknown/Adopted No family hx of     Heart Disease No family hx of        Social History     Tobacco Use    Smoking status: Former     Types: Vaping Device     Start date: 2011     Quit date: 2021     Years since quittin.2     Passive exposure: Never    Smokeless tobacco:  Never    Tobacco comments:     Vaping daily since 12/2021, stop smoking cigre 2 yers ago,a dn stop vaping 2-3 weeks ago (12/13/23)   Vaping Use    Vaping Use: Former    Substances: Nicotine, Flavoring    Devices: Pre-filled or refillable cartridge   Substance Use Topics    Alcohol use: Yes     Alcohol/week: 3.0 standard drinks of alcohol     Types: 3 Standard drinks or equivalent per week     Comment: occa    Drug use: No       fluconazole (DIFLUCAN) 150 MG tablet  acetaminophen (TYLENOL) 650 MG CR tablet  Boric Acid Vaginal 600 MG SUPP  chlorthalidone (HYGROTON) 25 MG tablet  diclofenac (VOLTAREN) 1 % topical gel  diclofenac (VOLTAREN) 1 % topical gel  SUMAtriptan (IMITREX) 50 MG tablet  ustekinumab (STELARA) 90 MG/ML          Physical Exam     First Vitals:  Patient Vitals for the past 24 hrs:   BP Temp Temp src Pulse Resp SpO2 Weight   04/01/24 1813 (!) 143/100 -- -- -- -- -- --   04/01/24 1810 (!) 179/94 97.2  F (36.2  C) Temporal 92 20 98 % 97.5 kg (215 lb)         PHYSICAL EXAM    General Appearance:  Alert, cooperative, no distress, appears stated age.  Patient is tearful, yelling, and appears upset, is not toxic appearing.  HENT: Normocephalic without obvious deformity, atraumatic. Mucous membranes moist   Eyes: Conjunctiva clear, Lids normal. No discharge.   Respiratory: No distress. Lungs clear to ausculation bilaterally. No wheezes, rhonchi or stridor  Cardiovascular: Regular rate and rhythm, no murmur. Normal cap refill. No peripheral edema  GI: Abdomen soft, nontender, normal bowel sounds  : No CVA tenderness.  No sores, erythema, or masses on the external genitalia.  No obvious uterine prolapse.  Cervix was visualized with speculum, this is not erythematous or edematous.  No active bleeding from the cervix.  There is a moderate amount of white watery discharge present around the cervix and in the vaginal vault.  No cervical motion tenderness or adnexal tenderness bilaterally.  Musculoskeletal: Moving  all extremities. No gross deformities  Integument: Warm, dry, no rashes or lesions  Neurologic: Alert and orientated x3. No focal deficits.  Psych: Normal mood and affect.         Results     LAB:  All pertinent labs reviewed and interpreted  Labs Ordered and Resulted from Time of ED Arrival to Time of ED Departure   ROUTINE UA WITH MICROSCOPIC REFLEX TO CULTURE - Abnormal       Result Value    Color Urine Light Yellow      Appearance Urine Clear      Glucose Urine Negative      Bilirubin Urine Negative      Ketones Urine Negative      Specific Gravity Urine 1.034 (*)     Blood Urine Negative      pH Urine 7.0      Protein Albumin Urine 20 (*)     Urobilinogen Urine 3.0 (*)     Nitrite Urine Negative      Leukocyte Esterase Urine Negative      Bacteria Urine Few (*)     Mucus Urine Present (*)     RBC Urine 1      WBC Urine 1      Squamous Epithelials Urine 3 (*)    WET PREPARATION - Abnormal    Trichomonas Absent      Yeast Present (*)     Clue Cells Absent      WBCs/high power field 3+ (*)    HCG QUALITATIVE URINE - Normal    hCG Urine Qualitative Negative     CHLAMYDIA TRACHOMATIS/NEISSERIA GONORRHOEAE BY PCR       RADIOLOGY:  No orders to display       IYuni, am serving as a scribe to document services personally performed by Raeann Chua PA-C, based on my observation and the provider's statements to me. IRaeann PA-C attest that Yuni Segura is acting in a scribe capacity, has observed my performance of the services and has documented them in accordance with my direction.       Raeann Chua PA-C   Emergency Medicine   [facility] EMERGENCY DEPARTMENT      Raeann Chua PA-C  04/02/24 0053

## 2024-04-03 NOTE — TELEPHONE ENCOUNTER
Patient confirmed scheduled appointment:  Date: 4/9  Time: 2:00  Visit type: NEW  Provider: Naheed  [place] [facility]  Testing/imaging: NA  Additional notes: NA

## 2024-04-03 NOTE — TELEPHONE ENCOUNTER
MEDICAL RECORDS REQUEST   [facility]  Urology Clinic  [address] [place] [address]  PHONE: [phone]  Fax: [phone]        FUTURE VISIT INFORMATION                                                   Kee STOKES Moise, : 1990 scheduled for future visit at [facility]    APPOINTMENT INFORMATION:  Date: 2024  Provider:  Maricruz Farfan PA-C  Reason for Visit/Diagnosis: prolapse consult    REFERRAL INFORMATION:  Referring provider:  Raeann Chua PA-C   Specialty: ED  Referring providers clinic:  [facility]     RECORDS REQUESTED FOR VISIT                                                     NOTES  STATUS/DETAILS   OFFICE NOTE from referring provider  yes   DISCHARGE REPORT from the ER  yes - [facility] 24   MEDICATION LIST  yes   LABS     URINALYSIS (UA)  yes - 24, 3/27/23, 22, 22   FEMALE PELVIC PAIN     OB/GYN VISIT NOTES  yes - [facility]: 23; [facility]: 10/5/23   GI/COLORECTAL VISIT NOTES MEDIA yes - [facility] 23   CT ABDOMEN/PELVIS (IMAGES & REPORT)  yes - 3/27/23   TRANSVAGINAL/PELVIC ULTRASOUND (IMAGES & REPORT)  yes - 3/27/23, 3/13/23     PRE-VISIT CHECKLIST      Joint diagnostic appointment coordinated correctly          (ensure right order & amount of time) Yes   RECORD COLLECTION COMPLETE Yes

## 2024-04-05 ENCOUNTER — PRE VISIT (OUTPATIENT)
Dept: UROLOGY | Facility: CLINIC | Age: 34
End: 2024-04-05
Payer: COMMERCIAL

## 2024-04-09 ENCOUNTER — PRE VISIT (OUTPATIENT)
Dept: UROLOGY | Facility: CLINIC | Age: 34
End: 2024-04-09

## 2024-04-11 ENCOUNTER — TELEPHONE (OUTPATIENT)
Dept: FAMILY MEDICINE | Facility: CLINIC | Age: 34
End: 2024-04-11

## 2024-04-11 DIAGNOSIS — B37.31 YEAST INFECTION OF THE VAGINA: Primary | ICD-10-CM

## 2024-04-11 RX ORDER — FLUCONAZOLE 150 MG/1
150 TABLET ORAL ONCE
Qty: 1 TABLET | Refills: 0 | Status: SHIPPED | OUTPATIENT
Start: 2024-04-11 | End: 2024-04-11

## 2024-04-11 NOTE — TELEPHONE ENCOUNTER
[facility] phone call message- general phone call:    Reason for call: Requesting to get Antibiotics for yeast infection.    [id number] - [place], [place] - [address] [place] [address]  Phone: [phone] Fax: [phone]      Return call needed: Yes    OK to leave a message on voice mail? Yes    Primary language: English      needed? No    Call taken on April 11, 2024 at 10:51 AM by Hellen Aguilar

## 2024-04-11 NOTE — TELEPHONE ENCOUNTER
Call placed back to patient. She was seen in the ED and diagnosed with a yeast infection but did not get an rx due to leaving AMA per chart review that writer can see. She is wondering if she can have this rx now as she is having symptoms and had a pos wet prep for yeast. HOMERO Damico

## 2024-04-11 NOTE — PROGRESS NOTES
Patient was diagnosed with a vaginal yeast infection in the ER but left AMA.  Chart reviewed confirming the infection.  Sending fluconazole to pharmacy.

## 2024-04-12 DIAGNOSIS — I10 BENIGN ESSENTIAL HYPERTENSION: ICD-10-CM

## 2024-04-12 RX ORDER — CHLORTHALIDONE 25 MG/1
25 TABLET ORAL DAILY
Qty: 30 TABLET | Refills: 0 | Status: SHIPPED | OUTPATIENT
Start: 2024-04-12 | End: 2024-05-20

## 2024-04-12 NOTE — TELEPHONE ENCOUNTER
Name from pharmacy: CHLORTHALIDONE 25MG TABLETS          Will file in chart as: chlorthalidone (HYGROTON) 25 MG tablet    Sig: TAKE 1 TABLET(25 MG) BY MOUTH DAILY    Disp: 30 tablet    Refills: 1 (Pharmacy requested: Not specified)    Start: 4/12/2024    Class: E-Prescribe    For: Benign essential hypertension    Last ordered: 2 months ago (2/5/2024) by Shira Khalil MD    Last refill: 3/17/2024    Rx #: [id number]     Routing refill request to provider for review/approval because:    Diuretics (Including Combos) Protocol Oorpao4804/12/2024 08:21 AM   Protocol Details Blood pressure under 140/90 in past 12 months    Has GFR on file in past 12 months and most recent value is normal        HOMERO Diaz, BSN

## 2024-05-09 NOTE — PLAN OF CARE
"Problem: Hypertensive Disorders in Pregnancy  Goal: Maternal-Fetal Stabilization  Intervention: Monitor and Manage Symptom Progression  Recent Flowsheet Documentation  Taken 4/10/2022 0815 by Maria C Leroy RN  Medication Review/Management: medications reviewed     Patient's blood pressure remains elevated despite labetalol, lowest 148/99. Denies consistent headache or vision changes. Around 1000, patient reported feeling \"cloudy\" and had vague headache, symptoms relieved with tylenol. Patient otherwise doing well, denies any additional symptoms. Hopeful to discharge home tomorrow.     " Detail Level: Generalized Detail Level: Detailed

## 2024-05-19 DIAGNOSIS — I10 BENIGN ESSENTIAL HYPERTENSION: ICD-10-CM

## 2024-05-20 DIAGNOSIS — I10 BENIGN ESSENTIAL HYPERTENSION: ICD-10-CM

## 2024-05-20 RX ORDER — CHLORTHALIDONE 25 MG/1
25 TABLET ORAL DAILY
Qty: 30 TABLET | Refills: 0 | Status: SHIPPED | OUTPATIENT
Start: 2024-05-20 | End: 2024-06-27

## 2024-05-20 RX ORDER — CHLORTHALIDONE 25 MG/1
25 TABLET ORAL DAILY
Qty: 90 TABLET | OUTPATIENT
Start: 2024-05-20

## 2024-05-20 NOTE — TELEPHONE ENCOUNTER
Name from pharmacy: CHLORTHALIDONE 25MG TABLETS          Will file in chart as: chlorthalidone (HYGROTON) 25 MG tablet    Sig: TAKE 1 TABLET(25 MG) BY MOUTH DAILY    Disp: 30 tablet    Refills: 0 (Pharmacy requested: Not specified)    Start: 5/19/2024    Class: E-Prescribe    Non-formulary For: Benign essential hypertension    Last ordered: 1 month ago (4/12/2024) by Eleni Gold MD    Last refill: 4/12/2024    Rx #: [id number]    Diuretics (Including Combos) Protocol Pbowud4505/19/2024 07:04 AM   Protocol Details Blood pressure under 140/90 in past 12 months    Has GFR on file in past 12 months and most recent value is normal        HOMERO Diaz, BSN

## 2024-05-21 ENCOUNTER — TRANSCRIBE ORDERS (OUTPATIENT)
Dept: CALL CENTER | Age: 34
End: 2024-05-21
Payer: COMMERCIAL

## 2024-05-21 DIAGNOSIS — L90.5 SCAR OF BREAST: Primary | ICD-10-CM

## 2024-05-24 ENCOUNTER — TELEPHONE (OUTPATIENT)
Dept: PLASTIC SURGERY | Facility: CLINIC | Age: 34
End: 2024-05-24
Payer: COMMERCIAL

## 2024-05-24 NOTE — TELEPHONE ENCOUNTER
Call placed to patient. Patient is going to be set up with a post-op appointment to follow-up in clinic.     Kitty Tyler RN on 5/24/2024 at 11:04 AM

## 2024-05-24 NOTE — TELEPHONE ENCOUNTER
[facility] Call Center    Phone Message    May a detailed message be left on voicemail: yes     Reason for Call: Other: Patient calling about scar pain from procedure back in 02/2024. Please call patient to discuss.      Action Taken: Message routed to:  [facility]    Travel Screening: Not Applicable

## 2024-05-30 ENCOUNTER — OFFICE VISIT (OUTPATIENT)
Dept: PLASTIC SURGERY | Facility: AMBULATORY SURGERY CENTER | Age: 34
End: 2024-05-30
Payer: COMMERCIAL

## 2024-05-30 VITALS — HEIGHT: 64 IN | WEIGHT: 130 LBS | BODY MASS INDEX: 22.2 KG/M2

## 2024-05-30 DIAGNOSIS — Z98.890 STATUS POST BILATERAL BREAST REDUCTION: Primary | ICD-10-CM

## 2024-05-30 PROCEDURE — 99212 OFFICE O/P EST SF 10 MIN: CPT | Performed by: PLASTIC SURGERY

## 2024-05-30 NOTE — LETTER
5/30/2024         RE: Kee Phipps  [address] [place] [address]        Dear Colleague,    Thank you for referring your patient, Kee Phipps, to the [facility] PLASTIC SURG[facility] [place]. Please see a copy of my visit note below.    Ms. Phipps is a 33 years old lady status post bilateral breast reduction. She is 3 months out from surgery.  She returns today for reevaluation, she is concerned of some paresthesias on bilateral breast and along the scars.    At the physical exam, patient have performed keloids around each nipple areolar complex, vertical limbs of the Wise pattern and along the inframammary fold incisions.  Otherwise patient present with good shape bilaterally.    Plan: I have informed her that the paresthesias are normal in the first 6 months to a year because the nerves are recovering from her breast reduction.  However, keloids are a more difficult problem to treat because there is not an effective treatment for this type of scars.  We discussed that excision tend to lead to recurrence.  We also discussed potential treatment with Kenalog injections.  However, steroid injection will not remove the keloids and the injection can produce secondary effects.    I have recommended silicone strips.  However, patient told that they are expensive.    I have recommended then to utilize lidocaine cream along the scars, especially along the inframammary fold scars and to apply a sports bra with some compression along the scars.    Patient told me that she will try that.    Follow-up as needed.    Miguelangel Feldman MD , FACS   Diplomate American Board of Plastic Surgery  Diplomate American Board of Surgery  Adj. Assistant Professor of Surgery  Division of Plastic & Reconstructive Surgery   [facility]  Office: [phone]   5/30/2024 at 12:11 PM       Again, thank you for allowing me to participate in the care of your patient.        Sincerely,        Miguelangel  MD Gaston

## 2024-05-30 NOTE — PROGRESS NOTES
Ms. Phipps is a 33 years old lady status post bilateral breast reduction. She is 3 months out from surgery.  She returns today for reevaluation, she is concerned of some paresthesias on bilateral breast and along the scars.    At the physical exam, patient have performed keloids around each nipple areolar complex, vertical limbs of the Wise pattern and along the inframammary fold incisions.  Otherwise patient present with good shape bilaterally.    Plan: I have informed her that the paresthesias are normal in the first 6 months to a year because the nerves are recovering from her breast reduction.  However, keloids are a more difficult problem to treat because there is not an effective treatment for this type of scars.  We discussed that excision tend to lead to recurrence.  We also discussed potential treatment with Kenalog injections.  However, steroid injection will not remove the keloids and the injection can produce secondary effects.    I have recommended silicone strips.  However, patient told that they are expensive.    I have recommended then to utilize lidocaine cream along the scars, especially along the inframammary fold scars and to apply a sports bra with some compression along the scars.    Patient told me that she will try that.    Follow-up as needed.    Miguelangel Feldman MD , FACS   Diplomate American Board of Plastic Surgery  Diplomate American Board of Surgery  Adj. Assistant Professor of Surgery  Division of Plastic & Reconstructive Surgery   [facility]  Office: [phone]   5/30/2024 at 12:11 PM

## 2024-06-22 ENCOUNTER — HEALTH MAINTENANCE LETTER (OUTPATIENT)
Age: 34
End: 2024-06-22

## 2024-06-26 ENCOUNTER — OFFICE VISIT (OUTPATIENT)
Dept: RHEUMATOLOGY | Facility: CLINIC | Age: 34
End: 2024-06-26
Attending: INTERNAL MEDICINE
Payer: COMMERCIAL

## 2024-06-26 VITALS
HEART RATE: 76 BPM | DIASTOLIC BLOOD PRESSURE: 83 MMHG | OXYGEN SATURATION: 98 % | BODY MASS INDEX: 35.84 KG/M2 | SYSTOLIC BLOOD PRESSURE: 117 MMHG | WEIGHT: 208.8 LBS

## 2024-06-26 DIAGNOSIS — M79.7 FIBROMYALGIA: ICD-10-CM

## 2024-06-26 DIAGNOSIS — M35.7 HYPERMOBILITY SYNDROME: ICD-10-CM

## 2024-06-26 DIAGNOSIS — M25.50 POLYARTHRALGIA: Primary | ICD-10-CM

## 2024-06-26 DIAGNOSIS — K50.019 CROHN'S DISEASE OF ILEUM WITH COMPLICATION (H): ICD-10-CM

## 2024-06-26 PROCEDURE — 99214 OFFICE O/P EST MOD 30 MIN: CPT | Performed by: INTERNAL MEDICINE

## 2024-06-26 PROCEDURE — G2211 COMPLEX E/M VISIT ADD ON: HCPCS | Performed by: INTERNAL MEDICINE

## 2024-06-26 RX ORDER — DULOXETIN HYDROCHLORIDE 30 MG/1
30 CAPSULE, DELAYED RELEASE ORAL DAILY
Qty: 30 CAPSULE | Refills: 2 | Status: SHIPPED | OUTPATIENT
Start: 2024-06-26 | End: 2024-07-26

## 2024-06-26 NOTE — PROGRESS NOTES
Rheumatology follow-up visit note     Kee is a 33 year old female presents today for follow-up.    Kee was seen today for recheck.    Diagnoses and all orders for this visit:    Polyarthralgia  -     DULoxetine (CYMBALTA) 30 MG capsule; Take 1 capsule (30 mg) by mouth daily for 30 days    Fibromyalgia  -     DULoxetine (CYMBALTA) 30 MG capsule; Take 1 capsule (30 mg) by mouth daily for 30 days    Crohn's disease of ileum with complication (H)    Hypermobility syndrome  -     DULoxetine (CYMBALTA) 30 MG capsule; Take 1 capsule (30 mg) by mouth daily for 30 days        This patient for polyarthralgias has combination of reasons for this, she has been found to have evidence of significant hypermobility syndrome, she has longstanding fibromyalgia diagnosed elsewhere in rheumatology, with a Crohn's background the potential for inflammatory joint disease there however that appears not to be the case.  I have explained to her what hypermobility is.  She is going to try duloxetine major side effects were outlined.  Will meet here in 3 months.  The longitudinal plan of care for the diagnosis(es)/condition(s) as documented were addressed during this visit. Due to the added complexity in care, I will continue to support Kee in the subsequent management and with ongoing continuity of care.     Follow up in 3 months.    HPI    Kee Phipps is a 33 year old female is here for follow-up of   polyarthralgias.  She has noted pain in her hands, feet, shoulders, knees.  Even a slight amount of activity causes her pain.  This can last several days.  She has pain almost in all her joint areas as it is noted.  She has tried various over-the-counter measures including Tylenol without help.  She sometimes feels as if she cannot stand up straight as it feels she might fall so she stands with a slight degree of flexion of the knees.    Today during examination she noted that she indeed has had feature suggestive of  hypermobility and that he and her significant other were talking about that just before the appointment.  Her recent work-up is reviewed mild elevation of C-reactive protein and that of sed rate is discussed with her.  There are variety of reasons we reviewed can contribute to this besides a pregnancy and or Crohn's.   She noted that as she is 6 months pregnant and since her pregnancy her joint symptoms have gotten worse.  More than 6 years ago she was told that she has fibromyalgia.  She remembers seeing a rheumatologist.  She also been found to have Crohn's disease.  She remembers that when she was on Entyvio infusions her joint pains and Crohn's both were very well controlled.  Prior to that she had been on Remicade and Humira for Crohn's and does not qualify does have more joint symptoms.  Most recently about 4 years ago she was given prednisone for Crohn's and again does not recall if there was a significant improvement in her joint symptoms at that point.  Currently she is on Stelara that was started 3 months ago.  At 1 point she was told that she may have rheumatoid arthritis as well.  In this background she reports that she has been hurting in almost all her joints, upper and lower extremities neck and back, the symptoms got worse after she found out she is pregnant having started pain in her lower back.  This sequence of events of pain getting worse during pregnancy has happened in the past to the point where she had had to have abortions as she could not stand the pain.  At the time she has had miscarriages.  In total she has had 6 pregnancies and one child.  She feels that there is hardly any area that does not hurt.  She is not sure if there is clear diurnal variation.  She feels pain all the time.  There is no personal or family history of psoriasis rheumatoid arthritis or lupus.  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    DETAILED EXAMINATION  06/26/24  :    Vitals:    06/26/24 1121   BP: 117/83   BP Location: Right arm   Pulse: 76   SpO2: 98%   Weight: 94.7 kg (208 lb 12.8 oz)     Alert oriented. Head including the face is examined for malar rash, heliotropes, scarring, lupus pernio. Eyes examined for redness such as in episcleritis/scleritis, periorbital lesions.   Neck/ Face examined for parotid gland swelling, range of motion of neck.  Left upper and lower and right upper and lower extremities examined for tenderness, swelling, warmth of the appendicular joints, range of motion, edema, rash.  Some of the important findings included: she does not have evidence of synovitis in the palpable joints of the upper extremities.  No significant deformities of the digits.  no Heberden nodes.  Range of motion of the shoulders  show full abduction.  No JLT effusion or warmth of the knees.  she does not have dactylitis of the digits.  There is no dactylitis of digits or toes.  She has signals of hypermobility.  She is able to touch her thumb to the distal forearm.  Her fifth digit extension is 90 degrees she has exaggerated hyperextension at her elbows and knees.  As she is able to place her palms flat on the floor as she flexes the lumbar spine.  Patient Active Problem List    Diagnosis Date Noted    Atypical glandular cells on cervical Pap smear 12/20/2023     Priority: Medium     11/6/23 NL Pap with negative HPV, pt needs repeat Pap/HPV in 5 yrs.  2/6/17 LSIL Pap, pt referred to OBGYN for colp/consult  9/2/15 Colposcopy done at [facility].  7/8/15 ASCUS Pap- superior elkin os ulceration.  Pt referred to OBGYN.  6/17/14 COLP APPT: Repeat Pap- LSIL, ECC and cervical os bx- neg dysplasia, Ectocervix bx- Moderate squamous dysplasia.  Plan: refer to GYN.  DG  4/30/14 LGSIL Pap with + high risk HPV.   Cervical appearance: pink with darker pink  lacy areas. Pt needs colposcopy ASAP!  2/8/13 LGSIL Pap with negative HPV.  Pt needs repeat Pap in 1yr.    6/18/09 nl Pap  2/8/13-LSIL HPV neg 4/30/14-LSIL HPV pos 6/2014 colposcopy CIN2 ECC neg 12/2014 pap ASCUS HPV pos / colposcopy CIN1      Constipation 12/20/2023     Priority: Medium    Migraine with aura and without status migrainosus, not intractable 12/13/2023     Priority: Medium    Benign essential hypertension 12/13/2023     Priority: Medium    Rheumatoid arthritis (H) 12/05/2023     Priority: Medium    Left ovarian cyst 03/21/2023     Priority: Medium    Pre-eclampsia 04/11/2022     Priority: Medium    Pre-eclampsia, postpartum 04/08/2022     Priority: Medium    Postpartum hypertension 04/02/2022     Priority: Medium    Retained placenta 04/02/2022     Priority: Medium    Carpal tunnel syndrome during pregnancy 03/10/2022     Priority: Medium    De Quervain's disease (tenosynovitis) 03/10/2022     Priority: Medium    Accelerated idioventricular rhythm (H) 01/06/2022     Priority: Medium    Adjustment disorder with depressed mood 09/08/2021     Priority: Medium     H/O Adjustment disorder with depressed mood and anxiety: 9/8/21 Pt reports that she is feeling good/okay right now and denies need for therapy at this time.  She will let clinic know if she would like to see a therapist in the future.      Cervical intraepithelial neoplasia grade 2 11/16/2017     Priority: Medium     Formatting of this note might be different from the original.  Overview:   Overview:   2/6/17 LSIL Pap, pt referred to OBGYN for colp/consult  9[facility]2/15 [facility]  7/8/15 ASCUS Pap- superior elkin os ulceration.  Pt referred to OBGYN.  6/17/14 COLP APPT: Repeat Pap- LSIL, ECC and cervical os bx- neg dysplasia, Ectocervix bx- Moderate squamous dysplasia.  Plan: refer to GYN.  DG  4/30/14 LGSIL Pap with + high risk HPV.   Cervical appearance: pink with darker pink lacy areas. Pt needs colposcopy ASAP!  2/8/13 LGSIL  Pap with negative HPV.  Pt needs repeat Pap in 1yr.    6/18/09 Pap: NIL  11/2017 Pap: NIL    Plan: Colposcopy  Formatting of this note might be different from the original.  Overview:   2/6/17 LSIL Pap, pt referred to OBGYN for colp/consult  9/2/15 [facility]  7/8/15 ASCUS Pap- superior elkin os ulceration.  Pt referred to OBGYN.  6/17/14 COLP APPT: Repeat Pap- LSIL, ECC and cervical os bx- neg dysplasia, Ectocervix bx- Moderate squamous dysplasia.  Plan: refer to GYN.  DG  4/30/14 LGSIL Pap with + high risk HPV.   Cervical appearance: pink with darker pink lacy areas. Pt needs colposcopy ASAP!  2/8/13 LGSIL Pap with negative HPV.  Pt needs repeat Pap in 1yr.    6/18/09 Pap: NIL  11/2017 Pap: NIL    Plan: Colposcopy  *Date Onset: 9/5/2015      Vitamin D deficiency 10/13/2017     Priority: Medium    Chronic vulvovaginitis 03/02/2017     Priority: Medium    Polyp of colon 01/16/2017     Priority: Medium    Crohn's disease of ileum (H) 01/03/2017     Priority: Medium     Crohn's Disease, Celiac Disease, and Fibromyalgia: Follows with [facility]. Received second Stelara injection on 9/1/21 at [facility].      Fibromyalgia 01/03/2017     Priority: Medium     Crohn's Disease, Celiac Disease, and Fibromyalgia: Follows with [facility]. Received second Stelara injection on 9/1/21 at [facility].      Immunosuppressed status (H24) 07/08/2015     Priority: Medium    Dysphagia 06/26/2015     Priority: Medium    Hypothyroidism 06/12/2015     Priority: Medium    Crohn's disease of colon (H) 04/20/2015     Priority: Medium     Formatting of this note might be different from the original.  not taking any medications      Celiac disease 11/06/2014     Priority: Medium     Crohn's Disease, Celiac Disease, and Fibromyalgia: Follows with [facility]. Received second Stelara injection on 9/1/21 at [facility].      Gastroesophageal reflux disease without esophagitis 11/05/2014     Priority: Medium    Chronic pain 09/30/2014     Priority: Medium     No  indication for narcotics per GI  Due to Crohn's not responsive to other therapies. CPM visit 1&2 complete     CPM #1 completed? Yes, date: 2015  CPM #2 completed? Yes, date: 3/9/2015  Pain contract signed? Yes, date: 3/9/2015  Behavioral health consult completed? Yes, date: 2015  Personal care plan completed? Yes, date: 2015 See patient instructions              Crohn's disease of small and large intestines (H) 10/02/2013     Priority: Medium    Abnormal CT scan, gastrointestinal tract 12/10/2012     Priority: Medium    Allergic rhinitis, seasonal 2012     Priority: Medium     Past Surgical History:   Procedure Laterality Date    APPENDECTOMY      BOWEL RESECTION      illium     CERVIX LESION DESTRUCTION      COLPOSCOPY 2014      DILATION AND CURETTAGE SUCTION, TREAT MISSED , 1ST TRIMESTER      miscarriage at 8 wks gest    DILATION AND CURETTAGE SUCTION, TREAT MISSED , 1ST TRIMESTER  2019    ECTOPIC PREGNANCY SURGERY       EXCISION GANGLION LEFT FOOT 2018      FASCIOTOMY LOWER EXTREMITY Left 2018    Left foot    FOOT SURGERY Left     LAPAROSCOPY DIAGNOSTIC (GYN) N/A 2019    Procedure: LAPAROSCOPY, DIAGNOSTIC;  Surgeon: Anni Velasquez MD;  Location:  OR    MAMMOPLASTY REDUCTION Bilateral 2024    Procedure: MAMMOPLASTY, REDUCTION BILATERAL;  Surgeon: Miguelangel Feldman MD;  Location: [facility]    PLANTAR FASCIA RELEASE Left 04/10/2018    Procedure:  PLANTAR FASCIOTOMY LEFT FOOT ;  Surgeon: Yuri Arreola DPM;  Location: [facility];  Service:     SALPINGECTOMY Right 05/15/2021    rt sided ruptured ectopic    SMALL BOWEL RESECTION      TONSILLECTOMY        Past Medical History:   Diagnosis Date    Abnormal Pap smear of cervix     Accelerated idioventricular rhythm (H) 2022    Anemia     Anxiety     Arthritis     Bilateral plantar fasciitis     Celiac disease 2014    Chronic pain syndrome     Crohn's  disease (H) 2012    Crohn's disease of colon (H) 2015    Depression     Depressive disorder     Fibromyalgia     Gastroesophageal reflux disease without esophagitis 2014    H/O miscarriage, currently pregnant     miscarriage .    HPV (human papilloma virus) infection     Hypothyroidism 2015    Immunosuppressed status (H24) 2015    Migraine with aura and without status migrainosus, not intractable 2023    Plantar fasciitis     bilateral    Polyp of colon 2017    Postpartum hypertension 2022     delivery 2013    34 weeks. Early rupture of membranes    Rheumatoid arthritis (H) 2023     Allergies   Allergen Reactions    Barley Grass Other (See Comments)     CELIAC  CELIAC    Gluten Meal GI Disturbance    Gramineae Pollens Other (See Comments)     CELIAC    Humira [Adalimumab] Other (See Comments)     Caused huge lumps at injection site.     Ibuprofen Other (See Comments)     Patient has Crohns disease and is unable to take this medication.    Latex Itching    Nifedipine Other (See Comments)     Flushing, lightheadedness    Wheat Extract GI Disturbance    Remicade [Infliximab] Rash     Current Outpatient Medications   Medication Sig Dispense Refill    acetaminophen (TYLENOL) 650 MG CR tablet Take 1 tablet (650 mg) by mouth every 8 hours as needed for mild pain or fever 100 tablet 0    Boric Acid Vaginal 600 MG SUPP       chlorthalidone (HYGROTON) 25 MG tablet TAKE 1 TABLET(25 MG) BY MOUTH DAILY 30 tablet 0    diclofenac (VOLTAREN) 1 % topical gel Apply 4 g topically 4 times daily 350 g 0    diclofenac (VOLTAREN) 1 % topical gel Apply 2 g topically 4 times daily 150 g 0    SUMAtriptan (IMITREX) 50 MG tablet Take 1 tablet (50 mg) by mouth at onset of headache for migraine May repeat in 2 hours. Max 4 tablets/24 hours. 30 tablet 0    ustekinumab (STELARA) 90 MG/ML Inject Subcutaneous every 2 months       family history includes Asthma in her brother,  brother, brother, brother, and son; Cancer in her maternal grandmother; Crohn's Disease in her paternal uncle; Hypertension in her mother; No Known Problems in her daughter; Seizure Disorder in her mother.  Social Connections: Socially Integrated (2/13/2024)    Received from [facility], [facility]    Social Connections     Frequency of Communication with Friends and Family: 0          WBC   Date Value Ref Range Status   08/16/2019 7.3 4.0 - 11.0 10e9/L Final     WBC Count   Date Value Ref Range Status   08/17/2023 8.1 4.0 - 11.0 10e3/uL Final     RBC Count   Date Value Ref Range Status   08/17/2023 4.48 3.80 - 5.20 10e6/uL Final   08/16/2019 4.17 3.8 - 5.2 10e12/L Final     Hemoglobin   Date Value Ref Range Status   08/17/2023 13.5 11.7 - 15.7 g/dL Final   08/16/2019 13.0 11.7 - 15.7 g/dL Final     Hematocrit   Date Value Ref Range Status   08/17/2023 39.3 35.0 - 47.0 % Final   08/16/2019 37.6 35.0 - 47.0 % Final     MCV   Date Value Ref Range Status   08/17/2023 88 78 - 100 fL Final   08/16/2019 90 78 - 100 fl Final     MCH   Date Value Ref Range Status   08/17/2023 30.1 26.5 - 33.0 pg Final   08/16/2019 31.2 26.5 - 33.0 pg Final     Platelet Count   Date Value Ref Range Status   08/17/2023 218 150 - 450 10e3/uL Final   08/16/2019 180 150 - 450 10e9/L Final     % Lymphocytes   Date Value Ref Range Status   03/27/2023 48 % Final   08/16/2019 29.6 % Final     AST   Date Value Ref Range Status   03/27/2023 14 10 - 35 U/L Final   08/16/2019 8 0 - 45 U/L Final     ALT   Date Value Ref Range Status   03/27/2023 13 10 - 35 U/L Final   08/16/2019 15 0 - 50 U/L Final     Albumin   Date Value Ref Range Status   03/27/2023 4.4 3.5 - 5.2 g/dL Final   05/16/2022 3.8 3.5 - 5.0 g/dL Final   08/16/2019 3.5 3.4 - 5.0 g/dL Final     Alkaline Phosphatase   Date Value Ref Range Status   03/27/2023 99 35 - 104 U/L Final   08/16/2019 62 40 - 150 U/L Final     Creatinine    Date Value Ref Range Status   03/27/2023 0.66 0.51 - 0.95 mg/dL Final   08/16/2019 0.67 0.52 - 1.04 mg/dL Final     GFR Estimate   Date Value Ref Range Status   03/27/2023 >90 >60 mL/min/1.73m2 Final     Comment:     eGFR calculated using 2021 CKD-EPI equation.   05/15/2021 >60 >60 mL/min/1.73m2 Final   08/16/2019 >90 >60 mL/min/[1.73_m2] Final     Comment:     Non  GFR Calc  Starting 12/18/2018, serum creatinine based estimated GFR (eGFR) will be   calculated using the Chronic Kidney Disease Epidemiology Collaboration   (CKD-EPI) equation.       GFR Estimate If Black   Date Value Ref Range Status   05/15/2021 >60 >60 mL/min/1.73m2 Final   08/16/2019 >90 >60 mL/min/[1.73_m2] Final     Comment:      GFR Calc  Starting 12/18/2018, serum creatinine based estimated GFR (eGFR) will be   calculated using the Chronic Kidney Disease Epidemiology Collaboration   (CKD-EPI) equation.       Creatinine Urine mg/dL   Date Value Ref Range Status   04/08/2022 133 mg/dL Final     Erythrocyte Sedimentation Rate   Date Value Ref Range Status   01/22/2022 39 (H) 0 - 20 mm/hr Final     C-Reactive Protein   Date Value Ref Range Status   05/15/2013 1.2 (H) <0.9 mg/dL Final     CRP Inflammation   Date Value Ref Range Status   03/18/2015 16.0 (H) 0.0 - 8.0 mg/L Final     CRP   Date Value Ref Range Status   01/22/2022 0.7 0.0-<0.8 mg/dL Final

## 2024-06-27 DIAGNOSIS — I10 BENIGN ESSENTIAL HYPERTENSION: ICD-10-CM

## 2024-06-27 RX ORDER — CHLORTHALIDONE 25 MG/1
25 TABLET ORAL DAILY
Qty: 30 TABLET | Refills: 0 | Status: SHIPPED | OUTPATIENT
Start: 2024-06-27

## 2024-06-27 NOTE — TELEPHONE ENCOUNTER
Refill of chlorthalidone. Last potassium was low 3.1. Will have staff reach out to schedule follow up in the next 1-3 weeks.

## 2024-06-28 ENCOUNTER — TELEPHONE (OUTPATIENT)
Dept: FAMILY MEDICINE | Facility: CLINIC | Age: 34
End: 2024-06-28
Payer: COMMERCIAL

## 2024-06-28 NOTE — TELEPHONE ENCOUNTER
Schedule pt to come back 7/8/2024 for CK BP & POTASSIUM.      Peace Ochoa MA            ----- Message from Gildardo Minor sent at 6/27/2024  8:21 AM CDT -----  Can you call this patient to help schedule blood pressure follow up in the next 1-2 weeks? Let her know I refilled her blood pressure medication but her last potassium check was low and she really needs to get this rechecked while on this medication. Thank you!

## 2024-07-08 DIAGNOSIS — E87.6 HYPOKALEMIA: Primary | ICD-10-CM

## 2024-07-30 NOTE — TELEPHONE ENCOUNTER
Patient: Stanford Santana   MRN: [id number] YOB: 1988   Sex: male Age: 35 y.o.  Date of Service: 2024       ASSESSMENT AND PLAN  I discussed the findings with Stanford Santana and have recommended the followin. Throat clearing without dysphonia or dysphagia, normal laryngoscopy  - Throat clearing strategies provided  - Follow-up as needed      CHIEF COMPLAINT  Chief Complaint   Patient presents with    [facility]       HISTORY OF PRESENT ILLNESS  Stanford Santana is a 35 y.o. male referred by Kasey Cleary, APR* for evaluation of globus and throat clearing.  The patient reports for the past several years he has had issues with throat clearing. He has tried omeprazole for reflux, allergy medications, nasal sprays, mouthwash, nothing has helped. He reports he feels a tickle in his throat and needs to clear, typically in the middle sometimes on the right. When he's not clearing it, he doesn't feel anything. No voice issues, swallowing trouble, or breathing changes. No nasal congestion. He does note some tonsil stones on the right.      PMH: none  SH: occasional hookah  All: some seasonal allergies      ADDITIONAL HISTORY  Past Medical History  He has a past medical history of Other specified health status. Surgical History  He has a past surgical history that includes Other surgical history (2020).   Social History  He reports that he has never smoked. He has never used smokeless tobacco. He reports current alcohol use. He reports that he does not use drugs. Allergies  Patient has no known allergies.     Family History  No family history on file.     REVIEW OF SYSTEMS  All 10 systems were reviewed and negative except for above.      PHYSICAL EXAM  ENT Physical Exam   GENERAL: Well-nourished and developed, alert and appropriate, no distress, voice [id number]  RESPIRATORY: Breathing quietly, no stridor  HEAD: Normocephalic atraumatic  FACE: Symmetric, no masses or lesions  EYES:  Pupils reactive,  Refill chlorthalidone for primary hypertension. Will have staff reach out to schedule recheck.    "sclera clear, external ocular muscles intact, no nystagmus.    EARS:  Pinnae normal. External auditory canals clear and tympanic membranes intact.  NOSE:  No anterior lesions, masses or polyps.  ORAL CAVITY/OROPHARYNX:  Buccal mucosa is moist without lesions or masses, tongue midline and palate elevates symmetrically. Tongue mobility intact.  NECK:  Soft. There is no lymphadenopathy or thyromegaly.    NEUROLOGIC:  Cranial nerves II-XII grossly intact.       Last Recorded Vitals  Temperature 36.1 °C (97 °F), height 1.854 m (6' 1\"), weight 89.8 kg (198 lb).    RESULTS    Patient Reported Outcome Measures  N/A    Laboratory, Radiology, and Pathology  I personally reviewed the following results, with the following interpretation:   N/A      PROCEDURES  Laryngoscopy    Date/Time: 7/30/2024 11:37 AM    Performed by: Vivi Levi MD  Authorized by: Vivi Levi MD       Flexible Fiberoptic Laryngoscopy     Patient failed a mirror exam due to limitations of equipment and the need for laryngoscopy to assess laryngeal anatomy and function    PREOPERATIVE DIAGNOSIS: throat clearing    POSTOPERATIVE DIAGNOSIS: Same    PROCEDURE: Transnasal videolaryngoscopy    ANESTHESIA:  Topical    COMPLICATIONS:  None    SPECIMENS:  None    PROCEDURE IN DETAIL:  The patient was brought into the endoscopy suite, placed in the upright position.  The nasal cavity was topically decongested anesthetized.  The distal chip video laryngoscope was passed through the nasal cavity.  The nasal cavity and nasopharynx were within normal limits except noted below. The following findings on laryngoscopy were noted:     Tongue Base: no masses or lesions   Vocal Fold Mobility               Right VF: mobile               Left VF: mobile   TVF Appearance               Edema/Erythema: none               Lesions/vibratory margin irregularities: none   Muscle Tension Patterns:  minimal   Other Findings: prominent adenoid tissue, tonsils 1+, no stones    The " patient tolerated the procedure well.      ----------------------------------------------------------------------  Vivi Levi MD, MAEd    [facility]  Department of Otolaryngology - Head and Neck Surgery  [facility]    The total time I spent in care of this patient today (excluding time spent on other billable services) is as follows:    Time Spent  Prep time on day of patient encounter: 5 minutes  Time spent directly with patient, family or caregiver: 15 minutes  Additional Time Spent on Patient Care Activities: 5 minutes  Documentation Time: 5 minutes  Other Time Spent: 0 minutes  Total: 30 minutes

## 2024-11-17 NOTE — PROGRESS NOTES
Assessment & Plan     34-year-old female with Crohn's disease (follows with [facility]), RA and celiac (sees [facility] rheumatology), fibromyalgia    Chronic vulvovaginitis  Patient has used boric acid suppositories in the past for recurrent BV with good results.  Recommended 2 weeks of daily use for acute symptoms followed by ongoing use 2 days weekly. No clue cells on wet prep today. Discussed clindamycin topically as next step.   - boric acid topical powder    Suprapubic tenderness  Immunosuppressed status (H)  - UA Macroscopic with reflex to Microscopic and Culture    Hirsutism  Hypokalemia  Benign essential hypertension  Presents to discuss concern for PCOS. Unwanted hair growth on anterior neck and chin, however periods are regular and no U/S evidence for polycystic disease. Migraines and HTN so SANDI contraindicated.  She did have hypokalemia noted at recent ED visit. Start spironolactone and titrate as tolerated.  Return visit in 2 weeks to check BMP.  - spironolactone (ALDACTONE) 25 MG tablet; Take 1 tablet (25 mg) by mouth daily.  - Basic metabolic panel; Future    Routine screening for STI (sexually transmitted infection)  - Hepatitis C RNA, Quantitative by PCR with Confirmatory Reflex to Genotyping; Future  - HIV Antigen Antibody Combo Cascade; Future  - Treponema Abs w Reflex to RPR and Titer; Future  - Wet preparation  - Chlamydia trachomatis PCR  - Neisseria gonorrhoeae PCR    Class 2 severe obesity due to excess calories with serious comorbidity and body mass index (BMI) of 36.0 to 36.9 in adult (H)  Patient is interested in losing weight.  She says her rheumatologist has recommended she avoid exercise.  I will reach out to Dr. Avilez to discuss any exercise restrictions prior to making a weight loss plan with patient.    Screening for diabetes mellitus  A1c today 5.1%  - Hemoglobin A1c    Hypothyroidism  Hypothyroidism is on problem list.  Chart review yielded no supporting documentation.  All TSH are normal  "range.  Will recheck today and if normal remove from problem list.  - TSH WITH FREE T4 REFLEX; Future    BMI  Estimated body mass index is 36.51 kg/m  as calculated from the following:    Height as of this encounter: 1.626 m (5' 4.02\").    Weight as of this encounter: 96.5 kg (212 lb 12.8 oz).   Weight management plan: Discussed healthy diet and exercise guidelines , patient to set return appointment for weight management visit    Return in about 2 weeks (around 12/2/2024) for Follow up, with me.    The longitudinal plan of care for the diagnosis(es)/condition(s) as documented were addressed during this visit. Due to the added complexity in care, I will continue to support Kee in the subsequent management and with ongoing continuity of care.    Subjective   Kee Phipps is a 34 year old female, presenting for the following health issues:     Chief Complaint   Patient presents with    Follow Up     PCOS          11/18/2024     9:24 AM   Additional Questions   Roomed by    Accompanied by self         11/18/2024    Information    services provided? No      HPI   33 yo F with Crohn's and celiac disease here to discuss concern for PCOS. Unwanted hair growth on neck, overweight, however no trouble conceiving (2 children conceived \"right away\"). Reports regular periods lasting 3 days for at least the past 4 years, with heavy bleeding on the first day going through 4 pads soaked through, followed by 2 days of lighter flow.     Requesting STI check - last male partner positive for Trich or chlamydia (not sure) in January. Last sexual activity 2 months ago. She would like to be tested for everything. Reports multiple past episodes BV and yeast, and current milky vaginal discharge, pelvic pain, urinary frequency/urgency/dysuria lasting 1 month. For recurrent BV she has been using boric acid suppositories prn. In the past 2 suppositories has resolved symptoms but now she's had it for a month and is " "concerned for recurrent BV vs UTI vs VD.    RoS otherwise negative       Objective    BP (!) 138/99   Pulse 66   Temp 96.8  F (36  C) (Tympanic)   Resp 20   Ht 1.626 m (5' 4.02\")   Wt 96.5 kg (212 lb 12.8 oz)   LMP 10/24/2024 (Approximate)   SpO2 98%   BMI 36.51 kg/m    Body mass index is 36.51 kg/m .    Physical Exam   Constitutional: alert, no acute distress, pleasant and cooperative  Cardiovascular: regular rate and rhythm, no murmurs/rubs/gallops  Respiratory: normal respiratory rate/rhythm/effort, all lung fields CTAB, speaks in complete sentences  Neck: no adenopathy and thyroid symmetric and normal size  Eyes: conjunctivae normal and sclera anicteric  Abdomen: Abdomen soft and without distension or tenderness. No palpable masses or organomegaly.  Prominent striae. Suprapubic tenderness.  Neuro: Alert and oriented, no focal deficits  Skin: no suspicious lesions or rashes   MSK: no gross deformities noted, range of motion grossly normal   Psychiatric: mentation and affect normal, judgment and insight intact    Results for orders placed or performed in visit on 11/18/24 (from the past 24 hours)   Wet preparation    Specimen: Vagina; Swab   Result Value Ref Range    Trichomonas Absent Absent    Yeast Absent Absent    Clue Cells Absent Absent    WBCs/high power field 2+ (A) None   UA Macroscopic with reflex to Microscopic and Culture    Specimen: Urine, Clean Catch   Result Value Ref Range    Color Urine Yellow Colorless, Straw, Light Yellow, Yellow    Appearance Urine Clear Clear    Glucose Urine Negative Negative mg/dL    Bilirubin Urine Negative Negative    Ketones Urine Negative Negative mg/dL    Specific Gravity Urine 1.025 1.005 - 1.030    Blood Urine Negative Negative    pH Urine 6.0 5.0 - 8.0    Protein Albumin Urine Negative Negative mg/dL    Urobilinogen Urine 1.0 0.2, 1.0 E.U./dL    Nitrite Urine Negative Negative    Leukocyte Esterase Urine Negative Negative    Narrative    Microscopic not " indicated   Hemoglobin A1c   Result Value Ref Range    Estimated Average Glucose 100 <117 mg/dL    Hemoglobin A1C 5.1 0.0 - 5.6 %     *Note: Due to a large number of results and/or encounters for the requested time period, some results have not been displayed. A complete set of results can be found in Results Review.              Nghia Hinton MD, MEd  Resident Physician (PGY-2)  [facility] Medicine - [facility]

## 2024-11-18 ENCOUNTER — OFFICE VISIT (OUTPATIENT)
Dept: FAMILY MEDICINE | Facility: CLINIC | Age: 34
End: 2024-11-18
Payer: COMMERCIAL

## 2024-11-18 VITALS
SYSTOLIC BLOOD PRESSURE: 138 MMHG | HEART RATE: 66 BPM | DIASTOLIC BLOOD PRESSURE: 99 MMHG | HEIGHT: 64 IN | OXYGEN SATURATION: 98 % | TEMPERATURE: 96.8 F | WEIGHT: 212.8 LBS | BODY MASS INDEX: 36.33 KG/M2 | RESPIRATION RATE: 20 BRPM

## 2024-11-18 DIAGNOSIS — E66.01 CLASS 2 SEVERE OBESITY DUE TO EXCESS CALORIES WITH SERIOUS COMORBIDITY AND BODY MASS INDEX (BMI) OF 36.0 TO 36.9 IN ADULT (H): ICD-10-CM

## 2024-11-18 DIAGNOSIS — N76.1 CHRONIC VULVOVAGINITIS: Primary | ICD-10-CM

## 2024-11-18 DIAGNOSIS — D84.9 IMMUNOSUPPRESSED STATUS (H): ICD-10-CM

## 2024-11-18 DIAGNOSIS — E87.6 HYPOKALEMIA: ICD-10-CM

## 2024-11-18 DIAGNOSIS — E03.9 HYPOTHYROIDISM, UNSPECIFIED TYPE: ICD-10-CM

## 2024-11-18 DIAGNOSIS — Z11.3 ROUTINE SCREENING FOR STI (SEXUALLY TRANSMITTED INFECTION): ICD-10-CM

## 2024-11-18 DIAGNOSIS — I10 BENIGN ESSENTIAL HYPERTENSION: ICD-10-CM

## 2024-11-18 DIAGNOSIS — R10.819 SUPRAPUBIC TENDERNESS: ICD-10-CM

## 2024-11-18 DIAGNOSIS — Z13.1 SCREENING FOR DIABETES MELLITUS: ICD-10-CM

## 2024-11-18 DIAGNOSIS — E66.812 CLASS 2 SEVERE OBESITY DUE TO EXCESS CALORIES WITH SERIOUS COMORBIDITY AND BODY MASS INDEX (BMI) OF 36.0 TO 36.9 IN ADULT (H): ICD-10-CM

## 2024-11-18 DIAGNOSIS — L68.0 HIRSUTISM: ICD-10-CM

## 2024-11-18 LAB
ALBUMIN UR-MCNC: NEGATIVE MG/DL
ANION GAP SERPL CALCULATED.3IONS-SCNC: 11 MMOL/L (ref 7–15)
APPEARANCE UR: CLEAR
BILIRUB UR QL STRIP: NEGATIVE
BUN SERPL-MCNC: 11.4 MG/DL (ref 6–20)
CALCIUM SERPL-MCNC: 9.5 MG/DL (ref 8.8–10.4)
CHLORIDE SERPL-SCNC: 103 MMOL/L (ref 98–107)
CLUE CELLS: ABNORMAL
COLOR UR AUTO: YELLOW
CREAT SERPL-MCNC: 0.74 MG/DL (ref 0.51–0.95)
EGFRCR SERPLBLD CKD-EPI 2021: >90 ML/MIN/1.73M2
EST. AVERAGE GLUCOSE BLD GHB EST-MCNC: 100 MG/DL
GLUCOSE SERPL-MCNC: 86 MG/DL (ref 70–99)
GLUCOSE UR STRIP-MCNC: NEGATIVE MG/DL
HBA1C MFR BLD: 5.1 % (ref 0–5.6)
HCO3 SERPL-SCNC: 24 MMOL/L (ref 22–29)
HGB UR QL STRIP: NEGATIVE
HIV 1+2 AB+HIV1 P24 AG SERPL QL IA: NONREACTIVE
KETONES UR STRIP-MCNC: NEGATIVE MG/DL
LEUKOCYTE ESTERASE UR QL STRIP: NEGATIVE
NITRATE UR QL: NEGATIVE
PH UR STRIP: 6 [PH] (ref 5–8)
POTASSIUM SERPL-SCNC: 4.2 MMOL/L (ref 3.4–5.3)
SODIUM SERPL-SCNC: 138 MMOL/L (ref 135–145)
SP GR UR STRIP: 1.02 (ref 1–1.03)
T PALLIDUM AB SER QL: NONREACTIVE
TRICHOMONAS, WET PREP: ABNORMAL
TSH SERPL DL<=0.005 MIU/L-ACNC: 0.76 UIU/ML (ref 0.3–4.2)
UROBILINOGEN UR STRIP-ACNC: 1 E.U./DL
WBC'S/HIGH POWER FIELD, WET PREP: ABNORMAL
YEAST, WET PREP: ABNORMAL

## 2024-11-18 RX ORDER — SPIRONOLACTONE 25 MG/1
25 TABLET ORAL DAILY
Qty: 30 TABLET | Refills: 0 | Status: SHIPPED | OUTPATIENT
Start: 2024-11-18

## 2024-11-18 RX ORDER — BORIC ACID
POWDER (GRAM) MISCELLANEOUS ONCE
OUTPATIENT
Start: 2024-11-18

## 2024-11-18 ASSESSMENT — ANXIETY QUESTIONNAIRES
7. FEELING AFRAID AS IF SOMETHING AWFUL MIGHT HAPPEN: MORE THAN HALF THE DAYS
5. BEING SO RESTLESS THAT IT IS HARD TO SIT STILL: NEARLY EVERY DAY
2. NOT BEING ABLE TO STOP OR CONTROL WORRYING: NEARLY EVERY DAY
GAD7 TOTAL SCORE: 19
GAD7 TOTAL SCORE: 19
6. BECOMING EASILY ANNOYED OR IRRITABLE: NEARLY EVERY DAY
IF YOU CHECKED OFF ANY PROBLEMS ON THIS QUESTIONNAIRE, HOW DIFFICULT HAVE THESE PROBLEMS MADE IT FOR YOU TO DO YOUR WORK, TAKE CARE OF THINGS AT HOME, OR GET ALONG WITH OTHER PEOPLE: VERY DIFFICULT
1. FEELING NERVOUS, ANXIOUS, OR ON EDGE: MORE THAN HALF THE DAYS
3. WORRYING TOO MUCH ABOUT DIFFERENT THINGS: NEARLY EVERY DAY
4. TROUBLE RELAXING: NEARLY EVERY DAY

## 2024-11-18 NOTE — PROGRESS NOTES
"Preceptor Attestation:  Vitals:    11/18/24 0926 11/18/24 0929   BP: (!) 144/105 (!) 138/99   Pulse: 66    Resp: 20    Temp: 96.8  F (36  C)    TempSrc: Tympanic    SpO2: 98%    Weight: 96.5 kg (212 lb 12.8 oz)    Height: 1.626 m (5' 4.02\")           I discussed the patient with the resident and evaluated the patient in person. I have verified the content of the note, which accurately reflects my assessment of the patient and the plan of care.   Supervising Physician:  Mounika Gibson MD    "

## 2024-11-18 NOTE — PATIENT INSTRUCTIONS
Please make an appointment to see me in 2 weeks to review your lab results and discuss weight loss medication options.  See attached instructions for boric acid for BV treatment and prevention.  Spironolactone can lead to changes in body hair distribution (and will also help with your blood pressure and potassium balance) but is slow to take effect and can take 3 to 6 months to see results.  We will also need to get a repeat blood test when you see me back in 2 weeks to make sure you are electrolytes are still normal after starting this new drug.    Dr. KLEIN's diet advice:    1) Eat lots of whole grains (brown rice, oats, 100% whole wheat bread/pasta, quinoa, etc), plant based protein (lentils, peas, beans, nuts), vegetables, and fruit    2) Eat less of everything else (baked goods, processed/packaged food, meat, eggs, cheese, sweets, etc.)    3) Don't drink your calories. Water, black coffee, tea without sugar, or (if you must) zero-calorie soft drinks are OK.    4) Don't worry about protein. You'll get enough without trying*. Protein deficiency is extremely rare.    *Some exceptions: people who have had weight loss surgery or take certain weight loss meds

## 2024-11-19 LAB
C TRACH DNA SPEC QL NAA+PROBE: NEGATIVE
HCV RNA SERPL NAA+PROBE-ACNC: NOT DETECTED IU/ML
N GONORRHOEA DNA SPEC QL NAA+PROBE: NEGATIVE

## 2024-11-26 DIAGNOSIS — N76.1 CHRONIC VULVOVAGINITIS: Primary | ICD-10-CM

## 2024-12-02 ENCOUNTER — OFFICE VISIT (OUTPATIENT)
Dept: FAMILY MEDICINE | Facility: CLINIC | Age: 34
End: 2024-12-02
Payer: COMMERCIAL

## 2024-12-02 VITALS
HEIGHT: 64 IN | HEART RATE: 79 BPM | TEMPERATURE: 98 F | RESPIRATION RATE: 18 BRPM | BODY MASS INDEX: 35.85 KG/M2 | WEIGHT: 210 LBS | DIASTOLIC BLOOD PRESSURE: 85 MMHG | OXYGEN SATURATION: 100 % | SYSTOLIC BLOOD PRESSURE: 121 MMHG

## 2024-12-02 DIAGNOSIS — R11.0 NAUSEA: ICD-10-CM

## 2024-12-02 DIAGNOSIS — R12 HEARTBURN: ICD-10-CM

## 2024-12-02 DIAGNOSIS — N91.0 DELAYED MENSES: ICD-10-CM

## 2024-12-02 DIAGNOSIS — Z32.00 PREGNANCY EXAMINATION OR TEST, PREGNANCY UNCONFIRMED: Primary | ICD-10-CM

## 2024-12-02 PROBLEM — F23 BRIEF PSYCHOTIC DISORDER (H): Status: ACTIVE | Noted: 2024-12-02

## 2024-12-02 LAB — HCG UR QL: NEGATIVE

## 2024-12-02 RX ORDER — FAMOTIDINE 20 MG/1
20 TABLET, FILM COATED ORAL 2 TIMES DAILY
Qty: 60 TABLET | Refills: 1 | Status: SHIPPED | OUTPATIENT
Start: 2024-12-02

## 2024-12-02 NOTE — PROGRESS NOTES
Assessment & Plan     Pregnancy examination or test, pregnancy unconfirmed  Delayed menses  LMP 9/24/2024.  Has been having symptoms of nausea, cramping since mid November.  Was seen in clinic 11/18/2024.  At that time urine pregnancy, wet prep, HIV, syphilis, BMP, UA were unremarkable.  Patient reports that she is concerned that she might be pregnant.  Continues to have nausea, heartburn, lower abdominal cramping.  Urine hCG in clinic negative.    Plan:   Beta-HCG Quantitative           Concern for PCOS   Patient had an ultrasound done back 2023 after she had abdominal cramping, pelvic pain that showed 2 cysts on the left ovary, right ovary was normal.  With the negative urine pregnancy test x 2 and delayed menses, could be secondary to PCOS.  Spironolactone was started during last visit, however patient did not pick it up from pharmacy.  Currently meeting 2/3 criteria that includes hirsutism/acne, prior hx of irregular menses   Plan:   US Pelvis Complete without Transvaginal       Nausea  Heartburn  Patient reports that she normally heartburn and nausea when she is pregnant. Urine pregnancy test was negative x2. Beta HCG in process. Her symptoms are likely from reflux   Plan:   Discussed the dietary/ lifestyle changes   Start famotidine (PEPCID) 20 MG tablet BID  If not improving would consider further workup            Return in about 1 week (around 12/9/2024) for Follow up in 1 week with Ultrasound for concern for PCOS.    Bismark Sweet is a 34 year old, presenting for the following health issues:  Pregnancy Test (UPT urine and blood, worry that it would be come tubal pregancy again, feel nausea, fatigue, heart burn , craving, constipation , cramping and headache, )      12/2/2024     2:12 PM   Additional Questions   Roomed by ML   Accompanied by self         12/2/2024    Information    services provided? No        HPI     Reports she has not been feeling well starting mid November  "  Since last week has been having nausea, heartburn, with no vomitting or BM changes   Normally has period every 4 weeks.   Last sexual activity 9/8/2024  LMP 9/24/24.  Has abdominal cramping. Very concerned that she could be preganant    Review of Systems  Constitutional, HEENT, cardiovascular, pulmonary, gi and gu systems are negative, except as otherwise noted.      Objective    /85 (BP Location: Right arm, Patient Position: Sitting, Cuff Size: Adult Regular)   Pulse 79   Temp 98  F (36.7  C) (Oral)   Resp 18   Ht 1.626 m (5' 4.02\")   Wt 95.3 kg (210 lb)   LMP 09/24/2024 (Approximate)   SpO2 100%   BMI 36.02 kg/m    Body mass index is 36.02 kg/m .  Physical Exam   GENERAL: alert and no distress  RESP: lungs clear to auscultation - no rales, rhonchi or wheezes  CV: regular rate and rhythm, normal S1 S2, no S3 or S4, no murmur, click or rub, no peripheral edema  ABDOMEN: soft, nontender, no hepatosplenomegaly, no masses and bowel sounds normal      Precepted with Dr. Adelfo Bundy MD PGY3  [facility]   "

## 2024-12-02 NOTE — PROGRESS NOTES
Preceptor Attestation:    I discussed the patient with the resident and evaluated the patient in person. I have verified the content of the note, which accurately reflects my assessment of the patient and the plan of care.   Supervising Physician:  Melchor Emanuel MD.

## 2024-12-03 DIAGNOSIS — I10 BENIGN ESSENTIAL HYPERTENSION: ICD-10-CM

## 2024-12-03 DIAGNOSIS — L68.0 HIRSUTISM: ICD-10-CM

## 2024-12-03 DIAGNOSIS — E87.6 HYPOKALEMIA: ICD-10-CM

## 2024-12-03 LAB — HCG INTACT+B SERPL-ACNC: <1 MIU/ML

## 2024-12-03 RX ORDER — SPIRONOLACTONE 25 MG/1
25 TABLET ORAL DAILY
Qty: 90 TABLET | Refills: 0 | Status: SHIPPED | OUTPATIENT
Start: 2024-12-03

## 2024-12-30 ENCOUNTER — TELEPHONE (OUTPATIENT)
Dept: FAMILY MEDICINE | Facility: CLINIC | Age: 34
End: 2024-12-30
Payer: COMMERCIAL

## 2024-12-30 NOTE — TELEPHONE ENCOUNTER
Pt states that she started her period last night and it was light but for the last 2 hrs it has been very heavy.  She states that she is going through a mod flow pad every 30 minutes and has passed 3 golf ball sized clots.  She states that she had lots of cramping last night.  She states that her last period was 4 wks late and started on 12/2 but ended up being nl flow and length.  She states that she was started on Spironolactone for her acne and is being worked up for PCOS but has not had u/s yet.  She has a HA that is not better with Tylenol and can't take Ibuprofen due to Chron's Disease.  There are no openings left in clinic so advised pt to go to the ER or Urgent care for eval.  Told her to follow-up as directed.      Gave info to Dr Hinton in clinic./Pretty Rios RN BSN

## 2024-12-30 NOTE — TELEPHONE ENCOUNTER
General Call    Contacts       Contact Date/Time Type Contact Phone/Fax    12/30/2024 03:40 PM CST Phone (Incoming) Kee Phipps (Self) [phone] (M)          Reason for Call:  TALK      What are your questions or concerns:  the Pt called to ask some questions from Pretty and would like a call back     Date of last appointment with provider: 12/23/2024    Could we send this information to you in [employer] or would you prefer to receive a phone call?:   Patient would prefer a phone call   Okay to leave a detailed message?: Yes at Cell number on file:    Telephone Information:   Mobile [phone]         Does this patient currently have active insurance coverage?  Yes, Pt has active insurance coverage.     Does patient or caller know when to expect a call? Yes, Nurses will return call within 2-3 business hours.    Nahum Lawrence on 12/30/2024 at 3:40 PM

## 2025-02-06 ENCOUNTER — OFFICE VISIT (OUTPATIENT)
Dept: RHEUMATOLOGY | Facility: CLINIC | Age: 35
End: 2025-02-06
Payer: COMMERCIAL

## 2025-02-06 VITALS
SYSTOLIC BLOOD PRESSURE: 110 MMHG | HEART RATE: 81 BPM | DIASTOLIC BLOOD PRESSURE: 74 MMHG | BODY MASS INDEX: 35.46 KG/M2 | OXYGEN SATURATION: 100 % | WEIGHT: 206.7 LBS | RESPIRATION RATE: 21 BRPM

## 2025-02-06 DIAGNOSIS — M25.50 POLYARTHRALGIA: Primary | ICD-10-CM

## 2025-02-06 DIAGNOSIS — M35.7 HYPERMOBILITY SYNDROME: ICD-10-CM

## 2025-02-06 DIAGNOSIS — K50.019 CROHN'S DISEASE OF ILEUM WITH COMPLICATION (H): ICD-10-CM

## 2025-02-06 RX ORDER — PREDNISONE 5 MG/1
TABLET ORAL
Qty: 75 TABLET | Refills: 0 | Status: SHIPPED | OUTPATIENT
Start: 2025-02-06 | End: 2025-03-08

## 2025-02-06 NOTE — PROGRESS NOTES
Rheumatology follow-up visit note     Kee is a 34 year old female presents today for follow-up.    Kee was seen today for recheck.    Diagnoses and all orders for this visit:    Polyarthralgia  -     predniSONE (DELTASONE) 5 MG tablet; Take 3 tablets (15 mg) by mouth daily for 15 days, THEN 2 tablets (10 mg) daily for 15 days.    Crohn's disease of ileum with complication (H)    Hypermobility syndrome        This patient with hypermobility, history of fibromyalgia, background of Crohn's, started on duloxetine on her previous is not able to tolerate that because of diarrhea.  The likelihood that she has inflammatory joint disease in association with Crohn such as enthesopathy appears to be remote however given the severity of her symptoms limited options going forward I will ask her to consider a trial of prednisone major side effects of ocular metabolic bone related were reviewed.  Will meet here in the next 3 weeks or sooner.    The longitudinal plan of care for the diagnosis(es)/condition(s) as documented were addressed during this visit. Due to the added complexity in care, I will continue to support Kee in the subsequent management and with ongoing continuity of care.      HPI    Kee Phipps is a 34 year old female is here for follow-up of widespread severe polyarthralgias.  On her previous visit she was started on duloxetine in the background of hypermobility history of fibromyalgia.  She was not able to tolerated despite trying, she had diarrhea.  She expresses significant frustration.  She is tries to work out after she left the next few days she is so much in pain.  She has had difficulty sitting or walking or laying down because of this chronic pain..  She has noted pain in her hands, feet, shoulders, knees.  Even a slight amount of activity causes her pain.  This can last several days.  She has pain almost in all her joint areas as it is noted.  She has tried various over-the-counter  measures including Tylenol without help.  She sometimes feels as if she cannot stand up straight as it feels she might fall so she stands with a slight degree of flexion of the knees.    Today during examination she noted that she indeed has had feature suggestive of hypermobility and that he and her significant other were talking about that just before the appointment.  Her recent work-up is reviewed mild elevation of C-reactive protein and that of sed rate is discussed with her.  There are variety of reasons we reviewed can contribute to this besides a pregnancy and or Crohn's.   She noted that as she is 6 months pregnant and since her pregnancy her joint symptoms have gotten worse.  More than 6 years ago she was told that she has fibromyalgia.  She remembers seeing a rheumatologist.  She also been found to have Crohn's disease.  She remembers that when she was on Entyvio infusions her joint pains and Crohn's both were very well controlled.  Prior to that she had been on Remicade and Humira for Crohn's and does not qualify does have more joint symptoms.  Most recently about 4 years ago she was given prednisone for Crohn's and again does not recall if there was a significant improvement in her joint symptoms at that point.  Currently she is on Stelara that was started 3 months ago.  At 1 point she was told that she may have rheumatoid arthritis as well.  In this background she reports that she has been hurting in almost all her joints, upper and lower extremities neck and back, the symptoms got worse after she found out she is pregnant having started pain in her lower back.  This sequence of events of pain getting worse during pregnancy has happened in the past to the point where she had had to have abortions as she could not stand the pain.  At the time she has had miscarriages.  In total she has had 6 pregnancies and one child.  She feels that there is hardly any area that does not hurt.  She is not sure if there  is clear diurnal variation.  She feels pain all the time.  There is no personal or family history of psoriasis rheumatoid arthritis or lupus.  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    DETAILED EXAMINATION  02/06/25  :    Vitals:    02/06/25 1204   BP: 110/74   BP Location: Right arm   Patient Position: Sitting   Cuff Size: Adult Regular   Pulse: 81   Resp: 21   SpO2: 100%   Weight: 93.8 kg (206 lb 11.2 oz)     Alert oriented. Head including the face is examined for malar rash, heliotropes, scarring, lupus pernio. Eyes examined for redness such as in episcleritis/scleritis, periorbital lesions.   Neck/ Face examined for parotid gland swelling, range of motion of neck.  Left upper and lower and right upper and lower extremities examined for tenderness, swelling, warmth of the appendicular joints, range of motion, edema, rash.  Some of the important findings included: she does not have evidence of synovitis in the palpable joints of the upper extremities.  No significant deformities of the digits.  There is no dactylitis.  Her pain is articular as well as nonarticular, she is more tender in the distal forearms than the wrists for example.     Patient Active Problem List    Diagnosis Date Noted    Brief psychotic disorder (H) 12/02/2024     Priority: Medium    Class 2 severe obesity due to excess calories with serious comorbidity in adult (H) 11/18/2024     Priority: Medium    Atypical glandular cells on cervical Pap smear 12/20/2023     Priority: Medium     11/6/23 NL Pap with negative HPV, pt needs repeat Pap/HPV in 5 yrs.  2/6/17 LSIL Pap, pt referred to OBGYN for colp/consult  9/2/15 Colposcopy done at [facility].  7/8/15 ASCUS Pap- superior elkin os ulceration.  Pt referred to OBGYN.  6/17/14 COLP APPT: Repeat Pap- LSIL, ECC and  cervical os bx- neg dysplasia, Ectocervix bx- Moderate squamous dysplasia.  Plan: refer to GYN.  DG  4/30/14 LGSIL Pap with + high risk HPV.   Cervical appearance: pink with darker pink lacy areas. Pt needs colposcopy ASAP!  2/8/13 LGSIL Pap with negative HPV.  Pt needs repeat Pap in 1yr.    6/18/09 nl Pap  2/8/13-LSIL HPV neg 4/30/14-LSIL HPV pos 6/2014 colposcopy CIN2 ECC neg 12/2014 pap ASCUS HPV pos / colposcopy CIN1      Constipation 12/20/2023     Priority: Medium    Migraine with aura and without status migrainosus, not intractable 12/13/2023     Priority: Medium    Benign essential hypertension 12/13/2023     Priority: Medium    Rheumatoid arthritis (H) 12/05/2023     Priority: Medium    Left ovarian cyst 03/21/2023     Priority: Medium    Pre-eclampsia 04/11/2022     Priority: Medium    Pre-eclampsia, postpartum 04/08/2022     Priority: Medium    Postpartum hypertension 04/02/2022     Priority: Medium    Retained placenta 04/02/2022     Priority: Medium    Carpal tunnel syndrome during pregnancy 03/10/2022     Priority: Medium    De Quervain's disease (tenosynovitis) 03/10/2022     Priority: Medium    Accelerated idioventricular rhythm (H) 01/06/2022     Priority: Medium    Adjustment disorder with depressed mood 09/08/2021     Priority: Medium     H/O Adjustment disorder with depressed mood and anxiety: 9/8/21 Pt reports that she is feeling good/okay right now and denies need for therapy at this time.  She will let clinic know if she would like to see a therapist in the future.      Cervical intraepithelial neoplasia grade 2 11/16/2017     Priority: Medium     Formatting of this note might be different from the original.  Overview:   Overview:   2/6/17 LSIL Pap, pt referred to OBGYN for colp/consult  9/2/15 Colposcopy done at [facility].  7/8/15 ASCUS Pap- superior elkin os ulceration.  Pt referred to OBGYN.  6/17/14 COLP APPT: Repeat Pap- LSIL, ECC and cervical os bx- neg dysplasia, Ectocervix bx-  Moderate squamous dysplasia.  Plan: refer to GYN.  DG  4/30/14 LGSIL Pap with + high risk HPV.   Cervical appearance: pink with darker pink lacy areas. Pt needs colposcopy ASAP!  2/8/13 LGSIL Pap with negative HPV.  Pt needs repeat Pap in 1yr.    6/18/09 Pap: NIL  11/2017 Pap: NIL    Plan: Colposcopy  Formatting of this note might be different from the original.  Overview:   2/6/17 LSIL Pap, pt referred to OBGYN for colp/consult  9/2/15 Colposcopy done at [facility].  7/8/15 ASCUS Pap- superior elkin os ulceration.  Pt referred to OBGYN.  6/17/14 COLP APPT: Repeat Pap- LSIL, ECC and cervical os bx- neg dysplasia, Ectocervix bx- Moderate squamous dysplasia.  Plan: refer to GYN.  DG  4/30/14 LGSIL Pap with + high risk HPV.   Cervical appearance: pink with darker pink lacy areas. Pt needs colposcopy ASAP!  2/8/13 LGSIL Pap with negative HPV.  Pt needs repeat Pap in 1yr.    6/18/09 Pap: NIL  11/2017 Pap: NIL    Plan: Colposcopy  *Date Onset: 9/5/2015      Vitamin D deficiency 10/13/2017     Priority: Medium    Chronic vulvovaginitis 03/02/2017     Priority: Medium    Polyp of colon 01/16/2017     Priority: Medium    Crohn's disease of ileum (H) 01/03/2017     Priority: Medium     Crohn's Disease, Celiac Disease, and Fibromyalgia: Follows with [facility]. Received second Stelara injection on 9/1/21 at [facility].      Fibromyalgia 01/03/2017     Priority: Medium     Crohn's Disease, Celiac Disease, and Fibromyalgia: Follows with [facility]. Received second Stelara injection on 9/1/21 at [facility].      Immunosuppressed status 07/08/2015     Priority: Medium    Dysphagia 06/26/2015     Priority: Medium    Crohn's disease of colon (H) 04/20/2015     Priority: Medium     Formatting of this note might be different from the original.  not taking any medications      Celiac disease 11/06/2014     Priority: Medium     Crohn's Disease, Celiac Disease, and Fibromyalgia: Follows with [facility]. Received second Stelara injection on 9/1/21 at [facility].       Gastroesophageal reflux disease without esophagitis 2014     Priority: Medium    Chronic pain 2014     Priority: Medium     No indication for narcotics per GI  Due to Crohn's not responsive to other therapies. CPM visit 1&2 complete     CPM #1 completed? Yes, date: 2015  CPM #2 completed? Yes, date: 3/9/2015  Pain contract signed? Yes, date: 3/9/2015  Behavioral health consult completed? Yes, date: 2015  Personal care plan completed? Yes, date: 2015 See patient instructions              Crohn's disease of small and large intestines (H) 10/02/2013     Priority: Medium    Abnormal CT scan, gastrointestinal tract 12/10/2012     Priority: Medium    Allergic rhinitis, seasonal 2012     Priority: Medium     Past Surgical History:   Procedure Laterality Date    APPENDECTOMY      BOWEL RESECTION      illium     CERVIX LESION DESTRUCTION      COLPOSCOPY 2014      DILATION AND CURETTAGE SUCTION, TREAT MISSED , 1ST TRIMESTER      miscarriage at 8 wks gest    DILATION AND CURETTAGE SUCTION, TREAT MISSED , 1ST TRIMESTER  2019    ECTOPIC PREGNANCY SURGERY       EXCISION GANGLION LEFT FOOT 2018      FASCIOTOMY LOWER EXTREMITY Left 2018    Left foot    FOOT SURGERY Left     LAPAROSCOPY DIAGNOSTIC (GYN) N/A 2019    Procedure: LAPAROSCOPY, DIAGNOSTIC;  Surgeon: Anni Velasquez MD;  Location:  OR    MAMMOPLASTY REDUCTION Bilateral 2024    Procedure: MAMMOPLASTY, REDUCTION BILATERAL;  Surgeon: Miguelangel Feldman MD;  Location: [facility]    PLANTAR FASCIA RELEASE Left 04/10/2018    Procedure:  PLANTAR FASCIOTOMY LEFT FOOT ;  Surgeon: Yuri Arreola DPM;  Location: [facility];  Service:     SALPINGECTOMY Right 05/15/2021    rt sided ruptured ectopic    SMALL BOWEL RESECTION      TONSILLECTOMY        Past Medical History:   Diagnosis Date    Abnormal Pap smear of cervix     Accelerated idioventricular rhythm (H)  2022    Anemia     Anxiety     Arthritis     Bilateral plantar fasciitis     Celiac disease 2014    Chronic pain syndrome     Crohn's disease (H) 2012    Crohn's disease of colon (H) 2015    Depression     Depressive disorder     Fibromyalgia     Gastroesophageal reflux disease without esophagitis 2014    H/O miscarriage, currently pregnant     miscarriage .    HPV (human papilloma virus) infection     Hypothyroidism 2015    Immunosuppressed status 2015    Migraine with aura and without status migrainosus, not intractable 2023    Plantar fasciitis     bilateral    Polyp of colon 2017    Postpartum hypertension 2022     delivery 2013    34 weeks. Early rupture of membranes    Rheumatoid arthritis (H) 2023     Allergies   Allergen Reactions    Barley Grass Other (See Comments)     CELIAC  CELIAC    Gluten Meal GI Disturbance    Gramineae Pollens Other (See Comments)     CELIAC    Humira [Adalimumab] Other (See Comments)     Caused huge lumps at injection site.     Ibuprofen Other (See Comments)     Patient has Crohns disease and is unable to take this medication.    Latex Itching    Nifedipine Other (See Comments)     Flushing, lightheadedness    Wheat Extract GI Disturbance    Remicade [Infliximab] Rash     Current Outpatient Medications   Medication Sig Dispense Refill    acetaminophen (TYLENOL) 650 MG CR tablet Take 1 tablet (650 mg) by mouth every 8 hours as needed for mild pain or fever 100 tablet 0    boric acid 600 mg vaginal suppository - PHARMACY TO MIX COMPOUND Place 1 suppository (600 mg) vaginally twice a week. 90 suppository 3    famotidine (PEPCID) 20 MG tablet Take 1 tablet (20 mg) by mouth 2 times daily. 60 tablet 1    spironolactone (ALDACTONE) 25 MG tablet TAKE 1 TABLET BY MOUTH EVERY DAY 90 tablet 0    tretinoin (RETIN-A) 0.025 % external cream Apply topically at bedtime. 45 g 1    ustekinumab (STELARA) 90 MG/ML Inject  Subcutaneous every 2 months       family history includes Asthma in her brother, brother, brother, brother, and son; Cancer in her maternal grandmother; Crohn's Disease in her paternal uncle; Hypertension in her mother; No Known Problems in her daughter; Seizure Disorder in her mother.  Social Connections: Socially Integrated (2/13/2024)    [facility]    Social Connections     Do you often feel lonely or isolated from those around you?: 0          WBC   Date Value Ref Range Status   08/16/2019 7.3 4.0 - 11.0 10e9/L Final     WBC Count   Date Value Ref Range Status   08/17/2023 8.1 4.0 - 11.0 10e3/uL Final     RBC Count   Date Value Ref Range Status   08/17/2023 4.48 3.80 - 5.20 10e6/uL Final   08/16/2019 4.17 3.8 - 5.2 10e12/L Final     Hemoglobin   Date Value Ref Range Status   08/17/2023 13.5 11.7 - 15.7 g/dL Final   08/16/2019 13.0 11.7 - 15.7 g/dL Final     Hematocrit   Date Value Ref Range Status   08/17/2023 39.3 35.0 - 47.0 % Final   08/16/2019 37.6 35.0 - 47.0 % Final     MCV   Date Value Ref Range Status   08/17/2023 88 78 - 100 fL Final   08/16/2019 90 78 - 100 fl Final     MCH   Date Value Ref Range Status   08/17/2023 30.1 26.5 - 33.0 pg Final   08/16/2019 31.2 26.5 - 33.0 pg Final     Platelet Count   Date Value Ref Range Status   08/17/2023 218 150 - 450 10e3/uL Final   08/16/2019 180 150 - 450 10e9/L Final     % Lymphocytes   Date Value Ref Range Status   03/27/2023 48 % Final   08/16/2019 29.6 % Final     AST   Date Value Ref Range Status   03/27/2023 14 10 - 35 U/L Final   08/16/2019 8 0 - 45 U/L Final     ALT   Date Value Ref Range Status   03/27/2023 13 10 - 35 U/L Final   08/16/2019 15 0 - 50 U/L Final     Albumin   Date Value Ref Range Status   03/27/2023 4.4 3.5 - 5.2 g/dL Final   05/16/2022 3.8 3.5 - 5.0 g/dL Final   08/16/2019 3.5 3.4 - 5.0 g/dL Final     Alkaline Phosphatase   Date Value Ref Range Status   03/27/2023 99 35 - 104 U/L Final    08/16/2019 62 40 - 150 U/L Final     Creatinine   Date Value Ref Range Status   12/23/2024 0.89 0.51 - 0.95 mg/dL Final   08/16/2019 0.67 0.52 - 1.04 mg/dL Final     GFR Estimate   Date Value Ref Range Status   12/23/2024 88 >60 mL/min/1.73m2 Final     Comment:     eGFR calculated using 2021 CKD-EPI equation.   05/15/2021 >60 >60 mL/min/1.73m2 Final   08/16/2019 >90 >60 mL/min/[1.73_m2] Final     Comment:     Non  GFR Calc  Starting 12/18/2018, serum creatinine based estimated GFR (eGFR) will be   calculated using the Chronic Kidney Disease Epidemiology Collaboration   (CKD-EPI) equation.       GFR Estimate If Black   Date Value Ref Range Status   05/15/2021 >60 >60 mL/min/1.73m2 Final   08/16/2019 >90 >60 mL/min/[1.73_m2] Final     Comment:      GFR Calc  Starting 12/18/2018, serum creatinine based estimated GFR (eGFR) will be   calculated using the Chronic Kidney Disease Epidemiology Collaboration   (CKD-EPI) equation.       Creatinine Urine mg/dL   Date Value Ref Range Status   04/08/2022 133 mg/dL Final     Erythrocyte Sedimentation Rate   Date Value Ref Range Status   01/22/2022 39 (H) 0 - 20 mm/hr Final     C-Reactive Protein   Date Value Ref Range Status   05/15/2013 1.2 (H) <0.9 mg/dL Final     CRP Inflammation   Date Value Ref Range Status   03/18/2015 16.0 (H) 0.0 - 8.0 mg/L Final     CRP   Date Value Ref Range Status   01/22/2022 0.7 0.0-<0.8 mg/dL Final

## 2025-02-19 ENCOUNTER — MYC REFILL (OUTPATIENT)
Dept: FAMILY MEDICINE | Facility: CLINIC | Age: 35
End: 2025-02-19
Payer: COMMERCIAL

## 2025-02-19 DIAGNOSIS — R51.9 HEADACHE IN PREGNANCY, ANTEPARTUM, SECOND TRIMESTER: ICD-10-CM

## 2025-02-19 DIAGNOSIS — O26.892 HEADACHE IN PREGNANCY, ANTEPARTUM, SECOND TRIMESTER: ICD-10-CM

## 2025-02-19 DIAGNOSIS — L70.0 ACNE VULGARIS: ICD-10-CM

## 2025-02-20 RX ORDER — SENNOSIDES 8.6 MG
650 CAPSULE ORAL EVERY 8 HOURS PRN
Qty: 100 TABLET | Refills: 0 | Status: SHIPPED | OUTPATIENT
Start: 2025-02-20

## 2025-02-20 RX ORDER — TRETINOIN 0.25 MG/G
CREAM TOPICAL AT BEDTIME
Qty: 45 G | Refills: 1 | OUTPATIENT
Start: 2025-02-20

## 2025-02-20 NOTE — TELEPHONE ENCOUNTER
acetaminophen (TYLENOL) 650 MG CR tablet         Sig: Take 1 tablet (650 mg) by mouth every 8 hours as needed for mild pain or fever.    Disp: 100 tablet    Refills: 0    Start: 2/19/2025    Class: E-Prescribe    Non-formulary For: Headache in pregnancy, antepartum, second trimester    Last ordered: 1 year ago (12/13/2023) by Maximino Sagastume MD    Analgesics (Non-Narcotic Tylenol and ASA Only) [id number]/19/2025 09:51 PM   Protocol Details Patient is 7 months old or older    Medication is active on med list and the sig matches. RN to manually verify dose and sig if red X/fail.    Medication matches indication    Recent (12 mo) or future (90 days) visit within the authorizing provider's specialty          Bharath Caldera RN, MSN

## 2025-02-20 NOTE — TELEPHONE ENCOUNTER
Needs appointment - tretinoin is contraindicated in pregnancy and chart review suggests she is pregnant

## 2025-02-20 NOTE — TELEPHONE ENCOUNTER
tretinoin (RETIN-A) 0.025 % external cream         Sig: Apply topically at bedtime.    Disp: 45 g    Refills: 1    Start: 2/19/2025    Class: E-Prescribe    Non-formulary For: Acne vulgaris    Last ordered: 1 month ago (12/23/2024) by Nghia Hinton MD    Patient comment: Is there a mingo dosage for this medication? If so I d like to go up    Topical Acne Medications Protocol [id number]/19/2025 09:50 PM   Protocol Details Patient is 12 years of age or older    Medication is active on med list and the sig matches. RN to manually verify dose and sig if red X/fail.    Recent (12 mo) or future (90 days) visit within the authorizing provider's specialty          Bharath Caldera RN, MSN

## 2025-03-06 DIAGNOSIS — I10 BENIGN ESSENTIAL HYPERTENSION: ICD-10-CM

## 2025-03-06 DIAGNOSIS — L68.0 HIRSUTISM: ICD-10-CM

## 2025-03-06 DIAGNOSIS — E87.6 HYPOKALEMIA: ICD-10-CM

## 2025-03-06 RX ORDER — SPIRONOLACTONE 25 MG/1
25 TABLET ORAL DAILY
Qty: 90 TABLET | Refills: 0 | Status: SHIPPED | OUTPATIENT
Start: 2025-03-06

## 2025-03-06 NOTE — TELEPHONE ENCOUNTER
Name from pharmacy: SPIRONOLACTONE 25MG TABLETS          Will file in chart as: spironolactone (ALDACTONE) 25 MG tablet    Sig: TAKE 1 TABLET BY MOUTH EVERY DAY    Disp: 90 tablet    Refills: 0 (Pharmacy requested: Not specified)    Start: 3/6/2025    Class: E-Prescribe    Non-formulary For: Hirsutism, Hypokalemia, Benign essential hypertension    Last ordered: 3 months ago (12/3/2024) by Nghia Hinton MD    Last refill: 12/3/2024    Rx #: [id number]    Diuretics (Including Combos) Protocol Oloaxw0003/06/2025 04:12 AM   Protocol Details Most recent blood pressure under 140/90 in past 12 months    Potassium level on file in past 12 months    Medication is active on med list and the sig matches. RN to manually verify dose and sig if red X/fail.    Has GFR on file in past 12 months and most recent value is normal    Medication indicated for associated diagnosis    Recent (12 mo) or future (90 days) visit within the authorizing provider's specialty    Patient is age 18 or older    No active pregancy on record    No positive pregnancy test in past 12 months          BP Readings from Last 3 Encounters:   02/06/25 110/74   12/23/24 111/79   12/02/24 121/85       GFR Estimate   Date Value Ref Range Status   12/23/2024 88 >60 mL/min/1.73m2 Final     Comment:     eGFR calculated using 2021 CKD-EPI equation.   05/15/2021 >60 >60 mL/min/1.73m2 Final   08/16/2019 >90 >60 mL/min/[1.73_m2] Final     Comment:     Non  GFR Calc  Starting 12/18/2018, serum creatinine based estimated GFR (eGFR) will be   calculated using the Chronic Kidney Disease Epidemiology Collaboration   (CKD-EPI) equation.         Prescription approved per [facility] Refill Protocol.    Bharath Caldera, RN, MSN

## 2025-03-17 NOTE — TELEPHONE ENCOUNTER
Answering service call re: test results    Beta hcg 14. Patient reports it was 20 a couple of days ago at urgent care, not yesterday as written in Dr. Miller's note. Explained results to patient and that if it has been a couple of days and her value has not doubled, this does not represent a viable pregnancy. She had no further questions. Told patient I would route note to Dr. SCHERER so she can also call if she has any further input.    Shaniqua Riddle MD  PGY-2  Pager # [phone]   Blood pressures noted    No new orders

## 2025-03-18 ENCOUNTER — OFFICE VISIT (OUTPATIENT)
Dept: FAMILY MEDICINE | Facility: CLINIC | Age: 35
End: 2025-03-18
Payer: COMMERCIAL

## 2025-03-18 VITALS
TEMPERATURE: 97.8 F | SYSTOLIC BLOOD PRESSURE: 127 MMHG | DIASTOLIC BLOOD PRESSURE: 89 MMHG | RESPIRATION RATE: 20 BRPM | BODY MASS INDEX: 34.62 KG/M2 | HEIGHT: 64 IN | OXYGEN SATURATION: 96 % | WEIGHT: 202.8 LBS | HEART RATE: 83 BPM

## 2025-03-18 DIAGNOSIS — L70.0 ACNE VULGARIS: ICD-10-CM

## 2025-03-18 DIAGNOSIS — L68.0 HIRSUTISM: Primary | ICD-10-CM

## 2025-03-18 PROCEDURE — 99213 OFFICE O/P EST LOW 20 MIN: CPT | Mod: GC

## 2025-03-18 PROCEDURE — 36415 COLL VENOUS BLD VENIPUNCTURE: CPT

## 2025-03-18 PROCEDURE — 80048 BASIC METABOLIC PNL TOTAL CA: CPT

## 2025-03-18 RX ORDER — SPIRONOLACTONE 50 MG/1
50 TABLET, FILM COATED ORAL DAILY
Qty: 90 TABLET | Refills: 0 | Status: SHIPPED | OUTPATIENT
Start: 2025-03-18

## 2025-03-18 RX ORDER — TRETINOIN 0.25 MG/G
GEL TOPICAL AT BEDTIME
Qty: 45 G | Refills: 1 | Status: SHIPPED | OUTPATIENT
Start: 2025-03-18

## 2025-03-18 NOTE — PATIENT INSTRUCTIONS
-increase spironolactone to 50 mg daily  -check BP if feel dizzy  -let us know if BP low  -check lab today  -try tretinoin gel  -continue moisturizing with SPF  -continue abstinence   -follow up 1 month video visit

## 2025-03-18 NOTE — PROGRESS NOTES
Assessment & Plan     Hirsutism  Suspect PCOS.  Patient endorses irregular periods and she also has physical evidence of hirsutism on exam.  She has hair growth on the chin.  She meets 2 out of 3 [place] criteria.  Ongoing weight loss efforts through weight management clinic at [facility].  We will increase spironolactone to 50 mg daily.  We reviewed the possible side effects such as hypotension.  She has a blood pressure cuff at home.  I told her to check her blood pressure if she does feel dizzy.  She will let us know if this happens.  We will also check a potassium today.  - Basic metabolic panel  - spironolactone (ALDACTONE) 50 MG tablet  Dispense: 90 tablet; Refill: 0  -Follow-up in 1 month via video visit    Acne vulgaris  Improving on topical tretinoin.  No mood concerns.  Last menstrual period on February 21.  She is not sexually active.  She is not on contraception.  Offered contraception she is not interested today.  I recommended that if she were to become sexually active that she would need to at least use male condoms.  She states that she will continue with abstinence.  Minimal side effects from the tretinoin.  Managing dry skin with moisturizing with lotion.  She is interested in trying a different formulation other than the cream.  Reviewed that there is a gel available.  Patient in agreement with plan.  Questions addressed.  - tretinoin (RETIN-A) 0.025 % external gel  Dispense: 45 g; Refill: 1      Return in about 4 weeks (around 4/15/2025) for Follow up, using a video visit.    Bismark Sweet is a 34 year old, presenting for the following health issues:  Recheck Medication        3/18/2025     9:27 AM   Additional Questions   Roomed by hTien JONES   Accompanied by Self         3/18/2025    Information    services provided? No     HPI      34-year-old female with a history of chronic pain, Crohn's disease, hypertension here for medication follow-up. Started topical  "tretinoin back in December, going well. Has a little bit of dry skin but other than that no side effects. No headache, constipation, mood concerns. LMP Feb 21st. Not sexually active and not on contraception. She is interested in the ointment if that's available because she wonders if that will have less of a drying effect. Has been moisturizing with SPF. Has ran out of her her tretinoin 2 weeks ago .     Spironolactone 25 mg tolerating well. Hair growth under chin and chest area has gotten a littlle better since starting it in Dec. Denies no side effects such as dizziness, orthostasis. K normal in Dec.     No other concerns today. She is not interest in starting contraception.       Review of Systems  Constitutional, HEENT, cardiovascular, pulmonary, gi and gu systems are negative, except as otherwise noted.      Objective    /89   Pulse 83   Temp 97.8  F (36.6  C) (Tympanic)   Resp 20   Ht 1.626 m (5' 4\")   Wt 92 kg (202 lb 12.8 oz)   LMP 01/25/2025 (Approximate)   SpO2 96%   BMI 34.81 kg/m    Body mass index is 34.81 kg/m .  Physical Exam   GENERAL: alert and no distress  EYES: Eyes grossly normal to inspection, PERRL and conjunctivae and sclerae normal  Chest: Normal work of breathing.  Normal chest wall excursion.  MS: no gross musculoskeletal defects noted, no edema  SKIN: no suspicious lesions or rashes  PSYCH: mentation appears normal, affect normal/bright            Signed Electronically by: Jaison Gao MD  {Email feedback regarding this note to [email or web address]   :[id number]}  "

## 2025-03-18 NOTE — PROGRESS NOTES
"Preceptor attestation:  Vital signs reviewed: /89   Pulse 83   Temp 97.8  F (36.6  C) (Tympanic)   Resp 20   Ht 1.626 m (5' 4\")   Wt 92 kg (202 lb 12.8 oz)   LMP 01/25/2025 (Approximate)   SpO2 96%   BMI 34.81 kg/m      Patient seen, evaluated, and discussed with the resident.  I verified the content of the note, which accurately reflects my assessment of the patient and the plan of care.    Supervising physician: Eduarda Wasserman MD  [facility]  "

## 2025-03-19 ENCOUNTER — TELEPHONE (OUTPATIENT)
Dept: FAMILY MEDICINE | Facility: CLINIC | Age: 35
End: 2025-03-19
Payer: COMMERCIAL

## 2025-03-19 DIAGNOSIS — L70.0 ACNE VULGARIS: ICD-10-CM

## 2025-03-19 LAB
ANION GAP SERPL CALCULATED.3IONS-SCNC: 10 MMOL/L (ref 7–15)
BUN SERPL-MCNC: 16.4 MG/DL (ref 6–20)
CALCIUM SERPL-MCNC: 9.3 MG/DL (ref 8.8–10.4)
CHLORIDE SERPL-SCNC: 105 MMOL/L (ref 98–107)
CREAT SERPL-MCNC: 0.8 MG/DL (ref 0.51–0.95)
EGFRCR SERPLBLD CKD-EPI 2021: >90 ML/MIN/1.73M2
GLUCOSE SERPL-MCNC: 93 MG/DL (ref 70–99)
HCO3 SERPL-SCNC: 26 MMOL/L (ref 22–29)
POTASSIUM SERPL-SCNC: 5.3 MMOL/L (ref 3.4–5.3)
SODIUM SERPL-SCNC: 141 MMOL/L (ref 135–145)

## 2025-03-19 NOTE — TELEPHONE ENCOUNTER
Prior Authorization Retail Medication Request    Medication/Dose: tretinoin (RETIN-A) 0.025 % external gel  Diagnosis and ICD code (if different than what is on RX):  Acne vulgaris [L70.0]   New/renewal/insurance change PA/secondary ins. PA:  Previously Tried and Failed:    Rationale:      Insurance   Primary: BLUE PLUS - BLUE PLUS [facility]   Insurance ID:  [id number]    Secondary (if applicable):  Insurance ID:      Pharmacy Information (if different than what is on RX)  Name:  [facility] #[id number] [address]  Phone:  [phone]   Fax:[phone]     Clinic Information  Preferred routing pool for dept communication:

## 2025-03-21 NOTE — TELEPHONE ENCOUNTER
PA Initiation    Medication: TRETINOIN 0.025 % EX GEL  Insurance Company: [employer] - Phone [phone] Fax [phone]  Pharmacy Filling the Rx: [facility] #[id number] [address]  Filling Pharmacy Phone: [phone]  Filling Pharmacy Fax: [phone]  Start Date: 3/21/2025

## 2025-03-25 ENCOUNTER — TELEPHONE (OUTPATIENT)
Dept: FAMILY MEDICINE | Facility: CLINIC | Age: 35
End: 2025-03-25

## 2025-03-25 NOTE — TELEPHONE ENCOUNTER
PRIOR AUTHORIZATION DENIED    Medication: TRETINOIN 0.025 % EX GEL  Insurance Company: [employer] - Phone [phone] Fax [phone]  Denial Date: 3/24/2025  Denial Reason(s):               Appeal Information:         Patient Notified: NO

## 2025-03-26 RX ORDER — TRETINOIN 0.25 MG/G
CREAM TOPICAL AT BEDTIME
Qty: 45 G | Refills: 1 | Status: SHIPPED | OUTPATIENT
Start: 2025-03-26

## 2025-03-26 NOTE — TELEPHONE ENCOUNTER
PA was denied. Would you like to send a different medication or appeal? Please advise. Thank you.

## 2025-07-12 ENCOUNTER — HEALTH MAINTENANCE LETTER (OUTPATIENT)
Age: 35
End: 2025-07-12

## 2025-08-28 ENCOUNTER — MYC REFILL (OUTPATIENT)
Dept: FAMILY MEDICINE | Facility: CLINIC | Age: 35
End: 2025-08-28
Payer: COMMERCIAL

## 2025-08-28 DIAGNOSIS — L68.0 HIRSUTISM: ICD-10-CM

## 2025-08-28 RX ORDER — SPIRONOLACTONE 50 MG/1
50 TABLET, FILM COATED ORAL DAILY
Qty: 90 TABLET | Refills: 0 | Status: SHIPPED | OUTPATIENT
Start: 2025-08-28

## (undated) DEVICE — BANDAGE ELASTIC 6X550 LF DBL 593-96LF

## (undated) DEVICE — ENDO SCOPE WARMER LF TM500

## (undated) DEVICE — LINEN GOWN X4 5410

## (undated) DEVICE — PAD CHUX UNDERPAD 30X36" P3036C

## (undated) DEVICE — BLADE KNIFE SURG 15 371115

## (undated) DEVICE — HOLDER DRAINAGE BULB 0814-8220

## (undated) DEVICE — BNDG ELASTIC 6"X5YDS UNSTERILE 6611-60

## (undated) DEVICE — DRSG STERI STRIP 1/2X4" R1547

## (undated) DEVICE — LINEN TOWEL PACK X5 5464

## (undated) DEVICE — DRAPE IOBAN INCISE 23X17" 6650EZ

## (undated) DEVICE — GLOVE PROTEXIS W/NEU-THERA 6.5  2D73TE65

## (undated) DEVICE — DRSG KERLIX 4 1/2"X4YDS ROLL 6715

## (undated) DEVICE — BLANKET BAIR HUGGER UNDERBODY 36X84 AU63500

## (undated) DEVICE — SYR BULB IRRIG DOVER 60 ML LATEX FREE 67000

## (undated) DEVICE — PEN MARKING SKIN W/LABELS 31145918

## (undated) DEVICE — STPL SKIN 35W 6.9MM  PXW35

## (undated) DEVICE — DRAIN RESERVOIR 100ML JP 0070740

## (undated) DEVICE — SPONGE LAP 18X18" X8435

## (undated) DEVICE — SUCTION MANIFOLD NEPTUNE 2 SYS 1 PORT 702-025-000

## (undated) DEVICE — ESU GROUND PAD UNIVERSAL W/O CORD

## (undated) DEVICE — ESU PENCIL SMOKE EVAC W/ROCKER SWITCH 0703-047-000

## (undated) DEVICE — GLOVE UNDER INDICATOR PI SZ 7.0 LF 41670

## (undated) DEVICE — Device

## (undated) DEVICE — ENDO TROCAR SLEEVE KII ADV FIXATION 05X100MM CFS02

## (undated) DEVICE — PAD PERI INDIV WRAP 11" 2022A

## (undated) DEVICE — SOL WATER IRRIG 1000ML BOTTLE 2F7114

## (undated) DEVICE — SOL NACL 0.9% INJ 1000ML BAG 2B1324X

## (undated) DEVICE — SUTURE MONOCRYL+ 4-0 PS-2 27IN MCP426H

## (undated) DEVICE — DRAIN BLAKE 15FR SIL 2229

## (undated) DEVICE — GLOVE BIOGEL PI INDICATOR 8.0 LF 41680

## (undated) DEVICE — ENDO TROCAR FIRST ENTRY KII FIOS ADV FIX 05X100MM CFF03

## (undated) DEVICE — GLOVE PROTEXIS MICRO 5.5  2D73PM55

## (undated) DEVICE — ESU GROUND PAD ADULT REM W/15' CORD E7507DB

## (undated) DEVICE — SUTURE VICRYL+ 2-0 27IN SH UND VCP417H

## (undated) DEVICE — DRSG XEROFORM 1X8"

## (undated) DEVICE — MARKER SURG SKIN 2 TIP STRL SPP99DT2AA

## (undated) DEVICE — GLOVE BIOGEL PI ULTRATOUCH G SZ 6.5 42165

## (undated) DEVICE — ADH LIQUID MASTISOL TOPICAL VIAL 2-3ML 0523-48

## (undated) DEVICE — STRAP KNEE/BODY 31143004

## (undated) DEVICE — SUCTION MANIFOLD DORNOCH ULTRA CART UL-CL500

## (undated) DEVICE — GLOVE GAMMEX NEOPRENE ULTRA SZ 7.5 LF 8515

## (undated) DEVICE — CLEANER CAUTERY TIP E2401

## (undated) DEVICE — ADPT 5 IN 1 360

## (undated) DEVICE — SU MONOCRYL 3-0 SH 27" UND Y416H

## (undated) DEVICE — GOWN IMPERVIOUS BREATHABLE SMART LG 89015

## (undated) DEVICE — SU STRATAFIX MONOCRYL 4-0 SPIRAL PS-2 SXMP1B118

## (undated) DEVICE — CONTAINER URINE SPEC 4OZ STRL 1053

## (undated) DEVICE — JELLY LUBRICATING SURGILUBE 2OZ TUBE 0281-0205-02

## (undated) DEVICE — TUBING INFUSION INFILTRATION LIPOSUCTION 156" 24-6008

## (undated) DEVICE — GLOVE UNDER INDICATOR PI SZ 6.5 LF 41665

## (undated) DEVICE — DRAPE SHEET REV FOLD 3/4 9349

## (undated) DEVICE — PANTIES MESH LG/XLG 2PK 706M2

## (undated) DEVICE — SU MONOCRYL 4-0 PS-2 18" UND Y496G

## (undated) DEVICE — DRSG DRAIN 4X4" 7086

## (undated) DEVICE — PREP CHLORAPREP 26ML TINTED HI-LITE ORANGE 930815

## (undated) DEVICE — SU VICRYL+ 3-0 27IN SH UND VCP416H

## (undated) DEVICE — SUTURE MONOCRYL+ 5-0 18IN P3 UND MCP493G

## (undated) DEVICE — SOL NACL 0.9% IRRIG 1000ML BOTTLE 2F7124

## (undated) DEVICE — ESU HOLDER LAP INST DISP PURPLE LONG 330MM H-PRO-330

## (undated) DEVICE — BLADE KNIFE SURG 10 371110

## (undated) DEVICE — SUCTION TIP YANKAUER W/O VENT K86

## (undated) DEVICE — SUCTION IRR STRYKERFLOW II W/TIP 250-070-520

## (undated) DEVICE — SU SILK 2-0 FS-1 18" 685G

## (undated) DEVICE — CUSTOM PACK GEN MAJOR SBA5BGMHEA

## (undated) DEVICE — CATH TRAY FOLEY SURESTEP 16FR LF A947316

## (undated) DEVICE — SUTURE MONOCRYL 3-0 18 PS2 UND MCP497G

## (undated) DEVICE — DRAPE CHEST 100X72X124 89227

## (undated) DEVICE — DECANTER TRANSFER DEVICE 2008S

## (undated) DEVICE — GLOVE PROTEXIS BLUE W/NEU-THERA 7.0  2D73EB70

## (undated) DEVICE — SU MONOCRYL+ 4-0 18IN PS2 UND MCP496G

## (undated) RX ORDER — DEXAMETHASONE SODIUM PHOSPHATE 10 MG/ML
INJECTION, SOLUTION INTRAMUSCULAR; INTRAVENOUS
Status: DISPENSED
Start: 2024-02-19

## (undated) RX ORDER — CEFAZOLIN SODIUM 1 G/3ML
INJECTION, POWDER, FOR SOLUTION INTRAMUSCULAR; INTRAVENOUS
Status: DISPENSED
Start: 2024-02-19

## (undated) RX ORDER — KETOROLAC TROMETHAMINE 30 MG/ML
INJECTION, SOLUTION INTRAMUSCULAR; INTRAVENOUS
Status: DISPENSED
Start: 2024-02-19

## (undated) RX ORDER — ONDANSETRON 2 MG/ML
INJECTION INTRAMUSCULAR; INTRAVENOUS
Status: DISPENSED
Start: 2019-08-16

## (undated) RX ORDER — PROPOFOL 10 MG/ML
INJECTION, EMULSION INTRAVENOUS
Status: DISPENSED
Start: 2024-02-19

## (undated) RX ORDER — GINSENG 100 MG
CAPSULE ORAL
Status: DISPENSED
Start: 2024-02-19

## (undated) RX ORDER — FENTANYL CITRATE 50 UG/ML
INJECTION, SOLUTION INTRAMUSCULAR; INTRAVENOUS
Status: DISPENSED
Start: 2019-08-16

## (undated) RX ORDER — FENTANYL CITRATE 50 UG/ML
INJECTION, SOLUTION INTRAMUSCULAR; INTRAVENOUS
Status: DISPENSED
Start: 2024-02-19

## (undated) RX ORDER — PROPOFOL 10 MG/ML
INJECTION, EMULSION INTRAVENOUS
Status: DISPENSED
Start: 2019-08-16

## (undated) RX ORDER — LIDOCAINE HYDROCHLORIDE 10 MG/ML
INJECTION, SOLUTION EPIDURAL; INFILTRATION; INTRACAUDAL; PERINEURAL
Status: DISPENSED
Start: 2024-02-19

## (undated) RX ORDER — BUPIVACAINE HYDROCHLORIDE 2.5 MG/ML
INJECTION, SOLUTION EPIDURAL; INFILTRATION; INTRACAUDAL
Status: DISPENSED
Start: 2019-08-16

## (undated) RX ORDER — DEXAMETHASONE SODIUM PHOSPHATE 4 MG/ML
INJECTION, SOLUTION INTRA-ARTICULAR; INTRALESIONAL; INTRAMUSCULAR; INTRAVENOUS; SOFT TISSUE
Status: DISPENSED
Start: 2019-08-16

## (undated) RX ORDER — ONDANSETRON 2 MG/ML
INJECTION INTRAMUSCULAR; INTRAVENOUS
Status: DISPENSED
Start: 2024-02-19

## (undated) RX ORDER — SODIUM BICARBONATE 42 MG/ML
INJECTION, SOLUTION INTRAVENOUS
Status: DISPENSED
Start: 2019-08-16